# Patient Record
Sex: MALE | Race: WHITE | URBAN - METROPOLITAN AREA
[De-identification: names, ages, dates, MRNs, and addresses within clinical notes are randomized per-mention and may not be internally consistent; named-entity substitution may affect disease eponyms.]

---

## 2021-08-21 ENCOUNTER — INPATIENT (INPATIENT)
Facility: HOSPITAL | Age: 46
LOS: 32 days | Discharge: HOME | DRG: 917 | End: 2021-09-23
Attending: STUDENT IN AN ORGANIZED HEALTH CARE EDUCATION/TRAINING PROGRAM | Admitting: INTERNAL MEDICINE
Payer: COMMERCIAL

## 2021-08-21 VITALS
RESPIRATION RATE: 14 BRPM | TEMPERATURE: 101 F | DIASTOLIC BLOOD PRESSURE: 77 MMHG | SYSTOLIC BLOOD PRESSURE: 122 MMHG | HEART RATE: 159 BPM | OXYGEN SATURATION: 97 %

## 2021-08-21 DIAGNOSIS — Z98.89 OTHER SPECIFIED POSTPROCEDURAL STATES: Chronic | ICD-10-CM

## 2021-08-21 DIAGNOSIS — Z96.629 PRESENCE OF UNSPECIFIED ARTIFICIAL ELBOW JOINT: Chronic | ICD-10-CM

## 2021-08-21 DIAGNOSIS — A41.9 SEPSIS, UNSPECIFIED ORGANISM: ICD-10-CM

## 2021-08-21 LAB
ALBUMIN SERPL ELPH-MCNC: 3.6 G/DL — SIGNIFICANT CHANGE UP (ref 3.3–5.2)
ALP SERPL-CCNC: 60 U/L — SIGNIFICANT CHANGE UP (ref 40–120)
ALT FLD-CCNC: 183 U/L — HIGH
AMPHET UR-MCNC: NEGATIVE — SIGNIFICANT CHANGE UP
ANION GAP SERPL CALC-SCNC: 16 MMOL/L — SIGNIFICANT CHANGE UP (ref 5–17)
ANISOCYTOSIS BLD QL: SLIGHT — SIGNIFICANT CHANGE UP
APAP SERPL-MCNC: <3 UG/ML — LOW (ref 10–26)
APPEARANCE UR: CLEAR — SIGNIFICANT CHANGE UP
APTT BLD: 31.8 SEC — SIGNIFICANT CHANGE UP (ref 27.5–35.5)
AST SERPL-CCNC: 248 U/L — HIGH
BARBITURATES UR SCN-MCNC: NEGATIVE — SIGNIFICANT CHANGE UP
BENZODIAZ UR-MCNC: POSITIVE
BILIRUB SERPL-MCNC: 0.5 MG/DL — SIGNIFICANT CHANGE UP (ref 0.4–2)
BILIRUB UR-MCNC: NEGATIVE — SIGNIFICANT CHANGE UP
BUN SERPL-MCNC: 39.7 MG/DL — HIGH (ref 8–20)
CALCIUM SERPL-MCNC: 8.8 MG/DL — SIGNIFICANT CHANGE UP (ref 8.6–10.2)
CHLORIDE SERPL-SCNC: 104 MMOL/L — SIGNIFICANT CHANGE UP (ref 98–107)
CK MB CFR SERPL CALC: 126.3 NG/ML — HIGH (ref 0–6.7)
CK SERPL-CCNC: 5627 U/L — HIGH (ref 30–200)
CO2 SERPL-SCNC: 22 MMOL/L — SIGNIFICANT CHANGE UP (ref 22–29)
COCAINE METAB.OTHER UR-MCNC: POSITIVE
COLOR SPEC: YELLOW — SIGNIFICANT CHANGE UP
CREAT SERPL-MCNC: 3.78 MG/DL — HIGH (ref 0.5–1.3)
DIFF PNL FLD: ABNORMAL
EPI CELLS # UR: SIGNIFICANT CHANGE UP
ETHANOL SERPL-MCNC: <10 MG/DL — SIGNIFICANT CHANGE UP (ref 0–9)
GLUCOSE SERPL-MCNC: 84 MG/DL — SIGNIFICANT CHANGE UP (ref 70–99)
GLUCOSE UR QL: 50 MG/DL
HCT VFR BLD CALC: 55.2 % — HIGH (ref 39–50)
HGB BLD-MCNC: 18.3 G/DL — HIGH (ref 13–17)
INR BLD: 1.05 RATIO — SIGNIFICANT CHANGE UP (ref 0.88–1.16)
KETONES UR-MCNC: ABNORMAL
LACTATE BLDV-MCNC: 3.1 MMOL/L — HIGH (ref 0.5–2)
LEUKOCYTE ESTERASE UR-ACNC: NEGATIVE — SIGNIFICANT CHANGE UP
LIDOCAIN IGE QN: 58 U/L — HIGH (ref 22–51)
LYMPHOCYTES # BLD AUTO: 19 % — SIGNIFICANT CHANGE UP (ref 13–44)
MACROCYTES BLD QL: SLIGHT — SIGNIFICANT CHANGE UP
MAGNESIUM SERPL-MCNC: 1.7 MG/DL — LOW (ref 1.8–2.6)
MCHC RBC-ENTMCNC: 31.2 PG — SIGNIFICANT CHANGE UP (ref 27–34)
MCHC RBC-ENTMCNC: 33.2 GM/DL — SIGNIFICANT CHANGE UP (ref 32–36)
MCV RBC AUTO: 94.2 FL — SIGNIFICANT CHANGE UP (ref 80–100)
METAMYELOCYTES # FLD: 1 % — HIGH (ref 0–0)
METHADONE UR-MCNC: NEGATIVE — SIGNIFICANT CHANGE UP
MICROCYTES BLD QL: SLIGHT — SIGNIFICANT CHANGE UP
MONOCYTES NFR BLD AUTO: 15 % — HIGH (ref 2–14)
NEUTROPHILS NFR BLD AUTO: 61 % — SIGNIFICANT CHANGE UP (ref 43–77)
NEUTS BAND # BLD: 3 % — SIGNIFICANT CHANGE UP (ref 0–8)
NITRITE UR-MCNC: NEGATIVE — SIGNIFICANT CHANGE UP
NT-PROBNP SERPL-SCNC: 1967 PG/ML — HIGH (ref 0–300)
OPIATES UR-MCNC: NEGATIVE — SIGNIFICANT CHANGE UP
PCP SPEC-MCNC: SIGNIFICANT CHANGE UP
PCP UR-MCNC: NEGATIVE — SIGNIFICANT CHANGE UP
PH UR: 6 — SIGNIFICANT CHANGE UP (ref 5–8)
PLAT MORPH BLD: NORMAL — SIGNIFICANT CHANGE UP
PLATELET # BLD AUTO: 189 K/UL — SIGNIFICANT CHANGE UP (ref 150–400)
POIKILOCYTOSIS BLD QL AUTO: SLIGHT — SIGNIFICANT CHANGE UP
POLYCHROMASIA BLD QL SMEAR: SLIGHT — SIGNIFICANT CHANGE UP
POTASSIUM SERPL-MCNC: 5.2 MMOL/L — SIGNIFICANT CHANGE UP (ref 3.5–5.3)
POTASSIUM SERPL-SCNC: 5.2 MMOL/L — SIGNIFICANT CHANGE UP (ref 3.5–5.3)
PROT SERPL-MCNC: 6.5 G/DL — LOW (ref 6.6–8.7)
PROT UR-MCNC: 100 MG/DL
PROTHROM AB SERPL-ACNC: 12.2 SEC — SIGNIFICANT CHANGE UP (ref 10.6–13.6)
RAPID RVP RESULT: SIGNIFICANT CHANGE UP
RBC # BLD: 5.86 M/UL — HIGH (ref 4.2–5.8)
RBC # FLD: 11.8 % — SIGNIFICANT CHANGE UP (ref 10.3–14.5)
RBC BLD AUTO: ABNORMAL
RBC CASTS # UR COMP ASSIST: ABNORMAL /HPF (ref 0–4)
SALICYLATES SERPL-MCNC: <0.6 MG/DL — LOW (ref 10–20)
SARS-COV-2 RNA SPEC QL NAA+PROBE: SIGNIFICANT CHANGE UP
SODIUM SERPL-SCNC: 142 MMOL/L — SIGNIFICANT CHANGE UP (ref 135–145)
SP GR SPEC: 1.02 — SIGNIFICANT CHANGE UP (ref 1.01–1.02)
STOMATOCYTES BLD QL SMEAR: PRESENT — SIGNIFICANT CHANGE UP
T4 AB SER-ACNC: 4.8 UG/DL — SIGNIFICANT CHANGE UP (ref 4.5–12)
THC UR QL: NEGATIVE — SIGNIFICANT CHANGE UP
TROPONIN T SERPL-MCNC: 0.72 NG/ML — HIGH (ref 0–0.06)
TSH SERPL-MCNC: 0.76 UIU/ML — SIGNIFICANT CHANGE UP (ref 0.27–4.2)
UROBILINOGEN FLD QL: NEGATIVE MG/DL — SIGNIFICANT CHANGE UP
VARIANT LYMPHS # BLD: 1 % — SIGNIFICANT CHANGE UP (ref 0–6)
WBC # BLD: 4.16 K/UL — SIGNIFICANT CHANGE UP (ref 3.8–10.5)
WBC # FLD AUTO: 4.16 K/UL — SIGNIFICANT CHANGE UP (ref 3.8–10.5)
WBC UR QL: SIGNIFICANT CHANGE UP

## 2021-08-21 PROCEDURE — 93010 ELECTROCARDIOGRAM REPORT: CPT

## 2021-08-21 PROCEDURE — 31500 INSERT EMERGENCY AIRWAY: CPT

## 2021-08-21 PROCEDURE — 99291 CRITICAL CARE FIRST HOUR: CPT | Mod: 25

## 2021-08-21 PROCEDURE — 70450 CT HEAD/BRAIN W/O DYE: CPT | Mod: 26,MA

## 2021-08-21 PROCEDURE — 71045 X-RAY EXAM CHEST 1 VIEW: CPT | Mod: 26

## 2021-08-21 RX ORDER — GLUCAGON INJECTION, SOLUTION 0.5 MG/.1ML
1 INJECTION, SOLUTION SUBCUTANEOUS ONCE
Refills: 0 | Status: DISCONTINUED | OUTPATIENT
Start: 2021-08-21 | End: 2021-09-07

## 2021-08-21 RX ORDER — DEXTROSE 50 % IN WATER 50 %
12.5 SYRINGE (ML) INTRAVENOUS ONCE
Refills: 0 | Status: DISCONTINUED | OUTPATIENT
Start: 2021-08-21 | End: 2021-09-07

## 2021-08-21 RX ORDER — ALBUTEROL 90 UG/1
2 AEROSOL, METERED ORAL EVERY 6 HOURS
Refills: 0 | Status: DISCONTINUED | OUTPATIENT
Start: 2021-08-21 | End: 2021-08-23

## 2021-08-21 RX ORDER — HEPARIN SODIUM 5000 [USP'U]/ML
5000 INJECTION INTRAVENOUS; SUBCUTANEOUS EVERY 8 HOURS
Refills: 0 | Status: DISCONTINUED | OUTPATIENT
Start: 2021-08-21 | End: 2021-09-23

## 2021-08-21 RX ORDER — ACETAMINOPHEN 500 MG
975 TABLET ORAL ONCE
Refills: 0 | Status: COMPLETED | OUTPATIENT
Start: 2021-08-21 | End: 2021-08-21

## 2021-08-21 RX ORDER — DEXTROSE 50 % IN WATER 50 %
15 SYRINGE (ML) INTRAVENOUS ONCE
Refills: 0 | Status: DISCONTINUED | OUTPATIENT
Start: 2021-08-21 | End: 2021-09-07

## 2021-08-21 RX ORDER — NOREPINEPHRINE BITARTRATE/D5W 8 MG/250ML
0.05 PLASTIC BAG, INJECTION (ML) INTRAVENOUS
Qty: 8 | Refills: 0 | Status: DISCONTINUED | OUTPATIENT
Start: 2021-08-21 | End: 2021-08-23

## 2021-08-21 RX ORDER — CHLORHEXIDINE GLUCONATE 213 G/1000ML
1 SOLUTION TOPICAL
Refills: 0 | Status: DISCONTINUED | OUTPATIENT
Start: 2021-08-21 | End: 2021-09-07

## 2021-08-21 RX ORDER — PHENYLEPHRINE HYDROCHLORIDE 10 MG/ML
0.5 INJECTION INTRAVENOUS
Qty: 40 | Refills: 0 | Status: DISCONTINUED | OUTPATIENT
Start: 2021-08-21 | End: 2021-08-21

## 2021-08-21 RX ORDER — DEXTROSE 50 % IN WATER 50 %
25 SYRINGE (ML) INTRAVENOUS ONCE
Refills: 0 | Status: DISCONTINUED | OUTPATIENT
Start: 2021-08-21 | End: 2021-09-07

## 2021-08-21 RX ORDER — KETAMINE HYDROCHLORIDE 100 MG/ML
100 INJECTION INTRAMUSCULAR; INTRAVENOUS ONCE
Refills: 0 | Status: DISCONTINUED | OUTPATIENT
Start: 2021-08-21 | End: 2021-08-21

## 2021-08-21 RX ORDER — SODIUM CHLORIDE 9 MG/ML
2800 INJECTION, SOLUTION INTRAVENOUS ONCE
Refills: 0 | Status: COMPLETED | OUTPATIENT
Start: 2021-08-21 | End: 2021-08-21

## 2021-08-21 RX ORDER — SODIUM CHLORIDE 9 MG/ML
1000 INJECTION, SOLUTION INTRAVENOUS
Refills: 0 | Status: DISCONTINUED | OUTPATIENT
Start: 2021-08-21 | End: 2021-08-23

## 2021-08-21 RX ORDER — PIPERACILLIN AND TAZOBACTAM 4; .5 G/20ML; G/20ML
3.38 INJECTION, POWDER, LYOPHILIZED, FOR SOLUTION INTRAVENOUS ONCE
Refills: 0 | Status: COMPLETED | OUTPATIENT
Start: 2021-08-21 | End: 2021-08-21

## 2021-08-21 RX ORDER — INSULIN LISPRO 100/ML
VIAL (ML) SUBCUTANEOUS EVERY 6 HOURS
Refills: 0 | Status: DISCONTINUED | OUTPATIENT
Start: 2021-08-21 | End: 2021-09-07

## 2021-08-21 RX ORDER — CHLORHEXIDINE GLUCONATE 213 G/1000ML
15 SOLUTION TOPICAL EVERY 12 HOURS
Refills: 0 | Status: DISCONTINUED | OUTPATIENT
Start: 2021-08-21 | End: 2021-08-31

## 2021-08-21 RX ORDER — SODIUM CHLORIDE 9 MG/ML
1000 INJECTION, SOLUTION INTRAVENOUS
Refills: 0 | Status: DISCONTINUED | OUTPATIENT
Start: 2021-08-21 | End: 2021-09-07

## 2021-08-21 RX ORDER — PANTOPRAZOLE SODIUM 20 MG/1
40 TABLET, DELAYED RELEASE ORAL DAILY
Refills: 0 | Status: DISCONTINUED | OUTPATIENT
Start: 2021-08-21 | End: 2021-09-01

## 2021-08-21 RX ORDER — ROCURONIUM BROMIDE 10 MG/ML
70 VIAL (ML) INTRAVENOUS ONCE
Refills: 0 | Status: COMPLETED | OUTPATIENT
Start: 2021-08-21 | End: 2021-08-21

## 2021-08-21 RX ORDER — PROPOFOL 10 MG/ML
10 INJECTION, EMULSION INTRAVENOUS
Qty: 1000 | Refills: 0 | Status: DISCONTINUED | OUTPATIENT
Start: 2021-08-21 | End: 2021-08-24

## 2021-08-21 RX ORDER — MAGNESIUM SULFATE 500 MG/ML
2 VIAL (ML) INJECTION ONCE
Refills: 0 | Status: COMPLETED | OUTPATIENT
Start: 2021-08-21 | End: 2021-08-22

## 2021-08-21 RX ORDER — VANCOMYCIN HCL 1 G
1000 VIAL (EA) INTRAVENOUS ONCE
Refills: 0 | Status: COMPLETED | OUTPATIENT
Start: 2021-08-21 | End: 2021-08-21

## 2021-08-21 RX ORDER — PIPERACILLIN AND TAZOBACTAM 4; .5 G/20ML; G/20ML
3.38 INJECTION, POWDER, LYOPHILIZED, FOR SOLUTION INTRAVENOUS EVERY 12 HOURS
Refills: 0 | Status: DISCONTINUED | OUTPATIENT
Start: 2021-08-21 | End: 2021-08-27

## 2021-08-21 RX ADMIN — SODIUM CHLORIDE 2800 MILLILITER(S): 9 INJECTION, SOLUTION INTRAVENOUS at 20:44

## 2021-08-21 RX ADMIN — Medication 250 MILLIGRAM(S): at 20:54

## 2021-08-21 RX ADMIN — Medication 70 MILLIGRAM(S): at 19:45

## 2021-08-21 RX ADMIN — PIPERACILLIN AND TAZOBACTAM 200 GRAM(S): 4; .5 INJECTION, POWDER, LYOPHILIZED, FOR SOLUTION INTRAVENOUS at 20:44

## 2021-08-21 RX ADMIN — PROPOFOL 5.4 MICROGRAM(S)/KG/MIN: 10 INJECTION, EMULSION INTRAVENOUS at 22:55

## 2021-08-21 RX ADMIN — Medication 975 MILLIGRAM(S): at 20:00

## 2021-08-21 RX ADMIN — PHENYLEPHRINE HYDROCHLORIDE 16.9 MICROGRAM(S)/KG/MIN: 10 INJECTION INTRAVENOUS at 21:52

## 2021-08-21 RX ADMIN — KETAMINE HYDROCHLORIDE 100 MILLIGRAM(S): 100 INJECTION INTRAMUSCULAR; INTRAVENOUS at 19:45

## 2021-08-21 RX ADMIN — Medication 8.44 MICROGRAM(S)/KG/MIN: at 22:39

## 2021-08-21 NOTE — ED PROCEDURE NOTE - CPROC ED TIME OUT STATEMENT1
“Patient's name, , procedure and correct site were confirmed during the [facility] Timeout.”
“Patient's name, , procedure and correct site were confirmed during the [facility] Timeout.”

## 2021-08-21 NOTE — H&P ADULT - NSHPSOCIALHISTORY_GEN_ALL_CORE
His sister reports he drinks alcohol, abuses cocaine and pills (benzos and possibly others), owns [email or web address]

## 2021-08-21 NOTE — ED PROVIDER NOTE - CLINICAL SUMMARY MEDICAL DECISION MAKING FREE TEXT BOX
45y/o M with PMHx of DM, ? Asthma presents to the ED BIBA s/p being found unresponsive by family, unknown downtime. 45y/o M with PMHx of DM, ? Asthma presents to the ED BIBA s/p being found unresponsive by family, unknown downtime. Pt here agonal breathing, hypoxic, difficult to oxygenate, intubated on arrival, pt septic, concern for COVID vs pna vs aspiration pna (emesis at mouth), sepsis protocol initiated, MICU consulted, admit for further management

## 2021-08-21 NOTE — H&P ADULT - NSHPPOADEEPVENOUSTHROMB_GEN_A_CORE
I agree with the Resident's findings and plan, as documented in today's note.    Hazel Villarreal MD   no

## 2021-08-21 NOTE — ED ADULT NURSE NOTE - OBJECTIVE STATEMENT
Pt unresponsive at this time. .  per EMS pt hx DM asthma,  presents to the ED BIBA after being found unresponsive by his family, unknown downtime.  pt was found with agonal respirations, pinpoint pupils, received 3mg IV Narcan and 1mg IN Narcan. EMS believes pt may have aspirated, bagged on arrival.b MD BY at bedside, on monitor

## 2021-08-21 NOTE — ED PROVIDER NOTE - PHYSICAL EXAMINATION
Const:   HEENT: NC/AT. Moist mucous membranes.  Eyes: No scleral icterus. EOMI.  Neck:. Soft and supple. Full ROM without pain.  Cardiac: Regular rate and regular rhythm. +S1/S2. Peripheral pulses 2+ and symmetric. No LE edema.  Resp:   Abd: Soft, non-tender, non-distended. Normal bowel sounds in all 4 quadrants. No guarding or rebound.  Back: Spine midline and non-tender. No CVAT.  Skin: No rashes, abrasions or lacerations.  Lymph: No cervical lymphadenopathy.  Neuro: Const: Unresponsive, agonal breathing  HEENT: NC/AT. Moist mucous membranes. Dried emesis around mouth  Eyes: 4mm equal round reactive  Neck:. Soft and supple. Full ROM without pain.  Cardiac: Regular rate and regular rhythm. +S1/S2. Peripheral pulses 2+ and symmetric. No LE edema.  Resp: Decreased breath sounds b/l.   Abd: Soft, non-distended. Normal bowel sounds in all 4 quadrants. No guarding or rebound.  Back: Spine midline and non-tender. No CVAT.  Skin: No rashes, abrasions or lacerations. No signs of trauma   Lymph: No cervical lymphadenopathy.  Neuro: Unresponsive, agonal breathing Const: Unresponsive, agonal breathing  HEENT: NC/AT. Moist mucous membranes. Dried emesis around mouth  Eyes: 4mm equal round reactive  Neck:. Soft and supple.  Cardiac:Tachy and regular rhythm. +S1/S2. Peripheral pulses 2+ and symmetric. No LE edema.  Resp: Decreased breath sounds b/l.   Abd: Soft, non-distended. No guarding or rebound.  Back: Spine midline, no deformity, no step offs. No CVAT.  Skin: No rashes, abrasions or lacerations. No signs of trauma   Lymph: No cervical lymphadenopathy.  Neuro: Unresponsive, agonal breathing

## 2021-08-21 NOTE — ED ADULT TRIAGE NOTE - CHIEF COMPLAINT QUOTE
pt with known hx of heroin addiction, BIBA unresponsive, found by family friend. agonal breaths noted, being bagged by EMS. pt sent to critical care, MD called for eval.

## 2021-08-21 NOTE — H&P ADULT - ASSESSMENT
44 y/o M with a h/o HTN, DM, asthma, with acute hypoxemic respiratory failure, aspiration pneumonia, AMS, suspected benzodiazepine overdose, GARRETT.            CRITICAL CARE TIME SPENT: 55 mins  Time spent evaluating/treating patient with medical issues that pose a high risk for life threatening deterioration and/or end-organ damage, reviewing data/labs/imaging, discussing case with multidisciplinary team, discussing plan/goals of care with patient/family. Non-inclusive of procedure time.   42 y/o M with a h/o HTN, DM, asthma, with acute hypoxemic respiratory failure, aspiration pneumonia, AMS, suspected benzodiazepine overdose, septic shock, GARRETT.    At this time the leading theory is that the patient overdosed on benzodiazepines and had a massive aspiration event. No major acute intracranial pathology seen on CT head. Unclear if this was a suicide attempt. His sister reports he has never mentioned anything about hurting himself to her.    - actively titrating vent settings to maintain SpO2 > 92%, currently with significant FiO2 requirement, at increased risk for development of ARDS  - obtain ABG and adjust minute ventilation as necessary  - start sedation with propofol to promote vent synchrony and patient comfort, he is beginning to become restless  - start norepinephrine infusion to maintain a MAP > 65, suspect onset of septic shock compounded by vasodilation from propofol  - s/p 3L IVF at this point, will give additional 1L LR bolus now and start maintenance LR @ 100cc/hr given evidence of hemoconcentration  - start course of empiric Zosyn, blood cultures pending, will add sputum culture and check urine Legionella Ag  - GARRETT/ATN likely pre-renal in nature, may indicate him being down for an extended period of time, check CK level  - trend BUN/Cr, monitor lytes, place parikh catheter for precise UOP measurement, no indication for urgent HD at this time  - will consider MRI head pending clinical course to further investigate questionable findings on CT head  - will need psychiatry and social work evaluation prior to discharge    Case discussed in detail with eICU physician, Dr. Waters.      CRITICAL CARE TIME SPENT: 55 mins  Time spent evaluating/treating patient with medical issues that pose a high risk for life threatening deterioration and/or end-organ damage, reviewing data/labs/imaging, discussing case with multidisciplinary team, discussing plan/goals of care with patient/family. Non-inclusive of procedure time.   44 y/o M with a h/o HTN, DM, asthma, with acute hypoxemic respiratory failure, aspiration pneumonia, AMS, suspected benzodiazepine overdose, septic shock, GARRETT.    At this time the leading theory is that the patient overdosed on benzodiazepines and had a massive aspiration event. No major acute intracranial pathology seen on CT head. Unclear if this was a suicide attempt. His sister reports he has never mentioned anything about hurting himself to her.    - actively titrating vent settings to maintain SpO2 > 92%, currently with significant FiO2 requirement, at increased risk for development of ARDS  - obtain ABG and adjust minute ventilation as necessary  - empiric inhaled bronchodilators for now  - start sedation with propofol to promote vent synchrony and patient comfort, he is beginning to become restless  - start norepinephrine infusion to maintain a MAP > 65, suspect onset of septic shock compounded by vasodilation from propofol  - s/p 3L IVF at this point, will give additional 1L LR bolus now and start maintenance LR @ 100cc/hr given evidence of hemoconcentration  - start course of empiric Zosyn, blood cultures pending, will add sputum culture and check urine Legionella Ag  - GARRETT/ATN likely pre-renal in nature, may indicate him being down for an extended period of time, check CK level  - trend BUN/Cr, monitor lytes, place parikh catheter for precise UOP measurement, no indication for urgent HD at this time  - will consider MRI head pending clinical course to further investigate questionable findings on CT head  - start Q 6 hour sliding scale insulin regimen  - will need psychiatry and social work evaluation prior to discharge    Case discussed in detail at the bedside with the patient's sister, Adela Borges ([phone]). All questions answered and concerns addressed. She will bring in the medications that she found at his house.    Case discussed in detail with eICU physician, Dr. Waters.      CRITICAL CARE TIME SPENT: 55 mins  Time spent evaluating/treating patient with medical issues that pose a high risk for life threatening deterioration and/or end-organ damage, reviewing data/labs/imaging, discussing case with multidisciplinary team, discussing plan/goals of care with patient/family. Non-inclusive of procedure time.

## 2021-08-21 NOTE — ED PROVIDER NOTE - OBJECTIVE STATEMENT
43y/o M with PMHx of DM, ? Asthma presents to the ED BIBA s/p being found unresponsive by family, unknown downtime. Per EMS, pt was found with agonal respirations, received 3IV Narcan and 1 IN Narcan. EMS believes pt may have aspirated, bagged in field. Unable to obtain further information secondary to condition. 45y/o M with PMHx of DM, ? Asthma (as per EMS) presents to the ED BIBA s/p being found unresponsive by family, unknown downtime. Per EMS, pt was found with agonal respirations, pinpoint pupils, received 3mg IV Narcan and 1mg IN Narcan. EMS believes pt may have aspirated, bagged in field. Unable to obtain further information secondary to condition.

## 2021-08-22 LAB
A1C WITH ESTIMATED AVERAGE GLUCOSE RESULT: 5.3 % — SIGNIFICANT CHANGE UP (ref 4–5.6)
ALBUMIN SERPL ELPH-MCNC: 2.8 G/DL — LOW (ref 3.3–5.2)
ALBUMIN SERPL ELPH-MCNC: 2.9 G/DL — LOW (ref 3.3–5.2)
ALP SERPL-CCNC: 40 U/L — SIGNIFICANT CHANGE UP (ref 40–120)
ALP SERPL-CCNC: 41 U/L — SIGNIFICANT CHANGE UP (ref 40–120)
ALT FLD-CCNC: 149 U/L — HIGH
ALT FLD-CCNC: 158 U/L — HIGH
ANION GAP SERPL CALC-SCNC: 12 MMOL/L — SIGNIFICANT CHANGE UP (ref 5–17)
ANION GAP SERPL CALC-SCNC: 13 MMOL/L — SIGNIFICANT CHANGE UP (ref 5–17)
ANION GAP SERPL CALC-SCNC: 17 MMOL/L — SIGNIFICANT CHANGE UP (ref 5–17)
APPEARANCE UR: ABNORMAL
AST SERPL-CCNC: 244 U/L — HIGH
AST SERPL-CCNC: 274 U/L — HIGH
BACTERIA # UR AUTO: ABNORMAL
BASE EXCESS BLDV CALC-SCNC: -2.3 MMOL/L — LOW (ref -2–3)
BILIRUB SERPL-MCNC: 0.4 MG/DL — SIGNIFICANT CHANGE UP (ref 0.4–2)
BILIRUB SERPL-MCNC: 0.5 MG/DL — SIGNIFICANT CHANGE UP (ref 0.4–2)
BILIRUB UR-MCNC: NEGATIVE — SIGNIFICANT CHANGE UP
BUN SERPL-MCNC: 43.2 MG/DL — HIGH (ref 8–20)
BUN SERPL-MCNC: 43.8 MG/DL — HIGH (ref 8–20)
BUN SERPL-MCNC: 44.6 MG/DL — HIGH (ref 8–20)
CALCIUM SERPL-MCNC: 8.2 MG/DL — LOW (ref 8.6–10.2)
CALCIUM SERPL-MCNC: 8.5 MG/DL — LOW (ref 8.6–10.2)
CALCIUM SERPL-MCNC: 8.5 MG/DL — LOW (ref 8.6–10.2)
CHLORIDE SERPL-SCNC: 105 MMOL/L — SIGNIFICANT CHANGE UP (ref 98–107)
CHLORIDE SERPL-SCNC: 107 MMOL/L — SIGNIFICANT CHANGE UP (ref 98–107)
CHLORIDE SERPL-SCNC: 107 MMOL/L — SIGNIFICANT CHANGE UP (ref 98–107)
CK MB CFR SERPL CALC: 101.1 NG/ML — HIGH (ref 0–6.7)
CK MB CFR SERPL CALC: 125.5 NG/ML — HIGH (ref 0–6.7)
CK MB CFR SERPL CALC: 57.6 NG/ML — HIGH (ref 0–6.7)
CK SERPL-CCNC: 7124 U/L — HIGH (ref 30–200)
CK SERPL-CCNC: 7422 U/L — HIGH (ref 30–200)
CK SERPL-CCNC: 8851 U/L — HIGH (ref 30–200)
CO2 SERPL-SCNC: 19 MMOL/L — LOW (ref 22–29)
CO2 SERPL-SCNC: 20 MMOL/L — LOW (ref 22–29)
CO2 SERPL-SCNC: 20 MMOL/L — LOW (ref 22–29)
COLOR SPEC: YELLOW — SIGNIFICANT CHANGE UP
COMMENT - URINE: SIGNIFICANT CHANGE UP
COVID-19 SPIKE DOMAIN AB INTERP: NEGATIVE — SIGNIFICANT CHANGE UP
COVID-19 SPIKE DOMAIN ANTIBODY RESULT: 0.4 U/ML — SIGNIFICANT CHANGE UP
CREAT ?TM UR-MCNC: 282 MG/DL — SIGNIFICANT CHANGE UP
CREAT SERPL-MCNC: 2.22 MG/DL — HIGH (ref 0.5–1.3)
CREAT SERPL-MCNC: 3.08 MG/DL — HIGH (ref 0.5–1.3)
CREAT SERPL-MCNC: 3.68 MG/DL — HIGH (ref 0.5–1.3)
DIFF PNL FLD: ABNORMAL
EPI CELLS # UR: SIGNIFICANT CHANGE UP
ESTIMATED AVERAGE GLUCOSE: 105 MG/DL — SIGNIFICANT CHANGE UP (ref 68–114)
GAS PNL BLDA: SIGNIFICANT CHANGE UP
GAS PNL BLDA: SIGNIFICANT CHANGE UP
GLUCOSE BLDC GLUCOMTR-MCNC: 108 MG/DL — HIGH (ref 70–99)
GLUCOSE BLDC GLUCOMTR-MCNC: 124 MG/DL — HIGH (ref 70–99)
GLUCOSE BLDC GLUCOMTR-MCNC: 128 MG/DL — HIGH (ref 70–99)
GLUCOSE SERPL-MCNC: 106 MG/DL — HIGH (ref 70–99)
GLUCOSE SERPL-MCNC: 141 MG/DL — HIGH (ref 70–99)
GLUCOSE SERPL-MCNC: 154 MG/DL — HIGH (ref 70–99)
GLUCOSE UR QL: NEGATIVE MG/DL — SIGNIFICANT CHANGE UP
GRAM STN FLD: SIGNIFICANT CHANGE UP
GRAN CASTS # UR COMP ASSIST: ABNORMAL /LPF
HCO3 BLDV-SCNC: 24 MMOL/L — SIGNIFICANT CHANGE UP (ref 22–29)
HCT VFR BLD CALC: 47.1 % — SIGNIFICANT CHANGE UP (ref 39–50)
HCT VFR BLD CALC: 49.1 % — SIGNIFICANT CHANGE UP (ref 39–50)
HGB BLD-MCNC: 15.7 G/DL — SIGNIFICANT CHANGE UP (ref 13–17)
HGB BLD-MCNC: 16.7 G/DL — SIGNIFICANT CHANGE UP (ref 13–17)
HYALINE CASTS # UR AUTO: ABNORMAL /LPF
KETONES UR-MCNC: ABNORMAL
LEUKOCYTE ESTERASE UR-ACNC: ABNORMAL
MAGNESIUM SERPL-MCNC: 1.8 MG/DL — SIGNIFICANT CHANGE UP (ref 1.6–2.6)
MAGNESIUM SERPL-MCNC: 1.8 MG/DL — SIGNIFICANT CHANGE UP (ref 1.8–2.6)
MAGNESIUM SERPL-MCNC: 2.1 MG/DL — SIGNIFICANT CHANGE UP (ref 1.6–2.6)
MCHC RBC-ENTMCNC: 31 PG — SIGNIFICANT CHANGE UP (ref 27–34)
MCHC RBC-ENTMCNC: 31 PG — SIGNIFICANT CHANGE UP (ref 27–34)
MCHC RBC-ENTMCNC: 33.3 GM/DL — SIGNIFICANT CHANGE UP (ref 32–36)
MCHC RBC-ENTMCNC: 34 GM/DL — SIGNIFICANT CHANGE UP (ref 32–36)
MCV RBC AUTO: 91.3 FL — SIGNIFICANT CHANGE UP (ref 80–100)
MCV RBC AUTO: 93.1 FL — SIGNIFICANT CHANGE UP (ref 80–100)
NITRITE UR-MCNC: NEGATIVE — SIGNIFICANT CHANGE UP
NT-PROBNP SERPL-SCNC: 5142 PG/ML — HIGH (ref 0–300)
OSMOLALITY UR: 773 MOSM/KG — SIGNIFICANT CHANGE UP (ref 300–1000)
PCO2 BLDV: 49 MMHG — SIGNIFICANT CHANGE UP (ref 42–55)
PH BLDV: 7.3 — LOW (ref 7.32–7.43)
PH UR: 5 — SIGNIFICANT CHANGE UP (ref 5–8)
PHOSPHATE SERPL-MCNC: 3.5 MG/DL — SIGNIFICANT CHANGE UP (ref 2.4–4.7)
PHOSPHATE SERPL-MCNC: 4.2 MG/DL — SIGNIFICANT CHANGE UP (ref 2.4–4.7)
PLATELET # BLD AUTO: 149 K/UL — LOW (ref 150–400)
PLATELET # BLD AUTO: 170 K/UL — SIGNIFICANT CHANGE UP (ref 150–400)
PO2 BLDV: 42 MMHG — SIGNIFICANT CHANGE UP (ref 25–45)
POTASSIUM SERPL-MCNC: 5.1 MMOL/L — SIGNIFICANT CHANGE UP (ref 3.5–5.3)
POTASSIUM SERPL-MCNC: 5.4 MMOL/L — HIGH (ref 3.5–5.3)
POTASSIUM SERPL-MCNC: 5.6 MMOL/L — HIGH (ref 3.5–5.3)
POTASSIUM SERPL-SCNC: 5.1 MMOL/L — SIGNIFICANT CHANGE UP (ref 3.5–5.3)
POTASSIUM SERPL-SCNC: 5.4 MMOL/L — HIGH (ref 3.5–5.3)
POTASSIUM SERPL-SCNC: 5.6 MMOL/L — HIGH (ref 3.5–5.3)
PROT ?TM UR-MCNC: 66 MG/DL — HIGH (ref 0–12)
PROT SERPL-MCNC: 5.6 G/DL — LOW (ref 6.6–8.7)
PROT SERPL-MCNC: 5.6 G/DL — LOW (ref 6.6–8.7)
PROT UR-MCNC: 30 MG/DL
PROT/CREAT UR-RTO: 0.2 RATIO — SIGNIFICANT CHANGE UP
RBC # BLD: 5.06 M/UL — SIGNIFICANT CHANGE UP (ref 4.2–5.8)
RBC # BLD: 5.38 M/UL — SIGNIFICANT CHANGE UP (ref 4.2–5.8)
RBC # FLD: 12 % — SIGNIFICANT CHANGE UP (ref 10.3–14.5)
RBC # FLD: 12.1 % — SIGNIFICANT CHANGE UP (ref 10.3–14.5)
RBC CASTS # UR COMP ASSIST: ABNORMAL /HPF (ref 0–4)
SAO2 % BLDV: 70.4 % — SIGNIFICANT CHANGE UP
SARS-COV-2 IGG+IGM SERPL QL IA: 0.4 U/ML — SIGNIFICANT CHANGE UP
SARS-COV-2 IGG+IGM SERPL QL IA: NEGATIVE — SIGNIFICANT CHANGE UP
SODIUM SERPL-SCNC: 138 MMOL/L — SIGNIFICANT CHANGE UP (ref 135–145)
SODIUM SERPL-SCNC: 139 MMOL/L — SIGNIFICANT CHANGE UP (ref 135–145)
SODIUM SERPL-SCNC: 143 MMOL/L — SIGNIFICANT CHANGE UP (ref 135–145)
SODIUM UR-SCNC: <30 MMOL/L — SIGNIFICANT CHANGE UP
SP GR SPEC: 1.02 — SIGNIFICANT CHANGE UP (ref 1.01–1.02)
SPECIMEN SOURCE: SIGNIFICANT CHANGE UP
TROPONIN T SERPL-MCNC: 1.04 NG/ML — HIGH (ref 0–0.06)
TROPONIN T SERPL-MCNC: 1.25 NG/ML — HIGH (ref 0–0.06)
UROBILINOGEN FLD QL: NEGATIVE MG/DL — SIGNIFICANT CHANGE UP
WBC # BLD: 11.65 K/UL — HIGH (ref 3.8–10.5)
WBC # BLD: 17.12 K/UL — HIGH (ref 3.8–10.5)
WBC # FLD AUTO: 11.65 K/UL — HIGH (ref 3.8–10.5)
WBC # FLD AUTO: 17.12 K/UL — HIGH (ref 3.8–10.5)
WBC UR QL: SIGNIFICANT CHANGE UP

## 2021-08-22 PROCEDURE — 93306 TTE W/DOPPLER COMPLETE: CPT | Mod: 26

## 2021-08-22 PROCEDURE — 71045 X-RAY EXAM CHEST 1 VIEW: CPT | Mod: 26

## 2021-08-22 PROCEDURE — 93010 ELECTROCARDIOGRAM REPORT: CPT | Mod: 77

## 2021-08-22 PROCEDURE — 99291 CRITICAL CARE FIRST HOUR: CPT

## 2021-08-22 PROCEDURE — 93010 ELECTROCARDIOGRAM REPORT: CPT

## 2021-08-22 PROCEDURE — 99223 1ST HOSP IP/OBS HIGH 75: CPT

## 2021-08-22 PROCEDURE — 99221 1ST HOSP IP/OBS SF/LOW 40: CPT

## 2021-08-22 RX ORDER — MIDODRINE HYDROCHLORIDE 2.5 MG/1
10 TABLET ORAL EVERY 8 HOURS
Refills: 0 | Status: DISCONTINUED | OUTPATIENT
Start: 2021-08-22 | End: 2021-08-26

## 2021-08-22 RX ORDER — ASPIRIN/CALCIUM CARB/MAGNESIUM 324 MG
81 TABLET ORAL DAILY
Refills: 0 | Status: DISCONTINUED | OUTPATIENT
Start: 2021-08-22 | End: 2021-09-07

## 2021-08-22 RX ORDER — VASOPRESSIN 20 [USP'U]/ML
0.04 INJECTION INTRAVENOUS
Qty: 50 | Refills: 0 | Status: DISCONTINUED | OUTPATIENT
Start: 2021-08-22 | End: 2021-08-22

## 2021-08-22 RX ADMIN — VASOPRESSIN 2.4 UNIT(S)/MIN: 20 INJECTION INTRAVENOUS at 00:54

## 2021-08-22 RX ADMIN — PROPOFOL 5.4 MICROGRAM(S)/KG/MIN: 10 INJECTION, EMULSION INTRAVENOUS at 00:55

## 2021-08-22 RX ADMIN — PANTOPRAZOLE SODIUM 40 MILLIGRAM(S): 20 TABLET, DELAYED RELEASE ORAL at 09:48

## 2021-08-22 RX ADMIN — VASOPRESSIN 2.4 UNIT(S)/MIN: 20 INJECTION INTRAVENOUS at 10:10

## 2021-08-22 RX ADMIN — SODIUM CHLORIDE 100 MILLILITER(S): 9 INJECTION, SOLUTION INTRAVENOUS at 00:52

## 2021-08-22 RX ADMIN — HEPARIN SODIUM 5000 UNIT(S): 5000 INJECTION INTRAVENOUS; SUBCUTANEOUS at 14:12

## 2021-08-22 RX ADMIN — Medication 50 GRAM(S): at 00:52

## 2021-08-22 RX ADMIN — ALBUTEROL 2 PUFF(S): 90 AEROSOL, METERED ORAL at 14:55

## 2021-08-22 RX ADMIN — CHLORHEXIDINE GLUCONATE 15 MILLILITER(S): 213 SOLUTION TOPICAL at 17:06

## 2021-08-22 RX ADMIN — ALBUTEROL 2 PUFF(S): 90 AEROSOL, METERED ORAL at 08:34

## 2021-08-22 RX ADMIN — CHLORHEXIDINE GLUCONATE 15 MILLILITER(S): 213 SOLUTION TOPICAL at 05:19

## 2021-08-22 RX ADMIN — SODIUM CHLORIDE 100 MILLILITER(S): 9 INJECTION, SOLUTION INTRAVENOUS at 09:47

## 2021-08-22 RX ADMIN — ALBUTEROL 2 PUFF(S): 90 AEROSOL, METERED ORAL at 04:13

## 2021-08-22 RX ADMIN — ALBUTEROL 2 PUFF(S): 90 AEROSOL, METERED ORAL at 20:27

## 2021-08-22 RX ADMIN — PIPERACILLIN AND TAZOBACTAM 25 GRAM(S): 4; .5 INJECTION, POWDER, LYOPHILIZED, FOR SOLUTION INTRAVENOUS at 17:06

## 2021-08-22 RX ADMIN — HEPARIN SODIUM 5000 UNIT(S): 5000 INJECTION INTRAVENOUS; SUBCUTANEOUS at 22:37

## 2021-08-22 RX ADMIN — MIDODRINE HYDROCHLORIDE 10 MILLIGRAM(S): 2.5 TABLET ORAL at 22:37

## 2021-08-22 RX ADMIN — HEPARIN SODIUM 5000 UNIT(S): 5000 INJECTION INTRAVENOUS; SUBCUTANEOUS at 05:20

## 2021-08-22 RX ADMIN — PROPOFOL 5.4 MICROGRAM(S)/KG/MIN: 10 INJECTION, EMULSION INTRAVENOUS at 09:48

## 2021-08-22 RX ADMIN — PIPERACILLIN AND TAZOBACTAM 25 GRAM(S): 4; .5 INJECTION, POWDER, LYOPHILIZED, FOR SOLUTION INTRAVENOUS at 05:19

## 2021-08-22 RX ADMIN — CHLORHEXIDINE GLUCONATE 1 APPLICATION(S): 213 SOLUTION TOPICAL at 05:19

## 2021-08-22 NOTE — CONSULT NOTE ADULT - ASSESSMENT
43y old  Male with PMH of HTN, DM, Asthma who presents with a chief complaint of Acute hypoxemic respiratory failure, AMS  from suspected overdose of benzo and cocaine.  Patient found  by sister unresponsive   Arrived in the ER with agonal breath breathing and was intubated. Cxr c/w extensive right infiltrate likely aspiration pneumonia.  CT head reveals symmetric low attenuation lesions in the bilateral globus pallidus regions of the basal ganglia, which can be seen with hypoxic ischemic encephalopathy or related to carbon monoxide positioning, as well as indeterminate areas of low attenuation in the bilateral cerebellum may reflect infarcts.  Patient developed shock symptoms of hypotension requiring pressors to maintain b/p. Resuscitated with fluids and receiving maintenance fluids. We were asked to see patient for Elevated trops from 0.71 on admission to 1.25.  Ekg on admit without ischemic changes and repeated now and sinus tach with no ischemic changes.    ELEVATED TROP   continue to trend trop  Repeat ecg in the am  Stat echo for wall motion abnormalities and if so consider anticoagulation if ok with neuro  Recoment ASA 81mg via ngt  Avoid beta blockers due to hypotension    ASPIRATION PNEUMONIA  LIKELY SEPTIC SHOCK FROM ASPIRATION PNEUMONIA  C/w antibiotics with zosyn  Agree with ivf for septic shock  Pressors for support with vasopressin & norepinephrine         43y old  Male with PMH of HTN, DM, Asthma who presents with a chief complaint of Acute hypoxemic respiratory failure, AMS  from suspected overdose of benzo and cocaine.  Patient found  by sister unresponsive.  Arrived in the ER with agonal breath breathing and was intubated. Cxr c/w extensive right infiltrate likely aspiration pneumonia.  CT head reveals symmetric low attenuation lesions in the bilateral globus pallidus regions of the basal ganglia, which can be seen with hypoxic ischemic encephalopathy or related to carbon monoxide positioning, as well as indeterminate areas of low attenuation in the bilateral cerebellum may reflect infarcts.  Patient developed shock symptoms of hypotension requiring pressors to maintain b/p. Resuscitated with fluids and receiving maintenance fluids. We were asked to see patient for Elevated trops from 0.71 on admission to 1.25.  Ekg on admit without ischemic changes and repeated now and sinus tach with no ischemic changes.    ELEVATED TROP /CPK   continue to trend trop  Repeat ecg in the am  Stat echo   preliminary report with significant wall motion abnormalities full report pending  Recommend ASA 81mg via NGT if ok with neuro  Avoid beta blockers due to hypotension  No statin  due to transaminitis likely due to shock liver  Patient will need Left Heart cath is he has a meaningful neurological recovery    ASPIRATION PNEUMONIA  LIKELY SEPTIC SHOCK FROM ASPIRATION PNEUMONIA  c/w antibiotics with zosyn  Agree with ivf for septic shock  Pressors for support with vasopressin & norepinephrine    ARF maintain renal perfusion  agree with IVF  Pressors to maintain b/p and titrate asap    Abnormal ct of brain secondary to anoxic encephalopathy  continue to follow neuro status    Prognosis appears poor   Will follow with you.

## 2021-08-22 NOTE — PROGRESS NOTE ADULT - SUBJECTIVE AND OBJECTIVE BOX
Patient is a 46y old  Male who presents with a chief complaint of Acute hypoxemic respiratory failure, AMS (22 Aug 2021 13:23)      BRIEF HOSPITAL COURSE: 45 y/o M (real name: Ildefonso Pierre, : [birth year]) with a h/o HTN, DM, asthma, presents to the ED after being found unresponsive in bed by his sister this evening. Noted to have agonal respirations and emesis coming from mouth. He was found to be hypoxemic in the ED prompting emergent endotracheal intubation and appears to have an extensive right sided pneumonia on CXR. Urine toxicology (+) for benzos and cocaine. CT head reveals symmetric low attenuation lesions in the bilateral globus pallidus regions of the basal ganglia, which can be seen with hypoxic ischemic encephalopathy or related to carbon monoxide positioning, as well as indeterminate areas of low attenuation in the bilateral cerebellum may reflect infarcts. Hospital course complicated by severe septic shock state requiring dual IV vasopressor therapy and GARRETT/rhabdomyolysis.    Events last 24 hours: Patient remains in IV vasopressor dependent shock state. TTE reveals severe reduced LVEF (<20%) and severe AS. Making urine. Continues to require full mechanical vent support. Sedated on propofol (agitated when weaned).        PAST MEDICAL & SURGICAL HISTORY:  Asthma    HTN (hypertension)    DM (diabetes mellitus)        Review of Systems:  Unable to obtain secondary to sedation/intubation      Medications:  piperacillin/tazobactam IVPB.. 3.375 Gram(s) IV Intermittent every 12 hours  midodrine 10 milliGRAM(s) Oral every 8 hours  norepinephrine Infusion 0.05 MICROgram(s)/kG/Min IV Continuous <Continuous>  ALBUTerol    90 MICROgram(s) HFA Inhaler 2 Puff(s) Inhalation every 6 hours  propofol Infusion 10 MICROgram(s)/kG/Min IV Continuous <Continuous>  aspirin  chewable 81 milliGRAM(s) Oral daily  heparin   Injectable 5000 Unit(s) SubCutaneous every 8 hours  pantoprazole  Injectable 40 milliGRAM(s) IV Push daily  dextrose 40% Gel 15 Gram(s) Oral once  dextrose 50% Injectable 25 Gram(s) IV Push once  dextrose 50% Injectable 12.5 Gram(s) IV Push once  dextrose 50% Injectable 25 Gram(s) IV Push once  glucagon  Injectable 1 milliGRAM(s) IntraMuscular once  insulin lispro (ADMELOG) corrective regimen sliding scale   SubCutaneous every 6 hours  dextrose 5%. 1000 milliLiter(s) IV Continuous <Continuous>  dextrose 5%. 1000 milliLiter(s) IV Continuous <Continuous>  lactated ringers. 1000 milliLiter(s) IV Continuous <Continuous>  chlorhexidine 0.12% Liquid 15 milliLiter(s) Oral Mucosa every 12 hours  chlorhexidine 2% Cloths 1 Application(s) Topical <User Schedule>        Mode: AC/ CMV (Assist Control/ Continuous Mandatory Ventilation)  RR (machine): 16  TV (machine): 450  FiO2: 50  PEEP: 7  MAP: 10  PIP: 16      ICU Vital Signs Last 24 Hrs  T(C): 38.3 (22 Aug 2021 20:01), Max: 39.4 (22 Aug 2021 01:00)  T(F): 100.9 (22 Aug 2021 20:01), Max: 102.9 (22 Aug 2021 01:00)  HR: 106 (22 Aug 2021 21:00) (101 - 126)  BP: 99/81 (22 Aug 2021 21:00) (55/25 - 139/95)  BP(mean): 88 (22 Aug 2021 21:00) (32 - 123)  ABP: --  ABP(mean): --  RR: 24 (22 Aug 2021 21:00) (14 - 38)  SpO2: 100% (22 Aug 2021 21:00) (84% - 100%)      ABG - ( 22 Aug 2021 00:41 )  pH, Arterial: 7.240 pH, Blood: x     /  pCO2: 43    /  pO2: 138   / HCO3: 18    / Base Excess: -9.0  /  SaO2: 99.5                I&O's Detail    21 Aug 2021 07:01  -  22 Aug 2021 07:00  --------------------------------------------------------  IN:    IV PiggyBack: 50 mL    IV PiggyBack: 50 mL    Lactated Ringers: 800 mL    Norepinephrine: 222.3 mL    Propofol: 44.8 mL    Vasopressin: 19.2 mL  Total IN: 1186.3 mL    OUT:    Indwelling Catheter - Urethral (mL): 540 mL  Total OUT: 540 mL    Total NET: 646.3 mL      22 Aug 2021 07:01  -  22 Aug 2021 21:43  --------------------------------------------------------  IN:    IV PiggyBack: 100 mL    Lactated Ringers: 1400 mL    Norepinephrine: 145.3 mL    Propofol: 72.6 mL    Vasopressin: 16.8 mL  Total IN: 1734.7 mL    OUT:    Indwelling Catheter - Urethral (mL): 705 mL  Total OUT: 705 mL    Total NET: 1029.7 mL            LABS:                        15.7   17.12 )-----------( 149      ( 22 Aug 2021 12:22 )             47.1     08    138  |  105  |  43.8<H>  ----------------------------<  154<H>  5.4<H>   |  20.0<L>  |  2.22<H>    Ca    8.2<L>      22 Aug 2021 20:53  Phos  3.5       Mg     1.8         TPro  5.6<L>  /  Alb  2.8<L>  /  TBili  0.5  /  DBili  x   /  AST  244<H>  /  ALT  149<H>  /  AlkPhos  40  08-22      CARDIAC MARKERS ( 22 Aug 2021 12:22 )  x     / 1.04 ng/mL / 7422 U/L / x     / 101.1 ng/mL  CARDIAC MARKERS ( 22 Aug 2021 04:55 )  x     / 1.25 ng/mL / 8851 U/L / x     / 125.5 ng/mL  CARDIAC MARKERS ( 21 Aug 2021 20:02 )  x     / 0.72 ng/mL / 5627 U/L / x     / 126.3 ng/mL      CAPILLARY BLOOD GLUCOSE      POCT Blood Glucose.: 124 mg/dL (22 Aug 2021 17:03)    PT/INR - ( 21 Aug 2021 20:02 )   PT: 12.2 sec;   INR: 1.05 ratio         PTT - ( 21 Aug 2021 20:02 )  PTT:31.8 sec  Urinalysis Basic - ( 22 Aug 2021 12:26 )    Color: Yellow / Appearance: Slightly Turbid / S.025 / pH: x  Gluc: x / Ketone: Trace  / Bili: Negative / Urobili: Negative mg/dL   Blood: x / Protein: 30 mg/dL / Nitrite: Negative   Leuk Esterase: Trace / RBC: 3-5 /HPF / WBC 3-5   Sq Epi: x / Non Sq Epi: Few / Bacteria: Few      CULTURES:  Rapid RVP Result: NotDetec (21 @ 21:01)        Physical Examination:    General: No acute distress. sedated, intubated    HEENT: Pupils equal, reactive to light.  Symmetric.    PULM: Clear to auscultation bilaterally, no significant sputum production    CVS: tachycardic, reg rhythm, no murmurs, rubs, or gallops    ABD: Soft, nondistended, nontender, normoactive bowel sounds, no masses    EXT: No edema, nontender    SKIN: Warm and well perfused, no rashes noted.    NEURO: sedated, agitated when sedation is weaned, moves all extremities spontaneously        RADIOLOGY:     < from: Xray Chest 1 View- PORTABLE-Urgent (Xray Chest 1 View- PORTABLE-Urgent .) (21 @ 00:48) >  A right jugular line has been inserted in good position. Bilateral infiltrates right greater than left again noted although there is some improvement.    IMPRESSION: Bilateral infiltrates right greater thanleft somewhat improved on the latest film. Tubes in place as above.          CRITICAL CARE TIME SPENT: 38 mins  Time spent evaluating/treating patient with medical issues that pose a high risk for life threatening deterioration and/or end-organ damage, reviewing data/labs/imaging, discussing case with multidisciplinary team, discussing plan/goals of care with patient/family. Non-inclusive of procedure time.

## 2021-08-22 NOTE — CONSULT NOTE ADULT - ASSESSMENT
DX : Anoxic Encephalopathy, Shock Liver,     ATN - Pigment Nephropathy,    Trend CK, Myoglobin,    C/W Current Management,    RRT in AM,     D/W Dr. Chambers,

## 2021-08-22 NOTE — CONSULT NOTE ADULT - SUBJECTIVE AND OBJECTIVE BOX
Reason for Admission: Acute hypoxemic respiratory failure, AMS      History of Present Illness:       47 y/o M (real name: Ildefonso Pierre, : [birth year]) with a h/o HTN, DM, asthma, presents to the ED after being found unresponsive in bed by his sister this evening. Noted to have agonal respirations and emesis coming from mouth. He was found to be hypoxemic in the ED prompting emergent endotracheal intubation and appears to have an extensive right sided pneumonia on CXR. The patient's sister, Adela, reports that he had recently broken up with his girlfriend and has been having a hard time coping with it and has been noticeably depressed. Within the past 5 days he was taken to the ED at [facility] for some unclear intoxication. Adela also reports recently noticing a plethora of newly prescribed medications at his house and was able to identify clonazepam and alprazolam. Tonight he had told her that he was going out with friends and asked if she would come over and watch his dog, which she thought seemed unusual. Urine toxicology (+) for benzos and cocaine. CT head reveals symmetric low attenuation lesions in the bilateral globus pallidus regions of the basal ganglia, which can be seen with hypoxic ischemic encephalopathy or related to carbon monoxide positioning, as well as indeterminate areas of low attenuation in the bilateral cerebellum may reflect infarcts. He is sedated and now with progressive hypotension despite 3L IVF bolus. Started on IV vasopressor therapy.     Review of Systems:  · General	not applicable  · Skin/Breast	not applicable  · Ophthalmologic	not applicable  · ENMT	not applicable  · Respiratory and Thorax	not applicable  · Cardiovascular	not applicable  · Gastrointestinal	not applicable  · Genitourinary	not applicable  · Musculoskeletal	not applicable  · Neurological	not applicable  · Psychiatric	not applicable  · Hematology/Lymphatics	not applicable  · Endocrine	not applicable  · Allergic/Immunologic	not applicable  · Additional ROS	Unable to obtain secondary to sedation/intubation.             Allergies:  	Allergy Status Unknown:     PAST MEDICAL HISTORY:  Asthma     DM (diabetes mellitus)     HTN (hypertension).      Social History (marital status, living situation, occupation, tobacco use, alcohol and drug use, and sexual history):     His sister reports he drinks alcohol, abuses cocaine and pills (benzos and possibly others), owns [employer]     Tobacco Screening:  · Core Measure Site	No  · Has the patient used tobacco in the past 30 days?	Unable to assess due to patient's cognitive impairment    Risk Assessment:    Present on Admission:  Deep Venous Thrombosis	no  Pulmonary Embolus	no     Heart Failure:  Does this patient have a history of or has been diagnosed with heart failure? no.    HIV Screen (per [facility] Department of Health, HIV screening must be offered to every individual between ages 13 and 64)	Unable to offer due to clinical condition    Physical Exam:    Physical Exam:  · Constitutional	detailed exam  · Constitutional Details	no distress  · Constitutional Comments	sedated, intubated, dried emesis around mouth  · Eyes	EOMI; PERRL; no drainage or redness  · ENMT	No oral lesions; no gross abnormalities  · Neck	No bruits; no thyromegaly or nodules  · Back	No deformity or limitation of movement  · Respiratory	detailed exam  · Respiratory Details	airway patent; good air movement; respirations non-labored  · Respiratory Comments	diminished at right base  · Cardiovascular	detailed exam  · Cardiovascular Details	no murmur  · Cardiovascular Details	tachycardia; positive S1; positive S2  · Gastrointestinal	Soft, non-tender, no hepatosplenomegaly, normal bowel sounds  · Extremities	No cyanosis, clubbing or edema  · Vascular	Equal and normal pulses (carotid, femoral, dorsalis pedis)  · Neurological	detailed exam  · Mental Status	sedated, does not follow commands, moves all extremities spontaneously  · Skin	No lesions; no rash  · Lymph Nodes	No lymphadedenopathy  · Musculoskeletal	No joint pain, swelling or deformity; no limitation of movement    Xray Chest 1 View- PORTABLE-Urgent (Xray Chest 1 View- PORTABLE-Urgent .) (21 @ 00:48)    PROCEDURE DATE:  2021      INTERPRETATION:  AP supine chest on 2021 at 8:10 PM. Patient was unresponsive andwas intubated.    COMPARISON: None available.    Heart magnified by technique.    Fairly extensive infiltration the right lung and smaller infiltrate at left lower lung field noted.    Endotracheal tube and nasogastric tube are in place.    Follow-up AP chest on 2021 at 12:34 AM.    A right jugular line has been inserted in good position. Bilateral infiltrates right greater than left again noted although there is some improvement.    IMPRESSION: Bilateral infiltrates right greater than left somewhat improved on the latest film.     Tubes in place as above.    CT Head No Cont (21 @ 20:37)     EXAM:  CT BRAIN                          PROCEDURE DATE:  2021      INTERPRETATION:  Clinical Indication: Altered mental status.    Comparison: None    Technique: Noncontrast axial CT images of the head were acquired. Coronal and sagittal reformats were obtained.    Findings:  The ventricles and sulci are normal in size for the patient's stated age.  No acute intracranial hemorrhage is identified.  There is no extra-axial fluid collection. No mass effect or midline shift is seen.  Indeterminate low-attenuation is noted in the bilateral cerebellum. Symmetric low attenuation lesions are noted in the bilateral globus pallidus regions of the basal ganglia  There is patchy opacification of the bilateral ethmoid air cells and mild maxillary sinus mucosal thickening. The mastoid air cells are well aerated.  No acute osseous abnormality is seen.  Nasogastric tube is partially imaged. Secretions are noted in the nasopharynx    Impression:    Symmetric low attenuation lesions in the bilateral globus pallidus regions of the basal ganglia, which can be seen with hypoxic ischemic encephalopathy or related to carbon monoxide positioning.  Indeterminate areas of low attenuation in the bilateral cerebellum may reflect infarcts.  MRI is recommended for further evaluation.  Creatinine, Serum: 3.68 mg/dL (08.22.21 @ 04:55)  Historical Values  Creatinine, Serum: 3.68 mg/dL (08.22.21 @ 04:55)   Creatinine, Serum: 3.78 mg/dL (08..21 @ 20:02)   44 y/o M with a h/o HTN, DM, asthma, with acute hypoxemic respiratory failure, aspiration pneumonia, AMS, suspected benzodiazepine overdose, septic shock, GARRETT.    At this time the leading theory is that the patient overdosed on benzodiazepines and had a massive aspiration event. No major acute intracranial pathology seen on CT head. Unclear if this was a suicide attempt. His sister reports he has never mentioned anything about hurting himself to her.    - actively titrating vent settings to maintain SpO2 > 92%, currently with significant FiO2 requirement, at increased risk for development of ARDS  - obtain ABG and adjust minute ventilation as necessary  - empiric inhaled bronchodilators for now  - start sedation with propofol to promote vent synchrony and patient comfort, he is beginning to become restless  - start norepinephrine infusion to maintain a MAP > 65, suspect onset of septic shock compounded by vasodilation from propofol  - s/p 3L IVF at this point, will give additional 1L LR bolus now and start maintenance LR @ 100cc/hr given evidence of hemoconcentration  - start course of empiric Zosyn, blood cultures pending, will add sputum culture and check urine Legionella Ag  - ATN  - No indication for urgent HD at this time  - consider MRI head pending clinical course to further investigate questionable findings on CT head  - start Q 6 hour sliding scale insulin regimen      143    |  107    |  43.2<H>  ----------------------------<  106<H>  Ca:8.5<L> (22 Aug 2021 04:55)  5.1     |  19.0<L>  |  3.68<H>      eGFR if Non : 19 *L*  eGFR if : 22 *L*    TPro  5.6<L>  /  Alb  2.9<L>  /  TBili  0.4    /  DBili  x      /  AST  274<H>  /  ALT  158<H>  /  AlkPhos  41     22 Aug 2021 04:55                             16.7   11.65<H> )-----------( 170      ( 22 Aug 2021 04:55 )                  49.1     Phos:4.2 mg/dL M.1 mg/dL  ( @ 04:55)    Urinalysis Basic - ( 21 Aug 2021 20:57 )  Color: Yellow / Appearance: Clear / S.025 / pH: x  Gluc: x / Ketone: Trace<!>  / Bili: Negative / Urobili: Negative mg/dL   Blood: x / Protein: 100 mg/dL<!> / Nitrite: Negative   Leuk Esterase: Negative / RBC: 3-5 /HPF<!> / WBC 0-2   Sq Epi: x / Non Sq Epi: Occasional / Bacteria: x

## 2021-08-22 NOTE — PROGRESS NOTE ADULT - ASSESSMENT
45 y/o M (real name: Ildefonso Pierre, : [birth year]) with a h/o HTN, DM, asthma, presents to the ED after being found unresponsive in the setting of substance abuse    Acute hypoxic respiratory failure  Cardiogenic vs distributive shock   Aspiration PNA   GARRETT w/ rhabdomyolysis   Anoxic brain injury   NSTEMI  Polysubstance abuse ? intentional OD    Neuro: Maintain RAAS b/w 0 to -2. Urine tox +ve benzos and cocaine. CTH suggestive of hypoxic encephalopathy. Sedated w/ low dose propofol for vent synchrony. Psych eval and 1:1 observation when/ if he wakes up   Cardiovascular: Aim for MAP target 65. On Levophed and vasopressin. TTE (report pending) but EF down on bedside eval. Trend enzymes. Start ASA. No BB. LHC when stable as per cads  Resp/Chest: Maintain SpO2 > 92%. On Volume AC . Wean per protocol as tolerated and daily SBT. Vent bundle. EtCO2 monitoring  GI/Nutrition: Diet: Keep NPO for now. IV PPI  ID: Dense ight sided PNA on CXR. Started on Zosyn for aspiration. f/u BCx. Trend LA  Renal: Renal consulted. Continue IV hydration for now. No acute need for HD now but likely in next 24-48hrs. Avoid nephrotoxic agents. Strict I&O. Trend CK  Endocrinology: A1c and TSH WNL. Check lipid panel. Maintain Blood sugar < 180  Haem/Oncology:  DVT ppx w/ HSQ   Code status: Full  Line/tubes/ drains: RIJ TLC (shock) , ETT/OGT and parikh     Critical Care time: 35 minutes assessing presenting problems of acute illness that poses high probability of life threatening deterioration or end organ damage/dysfunction.  Medical decision making including Initiating plan of care, reviewing data, reviewing radiology, discussing with multidisciplinary team, non inclusive of procedures

## 2021-08-22 NOTE — CONSULT NOTE ADULT - SUBJECTIVE AND OBJECTIVE BOX
Patient is a 43y old  Male who presents with a chief complaint of Acute hypoxemic respiratory failure, AMS (22 Aug 2021 11:37)      HPI:  45 y/o M (real name: Ildefonso Pierre, : [birth year]) with a h/o HTN, DM, asthma, presents to the ED after being found unresponsive in bed by his sister this evening. Noted to have agonal respirations and emesis coming from mouth. He was found to be hypoxemic in the ED prompting emergent endotracheal intubation and appears to have an extensive right sided pneumonia on CXR. The patient's sister, Adela, reports that he had recently broken up with his girlfriend and has been having a hard time coping with it and has been noticeably depressed. Within the past 5 days he was taken to the ED at [facility] for some unclear intoxication. Adela also reports recently noticing a plethora of newly prescribed medications at his house and was able to identify clonazepam and alprazolam. Tonight he had told her that he was going out with friends and asked if she would come over and watch his dog, which she thought seemed unusual. Urine toxicology (+) for benzos and cocaine. CT head reveals symmetric low attenuation lesions in the bilateral globus pallidus regions of the basal ganglia, which can be seen with hypoxic ischemic encephalopathy or related to carbon monoxide positioning, as well as indeterminate areas of low attenuation in the bilateral cerebellum may reflect infarcts. He is sedated and now with progressive hypotension despite 3L IVF bolus. Started on IV vasopressor therapy. (21 Aug 2021 21:54)      PAST MEDICAL HISTORY  ASTHMA  HTN (hypertension)  DM (diabetes mellitus)    PAST SURGICAL HISTORY          ALLERGIES:  NKDA      MEDICATIONS  (STANDING):  ALBUTerol    90 MICROgram(s) HFA Inhaler 2 Puff(s) Inhalation every 6 hours  glucagon  Injectable 1 milliGRAM(s) IntraMuscular once  heparin   Injectable 5000 Unit(s) SubCutaneous every 8 hours  insulin lispro (ADMELOG) corrective regimen sliding scale   SubCutaneous every 6 hours  lactated ringers. 1000 milliLiter(s) (100 mL/Hr) IV Continuous <Continuous>  norepinephrine Infusion 0.05 MICROgram(s)/kG/Min (8.44 mL/Hr) IV Continuous <Continuous>  pantoprazole  Injectable 40 milliGRAM(s) IV Push daily  piperacillin/tazobactam IVPB.. 3.375 Gram(s) IV Intermittent every 12 hours  propofol Infusion 10 MICROgram(s)/kG/Min (5.4 mL/Hr) IV Continuous <Continuous>  vasopressin Infusion 0.04 Unit(s)/Min (2.4 mL/Hr) IV Continuous <Continuous>    REVIEW OF SYSTEMS:  Unable to obtain    Vital Signs Last 24 Hrs  T(C): 37.9 (22 Aug 2021 12:00), Max: 39.4 (22 Aug 2021 01:00)  T(F): 100.2 (22 Aug 2021 12:00), Max: 102.9 (22 Aug 2021 01:00)  HR: 103 (22 Aug 2021 12:00) (101 - 159)  BP: 100/80 (22 Aug 2021 12:00) (55/25 - 139/95)  BP(mean): 88 (22 Aug 2021 12:00) (32 - 123)  RR: 22 (22 Aug 2021 12:00) (14 - 38)  SpO2: 96% (22 Aug 2021 12:00) (84% - 100%)    Daily     Daily     I&O's Detail    21 Aug 2021 07:01  -  22 Aug 2021 07:00  --------------------------------------------------------  IN:    IV PiggyBack: 50 mL    IV PiggyBack: 50 mL    Lactated Ringers: 800 mL    Norepinephrine: 222.3 mL    Propofol: 44.8 mL    Vasopressin: 19.2 mL  Total IN: 1186.3 mL    OUT:    Indwelling Catheter - Urethral (mL): 540 mL  Total OUT: 540 mL    Total NET: 646.3 mL      22 Aug 2021 07:01  -  22 Aug 2021 12:16  --------------------------------------------------------  IN:    Lactated Ringers: 500 mL    Norepinephrine: 52.5 mL    Propofol: 11.2 mL    Vasopressin: 12 mL  Total IN: 575.7 mL    OUT:    Indwelling Catheter - Urethral (mL): 245 mL  Total OUT: 245 mL    Total NET: 330.7 mL      PHYSICAL EXAM:  Appearance: Normal, well nourished	  HEENT:   Normal oral mucosa, PERRL, EOMI, sclera non-icteric	  Lymphatic: No cervical lymphadenopathy  Cardiovascular: Normal S1 S2, No JVD, No cardiac murmurs, No carotid bruits, No peripheral edema  Respiratory: Lungs clear to auscultation	  Psychiatry: A & O x 3, Mood & affect appropriate  Gastrointestinal:  Soft, Non-tender, + BS, no bruits	  Skin: No rashes, No ecchymoses, No cyanosis  Neurologic: Grossly non-focal with full strength in all four extremities  Extremities: Normal range of motion, No clubbing, cyanosis or edema  Vascular: Peripheral pulses palpable 2+ bilaterally      INTERPRETATION OF TELEMETRY:    ECG:    LABS:                        16.7   11.65 )-----------( 170      ( 22 Aug 2021 04:55 )             49.1     08-22    143  |  107  |  43.2<H>  ----------------------------<  106<H>  5.1   |  19.0<L>  |  3.68<H>    Ca    8.5<L>      22 Aug 2021 04:55  Phos  4.2     08-  Mg     2.1     08-22    TPro  5.6<L>  /  Alb  2.9<L>  /  TBili  0.4  /  DBili  x   /  AST  274<H>  /  ALT  158<H>  /  AlkPhos  41  08-22    CARDIAC MARKERS ( 22 Aug 2021 04:55 )  x     / 1.25 ng/mL / 8851 U/L / x     / 125.5 ng/mL  CARDIAC MARKERS ( 21 Aug 2021 20:02 )  x     / 0.72 ng/mL / 5627 U/L / x     / 126.3 ng/mL      PT/INR - ( 21 Aug 2021 20:02 )   PT: 12.2 sec;   INR: 1.05 ratio         PTT - ( 21 Aug 2021 20:02 )  PTT:31.8 sec  Urinalysis Basic - ( 21 Aug 2021 20:57 )    Color: Yellow / Appearance: Clear / S.025 / pH: x  Gluc: x / Ketone: Trace  / Bili: Negative / Urobili: Negative mg/dL   Blood: x / Protein: 100 mg/dL / Nitrite: Negative   Leuk Esterase: Negative / RBC: 3-5 /HPF / WBC 0-2   Sq Epi: x / Non Sq Epi: Occasional / Bacteria: x      I&O's Summary    21 Aug 2021 07:01  -  22 Aug 2021 07:00  --------------------------------------------------------  IN: 1186.3 mL / OUT: 540 mL / NET: 646.3 mL    22 Aug 2021 07:01  -  22 Aug 2021 12:16  --------------------------------------------------------  IN: 575.7 mL / OUT: 245 mL / NET: 330.7 mL      BNPSerum Pro-Brain Natriuretic Peptide: 1967 pg/mL ( @ 20:02)    RADIOLOGY & ADDITIONAL STUDIES: This is   a 43y old  Male with PMH of HTN, DM, Asthma who presents with a chief complaint of Acute hypoxemic respiratory failure, AMS  from suspected overdose of benzo and cocaine.  Patient found  by sister unresponsive   Arrived in the ER with agonal breath breathing and was intubated. Cxr c/w extensive right infiltrate likely aspiration pneumonia.  CT head reveals symmetric low attenuation lesions in the bilateral globus pallidus regions of the basal ganglia, which can be seen with hypoxic ischemic encephalopathy or related to carbon monoxide positioning, as well as indeterminate areas of low attenuation in the bilateral cerebellum may reflect infarcts.  Patient developed shock symptoms of hypotension requiring pressors to maintain b/p. Resuscitated with fluids and receiving maintenance fluids. We were asked to see patient for Elevated trops from 0.71 on admission to 1.25.  Ekg on admit without ischemic changes and repeated now and sinus tach with no ischemic changes.  Patient is intubated and sedated and unable to give history  Sister shared that patient has been depressed over breakup from his girlfriend and he asked her to take care of his dog which was a unusual request. She questions whether this was an intentional suicide attempt        PAST MEDICAL HISTORY  ASTHMA  HTN (hypertension)  DM (diabetes mellitus)    PAST SURGICAL HISTORY  Unknown    ALLERGIES:  NKDA    SOCIAL HX  unmarried, recently broke up with girlfriend  Non smoker   + polysubstance abuse  + etoh, Cocaine, benzo      MEDICATIONS  (STANDING):  ALBUTerol    90 MICROgram(s) HFA Inhaler 2 Puff(s) Inhalation every 6 hours  glucagon  Injectable 1 milliGRAM(s) IntraMuscular once  heparin   Injectable 5000 Unit(s) SubCutaneous every 8 hours  insulin lispro (ADMELOG) corrective regimen sliding scale   SubCutaneous every 6 hours  lactated ringers. 1000 milliLiter(s) (100 mL/Hr) IV Continuous <Continuous>  norepinephrine Infusion 0.05 MICROgram(s)/kG/Min (8.44 mL/Hr) IV Continuous <Continuous>  pantoprazole  Injectable 40 milliGRAM(s) IV Push daily  piperacillin/tazobactam IVPB.. 3.375 Gram(s) IV Intermittent every 12 hours  propofol Infusion 10 MICROgram(s)/kG/Min (5.4 mL/Hr) IV Continuous <Continuous>  vasopressin Infusion 0.04 Unit(s)/Min (2.4 mL/Hr) IV Continuous <Continuous>    REVIEW OF SYSTEMS:  Unable to obtain    Vital Signs Last 24 Hrs  T(C): 37.9 (22 Aug 2021 12:00), Max: 39.4 (22 Aug 2021 01:00)  T(F): 100.2 (22 Aug 2021 12:00), Max: 102.9 (22 Aug 2021 01:00)  HR: 103 (22 Aug 2021 12:00) (101 - 159)  BP: 100/80 (22 Aug 2021 12:00) (55/25 - 139/95)  BP(mean): 88 (22 Aug 2021 12:) (32 - 123)  RR: 22 (22 Aug 2021 12:00) (14 - 38)  SpO2: 96% (22 Aug 2021 12:00) (84% - 100%)    Daily     Daily     I&O's Detail    21 Aug 2021 07:01  -  22 Aug 2021 07:00  --------------------------------------------------------  IN:    IV PiggyBack: 50 mL    IV PiggyBack: 50 mL    Lactated Ringers: 800 mL    Norepinephrine: 222.3 mL    Propofol: 44.8 mL    Vasopressin: 19.2 mL  Total IN: 1186.3 mL    OUT:    Indwelling Catheter - Urethral (mL): 540 mL  Total OUT: 540 mL    Total NET: 646.3 mL      22 Aug 2021 07:01  -  22 Aug 2021 12:16  --------------------------------------------------------  IN:    Lactated Ringers: 500 mL    Norepinephrine: 52.5 mL    Propofol: 11.2 mL    Vasopressin: 12 mL  Total IN: 575.7 mL    OUT:    Indwelling Catheter - Urethral (mL): 245 mL  Total OUT: 245 mL    Total NET: 330.7 mL    PHYSICAL EXAM:  Appearance: Well developed well nourished male	  HEENT:   Normal oral mucosa, PERRL, EOMI, sclera non-icteric	  Lymphatic: No cervical lymphadenopathy  Cardiovascular: Normal S1 S2, No JVD, No cardiac murmurs, No carotid bruits, No peripheral edema  Respiratory: Decreased breath sounds  Psychiatry:  Unable to assess  Gastrointestinal:  Soft, Non-tender, + BS, no bruits	  Skin: No rashes, No ecchymoses, No cyanosis  Neurologic:Unable to assess  Extremities: No edeama   Vascular: Peripheral pulses palpable 2+ bilaterally      INTERPRETATION OF TELEMETRY:  Sinus to sinus tachycardia    ECG:  Sinus tach wnl    LABS:                        16.7   11.65 )-----------( 170      ( 22 Aug 2021 04:55 )             49.1         143  |  107  |  43.2<H>  ----------------------------<  106<H>  5.1   |  19.0<L>  |  3.68<H>    Ca    8.5<L>      22 Aug 2021 04:55  Phos  4.2       Mg     2.1         TPro  5.6<L>  /  Alb  2.9<L>  /  TBili  0.4  /  DBili  x   /  AST  274<H>  /  ALT  158<H>  /  AlkPhos  41  0822    CARDIAC MARKERS ( 22 Aug 2021 04:55 )  x     / 1.25 ng/mL / 8851 U/L / x     / 125.5 ng/mL  CARDIAC MARKERS ( 21 Aug 2021 20:02 )  x     / 0.72 ng/mL / 5627 U/L / x     / 126.3 ng/mL      PT/INR - ( 21 Aug 2021 20:02 )   PT: 12.2 sec;   INR: 1.05 ratio         PTT - ( 21 Aug 2021 20:02 )  PTT:31.8 sec  Urinalysis Basic - ( 21 Aug 2021 20:57 )    Color: Yellow / Appearance: Clear / S.025 / pH: x  Gluc: x / Ketone: Trace  / Bili: Negative / Urobili: Negative mg/dL   Blood: x / Protein: 100 mg/dL / Nitrite: Negative   Leuk Esterase: Negative / RBC: 3-5 /HPF / WBC 0-2   Sq Epi: x / Non Sq Epi: Occasional / Bacteria: x      I&O's Summary    21 Aug 2021 07:01  -  22 Aug 2021 07:00  --------------------------------------------------------  IN: 1186.3 mL / OUT: 540 mL / NET: 646.3 mL    22 Aug 2021 07:01  -  22 Aug 2021 12:16  --------------------------------------------------------  IN: 575.7 mL / OUT: 245 mL / NET: 330.7 mL    BNPSerum Pro-Brain Natriuretic Peptide: 1967 pg/mL ( @ 20:02)    RADIOLOGY & ADDITIONAL STUDIES:  < from: Xray Chest 1 View- PORTABLE-Urgent (Xray Chest 1 View- PORTABLE-Urgent .) (21 @ 00:48) >  IMPRESSION: Bilateral infiltrates right greater thanleft somewhat improved on the latest film. Tubes in place as above.

## 2021-08-22 NOTE — PROGRESS NOTE ADULT - ASSESSMENT
44 y/o M with a h/o HTN, DM, asthma, with acute hypoxemic respiratory failure, aspiration pneumonia, AMS, suspected benzodiazepine overdose, septic shock, GARRETT, rhabdomyolysis.    Suspected benzodiazepine overdose with massive aspiration event. No major acute intracranial pathology seen on CT head. Unclear if this was a suicide attempt.     - actively titrating vent settings to maintain SpO2 > 92%, weaned FiO2 to 40% and PEEP to 5  - empiric inhaled bronchodilators  - sedate with propofol to promote vent synchrony and patient comfort, agitated but nonpurposeful when weaned  - actively titrating norepinephrine infusion to maintain a MAP > 65, d/c vasopressin, add midodrine  - unclear etiology of severe cardiomyopathy, severe AS? Will require RHC/LHC when stable  - trops elevated 1+ but difficult to interpret in the setting of GARRETT and rhabdo, no acute ischemia on EKG  - hydrating with LR @ 100cc/hr, CK downtrending, f/u VBG to assess acid-base balance  - continue empiric Zosyn, blood and sputum cultures pending  - trend BUN/Cr, monitor lytes, UOP adequate at this time, mildly hyperkalemic- will treat medically now, high risk for requiring HD, nephrology is following  - will consider MRI head pending clinical course to further investigate questionable findings on CT head  - will need psychiatry and social work evaluation prior to discharge

## 2021-08-22 NOTE — PROGRESS NOTE ADULT - SUBJECTIVE AND OBJECTIVE BOX
Patient is a 43y old  Male who presents with a chief complaint of Acute hypoxemic respiratory failure, AMS (22 Aug 2021 12:15)    BRIEF HOSPITAL COURSE:   45 y/o M (real name: Ildefonso Pierre, : [birth year]) with a h/o HTN, DM, asthma, presents to the ED after being found unresponsive in bed by his sister this evening. Noted to have agonal respirations and emesis coming from mouth. He was found to be hypoxemic in the ED prompting emergent endotracheal intubation and appears to have an extensive right sided pneumonia on CXR    Events last 24 hours:   Remains on full vent support. Does not follow commands. On levophed and vasopressin and low dose propofol for vent synchrony     PAST MEDICAL & SURGICAL HISTORY:  Asthma    HTN (hypertension)    DM (diabetes mellitus)    Review of Systems:  Unable to assess given mentation       Physical Examination:    ICU Vital Signs Last 24 Hrs  T(C): 37.9 (22 Aug 2021 13:07), Max: 39.4 (22 Aug 2021 01:00)  T(F): 100.2 (22 Aug 2021 13:07), Max: 102.9 (22 Aug 2021 01:00)  HR: 109 (22 Aug 2021 13:00) (101 - 159)  BP: 86/74 (22 Aug 2021 13:00) (55/25 - 139/95)  BP(mean): 79 (22 Aug 2021 13:00) (32 - 123)  ABP: --  ABP(mean): --  RR: 18 (22 Aug 2021 13:00) (14 - 38)  SpO2: 99% (22 Aug 2021 13:00) (84% - 100%)    Neuro: Sedated and could not assess. Cough/ gag +  HEENT: Pupils equal, sluggish but reactive to light  PULM: Clear to auscultation bilaterally  CVS: Regular rhythm and controlled rate  ABD: Soft, nondistended  EXT: No b/l LE edema      Medications:  piperacillin/tazobactam IVPB.. 3.375 Gram(s) IV Intermittent every 12 hours    norepinephrine Infusion 0.05 MICROgram(s)/kG/Min IV Continuous <Continuous>    ALBUTerol    90 MICROgram(s) HFA Inhaler 2 Puff(s) Inhalation every 6 hours    propofol Infusion 10 MICROgram(s)/kG/Min IV Continuous <Continuous>      heparin   Injectable 5000 Unit(s) SubCutaneous every 8 hours    pantoprazole  Injectable 40 milliGRAM(s) IV Push daily      dextrose 40% Gel 15 Gram(s) Oral once  dextrose 50% Injectable 25 Gram(s) IV Push once  dextrose 50% Injectable 12.5 Gram(s) IV Push once  dextrose 50% Injectable 25 Gram(s) IV Push once  glucagon  Injectable 1 milliGRAM(s) IntraMuscular once  insulin lispro (ADMELOG) corrective regimen sliding scale   SubCutaneous every 6 hours  vasopressin Infusion 0.04 Unit(s)/Min IV Continuous <Continuous>    dextrose 5%. 1000 milliLiter(s) IV Continuous <Continuous>  dextrose 5%. 1000 milliLiter(s) IV Continuous <Continuous>  lactated ringers. 1000 milliLiter(s) IV Continuous <Continuous>      chlorhexidine 0.12% Liquid 15 milliLiter(s) Oral Mucosa every 12 hours  chlorhexidine 2% Cloths 1 Application(s) Topical <User Schedule>        Mode: AC/ CMV (Assist Control/ Continuous Mandatory Ventilation)  RR (machine): 16  TV (machine): 450  FiO2: 50  PEEP: 7  MAP: 9  PIP: 14      I&O's Detail    21 Aug 2021 07:  -  22 Aug 2021 07:00  --------------------------------------------------------  IN:    IV PiggyBack: 50 mL    IV PiggyBack: 50 mL    Lactated Ringers: 800 mL    Norepinephrine: 222.3 mL    Propofol: 44.8 mL    Vasopressin: 19.2 mL  Total IN: 1186.3 mL    OUT:    Indwelling Catheter - Urethral (mL): 540 mL  Total OUT: 540 mL    Total NET: 646.3 mL      22 Aug 2021 07:  -  22 Aug 2021 13:23  --------------------------------------------------------  IN:    Lactated Ringers: 700 mL    Norepinephrine: 73.5 mL    Propofol: 16.8 mL    Vasopressin: 12 mL  Total IN: 802.3 mL    OUT:    Indwelling Catheter - Urethral (mL): 310 mL  Total OUT: 310 mL    Total NET: 492.3 mL            RADIOLOGY/ Microbiology/ Labs: reviewed

## 2021-08-22 NOTE — CHART NOTE - NSCHARTNOTEFT_GEN_A_CORE
ETT was initially secured at 25cm as per ER -  Pt has 7.5 oral endotracheal tube and was pulled back to 23cm at the lip as per RO Talbot.  No issues or complications noted. BS equal and bilateral.  Will continue to monitor patient

## 2021-08-23 LAB
ALBUMIN SERPL ELPH-MCNC: 2.4 G/DL — LOW (ref 3.3–5.2)
ALBUMIN SERPL ELPH-MCNC: 2.8 G/DL — LOW (ref 3.3–5.2)
ALP SERPL-CCNC: 47 U/L — SIGNIFICANT CHANGE UP (ref 40–120)
ALP SERPL-CCNC: 64 U/L — SIGNIFICANT CHANGE UP (ref 40–120)
ALT FLD-CCNC: 123 U/L — HIGH
ALT FLD-CCNC: 133 U/L — HIGH
AMMONIA BLD-MCNC: 29 UMOL/L — SIGNIFICANT CHANGE UP (ref 11–55)
ANION GAP SERPL CALC-SCNC: 10 MMOL/L — SIGNIFICANT CHANGE UP (ref 5–17)
ANION GAP SERPL CALC-SCNC: 11 MMOL/L — SIGNIFICANT CHANGE UP (ref 5–17)
APPEARANCE CSF: CLEAR — SIGNIFICANT CHANGE UP
AST SERPL-CCNC: 211 U/L — HIGH
AST SERPL-CCNC: 219 U/L — HIGH
BILIRUB SERPL-MCNC: 0.5 MG/DL — SIGNIFICANT CHANGE UP (ref 0.4–2)
BILIRUB SERPL-MCNC: 0.7 MG/DL — SIGNIFICANT CHANGE UP (ref 0.4–2)
BUN SERPL-MCNC: 35.5 MG/DL — HIGH (ref 8–20)
BUN SERPL-MCNC: 40.6 MG/DL — HIGH (ref 8–20)
CALCIUM SERPL-MCNC: 8.5 MG/DL — LOW (ref 8.6–10.2)
CALCIUM SERPL-MCNC: 9.3 MG/DL — SIGNIFICANT CHANGE UP (ref 8.6–10.2)
CHLORIDE SERPL-SCNC: 103 MMOL/L — SIGNIFICANT CHANGE UP (ref 98–107)
CHLORIDE SERPL-SCNC: 107 MMOL/L — SIGNIFICANT CHANGE UP (ref 98–107)
CHOLEST SERPL-MCNC: 87 MG/DL — SIGNIFICANT CHANGE UP
CK MB CFR SERPL CALC: 28.9 NG/ML — HIGH (ref 0–6.7)
CK MB CFR SERPL CALC: 36.6 NG/ML — HIGH (ref 0–6.7)
CK SERPL-CCNC: 6350 U/L — HIGH (ref 30–200)
CK SERPL-CCNC: 6504 U/L — HIGH (ref 30–200)
CO2 SERPL-SCNC: 21 MMOL/L — LOW (ref 22–29)
CO2 SERPL-SCNC: 27 MMOL/L — SIGNIFICANT CHANGE UP (ref 22–29)
COLOR CSF: SIGNIFICANT CHANGE UP
CREAT SERPL-MCNC: 1.74 MG/DL — HIGH (ref 0.5–1.3)
CREAT SERPL-MCNC: 1.99 MG/DL — HIGH (ref 0.5–1.3)
CULTURE RESULTS: NO GROWTH — SIGNIFICANT CHANGE UP
GLUCOSE BLDC GLUCOMTR-MCNC: 100 MG/DL — HIGH (ref 70–99)
GLUCOSE BLDC GLUCOMTR-MCNC: 156 MG/DL — HIGH (ref 70–99)
GLUCOSE BLDC GLUCOMTR-MCNC: 78 MG/DL — SIGNIFICANT CHANGE UP (ref 70–99)
GLUCOSE BLDC GLUCOMTR-MCNC: 99 MG/DL — SIGNIFICANT CHANGE UP (ref 70–99)
GLUCOSE CSF-MCNC: 83 MG/DLG/24H — HIGH (ref 40–70)
GLUCOSE SERPL-MCNC: 110 MG/DL — HIGH (ref 70–99)
GLUCOSE SERPL-MCNC: 125 MG/DL — HIGH (ref 70–99)
GRAM STN FLD: SIGNIFICANT CHANGE UP
GRAM STN FLD: SIGNIFICANT CHANGE UP
HCT VFR BLD CALC: 42 % — SIGNIFICANT CHANGE UP (ref 39–50)
HDLC SERPL-MCNC: 41 MG/DL — SIGNIFICANT CHANGE UP
HGB BLD-MCNC: 14 G/DL — SIGNIFICANT CHANGE UP (ref 13–17)
LACTATE SERPL-SCNC: 0.4 MMOL/L — LOW (ref 0.5–2)
LACTATE SERPL-SCNC: 3 MMOL/L — HIGH (ref 0.5–2)
LEGIONELLA AG UR QL: NEGATIVE — SIGNIFICANT CHANGE UP
LIPID PNL WITH DIRECT LDL SERPL: 11 MG/DL — SIGNIFICANT CHANGE UP
MAGNESIUM SERPL-MCNC: 2 MG/DL — SIGNIFICANT CHANGE UP (ref 1.8–2.6)
MAGNESIUM SERPL-MCNC: 2.2 MG/DL — SIGNIFICANT CHANGE UP (ref 1.6–2.6)
MCHC RBC-ENTMCNC: 31.3 PG — SIGNIFICANT CHANGE UP (ref 27–34)
MCHC RBC-ENTMCNC: 33.3 GM/DL — SIGNIFICANT CHANGE UP (ref 32–36)
MCV RBC AUTO: 94 FL — SIGNIFICANT CHANGE UP (ref 80–100)
MYOGLOBIN SERPL-MCNC: 1708 NG/ML — HIGH (ref 28–72)
NEUTROPHILS # CSF: SIGNIFICANT CHANGE UP % (ref 0–6)
NON HDL CHOLESTEROL: 46 MG/DL — SIGNIFICANT CHANGE UP
NRBC NFR CSF: 1 /UL — SIGNIFICANT CHANGE UP (ref 0–5)
PHOSPHATE SERPL-MCNC: 2.6 MG/DL — SIGNIFICANT CHANGE UP (ref 2.4–4.7)
PHOSPHATE SERPL-MCNC: 3.6 MG/DL — SIGNIFICANT CHANGE UP (ref 2.4–4.7)
PLATELET # BLD AUTO: 127 K/UL — LOW (ref 150–400)
POTASSIUM SERPL-MCNC: 4.4 MMOL/L — SIGNIFICANT CHANGE UP (ref 3.5–5.3)
POTASSIUM SERPL-MCNC: 6.6 MMOL/L — CRITICAL HIGH (ref 3.5–5.3)
POTASSIUM SERPL-SCNC: 4.4 MMOL/L — SIGNIFICANT CHANGE UP (ref 3.5–5.3)
POTASSIUM SERPL-SCNC: 6.6 MMOL/L — CRITICAL HIGH (ref 3.5–5.3)
PROT CSF-MCNC: 24 MG/DL — SIGNIFICANT CHANGE UP (ref 15–45)
PROT SERPL-MCNC: 5.6 G/DL — LOW (ref 6.6–8.7)
PROT SERPL-MCNC: 5.7 G/DL — LOW (ref 6.6–8.7)
RBC # BLD: 4.47 M/UL — SIGNIFICANT CHANGE UP (ref 4.2–5.8)
RBC # CSF: 3 /CMM — HIGH (ref 0–1)
RBC # FLD: 12.1 % — SIGNIFICANT CHANGE UP (ref 10.3–14.5)
SODIUM SERPL-SCNC: 135 MMOL/L — SIGNIFICANT CHANGE UP (ref 135–145)
SODIUM SERPL-SCNC: 143 MMOL/L — SIGNIFICANT CHANGE UP (ref 135–145)
SPECIMEN SOURCE: SIGNIFICANT CHANGE UP
SPECIMEN SOURCE: SIGNIFICANT CHANGE UP
TRIGL SERPL-MCNC: 177 MG/DL — HIGH
TUBE TYPE: SIGNIFICANT CHANGE UP
WBC # BLD: 18.88 K/UL — HIGH (ref 3.8–10.5)
WBC # FLD AUTO: 18.88 K/UL — HIGH (ref 3.8–10.5)

## 2021-08-23 PROCEDURE — 70450 CT HEAD/BRAIN W/O DYE: CPT | Mod: 26

## 2021-08-23 PROCEDURE — 99233 SBSQ HOSP IP/OBS HIGH 50: CPT

## 2021-08-23 PROCEDURE — 95720 EEG PHY/QHP EA INCR W/VEEG: CPT

## 2021-08-23 PROCEDURE — 99223 1ST HOSP IP/OBS HIGH 75: CPT

## 2021-08-23 RX ORDER — DEXTROSE 50 % IN WATER 50 %
50 SYRINGE (ML) INTRAVENOUS ONCE
Refills: 0 | Status: COMPLETED | OUTPATIENT
Start: 2021-08-23 | End: 2021-08-23

## 2021-08-23 RX ORDER — SODIUM CHLORIDE 9 MG/ML
1000 INJECTION, SOLUTION INTRAVENOUS
Refills: 0 | Status: DISCONTINUED | OUTPATIENT
Start: 2021-08-23 | End: 2021-08-24

## 2021-08-23 RX ORDER — MIDAZOLAM HYDROCHLORIDE 1 MG/ML
2 INJECTION, SOLUTION INTRAMUSCULAR; INTRAVENOUS ONCE
Refills: 0 | Status: DISCONTINUED | OUTPATIENT
Start: 2021-08-23 | End: 2021-08-23

## 2021-08-23 RX ORDER — CALCIUM GLUCONATE 100 MG/ML
2 VIAL (ML) INTRAVENOUS ONCE
Refills: 0 | Status: COMPLETED | OUTPATIENT
Start: 2021-08-23 | End: 2021-08-23

## 2021-08-23 RX ORDER — INSULIN HUMAN 100 [IU]/ML
10 INJECTION, SOLUTION SUBCUTANEOUS ONCE
Refills: 0 | Status: COMPLETED | OUTPATIENT
Start: 2021-08-23 | End: 2021-08-23

## 2021-08-23 RX ORDER — ACETAMINOPHEN 500 MG
650 TABLET ORAL EVERY 6 HOURS
Refills: 0 | Status: DISCONTINUED | OUTPATIENT
Start: 2021-08-23 | End: 2021-09-07

## 2021-08-23 RX ORDER — SODIUM BICARBONATE 1 MEQ/ML
50 SYRINGE (ML) INTRAVENOUS ONCE
Refills: 0 | Status: COMPLETED | OUTPATIENT
Start: 2021-08-23 | End: 2021-08-23

## 2021-08-23 RX ORDER — ALBUTEROL 90 UG/1
2 AEROSOL, METERED ORAL EVERY 6 HOURS
Refills: 0 | Status: DISCONTINUED | OUTPATIENT
Start: 2021-08-23 | End: 2021-09-23

## 2021-08-23 RX ORDER — VANCOMYCIN HCL 1 G
1000 VIAL (EA) INTRAVENOUS ONCE
Refills: 0 | Status: COMPLETED | OUTPATIENT
Start: 2021-08-23 | End: 2021-08-23

## 2021-08-23 RX ORDER — SODIUM ZIRCONIUM CYCLOSILICATE 10 G/10G
10 POWDER, FOR SUSPENSION ORAL EVERY 8 HOURS
Refills: 0 | Status: COMPLETED | OUTPATIENT
Start: 2021-08-23 | End: 2021-08-23

## 2021-08-23 RX ORDER — MIDAZOLAM HYDROCHLORIDE 1 MG/ML
1 INJECTION, SOLUTION INTRAMUSCULAR; INTRAVENOUS
Refills: 0 | Status: DISCONTINUED | OUTPATIENT
Start: 2021-08-23 | End: 2021-08-24

## 2021-08-23 RX ADMIN — MIDAZOLAM HYDROCHLORIDE 2 MILLIGRAM(S): 1 INJECTION, SOLUTION INTRAMUSCULAR; INTRAVENOUS at 09:42

## 2021-08-23 RX ADMIN — MIDAZOLAM HYDROCHLORIDE 1 MILLIGRAM(S): 1 INJECTION, SOLUTION INTRAMUSCULAR; INTRAVENOUS at 16:03

## 2021-08-23 RX ADMIN — CHLORHEXIDINE GLUCONATE 15 MILLILITER(S): 213 SOLUTION TOPICAL at 17:07

## 2021-08-23 RX ADMIN — Medication 81 MILLIGRAM(S): at 11:21

## 2021-08-23 RX ADMIN — Medication 2: at 06:25

## 2021-08-23 RX ADMIN — MIDAZOLAM HYDROCHLORIDE 2 MILLIGRAM(S): 1 INJECTION, SOLUTION INTRAMUSCULAR; INTRAVENOUS at 17:49

## 2021-08-23 RX ADMIN — PROPOFOL 5.4 MICROGRAM(S)/KG/MIN: 10 INJECTION, EMULSION INTRAVENOUS at 20:09

## 2021-08-23 RX ADMIN — PROPOFOL 5.4 MICROGRAM(S)/KG/MIN: 10 INJECTION, EMULSION INTRAVENOUS at 12:48

## 2021-08-23 RX ADMIN — SODIUM ZIRCONIUM CYCLOSILICATE 10 GRAM(S): 10 POWDER, FOR SUSPENSION ORAL at 14:34

## 2021-08-23 RX ADMIN — SODIUM ZIRCONIUM CYCLOSILICATE 10 GRAM(S): 10 POWDER, FOR SUSPENSION ORAL at 22:09

## 2021-08-23 RX ADMIN — MIDAZOLAM HYDROCHLORIDE 1 MILLIGRAM(S): 1 INJECTION, SOLUTION INTRAMUSCULAR; INTRAVENOUS at 13:12

## 2021-08-23 RX ADMIN — PIPERACILLIN AND TAZOBACTAM 25 GRAM(S): 4; .5 INJECTION, POWDER, LYOPHILIZED, FOR SOLUTION INTRAVENOUS at 05:39

## 2021-08-23 RX ADMIN — HEPARIN SODIUM 5000 UNIT(S): 5000 INJECTION INTRAVENOUS; SUBCUTANEOUS at 05:39

## 2021-08-23 RX ADMIN — PIPERACILLIN AND TAZOBACTAM 25 GRAM(S): 4; .5 INJECTION, POWDER, LYOPHILIZED, FOR SOLUTION INTRAVENOUS at 17:07

## 2021-08-23 RX ADMIN — CHLORHEXIDINE GLUCONATE 15 MILLILITER(S): 213 SOLUTION TOPICAL at 05:38

## 2021-08-23 RX ADMIN — MIDODRINE HYDROCHLORIDE 10 MILLIGRAM(S): 2.5 TABLET ORAL at 13:01

## 2021-08-23 RX ADMIN — INSULIN HUMAN 10 UNIT(S): 100 INJECTION, SOLUTION SUBCUTANEOUS at 07:02

## 2021-08-23 RX ADMIN — MIDODRINE HYDROCHLORIDE 10 MILLIGRAM(S): 2.5 TABLET ORAL at 05:39

## 2021-08-23 RX ADMIN — Medication 50 MILLILITER(S): at 05:40

## 2021-08-23 RX ADMIN — Medication 50 MILLIEQUIVALENT(S): at 05:39

## 2021-08-23 RX ADMIN — MIDODRINE HYDROCHLORIDE 10 MILLIGRAM(S): 2.5 TABLET ORAL at 22:09

## 2021-08-23 RX ADMIN — ALBUTEROL 2 PUFF(S): 90 AEROSOL, METERED ORAL at 04:02

## 2021-08-23 RX ADMIN — PANTOPRAZOLE SODIUM 40 MILLIGRAM(S): 20 TABLET, DELAYED RELEASE ORAL at 11:21

## 2021-08-23 RX ADMIN — MIDAZOLAM HYDROCHLORIDE 2 MILLIGRAM(S): 1 INJECTION, SOLUTION INTRAMUSCULAR; INTRAVENOUS at 10:22

## 2021-08-23 RX ADMIN — Medication 650 MILLIGRAM(S): at 22:10

## 2021-08-23 RX ADMIN — HEPARIN SODIUM 5000 UNIT(S): 5000 INJECTION INTRAVENOUS; SUBCUTANEOUS at 13:01

## 2021-08-23 RX ADMIN — CHLORHEXIDINE GLUCONATE 1 APPLICATION(S): 213 SOLUTION TOPICAL at 05:38

## 2021-08-23 RX ADMIN — Medication 650 MILLIGRAM(S): at 23:26

## 2021-08-23 RX ADMIN — ALBUTEROL 2 PUFF(S): 90 AEROSOL, METERED ORAL at 08:57

## 2021-08-23 RX ADMIN — Medication 250 MILLIGRAM(S): at 23:47

## 2021-08-23 RX ADMIN — SODIUM ZIRCONIUM CYCLOSILICATE 10 GRAM(S): 10 POWDER, FOR SUSPENSION ORAL at 06:09

## 2021-08-23 RX ADMIN — Medication 200 GRAM(S): at 05:56

## 2021-08-23 RX ADMIN — HEPARIN SODIUM 5000 UNIT(S): 5000 INJECTION INTRAVENOUS; SUBCUTANEOUS at 22:09

## 2021-08-23 RX ADMIN — MIDAZOLAM HYDROCHLORIDE 2 MILLIGRAM(S): 1 INJECTION, SOLUTION INTRAMUSCULAR; INTRAVENOUS at 11:12

## 2021-08-23 NOTE — PROGRESS NOTE ADULT - SUBJECTIVE AND OBJECTIVE BOX
[facility]               Office: [address], [place] [address]                Telephone: [phone]/Fax: [phone]                                                       PROGRESS NOTE    Reason for follow up: HFrEF, elevated cardiac enzymes  COVID Status: Negative 8/21  Update: LINDA. Pt. remains intubated/sedated with questionable neurological status. CT demonstrated hypoxic-ischemic injury. EEG pending.     Review of symptoms:   All review of systems negative unless indicated otherwise above.     Vitals:  T(C): 36.6 (08-23-21 @ 11:40), Max: 38.3 (08-22-21 @ 20:00)  HR: 78 (08-23-21 @ 12:12) (78 - 116)  BP: 93/72 (08-23-21 @ 11:00) (80/69 - 176/100)  RR: 16 (08-23-21 @ 11:00) (16 - 30)  SpO2: 98% (08-23-21 @ 12:12) (86% - 100%)    Weight (kg): 93.1 (08-22 @ 00:45)    I&O's Summary    22 Aug 2021 07:01  -  23 Aug 2021 07:00  --------------------------------------------------------  IN: 2906.6 mL / OUT: 1435 mL / NET: 1471.6 mL    23 Aug 2021 07:01  -  23 Aug 2021 14:45  --------------------------------------------------------  IN: 689.5 mL / OUT: 980 mL / NET: -290.5 mL    TELEMETRY: Sinus rhythm    PHYSICAL EXAM:  Appearance: in no distress, intubated/sedated  Neurologic: sedated  Cardiovascular: RRR, Normal S1 S2, No JVD, No murmur  Respiratory: Lungs clear to auscultation	  Gastrointestinal:  Soft, Non-distended, + BS,	  Extremities: No clubbing, cyanosis, edema. Warm peripherally.  Vascular: Peripheral pulses palpable 2+ in all four extremities  Skin: warm, dry, intact    CURRENT MEDICATIONS:  midodrine 10 milliGRAM(s) Oral every 8 hours    piperacillin/tazobactam IVPB..  propofol Infusion  pantoprazole  Injectable  dextrose 40% Gel  dextrose 50% Injectable  dextrose 50% Injectable  dextrose 50% Injectable  glucagon  Injectable  insulin lispro (ADMELOG) corrective regimen sliding scale  aspirin  chewable  chlorhexidine 0.12% Liquid  chlorhexidine 2% Cloths  dextrose 5%.  dextrose 5%.  heparin   Injectable  lactated ringers. 	    LABS:	 	  CARDIAC MARKERS ( 23 Aug 2021 07:52 )                      6504 U/L / x     / 28.9 ng/mL  CARDIAC MARKERS ( 23 Aug 2021 04:15 )  / x              / 6350 U/L / x     / 36.6 ng/mL   CARDIAC MARKERS ( 22 Aug 2021 20:53 ) / x             / 7124 U/L / x     / 57.6 ng/mL  CARDIAC MARKERS ( 22 Aug 2021 12:22 ) 1.04 ng/mL / 7422 U/L / x     / 101.1 ng/mL  CARDIAC MARKERS ( 22 Aug 2021 04:55 ) 1.25 ng/mL / 8851 U/L / x     / 125.5 ng/mL  CARDIAC MARKERS ( 21 Aug 2021 20:02 ) 0.72 ng/mL / 5627 U/L / x     / 126.3 ng/mL                            14.0   18.88 )-----------( 127      ( 23 Aug 2021 04:14 )             42.0     08-23    143  |  107  |  35.5<H>  ----------------------------<  125<H>  4.4   |  27.0  |  1.74<H>    Ca    9.3      23 Aug 2021 07:52  Phos  2.6     08-23  Mg     2.2     08-23    TPro  5.6<L>  /  Alb  2.8<L>  /  TBili  0.7  /  DBili  x   /  AST  211<H>  /  ALT  123<H>  /  AlkPhos  47  08-23    proBNP: Serum Pro-Brain Natriuretic Peptide: 5142 pg/mL (08-22 @ 12:22)  Serum Pro-Brain Natriuretic Peptide: 1967 pg/mL (08-21 @ 20:02)    Lipid Profile: Date: 08-23 @ 04:14  Total cholesterol 87; Direct LDL: --; HDL: 41; Triglycerides:177    TSH: Thyroid Stimulating Hormone, Serum: 0.76 uIU/mL    TTE Echo Complete w/ Contrast w/ Doppler (08.22.21 @ 13:19)   Summary:   1. Left ventricular ejection fraction, by visual estimation, is <20%.   2. Severely decreased global left ventricular systolic function.   3. Spectral Doppler shows impaired relaxation pattern of left ventricular myocardial filling (Grade I diastolic dysfunction).   4. Moderately reduced RV systolic function.   5. Normal left atrial size.   6. Normal right atrial size.   7. There is no evidence of pericardial effusion.   8. Mild to moderate mitral valve regurgitation.   9. Mild aortic regurgitation.  10. Aortiv Valve is thickend and calcified with diminished opening, Calcuated JUAN DAVID is 0.88 sq cm, Mean gradient is 14 mm Hg. This probably represents Low Low gradient Severe AS.  11. Endocardial visualization was enhanced with intravenous echo contrast.    MD Jb Electronically signed on 8/22/2021 at 3:18:18 PM    < end of copied text >

## 2021-08-23 NOTE — CONSULT NOTE ADULT - ASSESSMENT
The patient is a 46y Male with a history of substance abuse now with anoxia.    Anoxia.   CT findings are consistent with anoxic brain injury.   Management of mechanical ventilator per ICU.    R/o seizure.   Agree with plan for c-EEG.   Would hold off on antiseizure drug for now.   Once EEG on, can lower/hold sedation and observe for seizure activity.    Epilepsy service to follow.    Case discussed with ICU team (Dr DERECK Hillman attending).

## 2021-08-23 NOTE — PROVIDER CONTACT NOTE (CRITICAL VALUE NOTIFICATION) - NS PROVIDER READ BACK TO LAB
Patient:   CHALINO ROMERO            MRN: [id number]            FIN: [id number]              Age:   69 years     Sex:  MALE     :  54   Associated Diagnoses:   None   Author:   GA ESPINOZA     Basic Information       Face to Face Encounter:  Diagnosis: CVA- Left [place].   .    Homebound Status:          This patient is homebound because an illness or injury renders him/her normally unable to leave home except with the assistance of another individual and leaving the home requires a considerable and taxing effort. Pt presents: High Fall Risk, Cognitive Deficits.         This patient is homebound because an illness or injury renders him/her normally unable to leave home except with the assistance or the aid of a supporting device and leaving the home requires a considerable and taxing effort: Walker, Instability, weakness, pain with ambulation dt medical condition.   Home Care Interventions/Plan:          Skilled Assessment and Teaching Oral Medication: Non-adherence to Medication Regimen.         Physicial Therapy: Gait Training, Strengthening, Balance Excerises.         Occupational Therapy: Compensatory strategies for cognitive deficits, Environmental modifications.         Speech Therapy: Maintenance therapy to prevent or slow decline in condition.   Certification:  The patient is being treated for the primary reason of Home Health Services during this encounter. The patient requires skilled home health because of residual sequela from an CVA. The patient is homebound due to these two reasons:  1)    and   2) Deficits with gait, transfers, ADLs and self Care.   The patient needs the following skilled services: PT,OT,SLP and RN services. The patient requires these services due to  continued functional deficits_.  I am the certifying physician.  I certify/recertify that this patient is confined to his/her home (and meets homebound criteria) and needs intermittent skilled nursing care,  physical therapy and/or speech therapy or continues to need occupational therapy. The patient is under my care, and a plan of care has been initiated and will periodically be reviewed by a physician. I had a Face-to-Face encounter with the patient today, during which the primary reason for home health  services was addressed. Please see the above clinical documentation to support certification and eligibility for home care. It will be made available to [facility] upon request.  Dr. Guerrero Mckinney  will follow the patient in the community.   .    SUBJECTIVE     Results Review   Functional Status   Reviewed patient's functional status   With the patient     Progress towards goals   Making gains towards functional goals     Participation in therapy   Participates in intensive therapy       Objective   VS/Measurements     Vitals between:   14-JUL-2019 21:15:30   TO   15-JUL-2019 21:15:30                   LAST RESULT MINIMUM MAXIMUM  Temperature 36.5 36.0 36.5  Heart Rate 79 79 101  Respiratory Rate 16 16 16  NISBP           113 113 145  NIDBP           72 70 73  NIMBP           86 86 97  SpO2                    95 92 96    General:  Alert and oriented, No acute distress.    Eye:  Pupils are equal, round and reactive to light, Extraocular movements are intact, Normal conjunctiva.   HENT:  Normocephalic.    Neck:  Supple.    Respiratory:  Lungs are clear to auscultation, Respirations are non-labored.   Cardiovascular:  Normal rate.    Breast:  No mass.    Gastrointestinal:  Soft.    Genitourinary:  No costovertebral angle tenderness.    Lymphatics:  No lymphadenopathy neck, axilla, groin.    Integumentary:  Warm.    Mental status/ Cognition   Alert.   Speech and language intact.   Insight and judgement improving.     Cranial Nerves:  Cranial nerves II - XII grossly intact.    Movement/ Coordination   Requires the use of a walker.     Musculoskeletal:  Improved strength and coordination of RUE and RLE.   yes

## 2021-08-23 NOTE — DIETITIAN INITIAL EVALUATION ADULT. - ETIOLOGY
Related to septic shock state, GARRETT/rhabdomyolysis, PNA requiring full vent support at this time.

## 2021-08-23 NOTE — PROGRESS NOTE ADULT - ASSESSMENT
DX : Anoxic Encephalopathy, Shock Liver,     ATN - Pigment Nephropathy,    Trend CK, Myoglobin,    C/W Current Management,    Continue with IVF;  SCr downtrending;  Trend CPK  Holding off on HD at current    D/W Dr. Hillman,

## 2021-08-23 NOTE — DIETITIAN INITIAL EVALUATION ADULT. - PERTINENT LABORATORY DATA
08-23 Na143 mmol/L Glu 125 mg/dL<H> K+ 4.4 mmol/L Cr  1.74 mg/dL<H> BUN 35.5 mg/dL<H> Phos 2.6 mg/dL Alb 2.8 g/dL<L> PAB n/a

## 2021-08-23 NOTE — CONSULT NOTE ADULT - SUBJECTIVE AND OBJECTIVE BOX
[facility]                                        [facility]                                  [facility]                                      [address]                                           [place], [place], [address]                                                [phone]        CC: R/o seizure.     HISTORY:  The patient is a 46y Male who presented to the ER after being found unresponsive in bed by family. He was brought to the ER. He was noted to be hypoxic with agonal respirations and was intubated and admitted to ICU on a mechanical ventilator.   He was noted to have some twitching type movement of the extremities for which the Epilepsy service was consulted.   He has a history of substance abuse.     PAST MEDICAL & SURGICAL HISTORY:  Asthma  HTN (hypertension)  DM (diabetes mellitus)    MEDICATIONS  (STANDING):  aspirin  chewable 81 milliGRAM(s) Oral daily  chlorhexidine 0.12% Liquid 15 milliLiter(s) Oral Mucosa every 12 hours  chlorhexidine 2% Cloths 1 Application(s) Topical <User Schedule>  dextrose 40% Gel 15 Gram(s) Oral once  dextrose 5%. 1000 milliLiter(s) (50 mL/Hr) IV Continuous <Continuous>  glucagon  Injectable 1 milliGRAM(s) IntraMuscular once  heparin   Injectable 5000 Unit(s) SubCutaneous every 8 hours  insulin lispro (ADMELOG) corrective regimen sliding scale   SubCutaneous every 6 hours  lactated ringers. 1000 milliLiter(s) (100 mL/Hr) IV Continuous <Continuous>  midodrine 10 milliGRAM(s) Oral every 8 hours  pantoprazole  Injectable 40 milliGRAM(s) IV Push daily  piperacillin/tazobactam IVPB.. 3.375 Gram(s) IV Intermittent every 12 hours  propofol Infusion 10 MICROgram(s)/kG/Min (5.4 mL/Hr) IV Continuous <Continuous>  sodium zirconium cyclosilicate 10 Gram(s) Oral every 8 hours    MEDICATIONS  (PRN):  ALBUTerol    90 MICROgram(s) HFA Inhaler 2 Puff(s) Inhalation every 6 hours PRN Shortness of Breath  midazolam Injectable 1 milliGRAM(s) IV Push every 2 hours PRN involuntary movements      Allergies  Allergy Status Unknown    SOCIAL HISTORY:  Unable to provide.   Known substance abuse.     FAMILY HISTORY:  Unable to provide.     ROS:  Constitutional: Unobtainable due to patient's condition.   Neuro: Unobtainable due to patient's condition.   Eyes: Unobtainable due to patient's condition.   Ears/nose/throat: Unobtainable due to patient's condition.   Cardiac: Unobtainable due to patient's condition.   Respiratory: Unobtainable due to patient's condition.   GI: Unobtainable due to patient's condition.   : Unobtainable due to patient's condition..  Integumentary: Unobtainable due to patient's condition.  Psych: Unobtainable due to patient's condition.  Heme: Unobtainable due to patient's condition.     Exam:  Vital Signs Last 24 Hrs  T(C): 36.6 (23 Aug 2021 11:40), Max: 38.3 (22 Aug 2021 20:00)  T(F): 97.9 (23 Aug 2021 11:40), Max: 100.9 (22 Aug 2021 20:00)  HR: 78 (23 Aug 2021 12:12) (78 - 116)  BP: 93/72 (23 Aug 2021 11:00) (80/69 - 176/100)  BP(mean): 80 (23 Aug 2021 11:00) (65 - 116)  RR: 16 (23 Aug 2021 11:00) (16 - 30)  SpO2: 98% (23 Aug 2021 12:12) (86% - 100%)  General: NAD.   Carotid bruits absent.     Mental status: The patient is sedated on a mechanical ventilator. He is not following any instructions.     Cranial nerves: There is no papilledema. Pupils react symmetrically to light. There is no blink to threat. He does not track with gaze. Corneal reflexes are present. Palate and tongue cannot be assessed.     Motor/Sensory: There is normal bulk and tone.  Minimal movement to noxious stimuli.     Reflexes: 1+ throughout and plantar responses are flexor.    Cerebellar: Cannot be assessed.     LABS:                         14.0   18.88 )-----------( 127      ( 23 Aug 2021 04:14 )             42.0       08-23    143  |  107  |  35.5<H>  ----------------------------<  125<H>  4.4   |  27.0  |  1.74<H>    Ca    9.3      23 Aug 2021 07:52  Phos  2.6     08-23  Mg     2.2     08-23    TPro  5.6<L>  /  Alb  2.8<L>  /  TBili  0.7  /  DBili  x   /  AST  211<H>  /  ALT  123<H>  /  AlkPhos  47  08-23      PT/INR - ( 21 Aug 2021 20:02 )   PT: 12.2 sec;   INR: 1.05 ratio    PTT - ( 21 Aug 2021 20:02 )  PTT:31.8 sec    CK: 6504    Toxicology positive for benzodiazepine and cocaine.       RADIOLOGY   CT head 8/21 and 8/23 images reviewed. There is hypodensity of the globus pallidus bilaterally.   There are multiple small infarcts in the cerebral and cerebellar hemispheres.

## 2021-08-23 NOTE — PROGRESS NOTE ADULT - SUBJECTIVE AND OBJECTIVE BOX
[facility] DIVISION OF KIDNEY DISEASES AND HYPERTENSION -- FOLLOW UP NOTE  --------------------------------------------------------------------------------  Chief Complaint:ATN    24 hour events/subjective:  Seen/examined  SCr improving;      PAST HISTORY  --------------------------------------------------------------------------------  No significant changes to PMH, PSH, FHx, SHx, unless otherwise noted    ALLERGIES & MEDICATIONS  --------------------------------------------------------------------------------  Allergies    Allergy Status Unknown    Intolerances      Standing Inpatient Medications  ALBUTerol    90 MICROgram(s) HFA Inhaler 2 Puff(s) Inhalation every 6 hours  aspirin  chewable 81 milliGRAM(s) Oral daily  chlorhexidine 0.12% Liquid 15 milliLiter(s) Oral Mucosa every 12 hours  chlorhexidine 2% Cloths 1 Application(s) Topical <User Schedule>  dextrose 40% Gel 15 Gram(s) Oral once  dextrose 5%. 1000 milliLiter(s) IV Continuous <Continuous>  dextrose 5%. 1000 milliLiter(s) IV Continuous <Continuous>  dextrose 50% Injectable 25 Gram(s) IV Push once  dextrose 50% Injectable 12.5 Gram(s) IV Push once  dextrose 50% Injectable 25 Gram(s) IV Push once  glucagon  Injectable 1 milliGRAM(s) IntraMuscular once  heparin   Injectable 5000 Unit(s) SubCutaneous every 8 hours  insulin lispro (ADMELOG) corrective regimen sliding scale   SubCutaneous every 6 hours  lactated ringers. 1000 milliLiter(s) IV Continuous <Continuous>  midodrine 10 milliGRAM(s) Oral every 8 hours  norepinephrine Infusion 0.05 MICROgram(s)/kG/Min IV Continuous <Continuous>  pantoprazole  Injectable 40 milliGRAM(s) IV Push daily  piperacillin/tazobactam IVPB.. 3.375 Gram(s) IV Intermittent every 12 hours  propofol Infusion 10 MICROgram(s)/kG/Min IV Continuous <Continuous>  sodium zirconium cyclosilicate 10 Gram(s) Oral every 8 hours    PRN Inpatient Medications      REVIEW OF SYSTEMS  --------------------------------------------------------------------------------  Gen: No weight changes, fatigue, fevers/chills, weakness  Skin: No rashes  Head/Eyes/Ears/Mouth: No headache; Normal hearing; Normal vision w/o blurriness; No sinus pain/discomfort, sore throat  Respiratory: No dyspnea, cough, wheezing, hemoptysis  CV: No chest pain, PND, orthopnea  GI: No abdominal pain, diarrhea, constipation, nausea, vomiting, melena, hematochezia  : No increased frequency, dysuria, hematuria, nocturia  MSK: No joint pain/swelling; no back pain; no edema  Neuro: No dizziness/lightheadedness, weakness, seizures, numbness, tingling  Heme: No easy bruising or bleeding  Endo: No heat/cold intolerance  Psych: No significant nervousness, anxiety, stress, depression    All other systems were reviewed and are negative, except as noted.    VITALS/PHYSICAL EXAM  --------------------------------------------------------------------------------  T(C): 37.9 (08-23-21 @ 07:36), Max: 38.3 (08-22-21 @ 20:00)  HR: 86 (08-23-21 @ 11:00) (79 - 116)  BP: 93/72 (08-23-21 @ 11:00) (80/69 - 176/100)  RR: 16 (08-23-21 @ 11:00) (16 - 30)  SpO2: 97% (08-23-21 @ 11:00) (86% - 100%)  Wt(kg): --  Height (cm): 180.3 (08-21-21 @ 20:06)  Weight (kg): 93.1 (08-22-21 @ 00:45)  BMI (kg/m2): 28.6 (08-22-21 @ 00:45)  BSA (m2): 2.13 (08-22-21 @ 00:45)      08-22-21 @ 07:01  -  08-23-21 @ 07:00  --------------------------------------------------------  IN: 2906.6 mL / OUT: 1435 mL / NET: 1471.6 mL    08-23-21 @ 07:01  -  08-23-21 @ 11:31  --------------------------------------------------------  IN: 305.8 mL / OUT: 525 mL / NET: -219.2 mL      Physical Exam:  	Gen: NAD   	HEENT: PERRL, supple neck, clear oropharynx  	Pulm: CTA B/L  	CV: RRR, S1S2; no rub  	Back: No spinal or CVA tenderness; no sacral edema  	Abd: +BS, soft, nontender/nondistended  	: No suprapubic tenderness  	UE: Warm, FROM, no clubbing, intact strength; no edema; no asterixis  	LE: Warm, FROM, no clubbing, intact strength; no edema  	Neuro: No focal deficits, intact gait  	Psych: Normal affect and mood  	Skin: Warm, without rashes    LABS/STUDIES  --------------------------------------------------------------------------------              14.0   18.88 >-----------<  127      [08-23-21 @ 04:14]              42.0     143  |  107  |  35.5  ----------------------------<  125      [08-23-21 @ 07:52]  4.4   |  27.0  |  1.74        Ca     9.3     [08-23-21 @ 07:52]      Mg     2.2     [08-23-21 @ 07:52]      Phos  2.6     [08-23-21 @ 07:52]    TPro  5.6  /  Alb  2.8  /  TBili  0.7  /  DBili  x   /  AST  211  /  ALT  123  /  AlkPhos  47  [08-23-21 @ 07:52]    PT/INR: PT 12.2 , INR 1.05       [08-21-21 @ 20:02]  PTT: 31.8       [08-21-21 @ 20:02]    Troponin 1.04      [08-22-21 @ 12:22]  CK 6504      [08-23-21 @ 07:52]    Creatinine Trend:  SCr 1.74 [08-23 @ 07:52]  SCr 1.99 [08-23 @ 04:14]  SCr 2.22 [08-22 @ 20:53]  SCr 3.08 [08-22 @ 12:22]  SCr 3.68 [08-22 @ 04:55]    Urinalysis - [08-22-21 @ 12:26]      Color Yellow / Appearance Slightly Turbid / SG 1.025 / pH 5.0      Gluc Negative / Ketone Trace  / Bili Negative / Urobili Negative       Blood Large / Protein 30 / Leuk Est Trace / Nitrite Negative      RBC 3-5 / WBC 3-5 / Hyaline 0-2 / Gran 0-2 / Sq Epi  / Non Sq Epi Few / Bacteria Few    Urine Creatinine 282      [08-22-21 @ 12:22]  Urine Protein 66.0      [08-22-21 @ 12:22]  Urine Sodium <30      [08-22-21 @ 12:22]  Urine Osmolality 773      [08-22-21 @ 12:26]    TSH 0.76      [08-21-21 @ 20:02]  Lipid: chol 87, , HDL 41, LDL --      [08-23-21 @ 04:14]

## 2021-08-23 NOTE — PROGRESS NOTE ADULT - SUBJECTIVE AND OBJECTIVE BOX
Patient is a 46y old  Male who presents with a chief complaint of Acute hypoxemic respiratory failure, AMS (22 Aug 2021 21:42)     45 y/o M (real name: Ildefonso Pierre, : [birth year]) with a h/o HTN, DM, asthma, presents to the ED after being found unresponsive in bed by his sister this evening. Noted to have agonal respirations and emesis coming from mouth. He was found to be hypoxemic in the ED prompting emergent endotracheal intubation and appears to have an extensive right sided pneumonia on CXR. Urine toxicology (+) for benzos and cocaine. CT head reveals symmetric low attenuation lesions in the bilateral globus pallidus regions of the basal ganglia, which can be seen with hypoxic ischemic encephalopathy or related to carbon monoxide positioning, as well as indeterminate areas of low attenuation in the bilateral cerebellum may reflect infarcts. Hospital course complicated by severe septic shock state requiring dual IV vasopressor therapy and GARRETT/rhabdomyolysis.    Events last 24 hours: Patient remains in IV vasopressor dependent shock state. TTE reveals severe reduced LVEF (<20%) and severe AS. Making urine. Continues to require full mechanical vent support. Sedated on propofol (agitated when weaned).          BRIEF HOSPITAL COURSE: ***    Events last 24 hours: ***    PAST MEDICAL & SURGICAL HISTORY:  Asthma    HTN (hypertension)    DM (diabetes mellitus)      Allergies    Allergy Status Unknown      Review of Systems:  CONSTITUTIONAL: No fever, chills, or fatigue  EYES: No eye pain, visual disturbances, or discharge  ENMT:  No difficulty hearing, tinnitus, vertigo; No sinus or throat pain  NECK: No pain or stiffness  RESPIRATORY: No cough, wheezing, chills or hemoptysis; No shortness of breath  CARDIOVASCULAR: No chest pain, palpitations, dizziness, or leg swelling  GASTROINTESTINAL: No abdominal or epigastric pain. No nausea, vomiting, or hematemesis; No diarrhea or constipation. No melena or hematochezia.  GENITOURINARY: No dysuria, frequency, hematuria, or incontinence  NEUROLOGICAL: No headaches, memory loss, loss of strength, numbness, or tremors  SKIN: No itching, burning, rashes, or lesions   MUSCULOSKELETAL: No joint pain or swelling; No muscle, back, or extremity pain  PSYCHIATRIC: No depression, anxiety, mood swings, or difficulty sleeping      Medications:  piperacillin/tazobactam IVPB.. 3.375 Gram(s) IV Intermittent every 12 hours    midodrine 10 milliGRAM(s) Oral every 8 hours  norepinephrine Infusion 0.05 MICROgram(s)/kG/Min IV Continuous <Continuous>    ALBUTerol    90 MICROgram(s) HFA Inhaler 2 Puff(s) Inhalation every 6 hours    propofol Infusion 10 MICROgram(s)/kG/Min IV Continuous <Continuous>      aspirin  chewable 81 milliGRAM(s) Oral daily  heparin   Injectable 5000 Unit(s) SubCutaneous every 8 hours    pantoprazole  Injectable 40 milliGRAM(s) IV Push daily      dextrose 40% Gel 15 Gram(s) Oral once  dextrose 50% Injectable 25 Gram(s) IV Push once  dextrose 50% Injectable 12.5 Gram(s) IV Push once  dextrose 50% Injectable 25 Gram(s) IV Push once  glucagon  Injectable 1 milliGRAM(s) IntraMuscular once  insulin lispro (ADMELOG) corrective regimen sliding scale   SubCutaneous every 6 hours    dextrose 5%. 1000 milliLiter(s) IV Continuous <Continuous>  dextrose 5%. 1000 milliLiter(s) IV Continuous <Continuous>  lactated ringers. 1000 milliLiter(s) IV Continuous <Continuous>      chlorhexidine 0.12% Liquid 15 milliLiter(s) Oral Mucosa every 12 hours  chlorhexidine 2% Cloths 1 Application(s) Topical <User Schedule>    sodium zirconium cyclosilicate 10 Gram(s) Oral every 8 hours      Mode: AC/ CMV (Assist Control/ Continuous Mandatory Ventilation)  RR (machine): 16  TV (machine): 450  FiO2: 40  PEEP: 5  MAP: 10  PIP: 17      ICU Vital Signs Last 24 Hrs  T(C): 37.9 (23 Aug 2021 07:36), Max: 38.3 (22 Aug 2021 20:00)  T(F): 100.2 (23 Aug 2021 07:36), Max: 100.9 (22 Aug 2021 20:00)  HR: 84 (23 Aug 2021 09:01) (79 - 116)  BP: 80/69 (23 Aug 2021 09:00) (55/25 - 176/100)  BP(mean): 74 (23 Aug 2021 09:00) (36 - 116)  ABP: --  ABP(mean): --  RR: 18 (23 Aug 2021 09:00) (16 - 30)  SpO2: 99% (23 Aug 2021 06:14) (86% - 100%)    Vital Signs Last 24 Hrs  T(C): 37.9 (23 Aug 2021 07:36), Max: 38.3 (22 Aug 2021 20:00)  T(F): 100.2 (23 Aug 2021 07:36), Max: 100.9 (22 Aug 2021 20:00)  HR: 84 (23 Aug 2021 09:01) (79 - 116)  BP: 80/69 (23 Aug 2021 09:00) (55/25 - 176/100)  BP(mean): 74 (23 Aug 2021 09:00) (36 - 116)  RR: 18 (23 Aug 2021 09:00) (16 - 30)  SpO2: 99% (23 Aug 2021 06:14) (86% - 100%)    ABG - ( 22 Aug 2021 00:41 )  pH, Arterial: 7.240 pH, Blood: x     /  pCO2: 43    /  pO2: 138   / HCO3: 18    / Base Excess: -9.0  /  SaO2: 99.5                I&O's Detail    22 Aug 2021 07:01  -  23 Aug 2021 07:00  --------------------------------------------------------  IN:    IV PiggyBack: 150 mL    Lactated Ringers: 2400 mL    Norepinephrine: 172.9 mL    Propofol: 166.9 mL    Vasopressin: 16.8 mL  Total IN: 2906.6 mL    OUT:    Indwelling Catheter - Urethral (mL): 1435 mL  Total OUT: 1435 mL    Total NET: 1471.6 mL      23 Aug 2021 07:01  -  23 Aug 2021 09:08  --------------------------------------------------------  IN:    IV PiggyBack: 50 mL    Lactated Ringers: 200 mL    Propofol: 55.8 mL  Total IN: 305.8 mL    OUT:    Indwelling Catheter - Urethral (mL): 525 mL    Norepinephrine: 0 mL  Total OUT: 525 mL    Total NET: -219.2 mL            LABS:                        14.0   18.88 )-----------( 127      ( 23 Aug 2021 04:14 )             42.0     08-23    143  |  107  |  35.5<H>  ----------------------------<  125<H>  4.4   |  27.0  |  1.74<H>    Ca    9.3      23 Aug 2021 07:52  Phos  2.6     08  Mg     2.2     08-23    TPro  5.6<L>  /  Alb  2.8<L>  /  TBili  0.7  /  DBili  x   /  AST  211<H>  /  ALT  123<H>  /  AlkPhos  47  08-23      CARDIAC MARKERS ( 23 Aug 2021 07:52 )  x     / x     / 6504 U/L / x     / 28.9 ng/mL  CARDIAC MARKERS ( 23 Aug 2021 04:15 )  x     / x     / 6350 U/L / x     / 36.6 ng/mL  CARDIAC MARKERS ( 22 Aug 2021 20:53 )  x     / x     / 7124 U/L / x     / 57.6 ng/mL  CARDIAC MARKERS ( 22 Aug 2021 12:22 )  x     / 1.04 ng/mL / 7422 U/L / x     / 101.1 ng/mL  CARDIAC MARKERS ( 22 Aug 2021 04:55 )  x     / 1.25 ng/mL / 8851 U/L / x     / 125.5 ng/mL  CARDIAC MARKERS ( 21 Aug 2021 20:02 )  x     / 0.72 ng/mL / 5627 U/L / x     / 126.3 ng/mL      CAPILLARY BLOOD GLUCOSE      POCT Blood Glucose.: 156 mg/dL (23 Aug 2021 06:23)    PT/INR - ( 21 Aug 2021 20:02 )   PT: 12.2 sec;   INR: 1.05 ratio         PTT - ( 21 Aug 2021 20:02 )  PTT:31.8 sec  Urinalysis Basic - ( 22 Aug 2021 12:26 )    Color: Yellow / Appearance: Slightly Turbid / S.025 / pH: x  Gluc: x / Ketone: Trace  / Bili: Negative / Urobili: Negative mg/dL   Blood: x / Protein: 30 mg/dL / Nitrite: Negative   Leuk Esterase: Trace / RBC: 3-5 /HPF / WBC 3-5   Sq Epi: x / Non Sq Epi: Few / Bacteria: Few      CULTURES:  Culture Results:   Normal Respiratory Le present ( @ 12:18)  Culture Results:   No growth ( @ 02:40)      Physical Examination:    General: No acute distress.  Alert, oriented, interactive, nonfocal    HEENT: Pupils equal, reactive to light.  Symmetric.    PULM: Clear to auscultation bilaterally, no significant sputum production    CVS: Regular rate and rhythm, no murmurs, rubs, or gallops    ABD: Soft, nondistended, nontender, normoactive bowel sounds, no masses    EXT: No edema, nontender    SKIN: Warm and well perfused, no rashes noted.    RADIOLOGY: ***    CRITICAL CARE TIME SPENT: ***     Patient is a 46y old  Male who presents with a chief complaint of Acute hypoxemic respiratory failure, AMS (22 Aug 2021 21:42)     45 y/o M (real name: Ildefonso Pierre, : [birth year]) with a h/o HTN, DM, asthma, presents to the ED after being found unresponsive in bed by his sister this evening. Noted to have agonal respirations and emesis coming from mouth. He was found to be hypoxemic in the ED prompting emergent endotracheal intubation and appears to have an extensive right sided pneumonia on CXR. Urine toxicology (+) for benzos and cocaine. CT head reveals symmetric low attenuation lesions in the bilateral globus pallidus regions of the basal ganglia, which can be seen with hypoxic ischemic encephalopathy or related to carbon monoxide positioning, as well as indeterminate areas of low attenuation in the bilateral cerebellum may reflect infarcts. Hospital course complicated by severe septic shock state requiring dual IV vasopressor therapy and GARRETT/rhabdomyolysis.    Events last 24 hours: Patient remains in IV vasopressor dependent shock state. TTE reveals severe reduced LVEF (<20%) and severe AS. Making urine. Continues to require full mechanical vent support. Sedated on propofol (agitated when weaned).      PAST MEDICAL & SURGICAL HISTORY:  Asthma    HTN (hypertension)    DM (diabetes mellitus)      Allergies    Allergy Status Unknown      Review of Systems:  CONSTITUTIONAL: No fever, chills, or fatigue  EYES: No eye pain, visual disturbances, or discharge  ENMT:  No difficulty hearing, tinnitus, vertigo; No sinus or throat pain  NECK: No pain or stiffness  RESPIRATORY: No cough, wheezing, chills or hemoptysis; No shortness of breath  CARDIOVASCULAR: No chest pain, palpitations, dizziness, or leg swelling  GASTROINTESTINAL: No abdominal or epigastric pain. No nausea, vomiting, or hematemesis; No diarrhea or constipation. No melena or hematochezia.  GENITOURINARY: No dysuria, frequency, hematuria, or incontinence  NEUROLOGICAL: No headaches, memory loss, loss of strength, numbness, or tremors  SKIN: No itching, burning, rashes, or lesions   MUSCULOSKELETAL: No joint pain or swelling; No muscle, back, or extremity pain  PSYCHIATRIC: No depression, anxiety, mood swings, or difficulty sleeping      Medications:  piperacillin/tazobactam IVPB.. 3.375 Gram(s) IV Intermittent every 12 hours    midodrine 10 milliGRAM(s) Oral every 8 hours  norepinephrine Infusion 0.05 MICROgram(s)/kG/Min IV Continuous <Continuous>    ALBUTerol    90 MICROgram(s) HFA Inhaler 2 Puff(s) Inhalation every 6 hours    propofol Infusion 10 MICROgram(s)/kG/Min IV Continuous <Continuous>      aspirin  chewable 81 milliGRAM(s) Oral daily  heparin   Injectable 5000 Unit(s) SubCutaneous every 8 hours    pantoprazole  Injectable 40 milliGRAM(s) IV Push daily      dextrose 40% Gel 15 Gram(s) Oral once  dextrose 50% Injectable 25 Gram(s) IV Push once  dextrose 50% Injectable 12.5 Gram(s) IV Push once  dextrose 50% Injectable 25 Gram(s) IV Push once  glucagon  Injectable 1 milliGRAM(s) IntraMuscular once  insulin lispro (ADMELOG) corrective regimen sliding scale   SubCutaneous every 6 hours    dextrose 5%. 1000 milliLiter(s) IV Continuous <Continuous>  dextrose 5%. 1000 milliLiter(s) IV Continuous <Continuous>  lactated ringers. 1000 milliLiter(s) IV Continuous <Continuous>      chlorhexidine 0.12% Liquid 15 milliLiter(s) Oral Mucosa every 12 hours  chlorhexidine 2% Cloths 1 Application(s) Topical <User Schedule>    sodium zirconium cyclosilicate 10 Gram(s) Oral every 8 hours      Mode: AC/ CMV (Assist Control/ Continuous Mandatory Ventilation)  RR (machine): 16  TV (machine): 450  FiO2: 40  PEEP: 5  MAP: 10  PIP: 17      ICU Vital Signs Last 24 Hrs  T(C): 37.9 (23 Aug 2021 07:36), Max: 38.3 (22 Aug 2021 20:00)  T(F): 100.2 (23 Aug 2021 07:36), Max: 100.9 (22 Aug 2021 20:00)  HR: 84 (23 Aug 2021 09:01) (79 - 116)  BP: 80/69 (23 Aug 2021 09:00) (55/25 - 176/100)  BP(mean): 74 (23 Aug 2021 09:00) (36 - 116)  ABP: --  ABP(mean): --  RR: 18 (23 Aug 2021 09:00) (16 - 30)  SpO2: 99% (23 Aug 2021 06:14) (86% - 100%)    Vital Signs Last 24 Hrs  T(C): 37.9 (23 Aug 2021 07:36), Max: 38.3 (22 Aug 2021 20:00)  T(F): 100.2 (23 Aug 2021 07:36), Max: 100.9 (22 Aug 2021 20:00)  HR: 84 (23 Aug 2021 09:01) (79 - 116)  BP: 80/69 (23 Aug 2021 09:00) (55/25 - 176/100)  BP(mean): 74 (23 Aug 2021 09:00) (36 - 116)  RR: 18 (23 Aug 2021 09:00) (16 - 30)  SpO2: 99% (23 Aug 2021 06:14) (86% - 100%)    ABG - ( 22 Aug 2021 00:41 )  pH, Arterial: 7.240 pH, Blood: x     /  pCO2: 43    /  pO2: 138   / HCO3: 18    / Base Excess: -9.0  /  SaO2: 99.5                I&O's Detail    22 Aug 2021 07:01  -  23 Aug 2021 07:00  --------------------------------------------------------  IN:    IV PiggyBack: 150 mL    Lactated Ringers: 2400 mL    Norepinephrine: 172.9 mL    Propofol: 166.9 mL    Vasopressin: 16.8 mL  Total IN: 2906.6 mL    OUT:    Indwelling Catheter - Urethral (mL): 1435 mL  Total OUT: 1435 mL    Total NET: 1471.6 mL      23 Aug 2021 07:01  -  23 Aug 2021 09:08  --------------------------------------------------------  IN:    IV PiggyBack: 50 mL    Lactated Ringers: 200 mL    Propofol: 55.8 mL  Total IN: 305.8 mL    OUT:    Indwelling Catheter - Urethral (mL): 525 mL    Norepinephrine: 0 mL  Total OUT: 525 mL    Total NET: -219.2 mL            LABS:                        14.0   18.88 )-----------( 127      ( 23 Aug 2021 04:14 )             42.0     08-23    143  |  107  |  35.5<H>  ----------------------------<  125<H>  4.4   |  27.0  |  1.74<H>    Ca    9.3      23 Aug 2021 07:52  Phos  2.6     08-23  Mg     2.2     08-23    TPro  5.6<L>  /  Alb  2.8<L>  /  TBili  0.7  /  DBili  x   /  AST  211<H>  /  ALT  123<H>  /  AlkPhos  47  08-23      CARDIAC MARKERS ( 23 Aug 2021 07:52 )  x     / x     / 6504 U/L / x     / 28.9 ng/mL  CARDIAC MARKERS ( 23 Aug 2021 04:15 )  x     / x     / 6350 U/L / x     / 36.6 ng/mL  CARDIAC MARKERS ( 22 Aug 2021 20:53 )  x     / x     / 7124 U/L / x     / 57.6 ng/mL  CARDIAC MARKERS ( 22 Aug 2021 12:22 )  x     / 1.04 ng/mL / 7422 U/L / x     / 101.1 ng/mL  CARDIAC MARKERS ( 22 Aug 2021 04:55 )  x     / 1.25 ng/mL / 8851 U/L / x     / 125.5 ng/mL  CARDIAC MARKERS ( 21 Aug 2021 20:02 )  x     / 0.72 ng/mL / 5627 U/L / x     / 126.3 ng/mL      CAPILLARY BLOOD GLUCOSE      POCT Blood Glucose.: 156 mg/dL (23 Aug 2021 06:23)    PT/INR - ( 21 Aug 2021 20:02 )   PT: 12.2 sec;   INR: 1.05 ratio         PTT - ( 21 Aug 2021 20:02 )  PTT:31.8 sec  Urinalysis Basic - ( 22 Aug 2021 12:26 )    Color: Yellow / Appearance: Slightly Turbid / S.025 / pH: x  Gluc: x / Ketone: Trace  / Bili: Negative / Urobili: Negative mg/dL   Blood: x / Protein: 30 mg/dL / Nitrite: Negative   Leuk Esterase: Trace / RBC: 3-5 /HPF / WBC 3-5   Sq Epi: x / Non Sq Epi: Few / Bacteria: Few      CULTURES:  Culture Results:   Normal Respiratory Le present ( @ 12:18)  Culture Results:   No growth ( @ 02:40)      Physical Examination:    General: No acute distress.  sedated, intubated    HEENT: Pupils equal, reactive to light.  Symmetric.    PULM: diminished on right, no significant sputum production    CVS: Regular rate and rhythm, no murmurs, rubs, or gallops    ABD: Soft, nondistended, nontender, normoactive bowel sounds, no masses    EXT: No edema, nontender    SKIN: Warm and well perfused, no rashes noted.    RADIOLOGY: reviewed    CRITICAL CARE TIME SPENT: 35 min     Patient is a 46y old  Male who presents with a chief complaint of Acute hypoxemic respiratory failure, AMS (22 Aug 2021 21:42)     45 y/o M (real name: Ildefonso Pierre, : 79) with a h/o HTN, DM, asthma, presents to the ED after being found unresponsive in bed by his sister this evening. Noted to have agonal respirations and emesis coming from mouth. He was found to be hypoxemic in the ED prompting emergent endotracheal intubation and appears to have an extensive right sided pneumonia on CXR. Urine toxicology (+) for benzos and cocaine. CT head reveals symmetric low attenuation lesions in the bilateral globus pallidus regions of the basal ganglia, which can be seen with hypoxic ischemic encephalopathy or related to carbon monoxide positioning, as well as indeterminate areas of low attenuation in the bilateral cerebellum may reflect infarcts. Hospital course complicated by severe septic shock state requiring dual IV vasopressor therapy and GARRETT/rhabdomyolysis.    Events last 24 hours: Patient remains in IV vasopressor dependent shock state. TTE reveals severe reduced LVEF (<20%) and severe AS. Making urine. Continues to require full mechanical vent support. Sedated on propofol (agitated when weaned).      PAST MEDICAL & SURGICAL HISTORY:  Asthma    HTN (hypertension)    DM (diabetes mellitus)      Allergies    Allergy Status Unknown      Could not obtain ROS due to underlying mentation    Medications:  piperacillin/tazobactam IVPB.. 3.375 Gram(s) IV Intermittent every 12 hours    midodrine 10 milliGRAM(s) Oral every 8 hours  norepinephrine Infusion 0.05 MICROgram(s)/kG/Min IV Continuous <Continuous>    ALBUTerol    90 MICROgram(s) HFA Inhaler 2 Puff(s) Inhalation every 6 hours    propofol Infusion 10 MICROgram(s)/kG/Min IV Continuous <Continuous>      aspirin  chewable 81 milliGRAM(s) Oral daily  heparin   Injectable 5000 Unit(s) SubCutaneous every 8 hours    pantoprazole  Injectable 40 milliGRAM(s) IV Push daily      dextrose 40% Gel 15 Gram(s) Oral once  dextrose 50% Injectable 25 Gram(s) IV Push once  dextrose 50% Injectable 12.5 Gram(s) IV Push once  dextrose 50% Injectable 25 Gram(s) IV Push once  glucagon  Injectable 1 milliGRAM(s) IntraMuscular once  insulin lispro (ADMELOG) corrective regimen sliding scale   SubCutaneous every 6 hours    dextrose 5%. 1000 milliLiter(s) IV Continuous <Continuous>  dextrose 5%. 1000 milliLiter(s) IV Continuous <Continuous>  lactated ringers. 1000 milliLiter(s) IV Continuous <Continuous>      chlorhexidine 0.12% Liquid 15 milliLiter(s) Oral Mucosa every 12 hours  chlorhexidine 2% Cloths 1 Application(s) Topical <User Schedule>    sodium zirconium cyclosilicate 10 Gram(s) Oral every 8 hours      Mode: AC/ CMV (Assist Control/ Continuous Mandatory Ventilation)  RR (machine): 16  TV (machine): 450  FiO2: 40  PEEP: 5  MAP: 10  PIP: 17      ICU Vital Signs Last 24 Hrs  T(C): 37.9 (23 Aug 2021 07:36), Max: 38.3 (22 Aug 2021 20:00)  T(F): 100.2 (23 Aug 2021 07:36), Max: 100.9 (22 Aug 2021 20:00)  HR: 84 (23 Aug 2021 09:01) (79 - 116)  BP: 80/69 (23 Aug 2021 09:00) (55/25 - 176/100)  BP(mean): 74 (23 Aug 2021 09:00) (36 - 116)  ABP: --  ABP(mean): --  RR: 18 (23 Aug 2021 09:00) (16 - 30)  SpO2: 99% (23 Aug 2021 06:14) (86% - 100%)    Vital Signs Last 24 Hrs  T(C): 37.9 (23 Aug 2021 07:36), Max: 38.3 (22 Aug 2021 20:00)  T(F): 100.2 (23 Aug 2021 07:36), Max: 100.9 (22 Aug 2021 20:00)  HR: 84 (23 Aug 2021 09:01) (79 - 116)  BP: 80/69 (23 Aug 2021 09:00) (55/25 - 176/100)  BP(mean): 74 (23 Aug 2021 09:00) (36 - 116)  RR: 18 (23 Aug 2021 09:00) (16 - 30)  SpO2: 99% (23 Aug 2021 06:14) (86% - 100%)    ABG - ( 22 Aug 2021 00:41 )  pH, Arterial: 7.240 pH, Blood: x     /  pCO2: 43    /  pO2: 138   / HCO3: 18    / Base Excess: -9.0  /  SaO2: 99.5                I&O's Detail    22 Aug 2021 07:01  -  23 Aug 2021 07:00  --------------------------------------------------------  IN:    IV PiggyBack: 150 mL    Lactated Ringers: 2400 mL    Norepinephrine: 172.9 mL    Propofol: 166.9 mL    Vasopressin: 16.8 mL  Total IN: 2906.6 mL    OUT:    Indwelling Catheter - Urethral (mL): 1435 mL  Total OUT: 1435 mL    Total NET: 1471.6 mL      23 Aug 2021 07:01  -  23 Aug 2021 09:08  --------------------------------------------------------  IN:    IV PiggyBack: 50 mL    Lactated Ringers: 200 mL    Propofol: 55.8 mL  Total IN: 305.8 mL    OUT:    Indwelling Catheter - Urethral (mL): 525 mL    Norepinephrine: 0 mL  Total OUT: 525 mL    Total NET: -219.2 mL            LABS:                        14.0   18.88 )-----------( 127      ( 23 Aug 2021 04:14 )             42.0         143  |  107  |  35.5<H>  ----------------------------<  125<H>  4.4   |  27.0  |  1.74<H>    Ca    9.3      23 Aug 2021 07:52  Phos  2.6       Mg     2.2         TPro  5.6<L>  /  Alb  2.8<L>  /  TBili  0.7  /  DBili  x   /  AST  211<H>  /  ALT  123<H>  /  AlkPhos  47  08      CARDIAC MARKERS ( 23 Aug 2021 07:52 )  x     / x     / 6504 U/L / x     / 28.9 ng/mL  CARDIAC MARKERS ( 23 Aug 2021 04:15 )  x     / x     / 6350 U/L / x     / 36.6 ng/mL  CARDIAC MARKERS ( 22 Aug 2021 20:53 )  x     / x     / 7124 U/L / x     / 57.6 ng/mL  CARDIAC MARKERS ( 22 Aug 2021 12:22 )  x     / 1.04 ng/mL / 7422 U/L / x     / 101.1 ng/mL  CARDIAC MARKERS ( 22 Aug 2021 04:55 )  x     / 1.25 ng/mL / 8851 U/L / x     / 125.5 ng/mL  CARDIAC MARKERS ( 21 Aug 2021 20:02 )  x     / 0.72 ng/mL / 5627 U/L / x     / 126.3 ng/mL      CAPILLARY BLOOD GLUCOSE      POCT Blood Glucose.: 156 mg/dL (23 Aug 2021 06:23)    PT/INR - ( 21 Aug 2021 20:02 )   PT: 12.2 sec;   INR: 1.05 ratio         PTT - ( 21 Aug 2021 20:02 )  PTT:31.8 sec  Urinalysis Basic - ( 22 Aug 2021 12:26 )    Color: Yellow / Appearance: Slightly Turbid / S.025 / pH: x  Gluc: x / Ketone: Trace  / Bili: Negative / Urobili: Negative mg/dL   Blood: x / Protein: 30 mg/dL / Nitrite: Negative   Leuk Esterase: Trace / RBC: 3-5 /HPF / WBC 3-5   Sq Epi: x / Non Sq Epi: Few / Bacteria: Few      CULTURES:  Culture Results:   Normal Respiratory Le present ( @ 12:18)  Culture Results:   No growth ( @ 02:40)      Physical Examination:    General: No acute distress.  sedated, intubated    HEENT: Pupils equal, reactive to light.  Symmetric.    PULM: diminished on right, no significant sputum production    CVS: Regular rate and rhythm, no murmurs, rubs, or gallops    ABD: Soft, nondistended, nontender, normoactive bowel sounds, no masses    EXT: No edema, nontender    SKIN: Warm and well perfused, no rashes noted.    RADIOLOGY: reviewed    CRITICAL CARE TIME SPENT: 35 min

## 2021-08-23 NOTE — PROGRESS NOTE ADULT - ASSESSMENT
44 y/o M with a h/o HTN, DM, asthma, with acute hypoxemic respiratory failure, aspiration pneumonia, AMS, suspected benzodiazepine overdose, septic shock, GARRETT, rhabdomyolysis.    Suspected benzodiazepine overdose with massive aspiration event. No major acute intracranial pathology seen on CT head. Unclear if this was a suicide attempt.     - actively titrating vent settings to maintain SpO2 > 92%, weaned FiO2 to 40% and PEEP to 5  - empiric inhaled bronchodilators  - sedate with propofol to promote vent synchrony and patient comfort, agitated but nonpurposeful when weaned  - actively titrating norepinephrine infusion to maintain a MAP > 65, d/c vasopressin, add midodrine  - unclear etiology of severe cardiomyopathy, severe AS? Will require RHC/LHC when stable  - trops elevated 1+ but difficult to interpret in the setting of GARRETT and rhabdo, no acute ischemia on EKG  - hydrating with LR @ 100cc/hr, CK downtrending, f/u VBG to assess acid-base balance  - continue empiric Zosyn, blood and sputum cultures pending  - trend BUN/Cr, monitor lytes, UOP adequate at this time, mildly hyperkalemic- will treat medically now, high risk for requiring HD, nephrology is following  - will consider MRI head pending clinical course to further investigate questionable findings on CT head  - will need psychiatry and social work evaluation prior to discharge     Pt is a 42 y/o M w/ PMHx HTN, DM, asthma, with acute hypoxemic respiratory failure, aspiration pneumonia, AMS, suspected benzodiazepine overdose, septic shock, GARRETT, rhabdomyolysis.    Suspected benzodiazepine overdose with massive aspiration event. No major acute intracranial pathology seen on CT head. Unclear if this was a suicide attempt.     - sedate with propofol to promote vent synchrony and patient comfort, agitated but nonpurposeful when weaned  - will repeat CT Head  - LP to be performed pending CT Head  - actively titrating vent settings to maintain SpO2 > 92%, weaned FiO2 to 40% and PEEP to 5  - empiric inhaled bronchodilators  - actively titrating norepinephrine infusion to maintain a MAP > 65, d/c vasopressin, add midodrine  - unclear etiology of severe cardiomyopathy, severe AS? Will require RHC/LHC when stable  - trops elevated 1+ but difficult to interpret in the setting of GARRETT and rhabdo, no acute ischemia on EKG  - hydrating with LR @ 100cc/hr, CK downtrending, f/u VBG to assess acid-base balance  - continue empiric Zosyn, blood and sputum cultures pending  - trend BUN/Cr, monitor lytes, UOP adequate at this time, mildly hyperkalemic- will treat medically now, high risk for requiring HD, nephrology is following  - will consider MRI head pending clinical course to further investigate questionable findings on CT head  - will need psychiatry and social work evaluation prior to discharge

## 2021-08-23 NOTE — DIETITIAN INITIAL EVALUATION ADULT. - OTHER INFO
Pt is a 45 y/o M  with a h/o HTN, DM, asthma, presents to the ED after being found unresponsive in bed by his sister this evening. Noted to have agonal respirations and emesis coming from mouth. He was found to be hypoxemic in the ED prompting emergent endotracheal intubation and appears to have an extensive right sided pneumonia on CXR. Urine toxicology (+) for benzos and cocaine. CT head reveals symmetric low attenuation lesions in the bilateral globus pallidus regions of the basal ganglia, which can be seen with hypoxic ischemic encephalopathy or related to carbon monoxide positioning, as well as indeterminate areas of low attenuation in the bilateral cerebellum may reflect infarcts. Hospital course complicated by severe septic shock state requiring dual IV vasopressor therapy and GARRETT/rhabdomyolysis. Pt remains intubated/sedated; NPO status maintained at this time. Suspected DTI to R heel noted.

## 2021-08-24 LAB
ALBUMIN SERPL ELPH-MCNC: 2.5 G/DL — LOW (ref 3.3–5.2)
ALP SERPL-CCNC: 64 U/L — SIGNIFICANT CHANGE UP (ref 40–120)
ALT FLD-CCNC: 103 U/L — HIGH
ANION GAP SERPL CALC-SCNC: 12 MMOL/L — SIGNIFICANT CHANGE UP (ref 5–17)
AST SERPL-CCNC: 164 U/L — HIGH
BASOPHILS # BLD AUTO: 0 K/UL — SIGNIFICANT CHANGE UP (ref 0–0.2)
BASOPHILS NFR BLD AUTO: 0 % — SIGNIFICANT CHANGE UP (ref 0–2)
BILIRUB SERPL-MCNC: 0.7 MG/DL — SIGNIFICANT CHANGE UP (ref 0.4–2)
BUN SERPL-MCNC: 22.4 MG/DL — HIGH (ref 8–20)
CALCIUM SERPL-MCNC: 8.7 MG/DL — SIGNIFICANT CHANGE UP (ref 8.6–10.2)
CHLORIDE SERPL-SCNC: 107 MMOL/L — SIGNIFICANT CHANGE UP (ref 98–107)
CK MB CFR SERPL CALC: 8.1 NG/ML — HIGH (ref 0–6.7)
CK SERPL-CCNC: 3449 U/L — HIGH (ref 30–200)
CO2 SERPL-SCNC: 25 MMOL/L — SIGNIFICANT CHANGE UP (ref 22–29)
CREAT SERPL-MCNC: 1.24 MG/DL — SIGNIFICANT CHANGE UP (ref 0.5–1.3)
CSF PCR RESULT: SIGNIFICANT CHANGE UP
CULTURE RESULTS: SIGNIFICANT CHANGE UP
DACRYOCYTES BLD QL SMEAR: SLIGHT — SIGNIFICANT CHANGE UP
EOSINOPHIL # BLD AUTO: 0.17 K/UL — SIGNIFICANT CHANGE UP (ref 0–0.5)
EOSINOPHIL NFR BLD AUTO: 0.9 % — SIGNIFICANT CHANGE UP (ref 0–6)
GAS PNL BLDA: SIGNIFICANT CHANGE UP
GAS PNL BLDA: SIGNIFICANT CHANGE UP
GLUCOSE BLDC GLUCOMTR-MCNC: 102 MG/DL — HIGH (ref 70–99)
GLUCOSE BLDC GLUCOMTR-MCNC: 146 MG/DL — HIGH (ref 70–99)
GLUCOSE BLDC GLUCOMTR-MCNC: 99 MG/DL — SIGNIFICANT CHANGE UP (ref 70–99)
GLUCOSE SERPL-MCNC: 119 MG/DL — HIGH (ref 70–99)
HCT VFR BLD CALC: 36.3 % — LOW (ref 39–50)
HGB BLD-MCNC: 12.4 G/DL — LOW (ref 13–17)
LACTATE SERPL-SCNC: 2.1 MMOL/L — HIGH (ref 0.5–2)
LYMPHOCYTES # BLD AUTO: 0.68 K/UL — LOW (ref 1–3.3)
LYMPHOCYTES # BLD AUTO: 3.6 % — LOW (ref 13–44)
MAGNESIUM SERPL-MCNC: 2 MG/DL — SIGNIFICANT CHANGE UP (ref 1.6–2.6)
MANUAL SMEAR VERIFICATION: SIGNIFICANT CHANGE UP
MCHC RBC-ENTMCNC: 31.2 PG — SIGNIFICANT CHANGE UP (ref 27–34)
MCHC RBC-ENTMCNC: 34.2 GM/DL — SIGNIFICANT CHANGE UP (ref 32–36)
MCV RBC AUTO: 91.2 FL — SIGNIFICANT CHANGE UP (ref 80–100)
METAMYELOCYTES # FLD: 1.8 % — HIGH (ref 0–0)
MONOCYTES # BLD AUTO: 0.68 K/UL — SIGNIFICANT CHANGE UP (ref 0–0.9)
MONOCYTES NFR BLD AUTO: 3.6 % — SIGNIFICANT CHANGE UP (ref 2–14)
MRSA PCR RESULT.: SIGNIFICANT CHANGE UP
NEUTROPHILS # BLD AUTO: 16.97 K/UL — HIGH (ref 1.8–7.4)
NEUTROPHILS NFR BLD AUTO: 83.9 % — HIGH (ref 43–77)
NEUTS BAND # BLD: 6.2 % — SIGNIFICANT CHANGE UP (ref 0–8)
OVALOCYTES BLD QL SMEAR: SLIGHT — SIGNIFICANT CHANGE UP
PHOSPHATE SERPL-MCNC: 2.5 MG/DL — SIGNIFICANT CHANGE UP (ref 2.4–4.7)
PLAT MORPH BLD: NORMAL — SIGNIFICANT CHANGE UP
PLATELET # BLD AUTO: 134 K/UL — LOW (ref 150–400)
POIKILOCYTOSIS BLD QL AUTO: SLIGHT — SIGNIFICANT CHANGE UP
POLYCHROMASIA BLD QL SMEAR: SLIGHT — SIGNIFICANT CHANGE UP
POTASSIUM SERPL-MCNC: 4.1 MMOL/L — SIGNIFICANT CHANGE UP (ref 3.5–5.3)
POTASSIUM SERPL-SCNC: 4.1 MMOL/L — SIGNIFICANT CHANGE UP (ref 3.5–5.3)
PROT SERPL-MCNC: 5.6 G/DL — LOW (ref 6.6–8.7)
RBC # BLD: 3.98 M/UL — LOW (ref 4.2–5.8)
RBC # FLD: 12.1 % — SIGNIFICANT CHANGE UP (ref 10.3–14.5)
RBC BLD AUTO: ABNORMAL
S AUREUS DNA NOSE QL NAA+PROBE: DETECTED
SMUDGE CELLS # BLD: PRESENT — SIGNIFICANT CHANGE UP
SODIUM SERPL-SCNC: 143 MMOL/L — SIGNIFICANT CHANGE UP (ref 135–145)
SPECIMEN SOURCE: SIGNIFICANT CHANGE UP
TRIGL SERPL-MCNC: 339 MG/DL — HIGH
WBC # BLD: 18.83 K/UL — HIGH (ref 3.8–10.5)
WBC # FLD AUTO: 18.83 K/UL — HIGH (ref 3.8–10.5)

## 2021-08-24 PROCEDURE — 93971 EXTREMITY STUDY: CPT | Mod: 26,RT

## 2021-08-24 PROCEDURE — 99233 SBSQ HOSP IP/OBS HIGH 50: CPT

## 2021-08-24 PROCEDURE — 99222 1ST HOSP IP/OBS MODERATE 55: CPT

## 2021-08-24 PROCEDURE — 71045 X-RAY EXAM CHEST 1 VIEW: CPT | Mod: 26

## 2021-08-24 PROCEDURE — 99291 CRITICAL CARE FIRST HOUR: CPT

## 2021-08-24 RX ORDER — PROPOFOL 10 MG/ML
50 INJECTION, EMULSION INTRAVENOUS
Qty: 1000 | Refills: 0 | Status: DISCONTINUED | OUTPATIENT
Start: 2021-08-24 | End: 2021-08-24

## 2021-08-24 RX ORDER — FENTANYL CITRATE 50 UG/ML
0.5 INJECTION INTRAVENOUS
Qty: 2500 | Refills: 0 | Status: DISCONTINUED | OUTPATIENT
Start: 2021-08-24 | End: 2021-08-24

## 2021-08-24 RX ORDER — PROPOFOL 10 MG/ML
9.67 INJECTION, EMULSION INTRAVENOUS
Qty: 1000 | Refills: 0 | Status: DISCONTINUED | OUTPATIENT
Start: 2021-08-24 | End: 2021-08-24

## 2021-08-24 RX ORDER — MIDAZOLAM HYDROCHLORIDE 1 MG/ML
0.02 INJECTION, SOLUTION INTRAMUSCULAR; INTRAVENOUS
Qty: 100 | Refills: 0 | Status: DISCONTINUED | OUTPATIENT
Start: 2021-08-24 | End: 2021-08-30

## 2021-08-24 RX ORDER — FENTANYL CITRATE 50 UG/ML
3 INJECTION INTRAVENOUS
Qty: 2500 | Refills: 0 | Status: DISCONTINUED | OUTPATIENT
Start: 2021-08-24 | End: 2021-08-31

## 2021-08-24 RX ORDER — PROPOFOL 10 MG/ML
40 INJECTION, EMULSION INTRAVENOUS
Qty: 1000 | Refills: 0 | Status: DISCONTINUED | OUTPATIENT
Start: 2021-08-24 | End: 2021-08-24

## 2021-08-24 RX ORDER — FUROSEMIDE 40 MG
80 TABLET ORAL ONCE
Refills: 0 | Status: COMPLETED | OUTPATIENT
Start: 2021-08-24 | End: 2021-08-24

## 2021-08-24 RX ORDER — FENTANYL CITRATE 50 UG/ML
50 INJECTION INTRAVENOUS
Refills: 0 | Status: DISCONTINUED | OUTPATIENT
Start: 2021-08-24 | End: 2021-08-24

## 2021-08-24 RX ORDER — MIDAZOLAM HYDROCHLORIDE 1 MG/ML
2 INJECTION, SOLUTION INTRAMUSCULAR; INTRAVENOUS ONCE
Refills: 0 | Status: DISCONTINUED | OUTPATIENT
Start: 2021-08-24 | End: 2021-08-24

## 2021-08-24 RX ORDER — FENTANYL CITRATE 50 UG/ML
100 INJECTION INTRAVENOUS ONCE
Refills: 0 | Status: DISCONTINUED | OUTPATIENT
Start: 2021-08-24 | End: 2021-08-24

## 2021-08-24 RX ORDER — FENTANYL CITRATE 50 UG/ML
2 INJECTION INTRAVENOUS
Qty: 2500 | Refills: 0 | Status: DISCONTINUED | OUTPATIENT
Start: 2021-08-24 | End: 2021-08-24

## 2021-08-24 RX ADMIN — Medication 650 MILLIGRAM(S): at 16:30

## 2021-08-24 RX ADMIN — PROPOFOL 5.4 MICROGRAM(S)/KG/MIN: 10 INJECTION, EMULSION INTRAVENOUS at 04:16

## 2021-08-24 RX ADMIN — CHLORHEXIDINE GLUCONATE 1 APPLICATION(S): 213 SOLUTION TOPICAL at 05:24

## 2021-08-24 RX ADMIN — FENTANYL CITRATE 50 MICROGRAM(S): 50 INJECTION INTRAVENOUS at 16:59

## 2021-08-24 RX ADMIN — Medication 81 MILLIGRAM(S): at 11:26

## 2021-08-24 RX ADMIN — SODIUM CHLORIDE 100 MILLILITER(S): 9 INJECTION, SOLUTION INTRAVENOUS at 11:47

## 2021-08-24 RX ADMIN — FENTANYL CITRATE 100 MICROGRAM(S): 50 INJECTION INTRAVENOUS at 03:09

## 2021-08-24 RX ADMIN — PANTOPRAZOLE SODIUM 40 MILLIGRAM(S): 20 TABLET, DELAYED RELEASE ORAL at 11:25

## 2021-08-24 RX ADMIN — MIDODRINE HYDROCHLORIDE 10 MILLIGRAM(S): 2.5 TABLET ORAL at 21:50

## 2021-08-24 RX ADMIN — Medication 2 MILLIGRAM(S): at 17:32

## 2021-08-24 RX ADMIN — Medication 650 MILLIGRAM(S): at 16:59

## 2021-08-24 RX ADMIN — MIDAZOLAM HYDROCHLORIDE 1 MILLIGRAM(S): 1 INJECTION, SOLUTION INTRAMUSCULAR; INTRAVENOUS at 01:52

## 2021-08-24 RX ADMIN — CHLORHEXIDINE GLUCONATE 15 MILLILITER(S): 213 SOLUTION TOPICAL at 17:21

## 2021-08-24 RX ADMIN — Medication 80 MILLIGRAM(S): at 23:41

## 2021-08-24 RX ADMIN — FENTANYL CITRATE 4.66 MICROGRAM(S)/KG/HR: 50 INJECTION INTRAVENOUS at 04:16

## 2021-08-24 RX ADMIN — HEPARIN SODIUM 5000 UNIT(S): 5000 INJECTION INTRAVENOUS; SUBCUTANEOUS at 21:50

## 2021-08-24 RX ADMIN — FENTANYL CITRATE 100 MICROGRAM(S): 50 INJECTION INTRAVENOUS at 03:18

## 2021-08-24 RX ADMIN — PROPOFOL 5.4 MICROGRAM(S)/KG/MIN: 10 INJECTION, EMULSION INTRAVENOUS at 11:25

## 2021-08-24 RX ADMIN — FENTANYL CITRATE 27.9 MICROGRAM(S)/KG/HR: 50 INJECTION INTRAVENOUS at 17:40

## 2021-08-24 RX ADMIN — FENTANYL CITRATE 50 MICROGRAM(S): 50 INJECTION INTRAVENOUS at 03:29

## 2021-08-24 RX ADMIN — MIDODRINE HYDROCHLORIDE 10 MILLIGRAM(S): 2.5 TABLET ORAL at 13:36

## 2021-08-24 RX ADMIN — HEPARIN SODIUM 5000 UNIT(S): 5000 INJECTION INTRAVENOUS; SUBCUTANEOUS at 13:37

## 2021-08-24 RX ADMIN — PIPERACILLIN AND TAZOBACTAM 25 GRAM(S): 4; .5 INJECTION, POWDER, LYOPHILIZED, FOR SOLUTION INTRAVENOUS at 05:14

## 2021-08-24 RX ADMIN — PIPERACILLIN AND TAZOBACTAM 25 GRAM(S): 4; .5 INJECTION, POWDER, LYOPHILIZED, FOR SOLUTION INTRAVENOUS at 17:21

## 2021-08-24 RX ADMIN — MIDAZOLAM HYDROCHLORIDE 1.86 MG/KG/HR: 1 INJECTION, SOLUTION INTRAMUSCULAR; INTRAVENOUS at 22:30

## 2021-08-24 RX ADMIN — MIDAZOLAM HYDROCHLORIDE 2 MILLIGRAM(S): 1 INJECTION, SOLUTION INTRAMUSCULAR; INTRAVENOUS at 02:14

## 2021-08-24 RX ADMIN — HEPARIN SODIUM 5000 UNIT(S): 5000 INJECTION INTRAVENOUS; SUBCUTANEOUS at 05:13

## 2021-08-24 RX ADMIN — CHLORHEXIDINE GLUCONATE 15 MILLILITER(S): 213 SOLUTION TOPICAL at 05:14

## 2021-08-24 RX ADMIN — FENTANYL CITRATE 50 MICROGRAM(S): 50 INJECTION INTRAVENOUS at 02:19

## 2021-08-24 RX ADMIN — MIDODRINE HYDROCHLORIDE 10 MILLIGRAM(S): 2.5 TABLET ORAL at 05:13

## 2021-08-24 RX ADMIN — PROPOFOL 27.9 MICROGRAM(S)/KG/MIN: 10 INJECTION, EMULSION INTRAVENOUS at 17:31

## 2021-08-24 RX ADMIN — PROPOFOL 22.3 MICROGRAM(S)/KG/MIN: 10 INJECTION, EMULSION INTRAVENOUS at 16:31

## 2021-08-24 NOTE — CHART NOTE - NSCHARTNOTEFT_GEN_A_CORE
Spoke with Iggy, she is requesting family meeting this Friday, time to be determined because patients sister July will be flying in from [place] on Thursday. There is another sister who is coming from [place] also.

## 2021-08-24 NOTE — CONSULT NOTE ADULT - SUBJECTIVE AND OBJECTIVE BOX
HPI:  47 y/o M (real name: Ildefonso Pierre, : [birth year]) with a h/o HTN, DM, asthma, presents to the ED after being found unresponsive in bed by his sister this evening. Noted to have agonal respirations and emesis coming from mouth. He was found to be hypoxemic in the ED prompting emergent endotracheal intubation and appears to have an extensive right sided pneumonia on CXR. The patient's sister, Adela, reports that he had recently broken up with his girlfriend and has been having a hard time coping with it and has been noticeably depressed. Within the past 5 days he was taken to the ED at [facility] for some unclear intoxication. Adela also reports recently noticing a plethora of newly prescribed medications at his house and was able to identify clonazepam and alprazolam. Tonight he had told her that he was going out with friends and asked if she would come over and watch his dog, which she thought seemed unusual. Urine toxicology (+) for benzos and cocaine. CT head reveals symmetric low attenuation lesions in the bilateral globus pallidus regions of the basal ganglia, which can be seen with hypoxic ischemic encephalopathy or related to carbon monoxide positioning, as well as indeterminate areas of low attenuation in the bilateral cerebellum may reflect infarcts. He is sedated and now with progressive hypotension despite 3L IVF bolus. Started on IV vasopressor therapy. (21 Aug 2021 21:54)      PERTINENT PMH REVIEWED: Yes No    PAST MEDICAL & SURGICAL HISTORY:  No pertinent past medical history    Asthma    HTN (hypertension)    DM (diabetes mellitus)    S/P elbow joint replacement  right elbow surgery.    S/P knee surgery  right thigh surgery.        SOCIAL HISTORY:                      Substance history:                    Admitted from:  home  [facility]                     Taoism/spirituality:                    Cultural concerns:                      Surrogate/HCP/Guardian: Phone#:    FAMILY HISTORY:      Allergies    Allergy Status Unknown  No Known Allergies    Intolerances        ADVANCE DIRECTIVES:   DNR YES NO  Completed on:                     MOLST  YES NO   Completed on:  Living Will  YES NO   Completed on:    Baseline ADLs (prior to admission):  Independent/ Dependent      Karnofsky/Palliative Performance Status Version 2:  %  http://npcrc.org/files/news/palliative_performance_scale_ppsv2.pdf    Present Symptoms:     Dyspnea: Mild Moderate Severe  Nausea/Vomiting: Yes No  Anxiety:  Yes No  Depression: Yes No  Fatigue: Yes No  Loss of appetite: Yes No  Constipation:     Pain:             Character-            Duration-            Effect-            Factors-            Frequency-            Location-            Severity-    Pain AD Score:  [email or web address] (press ctrl + left click to view)    Review of Systems: Reviewed                     Negative:                     Positive:  Unable to obtain due to poor mentation   All others negative    MEDICATIONS  (STANDING):  aspirin  chewable 81 milliGRAM(s) Oral daily  chlorhexidine 0.12% Liquid 15 milliLiter(s) Oral Mucosa every 12 hours  chlorhexidine 2% Cloths 1 Application(s) Topical <User Schedule>  dextrose 40% Gel 15 Gram(s) Oral once  dextrose 5% + lactated ringers. 1000 milliLiter(s) (100 mL/Hr) IV Continuous <Continuous>  dextrose 5%. 1000 milliLiter(s) (50 mL/Hr) IV Continuous <Continuous>  dextrose 5%. 1000 milliLiter(s) (100 mL/Hr) IV Continuous <Continuous>  dextrose 50% Injectable 25 Gram(s) IV Push once  dextrose 50% Injectable 12.5 Gram(s) IV Push once  dextrose 50% Injectable 25 Gram(s) IV Push once  fentaNYL   Infusion. 0.5 MICROgram(s)/kG/Hr (4.66 mL/Hr) IV Continuous <Continuous>  glucagon  Injectable 1 milliGRAM(s) IntraMuscular once  heparin   Injectable 5000 Unit(s) SubCutaneous every 8 hours  insulin lispro (ADMELOG) corrective regimen sliding scale   SubCutaneous every 6 hours  midodrine 10 milliGRAM(s) Oral every 8 hours  pantoprazole  Injectable 40 milliGRAM(s) IV Push daily  piperacillin/tazobactam IVPB.. 3.375 Gram(s) IV Intermittent every 12 hours  propofol Infusion 10 MICROgram(s)/kG/Min (5.4 mL/Hr) IV Continuous <Continuous>    MEDICATIONS  (PRN):  acetaminophen    Suspension .. 650 milliGRAM(s) Oral every 6 hours PRN Temp greater or equal to 38C (100.4F)  ALBUTerol    90 MICROgram(s) HFA Inhaler 2 Puff(s) Inhalation every 6 hours PRN Shortness of Breath  fentaNYL    Injectable 50 MICROGram(s) IV Push every 2 hours PRN Mild Pain (1 - 3)  midazolam Injectable 1 milliGRAM(s) IV Push every 2 hours PRN involuntary movements      PHYSICAL EXAM:    Vital Signs Last 24 Hrs  T(C): 37 (24 Aug 2021 07:40), Max: 39 (23 Aug 2021 23:00)  T(F): 98.6 (24 Aug 2021 07:40), Max: 102.2 (23 Aug 2021 23:00)  HR: 74 (24 Aug 2021 09:14) (74 - 97)  BP: 102/83 (24 Aug 2021 08:00) (85/67 - 119/90)  BP(mean): 90 (24 Aug 2021 08:00) (71 - 100)  RR: 16 (24 Aug 2021 08:00) (15 - 35)  SpO2: 100% (24 Aug 2021 09:14) (93% - 100%)    General: alert  oriented x ____ lethargic agitated delirious                  cachexia  nonverbal  coma    HEENT: normal  dry mouth  ET tube/trach    Lungs: comfortable tachypnea/labored breathing  excessive secretions    CV: normal  tachycardia    GI: normal  distended  tender  no BS               PEG/NG/OG tube  constipation  last BM:     : normal  incontinent  oliguria/anuria  parikh    MSK: normal  weakness  edema             ambulatory  bedbound/wheelchair bound    Neuro: no focal deficits cognitive impairment dysphagia dysarthria hemiplegia     Skin: normal  pressure ulcers- Stage_____  no rash    LABS:                        12.4   18.83 )-----------( 134      ( 24 Aug 2021 03:51 )             36.3     08-24    143  |  107  |  22.4<H>  ----------------------------<  119<H>  4.1   |  25.0  |  1.24    Ca    8.7      24 Aug 2021 03:51  Phos  2.5     08-24  Mg     2.0     08-24    TPro  5.6<L>  /  Alb  2.5<L>  /  TBili  0.7  /  DBili  x   /  AST  164<H>  /  ALT  103<H>  /  AlkPhos  64  08-24      Urinalysis Basic - ( 22 Aug 2021 12:26 )    Color: Yellow / Appearance: Slightly Turbid / S.025 / pH: x  Gluc: x / Ketone: Trace  / Bili: Negative / Urobili: Negative mg/dL   Blood: x / Protein: 30 mg/dL / Nitrite: Negative   Leuk Esterase: Trace / RBC: 3-5 /HPF / WBC 3-5   Sq Epi: x / Non Sq Epi: Few / Bacteria: Few      I&O's Summary    23 Aug 2021 07:01  -  24 Aug 2021 07:00  --------------------------------------------------------  IN: 3513.7 mL / OUT: 2745 mL / NET: 768.7 mL        RADIOLOGY & ADDITIONAL STUDIES:       HPI: 46M with PMH as listed admitted  with unresponsiveness, found by his sister. Patient was intubated and admitted to MICU. CT head revealed symmetric low attenuation lesions in the bilateral globus pallidus regions of the basal ganglia, which can be seen with hypoxic ischemic encephalopathy or related to carbon monoxide positioning. Indeterminate areas of low attenuation in the bilateral cerebellum may reflect infarcts. There are concerns for benzo overdose. Patient is current vent dependent and with poor mental status. s/p shock, off pressors. Epilepsy consulted for twitching, concerns for anoxic injury. EEG monitor on. Urine toxicology + for benzodiazepines and cocaine.      PERTINENT PMH REVIEWED: Yes     PAST MEDICAL & SURGICAL HISTORY:  No pertinent past medical history  Asthma  HTN (hypertension)  DM (diabetes mellitus)  S/P elbow joint replacement s/p right elbow surgery.  S/P knee surgery   Right thigh surgery.    SOCIAL HISTORY:                      Substance history: known substance abuse                     Admitted from:  home                     Pentecostalism/spirituality:                    Cultural concerns: none                       Surrogat - sister     FAMILY HISTORY: unable to obtain as patient is unresponsive     Allergies    Allergy Status Unknown  No Known Allergies    ADVANCE DIRECTIVES: Full code     Baseline ADLs (prior to admission):  Independent     Karnofsky/Palliative Performance Status Version 2:  %  http://npcrc.org/files/news/palliative_performance_scale_[email or web address].pdf    Present Symptoms:     Dyspnea: vented   Nausea/Vomiting: No  Anxiety:  unable   Depression: unable   Fatigue: unable   Loss of appetite: unable  Constipation: no     Pain: none currently             Character-            Duration-            Effect-            Factors-            Frequency-            Location-            Severity-    Pain AD Score:  [email or web address] (press ctrl + left click to view)    Review of Systems: Reviewed                 Unable to obtain due to poor mentation   All others negative    MEDICATIONS  (STANDING):  aspirin  chewable 81 milliGRAM(s) Oral daily  chlorhexidine 0.12% Liquid 15 milliLiter(s) Oral Mucosa every 12 hours  chlorhexidine 2% Cloths 1 Application(s) Topical <User Schedule>  dextrose 40% Gel 15 Gram(s) Oral once  dextrose 5% + lactated ringers. 1000 milliLiter(s) (100 mL/Hr) IV Continuous <Continuous>  dextrose 5%. 1000 milliLiter(s) (50 mL/Hr) IV Continuous <Continuous>  dextrose 5%. 1000 milliLiter(s) (100 mL/Hr) IV Continuous <Continuous>  dextrose 50% Injectable 25 Gram(s) IV Push once  dextrose 50% Injectable 12.5 Gram(s) IV Push once  dextrose 50% Injectable 25 Gram(s) IV Push once  fentaNYL   Infusion. 0.5 MICROgram(s)/kG/Hr (4.66 mL/Hr) IV Continuous <Continuous>  glucagon  Injectable 1 milliGRAM(s) IntraMuscular once  heparin   Injectable 5000 Unit(s) SubCutaneous every 8 hours  insulin lispro (ADMELOG) corrective regimen sliding scale   SubCutaneous every 6 hours  midodrine 10 milliGRAM(s) Oral every 8 hours  pantoprazole  Injectable 40 milliGRAM(s) IV Push daily  piperacillin/tazobactam IVPB.. 3.375 Gram(s) IV Intermittent every 12 hours  propofol Infusion 10 MICROgram(s)/kG/Min (5.4 mL/Hr) IV Continuous <Continuous>    MEDICATIONS  (PRN):  acetaminophen    Suspension .. 650 milliGRAM(s) Oral every 6 hours PRN Temp greater or equal to 38C (100.4F)  ALBUTerol    90 MICROgram(s) HFA Inhaler 2 Puff(s) Inhalation every 6 hours PRN Shortness of Breath  fentaNYL    Injectable 50 MICROGram(s) IV Push every 2 hours PRN Mild Pain (1 - 3)  midazolam Injectable 1 milliGRAM(s) IV Push every 2 hours PRN involuntary movements    PHYSICAL EXAM:    Vital Signs Last 24 Hrs  T(C): 37 (24 Aug 2021 07:40), Max: 39 (23 Aug 2021 23:00)  T(F): 98.6 (24 Aug 2021 07:40), Max: 102.2 (23 Aug 2021 23:00)  HR: 74 (24 Aug 2021 09:14) (74 - 97)  BP: 102/83 (24 Aug 2021 08:00) (85/67 - 119/90)  BP(mean): 90 (24 Aug 2021 08:00) (71 - 100)  RR: 16 (24 Aug 2021 08:00) (15 - 35)  SpO2: 100% (24 Aug 2021 09:14) (93% - 100%)    General: intubated     HEENT: ET tube    Lungs: comfortable     CV: normal      GI: normal. NG tube     : parikh    MSK: bedbound/wheelchair bound    Neuro: no focal deficits     Skin: no rash    LABS:                      12.4   18.83 )-----------( 134      ( 24 Aug 2021 03:51 )             36.3     08-    143  |  107  |  22.4<H>  ----------------------------<  119<H>  4.1   |  25.0  |  1.24    Ca    8.7      24 Aug 2021 03:51  Phos  2.5       Mg     2.0         TPro  5.6<L>  /  Alb  2.5<L>  /  TBili  0.7  /  DBili  x   /  AST  164<H>  /  ALT  103<H>  /  AlkPhos  64      Urinalysis Basic - ( 22 Aug 2021 12:26 )    Color: Yellow / Appearance: Slightly Turbid / S.025 / pH: x  Gluc: x / Ketone: Trace  / Bili: Negative / Urobili: Negative mg/dL   Blood: x / Protein: 30 mg/dL / Nitrite: Negative   Leuk Esterase: Trace / RBC: 3-5 /HPF / WBC 3-5   Sq Epi: x / Non Sq Epi: Few / Bacteria: Few    I&O's Summary    23 Aug 2021 07:01  -  24 Aug 2021 07:00  --------------------------------------------------------  IN: 3513.7 mL / OUT: 2745 mL / NET: 768.7 mL    RADIOLOGY & ADDITIONAL STUDIES:    < from: CT Head No Cont (21 @ 10:53) >  INTERPRETATION:  CLINICAL INDICATION: Seizure. Altered mental status.    TECHNIQUE: CT of the head was performed without the administration of intravenous contrast.    COMPARISON: CT head 2021.    FINDINGS:  Slightly increased prominence of hypoattenuating foci involving the bilateral globi pallidi. Additional likely infarcts are increased in conspicuity involving the right sensorimotor cortex, parietal centrum semiovale, left occipital lobe, and bilateral cerebellum.    No acute hemorrhage. No hydrocephalus. No extra-axial fluid collections. No midline shift.    The visualized intraorbital contents are unremarkable. Scattered mucosal thickening throughout the ethmoid air cells. The mastoid air cells are clear. The visualized soft tissues and osseous structures appear normal.    IMPRESSION:    -Slightly increased prominence of bilateral globus pallidus hypodense foci, likely representing hypoxic-ischemicinjury.    -Again noted multiple likely small acute infarcts involving the bilateral cerebral and cerebellar hemispheres.    -No acute intracranial hemorrhage.    --- End of Report ---    < end of copied text >    < from: CT Head No Cont (21 @ 20:37) >  Impression:  Symmetric low attenuation lesions in the bilateral globus pallidus regions of the basal ganglia, which can be seen with hypoxic ischemic encephalopathy or related to carbon monoxide positioning.  Indeterminate areas of low attenuation in the bilateral cerebellum may reflect infarcts.  MRI is recommended for further evaluation.    --- End of Report ---    < end of copied text >     HPI: 46M with PMH as listed admitted  with unresponsiveness, found by his sister. Patient was intubated and admitted to MICU. CT head revealed symmetric low attenuation lesions in the bilateral globus pallidus regions of the basal ganglia, which can be seen with hypoxic ischemic encephalopathy or related to carbon monoxide positioning. Indeterminate areas of low attenuation in the bilateral cerebellum may reflect infarcts. There are concerns for benzo overdose. Patient is current vent dependent and with poor mental status. s/p shock, off pressors. Epilepsy consulted for twitching, concerns for anoxic injury. EEG monitor on. Urine toxicology + for benzodiazepines and cocaine.      PERTINENT PMH REVIEWED: Yes     PAST MEDICAL & SURGICAL HISTORY:  No pertinent past medical history  Asthma  HTN (hypertension)  DM (diabetes mellitus)  S/P elbow joint replacement s/p right elbow surgery.  S/P knee surgery   Right thigh surgery.    SOCIAL HISTORY:                      Substance history: known substance abuse                     Admitted from:  home                     Presybeterian/spirituality:                    Cultural concerns: none                       Surrogate -  Xiao (aunt) [phone] making decisions  Ex wife Araceli: [phone] (contact if Xiao is unreachable)   Pinky Posadas [phone] (sister lives in [place])    FAMILY HISTORY: unable to obtain as patient is unresponsive     Allergies    Allergy Status Unknown  No Known Allergies    ADVANCE DIRECTIVES: Full code     Baseline ADLs (prior to admission):  Independent     Karnofsky/Palliative Performance Status Version 2:  %  http://npcrc.org/files/news/palliative_performance_scale_[email or web address].pdf    Present Symptoms:     Dyspnea: vented   Nausea/Vomiting: No  Anxiety:  unable   Depression: unable   Fatigue: unable   Loss of appetite: unable  Constipation: no     Pain: none currently             Character-            Duration-            Effect-            Factors-            Frequency-            Location-            Severity-    Pain AD Score:  [email or web address] (press ctrl + left click to view)    Review of Systems: Reviewed                 Unable to obtain due to poor mentation   All others negative    MEDICATIONS  (STANDING):  aspirin  chewable 81 milliGRAM(s) Oral daily  chlorhexidine 0.12% Liquid 15 milliLiter(s) Oral Mucosa every 12 hours  chlorhexidine 2% Cloths 1 Application(s) Topical <User Schedule>  dextrose 40% Gel 15 Gram(s) Oral once  dextrose 5% + lactated ringers. 1000 milliLiter(s) (100 mL/Hr) IV Continuous <Continuous>  dextrose 5%. 1000 milliLiter(s) (50 mL/Hr) IV Continuous <Continuous>  dextrose 5%. 1000 milliLiter(s) (100 mL/Hr) IV Continuous <Continuous>  dextrose 50% Injectable 25 Gram(s) IV Push once  dextrose 50% Injectable 12.5 Gram(s) IV Push once  dextrose 50% Injectable 25 Gram(s) IV Push once  fentaNYL   Infusion. 0.5 MICROgram(s)/kG/Hr (4.66 mL/Hr) IV Continuous <Continuous>  glucagon  Injectable 1 milliGRAM(s) IntraMuscular once  heparin   Injectable 5000 Unit(s) SubCutaneous every 8 hours  insulin lispro (ADMELOG) corrective regimen sliding scale   SubCutaneous every 6 hours  midodrine 10 milliGRAM(s) Oral every 8 hours  pantoprazole  Injectable 40 milliGRAM(s) IV Push daily  piperacillin/tazobactam IVPB.. 3.375 Gram(s) IV Intermittent every 12 hours  propofol Infusion 10 MICROgram(s)/kG/Min (5.4 mL/Hr) IV Continuous <Continuous>    MEDICATIONS  (PRN):  acetaminophen    Suspension .. 650 milliGRAM(s) Oral every 6 hours PRN Temp greater or equal to 38C (100.4F)  ALBUTerol    90 MICROgram(s) HFA Inhaler 2 Puff(s) Inhalation every 6 hours PRN Shortness of Breath  fentaNYL    Injectable 50 MICROGram(s) IV Push every 2 hours PRN Mild Pain (1 - 3)  midazolam Injectable 1 milliGRAM(s) IV Push every 2 hours PRN involuntary movements    PHYSICAL EXAM:    Vital Signs Last 24 Hrs  T(C): 37 (24 Aug 2021 07:40), Max: 39 (23 Aug 2021 23:00)  T(F): 98.6 (24 Aug 2021 07:40), Max: 102.2 (23 Aug 2021 23:00)  HR: 74 (24 Aug 2021 09:14) (74 - 97)  BP: 102/83 (24 Aug 2021 08:00) (85/67 - 119/90)  BP(mean): 90 (24 Aug 2021 08:00) (71 - 100)  RR: 16 (24 Aug 2021 08:00) (15 - 35)  SpO2: 100% (24 Aug 2021 09:14) (93% - 100%)    General: intubated     HEENT: ET tube    Lungs: comfortable     CV: normal      GI: normal. NG tube     : parikh    MSK: bedbound/wheelchair bound    Neuro: no focal deficits     Skin: no rash    LABS:                      12.4   18.83 )-----------( 134      ( 24 Aug 2021 03:51 )             36.3     08-24    143  |  107  |  22.4<H>  ----------------------------<  119<H>  4.1   |  25.0  |  1.24    Ca    8.7      24 Aug 2021 03:51  Phos  2.5     -  Mg     2.0     -24    TPro  5.6<L>  /  Alb  2.5<L>  /  TBili  0.7  /  DBili  x   /  AST  164<H>  /  ALT  103<H>  /  AlkPhos  64  08-24    Urinalysis Basic - ( 22 Aug 2021 12:26 )    Color: Yellow / Appearance: Slightly Turbid / S.025 / pH: x  Gluc: x / Ketone: Trace  / Bili: Negative / Urobili: Negative mg/dL   Blood: x / Protein: 30 mg/dL / Nitrite: Negative   Leuk Esterase: Trace / RBC: 3-5 /HPF / WBC 3-5   Sq Epi: x / Non Sq Epi: Few / Bacteria: Few    I&O's Summary    23 Aug 2021 07:01  -  24 Aug 2021 07:00  --------------------------------------------------------  IN: 3513.7 mL / OUT: 2745 mL / NET: 768.7 mL    RADIOLOGY & ADDITIONAL STUDIES:    < from: CT Head No Cont (21 @ 10:53) >  INTERPRETATION:  CLINICAL INDICATION: Seizure. Altered mental status.    TECHNIQUE: CT of the head was performed without the administration of intravenous contrast.    COMPARISON: CT head 2021.    FINDINGS:  Slightly increased prominence of hypoattenuating foci involving the bilateral globi pallidi. Additional likely infarcts are increased in conspicuity involving the right sensorimotor cortex, parietal centrum semiovale, left occipital lobe, and bilateral cerebellum.    No acute hemorrhage. No hydrocephalus. No extra-axial fluid collections. No midline shift.    The visualized intraorbital contents are unremarkable. Scattered mucosal thickening throughout the ethmoid air cells. The mastoid air cells are clear. The visualized soft tissues and osseous structures appear normal.    IMPRESSION:    -Slightly increased prominence of bilateral globus pallidus hypodense foci, likely representing hypoxic-ischemicinjury.    -Again noted multiple likely small acute infarcts involving the bilateral cerebral and cerebellar hemispheres.    -No acute intracranial hemorrhage.    --- End of Report ---    < end of copied text >    < from: CT Head No Cont (21 @ 20:37) >  Impression:  Symmetric low attenuation lesions in the bilateral globus pallidus regions of the basal ganglia, which can be seen with hypoxic ischemic encephalopathy or related to carbon monoxide positioning.  Indeterminate areas of low attenuation in the bilateral cerebellum may reflect infarcts.  MRI is recommended for further evaluation.    --- End of Report ---    < end of copied text >

## 2021-08-24 NOTE — CONSULT NOTE ADULT - CONVERSATION DETAILS
Extensive conversation with patient's Aunt Xiao via telephone. Patient with a 17 year old daughter, ex wife, and 2 sisters. He has been estranged from sisters essentially per Xiao. He has been in touch with ex wife frequently due to sharing child. Child is underage so surrogate decision making would go to Aunt at this point as family has all deferred to her. Aunt Xiao has kept all family in the loop of the updates given by providers.     I reviewed overall condition and poor prognosis given poor mental state, concerns for anoxic injury, acute systolic heart failure, ventilator dependence. I addressed with Aunt regarding next steps if he continues to be unresponsive and ventilator dependent, then we will need to make further decisions regarding next steps. I explained to her the options would be trach/PEG versus withdrawal and comfort measures. She explained they have been through this before as patient's mother (almost 20 years ago now) had respiratory failure and was ventilator dependent and the patient actually had to make the decision at that time to remove her from the ventilator. I expressed that we will continue what we are doing, await results of EEG and give him some more time but in the next 24-48 hours I recommended family meeting to discuss next steps. I then addressed with her code status and explained that if he were to decompensate to the point of having a cardiac arrest despite our maximal efforts, it is unlikely that CPR will be medically therapeutic or result in a better outcome. She agrees with DNR but continue all other measures and see if he has any improvement and if not we will plan on family discussion.

## 2021-08-24 NOTE — EEG REPORT - NS EEG TEXT BOX
[facility]   COMPREHENSIVE EPILEPSY CENTER   REPORT OF LONG-TERM VIDEO EEG     [facility]: [address], [address], [place] [address], Ph#: [phone]  [facility]: [id number], [place] [address], Ph#: [phone]  [facility]: [address] [place] [address], Ph#: [phone]    Patient Name: YESI MIRZA  Age and : 46y ([birth year])  MRN #: [id number]  Location: [address] [id number]  Referring Physician: Ridge Chambers    Start Time/Date: 15:51 on 21  End Time/Date: 08:00 on 21  Duration: 16hr 06m    _____________________________________________________________  STUDY INFORMATION    EEG Recording Technique:  The patient underwent continuous Video-EEG monitoring, using Telemetry System hardware on the XLTek Digital System. EEG and video data were stored on a computer hard drive with important events saved in digital archive files. The material was reviewed by a physician (electroencephalographer / epileptologist) on a daily basis. Seb and seizure detection algorithms were utilized and reviewed. An EEG Technician attended to the patient, and was available throughout daytime work hours.  The epilepsy center neurologist was available in person or on call 24-hours per day.    EEG Placement and Labeling of Electrodes:  The EEG was performed utilizing 20 channel referential EEG connections (coronal over temporal over parasagittal montage) using all standard 10-20 electrode placements with EKG, with additional electrodes placed in the inferior temporal region using the modified 10-10 montage electrode placements for elective admissions, or if deemed necessary. Recording was at a sampling rate of 256 samples per second per channel. Time synchronized digital video recording was done simultaneously with EEG recording. A low light infrared camera was used for low light recording.     _____________________________________________________________  HISTORY    Patient is a 46y old  Male who presents with a chief complaint of Acute hypoxemic respiratory failure, AMS (24 Aug 2021 00:03)      PERTINENT MEDICATION:  propofol Infusion 10 MICROgram(s)/kG/Min (5.4 mL/Hr) IV Continuous <Continuous>    _____________________________________________________________  STUDY INTERPRETATION    Findings: The background was continuous and reactive. During wakefulness, no posterior dominant rhythm seen.    Background Slowing:  Diffuse theta and polymorphic delta slowing.    Focal Slowing:   None were present.    Sleep Background:  Drowsiness was characterized by fragmentation, attenuation, and slowing of the background activity.    Stage II sleep transients were not recorded.    Other Non-Epileptiform Findings:  None were present.    Interictal Epileptiform Activity:   None were present.    Events:  Clinical events: Multiple push-button events for intermittent contraction/elevation of the right shoulder or upper body were without ictal correlate on EEG.  Seizures: None recorded.    Activation Procedures:   Hyperventilation was not performed.    Photic stimulation was not performed.     Artifacts:  Intermittent myogenic and movement artifacts were noted.    ECG:  The heart rate on single channel ECG was predominantly between 80-90 BPM.    _____________________________________________________________  EEG SUMMARY/CLASSIFICATION    Abnormal EEG in a sedated patient.  - Multiple push-button events for intermittent contraction/elevation of the right shoulder or upper body were without ictal correlate on EEG.  - Moderate to severe generalized slowing.    _____________________________________________________________  EEG IMPRESSION/CLINICAL CORRELATE    Abnormal EEG study.  1. Intermittent contraction/elevation of the right shoulder and upper body are nonepileptic.   2. Moderate to severe nonspecific diffuse or multifocal cerebral dysfunction.   3. No epileptiform pattern or seizure seen.    _____________________________________________________________    Brandee Roach MD  Director, Epilepsy/EMU - [facility]

## 2021-08-24 NOTE — PROGRESS NOTE ADULT - SUBJECTIVE AND OBJECTIVE BOX
Patient is a 43y old  Male who presents with a chief complaint of Acute hypoxemic respiratory failure, AMS (22 Aug 2021 12:15)    BRIEF HOSPITAL COURSE:   46M with h/o HTN, DM, asthma, presents to the ED after being found unresponsive in bed by his sister this evening. Noted to have agonal respirations and emesis coming from mouth. He was found to be hypoxemic in the ED prompting emergent endotracheal intubation and appears to have an extensive right sided pneumonia on CXR    Events last 24 hours:   Remains on full vent support. S/p LP today. Sedated on Propofol. On EEG. Involuntary movements earlier this morning, now on high dose propofol    PAST MEDICAL & SURGICAL HISTORY:  Asthma    HTN (hypertension)    DM (diabetes mellitus)    Review of Systems:  Unable to assess given mentation     Physical Examination:  ICU Vital Signs Last 24 Hrs  T(C): 39 (24 Aug 2021 00:00), Max: 39 (23 Aug 2021 23:00)  T(F): 102.2 (24 Aug 2021 00:00), Max: 102.2 (23 Aug 2021 23:00)  HR: 89 (24 Aug 2021 00:00) (78 - 109)  BP: 96/70 (24 Aug 2021 00:00) (80/69 - 176/100)  BP(mean): 79 (24 Aug 2021 00:00) (69 - 116)  RR: 26 (24 Aug 2021 00:00) (15 - 30)  SpO2: 97% (24 Aug 2021 00:00) (86% - 100%)    Neuro: Sedated and could not assess. Cough +  HEENT: Pupils equal, sluggish but reactive to light  PULM: Clear to auscultation bilaterally  CVS: Regular rhythm and controlled rate  ABD: Soft, nondistended  EXT: No b/l LE edema      MEDICATIONS  (STANDING):  aspirin  chewable 81 milliGRAM(s) Oral daily  chlorhexidine 0.12% Liquid 15 milliLiter(s) Oral Mucosa every 12 hours  chlorhexidine 2% Cloths 1 Application(s) Topical <User Schedule>  dextrose 40% Gel 15 Gram(s) Oral once  dextrose 5% + lactated ringers. 1000 milliLiter(s) (100 mL/Hr) IV Continuous <Continuous>  dextrose 5%. 1000 milliLiter(s) (50 mL/Hr) IV Continuous <Continuous>  dextrose 5%. 1000 milliLiter(s) (100 mL/Hr) IV Continuous <Continuous>  dextrose 50% Injectable 25 Gram(s) IV Push once  dextrose 50% Injectable 12.5 Gram(s) IV Push once  dextrose 50% Injectable 25 Gram(s) IV Push once  glucagon  Injectable 1 milliGRAM(s) IntraMuscular once  heparin   Injectable 5000 Unit(s) SubCutaneous every 8 hours  insulin lispro (ADMELOG) corrective regimen sliding scale   SubCutaneous every 6 hours  midodrine 10 milliGRAM(s) Oral every 8 hours  pantoprazole  Injectable 40 milliGRAM(s) IV Push daily  piperacillin/tazobactam IVPB.. 3.375 Gram(s) IV Intermittent every 12 hours  propofol Infusion 10 MICROgram(s)/kG/Min (5.4 mL/Hr) IV Continuous <Continuous>    MEDICATIONS  (PRN):  acetaminophen    Suspension .. 650 milliGRAM(s) Oral every 6 hours PRN Temp greater or equal to 38C (100.4F)  ALBUTerol    90 MICROgram(s) HFA Inhaler 2 Puff(s) Inhalation every 6 hours PRN Shortness of Breath  midazolam Injectable 1 milliGRAM(s) IV Push every 2 hours PRN involuntary movements    RADIOLOGY/ Microbiology/ Labs: reviewed      46M w/ PMHx HTN, DM, asthma, presents to the ED after being found unresponsive in the setting of substance abuse    Acute hypoxic respiratory failure  Cardiogenic vs distributive shock, resolved   Aspiration PNA   GARRETT w/ rhabdomyolysis   Anoxic brain injury   NSTEMI  Polysubstance abuse ? intentional OD    Neuro: Urine tox +ve benzos and cocaine. Initial CTH suggestive of hypoxic encephalopathy. S/p LP today and results WNL. Suppressed EEG tonight, plan to titrated down propofol. PRN versed. Repeat CTH c/w anoxic injury   Cardiovascular: Aim for MAP target 65. Off pressors. On midodrine. TTE w/ biventricular heart failure, EF < 20%. Started ASA. No BB. LHC when stable as per cads  Resp/Chest: Maintain SpO2 > 92%. On Volume AC. Unable to wean at this time. Vent bundle. EtCO2 monitoring. ABG AM   GI/Nutrition: Diet: Started on tube feeds. IV PPI  ID: Right sided PNA on CXR. Started on Zosyn for aspiration. Added Vanco x 1 for persistent fevers, ? central in origin. f/u BCx. Trend LA  Renal: Continue IV hydration. SCr continues to trend down and UOP acceptable. Strict I&O. f/u CK  Endocrinology: A1c and TSH WNL. Maintain Blood sugar < 180  Haem/Oncology:  DVT ppx w/ HSQ   Code status: Full  Line/tubes/ drains: RIJ TLC (shock) 08/21, ETT/OGT and parikh 08/21    )

## 2021-08-24 NOTE — CONSULT NOTE ADULT - ASSESSMENT
46M with HTN, DM, asthma, here with vent dependent respiratory failure, unresponsiveness, concerns for anoxic injury,  GARRETT, NSTEMI, urine toxicology + for benzodiazepines and cocaine.     #1 Ventilator dependence - poor overall prognosis for meaningful weaning given poor mental status. concerns for anoxic injury   #2 Anoxic brain injury - post possible overdose. CT head findings as above. Epilepsy following, on continuous EEG.   #3  46M with HTN, DM, asthma, here with vent dependent respiratory failure, unresponsiveness, concerns for anoxic injury,  GARRETT, NSTEMI, urine toxicology + for benzodiazepines and cocaine.     #1 Ventilator dependence - poor overall prognosis for meaningful weaning given poor mental status. concerns for anoxic injury   #2 Anoxic brain injury - post possible overdose. CT head findings as above. Epilepsy following, on continuous EEG.   #3 GARRETT - improving slowly.   #4 Encounter for palliative care - will reach out to sister. Overall poor prognosis.  46M with HTN, DM, asthma, here with vent dependent respiratory failure, unresponsiveness, concerns for anoxic injury,  GARRETT, NSTEMI, acute systolic CHF, s/p shock (off pressors), possible aspiration related event on zosyn,  urine toxicology + for benzodiazepines and cocaine.     #1 Ventilator dependence - poor overall prognosis for meaningful weaning given poor mental status. concerns for anoxic injury   #2 Anoxic brain injury - post possible overdose. CT head findings as above. Epilepsy following, on continuous EEG.   #3 Shock - possible aspiration event, resolved on antibiotics.   #4 Acute systolic CHF - not a candidate for intervention at this time.   #5 GARRETT - improving slowly.   #6 Encounter for palliative care - will reach out to sister. Overall poor prognosis.  46M with HTN, DM, asthma, here with vent dependent respiratory failure, unresponsiveness, concerns for anoxic injury,  GARRETT, NSTEMI, acute systolic CHF, s/p shock (off pressors), possible aspiration related event on zosyn,  urine toxicology + for benzodiazepines and cocaine.     #1 Ventilator dependence - poor overall prognosis for meaningful weaning given poor mental status. concerns for anoxic injury   #2 Anoxic brain injury - post possible overdose. CT head findings as above. Epilepsy following, on continuous EEG.   #3 Shock - possible aspiration event, resolved on antibiotics.   #4 Acute systolic CHF - not a candidate for intervention at this time.   #5 GARRETT - improving slowly.   #6 Encounter for palliative care - see goals of care above. Overall poor prognosis.

## 2021-08-24 NOTE — PROVIDER CONTACT NOTE (EICU) - ASSESSMENT
Problem List  1. ? suicide attempt  2. Acute hypoxic resp failure with pneumonia, likely from aspiration  3. GARRETT with hyperkalemia, unclear baseline creatinine  4. Watch for BZD and cocaine withdrawal  5. Concern of bone marrow high percent of monocyte and metamyelocytes  6. Dehydration with HB 18
GCS 3

## 2021-08-24 NOTE — PROGRESS NOTE ADULT - ASSESSMENT
43y old  Male with PMH of HTN, DM, Asthma who presents with a chief complaint of Acute hypoxemic respiratory failure, AMS  from suspected overdose of benzo and cocaine.  Patient found  by sister unresponsive.  Arrived in the ER with agonal breath breathing and was intubated. Cxr c/w extensive right infiltrate likely aspiration pneumonia.  CT head reveals symmetric low attenuation lesions in the bilateral globus pallidus regions of the basal ganglia, which can be seen with hypoxic ischemic encephalopathy or related to carbon monoxide positioning, as well as indeterminate areas of low attenuation in the bilateral cerebellum may reflect infarcts.  Patient developed shock symptoms of hypotension requiring pressors to maintain b/p. Resuscitated with fluids and receiving maintenance fluids. We were asked to see patient for Elevated trops from 0.71 on admission to 1.25.  Ekg on admit without ischemic changes and repeated now and sinus tach with no ischemic changes.    ELEVATED TROP/CPK   CK/CKMB trending down  EKG without acute changes  Recommend ASA 81mg via NGT if ok with neuro    Systolic heart failure (LVEF <20%)  TTE: LVEF <20%, grade I DD, moderately reduced RV systolic function, mild-moderate MVR, mild AI, LF/LG severe AS  Once BP can tolerate would recommend introducing hydralazine for afterload reduction and low dose BB  Avoid ACE/ARB/ARNi until renal function normalizes  If pt should have meaningful neurologic recovery he will eventually need ischemic eval ([facility])    SEPTIC SHOCK FROM ASPIRATION PNEUMONIA  On Zosyn  BP improving, no longer requiring vasopressors  On Midodrine     Anoxic Encephalopathy/Shock Liver  Avoid statin in view of transaminitis/rhabdo?  EEG pending

## 2021-08-24 NOTE — PROGRESS NOTE ADULT - SUBJECTIVE AND OBJECTIVE BOX
[facility]                                                                     MD Carole Thompson DO                                              Epilepsy Consult #: [phone])                                               Office: [address] [place] [address], [address]                                                 Phone: [phone]; Fax: [phone]                            ==============================================    EPILEPSY FOLLOW-UP NOTE      INTERVAL HISTORY:  Intermittent contraction of the right upper body and shoulder captured on cvEEG without ictal correlate on EEG.     MEDICATIONS:   acetaminophen    Suspension .. 650 milliGRAM(s) Oral every 6 hours PRN Temp greater or equal to 38C (100.4F)  ALBUTerol    90 MICROgram(s) HFA Inhaler 2 Puff(s) Inhalation every 6 hours PRN Shortness of Breath  aspirin  chewable 81 milliGRAM(s) Oral daily  chlorhexidine 0.12% Liquid 15 milliLiter(s) Oral Mucosa every 12 hours  chlorhexidine 2% Cloths 1 Application(s) Topical <User Schedule>  dextrose 40% Gel 15 Gram(s) Oral once  dextrose 5% + lactated ringers. 1000 milliLiter(s) (100 mL/Hr) IV Continuous <Continuous>  dextrose 5%. 1000 milliLiter(s) (50 mL/Hr) IV Continuous <Continuous>  dextrose 5%. 1000 milliLiter(s) (100 mL/Hr) IV Continuous <Continuous>  dextrose 50% Injectable 25 Gram(s) IV Push once  dextrose 50% Injectable 12.5 Gram(s) IV Push once  dextrose 50% Injectable 25 Gram(s) IV Push once  fentaNYL    Injectable 50 MICROGram(s) IV Push every 2 hours PRN Mild Pain (1 - 3)  fentaNYL   Infusion. 0.5 MICROgram(s)/kG/Hr (4.66 mL/Hr) IV Continuous <Continuous>  glucagon  Injectable 1 milliGRAM(s) IntraMuscular once  heparin   Injectable 5000 Unit(s) SubCutaneous every 8 hours  insulin lispro (ADMELOG) corrective regimen sliding scale   SubCutaneous every 6 hours  midazolam Injectable 1 milliGRAM(s) IV Push every 2 hours PRN involuntary movements  midodrine 10 milliGRAM(s) Oral every 8 hours  pantoprazole  Injectable 40 milliGRAM(s) IV Push daily  piperacillin/tazobactam IVPB.. 3.375 Gram(s) IV Intermittent every 12 hours  propofol Infusion 10 MICROgram(s)/kG/Min (5.4 mL/Hr) IV Continuous <Continuous>    LAST 24-HR VITALS:  T(C): 37 (21 @ 07:40), Max: 39 (21 @ 23:00)  HR: 78 (21 @ 11:00) (74 - 97)  BP: 94/70 (21 @ 11:00) (85/67 - 119/90)  ABP: --  RR: 15 (21 @ 11:00) (11 - 35)  SpO2: 98% (21 @ 11:00) (93% - 100%)  CVP(cm H2O): --    GENERAL PHYSICAL EXAM:  GEN: lying in bed, sedated, mechanically ventilated   HEENT:  NCAT, OP clear  EYES: no nystagmus  NECK: supple  CV: RRR, no murmur  PULM: no wheezing  ABD: +BS, NT, ND  EXT: no cyanosis  SKIN: warm, dry    NEUROLOGICAL EXAM: (on propofol)   sedated, unresponsive to tactile/verbal stimuli  Bilateral pupils sluggish in response, symmetric face  No spontaneous movement  No grimace or w/d to painful stimuli in all EXTs  0 reflex in all EXTs  Unable to assess plantar response; bilateral heel protectors on  Coordination deferred  Gait deferred      LABS:                          12.4   18.83 )-----------( 134      ( 24 Aug 2021 03:51 )             36.3     -    143  |  107  |  22.4<H>  ----------------------------<  119<H>  4.1   |  25.0  |  1.24    Ca    8.7      24 Aug 2021 03:51  Phos  2.5     08-  Mg     2.0     -24    TPro  5.6<L>  /  Alb  2.5<L>  /  TBili  0.7  /  DBili  x   /  AST  164<H>  /  ALT  103<H>  /  AlkPhos  64  -24    CAPILLARY BLOOD GLUCOSE      POCT Blood Glucose.: 99 mg/dL (24 Aug 2021 11:32)  POCT Blood Glucose.: 100 mg/dL (23 Aug 2021 23:22)  POCT Blood Glucose.: 78 mg/dL (23 Aug 2021 17:00)    LIVER FUNCTIONS - ( 24 Aug 2021 03:51 )  Alb: 2.5 g/dL / Pro: 5.6 g/dL / ALK PHOS: 64 U/L / ALT: 103 U/L / AST: 164 U/L / GGT: x             Urinalysis Basic - ( 22 Aug 2021 12:26 )    Color: Yellow / Appearance: Slightly Turbid / S.025 / pH: x  Gluc: x / Ketone: Trace  / Bili: Negative / Urobili: Negative mg/dL   Blood: x / Protein: 30 mg/dL / Nitrite: Negative   Leuk Esterase: Trace / RBC: 3-5 /HPF / WBC 3-5   Sq Epi: x / Non Sq Epi: Few / Bacteria: Few        OTHER IMAGING AND STUDIES:    [facility] [place]  - 21:  EEG SUMMARY/CLASSIFICATION    Abnormal EEG in a sedated patient.  - Multiple push-button events for intermittent contraction/elevation of the right shoulder or upper body were without ictal correlate on EEG.  - Moderate to severe generalized slowing.    _____________________________________________________________  EEG IMPRESSION/CLINICAL CORRELATE    Abnormal EEG study.  1. Intermittent contraction/elevation of the right shoulder and upper body are nonepileptic.   2. Moderate to severe nonspecific diffuse or multifocal cerebral dysfunction.   3. No epileptiform pattern or seizure seen.

## 2021-08-24 NOTE — CHART NOTE - NSCHARTNOTEFT_GEN_A_CORE
Received phone call back form Aunt Xiao. She discussed everything with patient's ex wife Araceli and she feels caught in the middle because the rest of the family wants to hold off on " DNR" status at this time. She agrees with it but is trying to give the rest of the family time to process so she asked me to remove that order for now. I ensured that family understood this status did not mean we would stop treating. She understands but stated to hold off on that for now and if he does decompensate will readdress and she will reinstate it. She just doesn't want the family to feel like she is making decisions without their involvement. Full code for now.

## 2021-08-24 NOTE — PROGRESS NOTE ADULT - ASSESSMENT
46M w/ PMHx HTN, DM, asthma, presents to the ED after being found unresponsive in the setting of substance abuse    Acute hypoxic respiratory failure  Cardiogenic vs distributive shock, resolved   Aspiration PNA   GARRETT w/ rhabdomyolysis   Anoxic brain injury   NSTEMI  Polysubstance abuse ? intentional OD    Neuro: Urine tox +ve benzos and cocaine. Initial CTH suggestive of hypoxic encephalopathy. S/p LP today and results WNL. Suppressed EEG tonight, plan to titrated down propofol. PRN versed. Repeat CTH c/w anoxic injury   Cardiovascular: Aim for MAP target 65. Off pressors. On midodrine. TTE w/ biventricular heart failure, EF < 20%. Started ASA. No BB. LHC when stable as per cads  Resp/Chest: Maintain SpO2 > 92%. On Volume AC. Unable to wean at this time. Vent bundle. EtCO2 monitoring. ABG AM   GI/Nutrition: Diet: Started on tube feeds. IV PPI  ID: Right sided PNA on CXR. Started on Zosyn for aspiration. Added Vanco x 1 for persistent fevers, ? central in origin. f/u BCx. Trend LA  Renal: Continue IV hydration. SCr continues to trend down and UOP acceptable. Strict I&O. f/u CK  Endocrinology: A1c and TSH WNL. Maintain Blood sugar < 180  Haem/Oncology:  DVT ppx w/ HSQ   Code status: Full  Line/tubes/ drains: RIJ TLC (shock) 08/21, ETT/OGT and parikh 08/21    Critical Care time: 35 minutes assessing presenting problems of acute illness that poses high probability of life threatening deterioration or end organ damage/dysfunction.  Medical decision making including Initiating plan of care, reviewing data, reviewing radiology, discussing with multidisciplinary team, non inclusive of procedures

## 2021-08-24 NOTE — PROGRESS NOTE ADULT - SUBJECTIVE AND OBJECTIVE BOX
Patient is a 43y old  Male who presents with a chief complaint of Acute hypoxemic respiratory failure, AMS (22 Aug 2021 12:15)    BRIEF HOSPITAL COURSE:   46M with h/o HTN, DM, asthma, presents to the ED after being found unresponsive in bed by his sister this evening. Noted to have agonal respirations and emesis coming from mouth. He was found to be hypoxemic in the ED prompting emergent endotracheal intubation and appears to have an extensive right sided pneumonia on CXR    Events last 24 hours:   Remains on full vent support. S/p LP today. Sedated on Propofol. On EEG. Involuntary movements earlier this morning, now on high dose propofol    PAST MEDICAL & SURGICAL HISTORY:  Asthma    HTN (hypertension)    DM (diabetes mellitus)    Review of Systems:  Unable to assess given mentation       Physical Examination:  ICU Vital Signs Last 24 Hrs  T(C): 39 (24 Aug 2021 00:00), Max: 39 (23 Aug 2021 23:00)  T(F): 102.2 (24 Aug 2021 00:00), Max: 102.2 (23 Aug 2021 23:00)  HR: 89 (24 Aug 2021 00:00) (78 - 109)  BP: 96/70 (24 Aug 2021 00:00) (80/69 - 176/100)  BP(mean): 79 (24 Aug 2021 00:00) (69 - 116)  ABP: --  ABP(mean): --  RR: 26 (24 Aug 2021 00:00) (15 - 30)  SpO2: 97% (24 Aug 2021 00:00) (86% - 100%)      Neuro: Sedated and could not assess. Cough +  HEENT: Pupils equal, sluggish but reactive to light  PULM: Clear to auscultation bilaterally  CVS: Regular rhythm and controlled rate  ABD: Soft, nondistended  EXT: No b/l LE edema      MEDICATIONS  (STANDING):  aspirin  chewable 81 milliGRAM(s) Oral daily  chlorhexidine 0.12% Liquid 15 milliLiter(s) Oral Mucosa every 12 hours  chlorhexidine 2% Cloths 1 Application(s) Topical <User Schedule>  dextrose 40% Gel 15 Gram(s) Oral once  dextrose 5% + lactated ringers. 1000 milliLiter(s) (100 mL/Hr) IV Continuous <Continuous>  dextrose 5%. 1000 milliLiter(s) (50 mL/Hr) IV Continuous <Continuous>  dextrose 5%. 1000 milliLiter(s) (100 mL/Hr) IV Continuous <Continuous>  dextrose 50% Injectable 25 Gram(s) IV Push once  dextrose 50% Injectable 12.5 Gram(s) IV Push once  dextrose 50% Injectable 25 Gram(s) IV Push once  glucagon  Injectable 1 milliGRAM(s) IntraMuscular once  heparin   Injectable 5000 Unit(s) SubCutaneous every 8 hours  insulin lispro (ADMELOG) corrective regimen sliding scale   SubCutaneous every 6 hours  midodrine 10 milliGRAM(s) Oral every 8 hours  pantoprazole  Injectable 40 milliGRAM(s) IV Push daily  piperacillin/tazobactam IVPB.. 3.375 Gram(s) IV Intermittent every 12 hours  propofol Infusion 10 MICROgram(s)/kG/Min (5.4 mL/Hr) IV Continuous <Continuous>    MEDICATIONS  (PRN):  acetaminophen    Suspension .. 650 milliGRAM(s) Oral every 6 hours PRN Temp greater or equal to 38C (100.4F)  ALBUTerol    90 MICROgram(s) HFA Inhaler 2 Puff(s) Inhalation every 6 hours PRN Shortness of Breath  midazolam Injectable 1 milliGRAM(s) IV Push every 2 hours PRN involuntary movements        RADIOLOGY/ Microbiology/ Labs: reviewed

## 2021-08-24 NOTE — PROGRESS NOTE ADULT - ASSESSMENT
46M w/ PMHx HTN, DM, asthma, presents to the ED after being found unresponsive in the setting of substance abuse. Personally reviewed all imagines, labs, EEG and other studies.    Impression:  1. Seizure-like activity: Hyperkinetic movements captured on cvEEG without ictal correlate on EEG. No epileptiform discharges or seizure seen.  2. Anoxic brain injury from ?intentional polysubstance abuse  3. Acute respiratory failure  4. Rhabdomyolysis  5. GARRETT  6. NSTEMI      Recommendation:  - discontinue cvEEG  - no indication for antiepileptic medication at this time  - on abx  - cardiology following  - management of vent per primary team   - ongoing GOC discussions with family      No acute intervention from Epilepsy at this time.  Please reconsult if needed.  ______________________  Brandee Roach MD   Director, Epilepsy/EMU - [facility]   Epilepsy Consult #: [phone] ([phone])

## 2021-08-25 PROBLEM — Z00.00 ENCOUNTER FOR PREVENTIVE HEALTH EXAMINATION: Status: ACTIVE | Noted: 2021-08-25

## 2021-08-25 LAB
ANION GAP SERPL CALC-SCNC: 9 MMOL/L — SIGNIFICANT CHANGE UP (ref 5–17)
BUN SERPL-MCNC: 17.1 MG/DL — SIGNIFICANT CHANGE UP (ref 8–20)
CALCIUM SERPL-MCNC: 8.6 MG/DL — SIGNIFICANT CHANGE UP (ref 8.6–10.2)
CHLORIDE SERPL-SCNC: 106 MMOL/L — SIGNIFICANT CHANGE UP (ref 98–107)
CO2 SERPL-SCNC: 32 MMOL/L — HIGH (ref 22–29)
CREAT SERPL-MCNC: 1.07 MG/DL — SIGNIFICANT CHANGE UP (ref 0.5–1.3)
GLUCOSE BLDC GLUCOMTR-MCNC: 132 MG/DL — HIGH (ref 70–99)
GLUCOSE BLDC GLUCOMTR-MCNC: 94 MG/DL — SIGNIFICANT CHANGE UP (ref 70–99)
GLUCOSE SERPL-MCNC: 115 MG/DL — HIGH (ref 70–99)
HCT VFR BLD CALC: 37.8 % — LOW (ref 39–50)
HGB BLD-MCNC: 12.6 G/DL — LOW (ref 13–17)
MAGNESIUM SERPL-MCNC: 1.6 MG/DL — SIGNIFICANT CHANGE UP (ref 1.6–2.6)
MCHC RBC-ENTMCNC: 30.7 PG — SIGNIFICANT CHANGE UP (ref 27–34)
MCHC RBC-ENTMCNC: 33.3 GM/DL — SIGNIFICANT CHANGE UP (ref 32–36)
MCV RBC AUTO: 92 FL — SIGNIFICANT CHANGE UP (ref 80–100)
MYOGLOBIN UR-MCNC: 1373 NG/ML — HIGH (ref 0–13)
PHOSPHATE SERPL-MCNC: 1.9 MG/DL — LOW (ref 2.4–4.7)
PLATELET # BLD AUTO: 138 K/UL — LOW (ref 150–400)
POTASSIUM SERPL-MCNC: 3.5 MMOL/L — SIGNIFICANT CHANGE UP (ref 3.5–5.3)
POTASSIUM SERPL-SCNC: 3.5 MMOL/L — SIGNIFICANT CHANGE UP (ref 3.5–5.3)
RBC # BLD: 4.11 M/UL — LOW (ref 4.2–5.8)
RBC # FLD: 12.3 % — SIGNIFICANT CHANGE UP (ref 10.3–14.5)
SODIUM SERPL-SCNC: 147 MMOL/L — HIGH (ref 135–145)
WBC # BLD: 13.41 K/UL — HIGH (ref 3.8–10.5)
WBC # FLD AUTO: 13.41 K/UL — HIGH (ref 3.8–10.5)

## 2021-08-25 PROCEDURE — 99233 SBSQ HOSP IP/OBS HIGH 50: CPT

## 2021-08-25 PROCEDURE — 93931 UPPER EXTREMITY STUDY: CPT | Mod: 26,LT

## 2021-08-25 RX ORDER — POTASSIUM PHOSPHATE, MONOBASIC POTASSIUM PHOSPHATE, DIBASIC 236; 224 MG/ML; MG/ML
30 INJECTION, SOLUTION INTRAVENOUS ONCE
Refills: 0 | Status: COMPLETED | OUTPATIENT
Start: 2021-08-25 | End: 2021-08-25

## 2021-08-25 RX ORDER — POTASSIUM CHLORIDE 20 MEQ
20 PACKET (EA) ORAL ONCE
Refills: 0 | Status: COMPLETED | OUTPATIENT
Start: 2021-08-25 | End: 2021-08-25

## 2021-08-25 RX ORDER — MAGNESIUM SULFATE 500 MG/ML
4 VIAL (ML) INJECTION ONCE
Refills: 0 | Status: COMPLETED | OUTPATIENT
Start: 2021-08-25 | End: 2021-08-25

## 2021-08-25 RX ADMIN — Medication 650 MILLIGRAM(S): at 03:48

## 2021-08-25 RX ADMIN — Medication 100 MILLIEQUIVALENT(S): at 11:04

## 2021-08-25 RX ADMIN — MIDODRINE HYDROCHLORIDE 10 MILLIGRAM(S): 2.5 TABLET ORAL at 05:14

## 2021-08-25 RX ADMIN — MIDODRINE HYDROCHLORIDE 10 MILLIGRAM(S): 2.5 TABLET ORAL at 21:03

## 2021-08-25 RX ADMIN — MIDODRINE HYDROCHLORIDE 10 MILLIGRAM(S): 2.5 TABLET ORAL at 13:04

## 2021-08-25 RX ADMIN — Medication 650 MILLIGRAM(S): at 10:00

## 2021-08-25 RX ADMIN — FENTANYL CITRATE 27.9 MICROGRAM(S)/KG/HR: 50 INJECTION INTRAVENOUS at 19:05

## 2021-08-25 RX ADMIN — PIPERACILLIN AND TAZOBACTAM 25 GRAM(S): 4; .5 INJECTION, POWDER, LYOPHILIZED, FOR SOLUTION INTRAVENOUS at 17:36

## 2021-08-25 RX ADMIN — Medication 650 MILLIGRAM(S): at 21:04

## 2021-08-25 RX ADMIN — HEPARIN SODIUM 5000 UNIT(S): 5000 INJECTION INTRAVENOUS; SUBCUTANEOUS at 13:03

## 2021-08-25 RX ADMIN — CHLORHEXIDINE GLUCONATE 1 APPLICATION(S): 213 SOLUTION TOPICAL at 05:15

## 2021-08-25 RX ADMIN — Medication 650 MILLIGRAM(S): at 05:16

## 2021-08-25 RX ADMIN — Medication 81 MILLIGRAM(S): at 11:05

## 2021-08-25 RX ADMIN — PANTOPRAZOLE SODIUM 40 MILLIGRAM(S): 20 TABLET, DELAYED RELEASE ORAL at 11:04

## 2021-08-25 RX ADMIN — MIDAZOLAM HYDROCHLORIDE 1.86 MG/KG/HR: 1 INJECTION, SOLUTION INTRAMUSCULAR; INTRAVENOUS at 13:03

## 2021-08-25 RX ADMIN — CHLORHEXIDINE GLUCONATE 15 MILLILITER(S): 213 SOLUTION TOPICAL at 17:37

## 2021-08-25 RX ADMIN — Medication 650 MILLIGRAM(S): at 11:00

## 2021-08-25 RX ADMIN — HEPARIN SODIUM 5000 UNIT(S): 5000 INJECTION INTRAVENOUS; SUBCUTANEOUS at 05:14

## 2021-08-25 RX ADMIN — Medication 100 GRAM(S): at 11:14

## 2021-08-25 RX ADMIN — Medication 650 MILLIGRAM(S): at 23:01

## 2021-08-25 RX ADMIN — POTASSIUM PHOSPHATE, MONOBASIC POTASSIUM PHOSPHATE, DIBASIC 83.33 MILLIMOLE(S): 236; 224 INJECTION, SOLUTION INTRAVENOUS at 10:44

## 2021-08-25 RX ADMIN — HEPARIN SODIUM 5000 UNIT(S): 5000 INJECTION INTRAVENOUS; SUBCUTANEOUS at 21:04

## 2021-08-25 RX ADMIN — PIPERACILLIN AND TAZOBACTAM 25 GRAM(S): 4; .5 INJECTION, POWDER, LYOPHILIZED, FOR SOLUTION INTRAVENOUS at 05:15

## 2021-08-25 RX ADMIN — FENTANYL CITRATE 27.9 MICROGRAM(S)/KG/HR: 50 INJECTION INTRAVENOUS at 03:03

## 2021-08-25 RX ADMIN — CHLORHEXIDINE GLUCONATE 15 MILLILITER(S): 213 SOLUTION TOPICAL at 05:14

## 2021-08-25 NOTE — PROGRESS NOTE ADULT - ASSESSMENT
Asssesment & Plan:   46M w/ PMHx HTN, DM, asthma, presents to the ED after being found unresponsive in the setting of substance abuse with anoxic brain injury related to prolonged hypoxia.     Neuro:  - Continue sedation on versed and fentanyl gtts   - PRN Ativan pushes needed for agitation  - Lumbar puncture negative 8/23  - Urine tox on admission + for benzos/cocaine.   - CTH suggestive of hypoxic encephalopathy.  - EEG d/c'd no seizure activity, nonepileptic movements present  - Consider further imaging with MRI of brain   - GOC with palliative care and family planned meeting for Friday    Cardiovascular:   - Off vasopressors, aim for MAP target >65.   - Continue Midodrine 10 mg Q8hr   - TTE w/ biventricular heart failure, EF < 20%. Started ASA. Appreciate cards recs  - Right lower extremity duplex negative. Awaiting Left Upper Arm arterial duplex     Resp/Chest:   - Failed SBT this AM with tachypnea and agitation. Sedation resestarted  - Remains on Volume AC. Weaned 02 from 60% to 40% peep 8, tolerating well.   - Continue to maintain SpO2 > 92%.   - Continue Ventilator bundle.   - Continue EtCO2 monitoring.    - Albuterol PRN.     GI/Nutrition:   - Continue tube feedings  - IV PPI while intubated     ID:   - Continues to remain febrile on cooling blanket. Trend fever curve   - + Right sided PNA on CXR and remains on Zosyn for aspiration cause   - Blood culture/UC negative   - Consider additional source as neurogenic fevers    Renal:  - Improving kidney function; SCr continues to trend down and UOP acceptable.  - Strict I&O.   -CK trending down, continue to monitor.     Endocrinology:   - A1c and TSH WNL.   - Continue to maintain tight glycemic control with goal blood sugar < 180  - Sliding scale FSq6hr    Haem/Oncology:    -DVT ppx w/ HSQ   -SCDs    Code status:   -Full, GOC meeting Friday. Palliative care involved.     Line/tubes/ drains:   -RIJ TLC, ETT/OGT, PIV, and parikh catheter     Plan of care discussed with attending Dr. Garcia.              Asssesment & Plan:   46M w/ PMHx HTN, DM, asthma, presents to the ED after being found unresponsive in the setting of substance abuse with anoxic brain injury related to prolonged hypoxia.     Neuro:  - No purposeful movements  - Continue sedation on versed and fentanyl gtts   - PRN Ativan pushes needed for agitation  - Lumbar puncture negative 8/23  - Urine tox on admission + for benzos/cocaine.   - CTH suggestive of hypoxic encephalopathy.  - EEG d/c'd no seizure activity, nonepileptic myoclonic jerks movements present  - Consider further imaging with MRI of brain   - GOC with palliative care and family planned meeting for Friday    Cardiovascular:   - Off vasopressors, aim for MAP target >65.   - Continue Midodrine 10 mg Q8hr   - TTE w/ biventricular heart failure, EF < 20%. Started ASA. Appreciate cards recs  - Right lower extremity duplex negative. Awaiting Left Upper Arm arterial duplex     Resp/Chest:   - Failed SBT this AM with tachypnea and agitation. Sedation resestarted  - Remains on Volume AC. Weaned 02 from 60% to 40% peep 8, tolerating well.   - Continue to maintain SpO2 > 92%.   - Continue Ventilator bundle.   - Continue EtCO2 monitoring.    - Albuterol PRN.     GI/Nutrition:   - Continue tube feedings  - IV PPI while intubated     ID:   - Continues to remain febrile on cooling blanket. Trend fever curve   - + Right sided PNA on CXR and remains on Zosyn for aspiration cause   - Blood culture/UC negative   - Consider additional source as neurogenic fevers    Renal:  - Improving kidney function; SCr continues to trend down and UOP acceptable.  - Strict I&O.   -CK trending down, continue to monitor.     Endocrinology:   - A1c and TSH WNL.   - Continue to maintain tight glycemic control with goal blood sugar < 180  - Sliding scale FSq6hr    Haem/Oncology:    -DVT ppx w/ HSQ   -SCDs    Code status:   -Full, GOC meeting Friday. Palliative care involved.     Line/tubes/ drains:   -RIJ TLC, ETT/OGT, PIV, and parikh catheter     Plan of care discussed with attending Dr. Garcia.

## 2021-08-25 NOTE — PROGRESS NOTE ADULT - ASSESSMENT
46M with HTN, DM, asthma, here with vent dependent respiratory failure, unresponsiveness, concerns for anoxic injury,  GARRETT, NSTEMI, acute systolic CHF, s/p shock (off pressors), possible aspiration related event on zosyn,  urine toxicology + for benzodiazepines and cocaine.     #1 Ventilator dependence - poor overall prognosis for meaningful weaning given poor mental status. concerns for anoxic injury   #2 Anoxic brain injury - post possible overdose. CT head findings as above. Epilepsy following, on continuous EEG.   #3 Shock - possible aspiration event, resolved on antibiotics.   #4 Acute systolic CHF - not a candidate for intervention at this time.   #5 GARRETT - improving slowly.   #6 Encounter for palliative care - goals of care held 8/24, family meeting Friday once sister comes from [place]. If patient decompensates, would warrant further discussion.  46M with HTN, DM, asthma, here with vent dependent respiratory failure, unresponsiveness, concerns for anoxic injury,  GARRETT, NSTEMI, acute systolic CHF, s/p shock (off pressors), possible aspiration related event on zosyn,  urine toxicology + for benzodiazepines and cocaine.     #1 Ventilator dependence - poor overall prognosis for meaningful weaning given poor mental status. concerns for anoxic injury   #2 Anoxic brain injury - post possible overdose. CT head findings as above. Epilepsy following, on continuous EEG.   #3 Shock - possible aspiration event, resolved on antibiotics.   #4 Acute systolic CHF - not a candidate for intervention at this time.   #5 GARRETT - improving slowly.   #6 Encounter for palliative care - goals of care held 8/24, family meeting Friday once sister comes from [place]. If patient decompensates, would warrant further discussion prior to family meeting on friday.  46M with HTN, DM, asthma, here with vent dependent respiratory failure, unresponsiveness, concerns for anoxic injury,  GARRETT, NSTEMI, acute systolic CHF, s/p shock (off pressors), possible aspiration related event on zosyn,  urine toxicology + for benzodiazepines and cocaine.     #1 Ventilator dependence - poor overall prognosis for meaningful weaning given poor mental status. concerns for anoxic injury   #2 Anoxic brain injury - post possible overdose. CT head findings as above. Epilepsy following, on continuous EEG.   #3 Shock - possible aspiration event, resolved on antibiotics.   #4 Acute systolic CHF - not a candidate for intervention at this time.   #5 GARRETT - improving slowly.   #6 Encounter for palliative care - goals of care held 8/24, family meeting Friday once sister comes from [place], (other sister Beverly in [place] - will be on the phone). If patient decompensates, would warrant further discussion prior to family meeting on Friday.

## 2021-08-25 NOTE — PROGRESS NOTE ADULT - SUBJECTIVE AND OBJECTIVE BOX
OVERNIGHT EVENTS: remains vent dependent with poor mental state     Present Symptoms:           Dyspnea: vented   Nausea/Vomiting: No  Anxiety:  unable   Depression: unable   Fatigue: unable   Loss of appetite: unable  Constipation: no     Pain: none currently             Character-            Duration-            Effect-            Factors-            Frequency-            Location-            Severity-    Pain AD Score:  [email or web address] (press ctrl + left click to view)    Review of Systems: Reviewed                 Unable to obtain due to poor mentation   All others negative    MEDICATIONS  (STANDING):  aspirin  chewable 81 milliGRAM(s) Oral daily  chlorhexidine 0.12% Liquid 15 milliLiter(s) Oral Mucosa every 12 hours  chlorhexidine 2% Cloths 1 Application(s) Topical <User Schedule>  dextrose 40% Gel 15 Gram(s) Oral once  dextrose 5%. 1000 milliLiter(s) (50 mL/Hr) IV Continuous <Continuous>  dextrose 5%. 1000 milliLiter(s) (100 mL/Hr) IV Continuous <Continuous>  dextrose 50% Injectable 25 Gram(s) IV Push once  dextrose 50% Injectable 12.5 Gram(s) IV Push once  dextrose 50% Injectable 25 Gram(s) IV Push once  fentaNYL   Infusion. 3 MICROgram(s)/kG/Hr (27.9 mL/Hr) IV Continuous <Continuous>  glucagon  Injectable 1 milliGRAM(s) IntraMuscular once  heparin   Injectable 5000 Unit(s) SubCutaneous every 8 hours  insulin lispro (ADMELOG) corrective regimen sliding scale   SubCutaneous every 6 hours  magnesium sulfate  IVPB 4 Gram(s) IV Intermittent once  midazolam Infusion 0.02 mG/kG/Hr (1.86 mL/Hr) IV Continuous <Continuous>  midodrine 10 milliGRAM(s) Oral every 8 hours  pantoprazole  Injectable 40 milliGRAM(s) IV Push daily  piperacillin/tazobactam IVPB.. 3.375 Gram(s) IV Intermittent every 12 hours  potassium chloride  20 mEq/100 mL IVPB 20 milliEquivalent(s) IV Intermittent once    MEDICATIONS  (PRN):  acetaminophen    Suspension .. 650 milliGRAM(s) Oral every 6 hours PRN Temp greater or equal to 38C (100.4F)  ALBUTerol    90 MICROgram(s) HFA Inhaler 2 Puff(s) Inhalation every 6 hours PRN Shortness of Breath    PHYSICAL EXAM:    Vital Signs Last 24 Hrs  T(C): 38.4 (25 Aug 2021 10:00), Max: 39.3 (25 Aug 2021 06:00)  T(F): 101.1 (25 Aug 2021 10:00), Max: 102.7 (25 Aug 2021 06:00)  HR: 91 (25 Aug 2021 10:00) (64 - 108)  BP: 109/80 (25 Aug 2021 10:00) (88/70 - 124/75)  BP(mean): 87 (25 Aug 2021 10:00) (76 - 103)  RR: 14 (25 Aug 2021 10:00) (13 - 40)  SpO2: 98% (25 Aug 2021 10:00) (88% - 100%)    General: intubated     HEENT: ET tube    Lungs: comfortable     CV: normal      GI: normal. NG tube     : kati    MSK: bedbound/wheelchair bound    Neuro: no focal deficits     Skin: no rash    LABS:                      12.6   13.41 )-----------( 138      ( 25 Aug 2021 04:17 )             37.8     08-25    147<H>  |  106  |  17.1  ----------------------------<  115<H>  3.5   |  32.0<H>  |  1.07    Ca    8.6      25 Aug 2021 04:17  Phos  1.9     08-25  Mg     1.6     08-25    TPro  5.6<L>  /  Alb  2.5<L>  /  TBili  0.7  /  DBili  x   /  AST  164<H>  /  ALT  103<H>  /  AlkPhos  64  08-24    I&O's Summary    24 Aug 2021 07:01  -  25 Aug 2021 07:00  --------------------------------------------------------  IN: 3829 mL / OUT: 4175 mL / NET: -346 mL    25 Aug 2021 07:01  -  25 Aug 2021 10:55  --------------------------------------------------------  IN: 192.7 mL / OUT: 130 mL / NET: 62.7 mL    RADIOLOGY & ADDITIONAL STUDIES:    ADVANCE DIRECTIVES: Full code

## 2021-08-25 NOTE — PROGRESS NOTE ADULT - SUBJECTIVE AND OBJECTIVE BOX
Patient is a 43y old M who presents with a chief complaint of acute hypoxemic respiratory failure/AMS (22 Aug 2021 12:15).    BRIEF HOSPITAL COURSE:   47 y/o M with a h/o HTN, DM, asthma, presents to the ED after being found unresponsive in bed. Noted to have agonal respirations and emesis coming from mouth. He was found to be hypoxemic in the ED prompting emergent endotracheal intubation and appears to have an extensive right sided pneumonia on CXR. Urine toxicology (+) for benzos and cocaine. CT head reveals symmetric low attenuation lesions in the bilateral globus pallidus regions of the basal ganglia, which can be seen with hypoxic ischemic encephalopathy, as well as indeterminate areas of low attenuation in the bilateral cerebellum may reflect infarcts. Hospital course complicated by severe septic shock state and GARRETT/rhabdomyolysis.    Events last 24 hours:   Remains on full vent support. Increased ventilator asynchrony and involuntary movements now sedated on fentanyl and versed gtts. Hyperkinetic movements with no seizure activity; EEG d/c'd. Right lower extremity duplex negative. Awaiting left upper arm arterial duplex. Continues to remain off vasopressors and on Midodrine. Palliative involved, GOC discussion planned for Friday.     PAST MEDICAL & SURGICAL HISTORY:  Asthma  HTN (hypertension)  DM (diabetes mellitus)    Review of Systems:  Unable to assess given mentation and sedation.       ICU Vital Signs Last 24 Hrs  T(C): 38.2 (25 Aug 2021 11:00), Max: 39.3 (25 Aug 2021 06:00)  T(F): 100.8 (25 Aug 2021 11:00), Max: 102.7 (25 Aug 2021 06:00)  HR: 85 (25 Aug 2021 11:00) (64 - 108)  BP: 99/73 (25 Aug 2021 11:00) (88/70 - 124/75)  BP(mean): 82 (25 Aug 2021 11:00) (76 - 103)  RR: 13 (25 Aug 2021 11:00) (13 - 40)  SpO2: 97% (25 Aug 2021 11:00) (88% - 100%)    I&O's Detail    24 Aug 2021 07:01  -  25 Aug 2021 07:00  --------------------------------------------------------  IN:    dextrose 5% + lactated ringers: 1600 mL    Enteral Tube Flush: 110 mL    FentaNYL: 93 mL    FentaNYL: 74.4 mL    FentaNYL: 349.3 mL    IV PiggyBack: 75 mL    Jevity 1.5: 1080 mL    Midazolam: 50.1 mL    Propofol: 140 mL    Propofol (Status Epilepticus): 89.2 mL    Propofol (Status Epilepticus): 168 mL  Total IN: 3829 mL    OUT:    Indwelling Catheter - Urethral (mL): 4175 mL  Total OUT: 4175 mL    Total NET: -346 mL      25 Aug 2021 07:01  -  25 Aug 2021 11:36  --------------------------------------------------------  IN:    FentaNYL: 14 mL    IV PiggyBack: 25 mL    Jevity 1.5: 150 mL    Midazolam: 3.7 mL  Total IN: 192.7 mL    OUT:    Indwelling Catheter - Urethral (mL): 130 mL  Total OUT: 130 mL    Total NET: 62.7 mL          Lab Results:  CBC  CBC Full  -  ( 25 Aug 2021 04:17 )  WBC Count : 13.41 K/uL  RBC Count : 4.11 M/uL  Hemoglobin : 12.6 g/dL  Hematocrit : 37.8 %  Platelet Count - Automated : 138 K/uL  Mean Cell Volume : 92.0 fl  Mean Cell Hemoglobin : 30.7 pg  Mean Cell Hemoglobin Concentration : 33.3 gm/dL  Auto Neutrophil # : x  Auto Lymphocyte # : x  Auto Monocyte # : x  Auto Eosinophil # : x  Auto Basophil # : x  Auto Neutrophil % : x  Auto Lymphocyte % : x  Auto Monocyte % : x  Auto Eosinophil % : x  Auto Basophil % : x                          12.6   13.41 )-----------( 138      ( 25 Aug 2021 04:17 )             37.8     08-25    147<H>  |  106  |  17.1  ----------------------------<  115<H>  3.5   |  32.0<H>  |  1.07    Ca    8.6      25 Aug 2021 04:17  Phos  1.9     08-25  Mg     1.6     08-25    TPro  5.6<L>  /  Alb  2.5<L>  /  TBili  0.7  /  DBili  x   /  AST  164<H>  /  ALT  103<H>  /  AlkPhos  64  08-24    LIVER FUNCTIONS - ( 24 Aug 2021 03:51 )  Alb: 2.5 g/dL / Pro: 5.6 g/dL / ALK PHOS: 64 U/L / ALT: 103 U/L / AST: 164 U/L / GGT: x           ABG - ( 24 Aug 2021 18:08 )  pH, Arterial: 7.390 pH, Blood: x     /  pCO2: 50    /  pO2: 116   / HCO3: 30    / Base Excess: 5.3   /  SaO2: 99.2        MICROBIOLOGY/CULTURES:  Culture Results:   No growth (08-23 @ 21:43)  Culture Results:   Testing in progress (08-23 @ 21:43)  Culture Results:   Normal Respiratory Le present (08-22 @ 12:18)  Culture Results:   No growth (08-22 @ 02:40)  Culture Results:   No growth at 48 hours (08-21 @ 20:04)  Culture Results:   No growth at 48 hours (08-21 @ 20:03)      RADIOLOGY RESULTS:  < from: CT Head No Cont (08.23.21 @ 10:53) >  INTERPRETATION:  CLINICAL INDICATION: Seizure. Altered mental status.    TECHNIQUE: CT of the head was performed without the administration of intravenous contrast.    COMPARISON: CT head 8/21/2021.    FINDINGS:  Slightly increased prominence of hypoattenuating foci involving the bilateral globi pallidi. Additional likely infarcts are increased in conspicuity involving the right sensorimotor cortex, parietal centrum semiovale, left occipital lobe, and bilateral cerebellum.    No acute hemorrhage. No hydrocephalus. No extra-axial fluid collections. No midline shift.    The visualized intraorbital contents are unremarkable. Scattered mucosal thickening throughout the ethmoid air cells. The mastoid air cells are clear. The visualized soft tissues and osseous structures appear normal.    IMPRESSION:    -Slightly increased prominence of bilateral globus pallidus hypodense foci, likely representing hypoxic-ischemicinjury.    -Again noted multiple likely small acute infarcts involving the bilateral cerebral and cerebellar hemispheres.    -No acute intracranial hemorrhage.    Impression:  Symmetric low attenuation lesions in the bilateral globus pallidus regions of the basal ganglia, which can be seen with hypoxic ischemic encephalopathy or related to carbon monoxide positioning.  Indeterminate areas of low attenuation in the bilateral cerebellum may reflect infarcts.  MRI is recommended for further evaluation.      MEDICATIONS  (STANDING):  aspirin  chewable 81 milliGRAM(s) Oral daily  chlorhexidine 0.12% Liquid 15 milliLiter(s) Oral Mucosa every 12 hours  chlorhexidine 2% Cloths 1 Application(s) Topical <User Schedule>  dextrose 40% Gel 15 Gram(s) Oral once  dextrose 5%. 1000 milliLiter(s) (50 mL/Hr) IV Continuous <Continuous>  dextrose 5%. 1000 milliLiter(s) (100 mL/Hr) IV Continuous <Continuous>  dextrose 50% Injectable 25 Gram(s) IV Push once  dextrose 50% Injectable 12.5 Gram(s) IV Push once  dextrose 50% Injectable 25 Gram(s) IV Push once  fentaNYL   Infusion. 3 MICROgram(s)/kG/Hr (27.9 mL/Hr) IV Continuous <Continuous>  glucagon  Injectable 1 milliGRAM(s) IntraMuscular once  heparin   Injectable 5000 Unit(s) SubCutaneous every 8 hours  insulin lispro (ADMELOG) corrective regimen sliding scale   SubCutaneous every 6 hours  midazolam Infusion 0.02 mG/kG/Hr (1.86 mL/Hr) IV Continuous <Continuous>  midodrine 10 milliGRAM(s) Oral every 8 hours  pantoprazole  Injectable 40 milliGRAM(s) IV Push daily  piperacillin/tazobactam IVPB.. 3.375 Gram(s) IV Intermittent every 12 hours    MEDICATIONS  (PRN):  acetaminophen    Suspension .. 650 milliGRAM(s) Oral every 6 hours PRN Temp greater or equal to 38C (100.4F)  ALBUTerol    90 MICROgram(s) HFA Inhaler 2 Puff(s) Inhalation every 6 hours PRN Shortness of Breath      Physical Exam  Neuro: Intubated & Sedated, will over breath ventilator. Cough/Gag +, PERRLA sluggish 1mm  PULM: Coarse rhonchi heard to auscultation bilaterally, +sputum   CVS: Regular rhythm and controlled rate  ABD: Soft, nondistended  EXT: +2 edema lower extremity, left upper extremity cool with mottled fingers            Patient is a 43y old M who presents with a chief complaint of acute hypoxemic respiratory failure/AMS (22 Aug 2021 12:15).    BRIEF HOSPITAL COURSE:   45 y/o M with a h/o HTN, DM, asthma, presents to the ED after being found unresponsive in bed. Noted to have agonal respirations and emesis coming from mouth. He was found to be hypoxemic in the ED prompting emergent endotracheal intubation and appears to have an extensive right sided pneumonia on CXR. Urine toxicology (+) for benzos and cocaine. CT head reveals symmetric low attenuation lesions in the bilateral globus pallidus regions of the basal ganglia, which can be seen with hypoxic ischemic encephalopathy, as well as indeterminate areas of low attenuation in the bilateral cerebellum may reflect infarcts. Hospital course complicated by severe septic shock state and GARRETT/rhabdomyolysis.    Events last 24 hours:   Remains on full vent support, failing SBTs. Increased ventilator asynchrony and involuntary movements now sedated on fentanyl and versed gtts. Hyperkinetic movements with no seizure activity; EEG d/c'd. No purposeful movements. Continues to remain off vasopressors and on Midodrine. Palliative involved, GOC discussion planned for Friday.     PAST MEDICAL & SURGICAL HISTORY:  Asthma  HTN (hypertension)  DM (diabetes mellitus)    Review of Systems:  Unable to assess given mentation and sedation.       ICU Vital Signs Last 24 Hrs  T(C): 38.2 (25 Aug 2021 11:00), Max: 39.3 (25 Aug 2021 06:00)  T(F): 100.8 (25 Aug 2021 11:00), Max: 102.7 (25 Aug 2021 06:00)  HR: 85 (25 Aug 2021 11:00) (64 - 108)  BP: 99/73 (25 Aug 2021 11:00) (88/70 - 124/75)  BP(mean): 82 (25 Aug 2021 11:00) (76 - 103)  RR: 13 (25 Aug 2021 11:00) (13 - 40)  SpO2: 97% (25 Aug 2021 11:00) (88% - 100%)    I&O's Detail    24 Aug 2021 07:01  -  25 Aug 2021 07:00  --------------------------------------------------------  IN:    dextrose 5% + lactated ringers: 1600 mL    Enteral Tube Flush: 110 mL    FentaNYL: 93 mL    FentaNYL: 74.4 mL    FentaNYL: 349.3 mL    IV PiggyBack: 75 mL    Jevity 1.5: 1080 mL    Midazolam: 50.1 mL    Propofol: 140 mL    Propofol (Status Epilepticus): 89.2 mL    Propofol (Status Epilepticus): 168 mL  Total IN: 3829 mL    OUT:    Indwelling Catheter - Urethral (mL): 4175 mL  Total OUT: 4175 mL    Total NET: -346 mL      25 Aug 2021 07:01  -  25 Aug 2021 11:36  --------------------------------------------------------  IN:    FentaNYL: 14 mL    IV PiggyBack: 25 mL    Jevity 1.5: 150 mL    Midazolam: 3.7 mL  Total IN: 192.7 mL    OUT:    Indwelling Catheter - Urethral (mL): 130 mL  Total OUT: 130 mL    Total NET: 62.7 mL          Lab Results:  CBC  CBC Full  -  ( 25 Aug 2021 04:17 )  WBC Count : 13.41 K/uL  RBC Count : 4.11 M/uL  Hemoglobin : 12.6 g/dL  Hematocrit : 37.8 %  Platelet Count - Automated : 138 K/uL  Mean Cell Volume : 92.0 fl  Mean Cell Hemoglobin : 30.7 pg  Mean Cell Hemoglobin Concentration : 33.3 gm/dL  Auto Neutrophil # : x  Auto Lymphocyte # : x  Auto Monocyte # : x  Auto Eosinophil # : x  Auto Basophil # : x  Auto Neutrophil % : x  Auto Lymphocyte % : x  Auto Monocyte % : x  Auto Eosinophil % : x  Auto Basophil % : x                          12.6   13.41 )-----------( 138      ( 25 Aug 2021 04:17 )             37.8     08-25    147<H>  |  106  |  17.1  ----------------------------<  115<H>  3.5   |  32.0<H>  |  1.07    Ca    8.6      25 Aug 2021 04:17  Phos  1.9     08-25  Mg     1.6     08-25    TPro  5.6<L>  /  Alb  2.5<L>  /  TBili  0.7  /  DBili  x   /  AST  164<H>  /  ALT  103<H>  /  AlkPhos  64  08-24    LIVER FUNCTIONS - ( 24 Aug 2021 03:51 )  Alb: 2.5 g/dL / Pro: 5.6 g/dL / ALK PHOS: 64 U/L / ALT: 103 U/L / AST: 164 U/L / GGT: x           ABG - ( 24 Aug 2021 18:08 )  pH, Arterial: 7.390 pH, Blood: x     /  pCO2: 50    /  pO2: 116   / HCO3: 30    / Base Excess: 5.3   /  SaO2: 99.2        MICROBIOLOGY/CULTURES:  Culture Results:   No growth (08-23 @ 21:43)  Culture Results:   Testing in progress (08-23 @ 21:43)  Culture Results:   Normal Respiratory Le present (08-22 @ 12:18)  Culture Results:   No growth (08-22 @ 02:40)  Culture Results:   No growth at 48 hours (08-21 @ 20:04)  Culture Results:   No growth at 48 hours (08-21 @ 20:03)      RADIOLOGY RESULTS:  < from: CT Head No Cont (08.23.21 @ 10:53) >  INTERPRETATION:  CLINICAL INDICATION: Seizure. Altered mental status.    TECHNIQUE: CT of the head was performed without the administration of intravenous contrast.    COMPARISON: CT head 8/21/2021.    FINDINGS:  Slightly increased prominence of hypoattenuating foci involving the bilateral globi pallidi. Additional likely infarcts are increased in conspicuity involving the right sensorimotor cortex, parietal centrum semiovale, left occipital lobe, and bilateral cerebellum.    No acute hemorrhage. No hydrocephalus. No extra-axial fluid collections. No midline shift.    The visualized intraorbital contents are unremarkable. Scattered mucosal thickening throughout the ethmoid air cells. The mastoid air cells are clear. The visualized soft tissues and osseous structures appear normal.    IMPRESSION:    -Slightly increased prominence of bilateral globus pallidus hypodense foci, likely representing hypoxic-ischemicinjury.    -Again noted multiple likely small acute infarcts involving the bilateral cerebral and cerebellar hemispheres.    -No acute intracranial hemorrhage.    Impression:  Symmetric low attenuation lesions in the bilateral globus pallidus regions of the basal ganglia, which can be seen with hypoxic ischemic encephalopathy or related to carbon monoxide positioning.  Indeterminate areas of low attenuation in the bilateral cerebellum may reflect infarcts.  MRI is recommended for further evaluation.      MEDICATIONS  (STANDING):  aspirin  chewable 81 milliGRAM(s) Oral daily  chlorhexidine 0.12% Liquid 15 milliLiter(s) Oral Mucosa every 12 hours  chlorhexidine 2% Cloths 1 Application(s) Topical <User Schedule>  dextrose 40% Gel 15 Gram(s) Oral once  dextrose 5%. 1000 milliLiter(s) (50 mL/Hr) IV Continuous <Continuous>  dextrose 5%. 1000 milliLiter(s) (100 mL/Hr) IV Continuous <Continuous>  dextrose 50% Injectable 25 Gram(s) IV Push once  dextrose 50% Injectable 12.5 Gram(s) IV Push once  dextrose 50% Injectable 25 Gram(s) IV Push once  fentaNYL   Infusion. 3 MICROgram(s)/kG/Hr (27.9 mL/Hr) IV Continuous <Continuous>  glucagon  Injectable 1 milliGRAM(s) IntraMuscular once  heparin   Injectable 5000 Unit(s) SubCutaneous every 8 hours  insulin lispro (ADMELOG) corrective regimen sliding scale   SubCutaneous every 6 hours  midazolam Infusion 0.02 mG/kG/Hr (1.86 mL/Hr) IV Continuous <Continuous>  midodrine 10 milliGRAM(s) Oral every 8 hours  pantoprazole  Injectable 40 milliGRAM(s) IV Push daily  piperacillin/tazobactam IVPB.. 3.375 Gram(s) IV Intermittent every 12 hours    MEDICATIONS  (PRN):  acetaminophen    Suspension .. 650 milliGRAM(s) Oral every 6 hours PRN Temp greater or equal to 38C (100.4F)  ALBUTerol    90 MICROgram(s) HFA Inhaler 2 Puff(s) Inhalation every 6 hours PRN Shortness of Breath      Physical Exam  Neuro: Intubated & Sedated, will over breath ventilator. Cough/Gag +, PERRLA sluggish 1mm  PULM: Coarse rhonchi heard to auscultation bilaterally, +sputum   CVS: Regular rhythm and controlled rate  ABD: Soft, nondistended  EXT: +2 edema lower extremity, left upper extremity cool with mottled fingers

## 2021-08-25 NOTE — PROGRESS NOTE ADULT - SUBJECTIVE AND OBJECTIVE BOX
Patient is a 43y old M who presents with a chief complaint of acute hypoxemic respiratory failure/AMS (22 Aug 2021 12:15).    BRIEF HOSPITAL COURSE:   45 y/o M with a h/o HTN, DM, asthma, presents to the ED after being found unresponsive in bed. Noted to have agonal respirations and emesis coming from mouth. He was found to be hypoxemic in the ED prompting emergent endotracheal intubation and appears to have an extensive right sided pneumonia on CXR. Urine toxicology (+) for benzos and cocaine. CT head reveals symmetric low attenuation lesions in the bilateral globus pallidus regions of the basal ganglia, which can be seen with hypoxic ischemic encephalopathy, as well as indeterminate areas of low attenuation in the bilateral cerebellum may reflect infarcts. Hospital course complicated by severe septic shock state and GARRETT/rhabdomyolysis.    Events last 24 hours:   Remains on full vent support, failing SBTs. Increased ventilator asynchrony and involuntary movements now sedated on fentanyl and versed gtts. Hyperkinetic movements with no seizure activity; EEG d/c'd. No purposeful movements. Continues to remain off vasopressors and on Midodrine. Palliative involved, GOC discussion planned for Friday.     PAST MEDICAL & SURGICAL HISTORY:  Asthma  HTN (hypertension)  DM (diabetes mellitus)    Review of Systems:  Unable to assess given mentation and sedation.       ICU Vital Signs Last 24 Hrs  T(C): 38.2 (25 Aug 2021 11:00), Max: 39.3 (25 Aug 2021 06:00)  T(F): 100.8 (25 Aug 2021 11:00), Max: 102.7 (25 Aug 2021 06:00)  HR: 85 (25 Aug 2021 11:00) (64 - 108)  BP: 99/73 (25 Aug 2021 11:00) (88/70 - 124/75)  BP(mean): 82 (25 Aug 2021 11:00) (76 - 103)  RR: 13 (25 Aug 2021 11:00) (13 - 40)  SpO2: 97% (25 Aug 2021 11:00) (88% - 100%)    I&O's Detail    24 Aug 2021 07:01  -  25 Aug 2021 07:00  --------------------------------------------------------  IN:    dextrose 5% + lactated ringers: 1600 mL    Enteral Tube Flush: 110 mL    FentaNYL: 93 mL    FentaNYL: 74.4 mL    FentaNYL: 349.3 mL    IV PiggyBack: 75 mL    Jevity 1.5: 1080 mL    Midazolam: 50.1 mL    Propofol: 140 mL    Propofol (Status Epilepticus): 89.2 mL    Propofol (Status Epilepticus): 168 mL  Total IN: 3829 mL    OUT:    Indwelling Catheter - Urethral (mL): 4175 mL  Total OUT: 4175 mL    Total NET: -346 mL      25 Aug 2021 07:01  -  25 Aug 2021 11:36  --------------------------------------------------------  IN:    FentaNYL: 14 mL    IV PiggyBack: 25 mL    Jevity 1.5: 150 mL    Midazolam: 3.7 mL  Total IN: 192.7 mL    OUT:    Indwelling Catheter - Urethral (mL): 130 mL  Total OUT: 130 mL    Total NET: 62.7 mL          Lab Results:  CBC  CBC Full  -  ( 25 Aug 2021 04:17 )  WBC Count : 13.41 K/uL  RBC Count : 4.11 M/uL  Hemoglobin : 12.6 g/dL  Hematocrit : 37.8 %  Platelet Count - Automated : 138 K/uL  Mean Cell Volume : 92.0 fl  Mean Cell Hemoglobin : 30.7 pg  Mean Cell Hemoglobin Concentration : 33.3 gm/dL  Auto Neutrophil # : x  Auto Lymphocyte # : x  Auto Monocyte # : x  Auto Eosinophil # : x  Auto Basophil # : x  Auto Neutrophil % : x  Auto Lymphocyte % : x  Auto Monocyte % : x  Auto Eosinophil % : x  Auto Basophil % : x                          12.6   13.41 )-----------( 138      ( 25 Aug 2021 04:17 )             37.8     08-25    147<H>  |  106  |  17.1  ----------------------------<  115<H>  3.5   |  32.0<H>  |  1.07    Ca    8.6      25 Aug 2021 04:17  Phos  1.9     08-25  Mg     1.6     08-25    TPro  5.6<L>  /  Alb  2.5<L>  /  TBili  0.7  /  DBili  x   /  AST  164<H>  /  ALT  103<H>  /  AlkPhos  64  08-24    LIVER FUNCTIONS - ( 24 Aug 2021 03:51 )  Alb: 2.5 g/dL / Pro: 5.6 g/dL / ALK PHOS: 64 U/L / ALT: 103 U/L / AST: 164 U/L / GGT: x           ABG - ( 24 Aug 2021 18:08 )  pH, Arterial: 7.390 pH, Blood: x     /  pCO2: 50    /  pO2: 116   / HCO3: 30    / Base Excess: 5.3   /  SaO2: 99.2        MICROBIOLOGY/CULTURES:  Culture Results:   No growth (08-23 @ 21:43)  Culture Results:   Testing in progress (08-23 @ 21:43)  Culture Results:   Normal Respiratory Le present (08-22 @ 12:18)  Culture Results:   No growth (08-22 @ 02:40)  Culture Results:   No growth at 48 hours (08-21 @ 20:04)  Culture Results:   No growth at 48 hours (08-21 @ 20:03)      RADIOLOGY RESULTS:  < from: CT Head No Cont (08.23.21 @ 10:53) >  INTERPRETATION:  CLINICAL INDICATION: Seizure. Altered mental status.    TECHNIQUE: CT of the head was performed without the administration of intravenous contrast.    COMPARISON: CT head 8/21/2021.    FINDINGS:  Slightly increased prominence of hypoattenuating foci involving the bilateral globi pallidi. Additional likely infarcts are increased in conspicuity involving the right sensorimotor cortex, parietal centrum semiovale, left occipital lobe, and bilateral cerebellum.    No acute hemorrhage. No hydrocephalus. No extra-axial fluid collections. No midline shift.    The visualized intraorbital contents are unremarkable. Scattered mucosal thickening throughout the ethmoid air cells. The mastoid air cells are clear. The visualized soft tissues and osseous structures appear normal.    IMPRESSION:    -Slightly increased prominence of bilateral globus pallidus hypodense foci, likely representing hypoxic-ischemicinjury.    -Again noted multiple likely small acute infarcts involving the bilateral cerebral and cerebellar hemispheres.    -No acute intracranial hemorrhage.    Impression:  Symmetric low attenuation lesions in the bilateral globus pallidus regions of the basal ganglia, which can be seen with hypoxic ischemic encephalopathy or related to carbon monoxide positioning.  Indeterminate areas of low attenuation in the bilateral cerebellum may reflect infarcts.  MRI is recommended for further evaluation.      MEDICATIONS  (STANDING):  aspirin  chewable 81 milliGRAM(s) Oral daily  chlorhexidine 0.12% Liquid 15 milliLiter(s) Oral Mucosa every 12 hours  chlorhexidine 2% Cloths 1 Application(s) Topical <User Schedule>  dextrose 40% Gel 15 Gram(s) Oral once  dextrose 5%. 1000 milliLiter(s) (50 mL/Hr) IV Continuous <Continuous>  dextrose 5%. 1000 milliLiter(s) (100 mL/Hr) IV Continuous <Continuous>  dextrose 50% Injectable 25 Gram(s) IV Push once  dextrose 50% Injectable 12.5 Gram(s) IV Push once  dextrose 50% Injectable 25 Gram(s) IV Push once  fentaNYL   Infusion. 3 MICROgram(s)/kG/Hr (27.9 mL/Hr) IV Continuous <Continuous>  glucagon  Injectable 1 milliGRAM(s) IntraMuscular once  heparin   Injectable 5000 Unit(s) SubCutaneous every 8 hours  insulin lispro (ADMELOG) corrective regimen sliding scale   SubCutaneous every 6 hours  midazolam Infusion 0.02 mG/kG/Hr (1.86 mL/Hr) IV Continuous <Continuous>  midodrine 10 milliGRAM(s) Oral every 8 hours  pantoprazole  Injectable 40 milliGRAM(s) IV Push daily  piperacillin/tazobactam IVPB.. 3.375 Gram(s) IV Intermittent every 12 hours    MEDICATIONS  (PRN):  acetaminophen    Suspension .. 650 milliGRAM(s) Oral every 6 hours PRN Temp greater or equal to 38C (100.4F)  ALBUTerol    90 MICROgram(s) HFA Inhaler 2 Puff(s) Inhalation every 6 hours PRN Shortness of Breath      Physical Exam  Neuro: Intubated & Sedated, will over breath ventilator. Cough/Gag +, PERRLA sluggish 1mm  PULM: Coarse rhonchi heard to auscultation bilaterally, +sputum   CVS: Regular rhythm and controlled rate  ABD: Soft, nondistended  EXT: +2 edema lower extremity, left upper extremity cool with mottled fingers               Assessment and Plan:   · Assessment	  Asssesment & Plan:   46M w/ PMHx HTN, DM, asthma, presents to the ED after being found unresponsive in the setting of substance abuse with anoxic brain injury related to prolonged hypoxia.     Neuro:  - No purposeful movements  - Continue sedation on versed and fentanyl gtts   - PRN Ativan pushes needed for agitation  - Lumbar puncture negative 8/23  - Urine tox on admission + for benzos/cocaine.   - CTH suggestive of hypoxic encephalopathy.  - EEG d/c'd no seizure activity, nonepileptic myoclonic jerks movements present  - Consider further imaging with MRI of brain   - [facility] with palliative care and family planned meeting for Friday    Cardiovascular:   - Off vasopressors, aim for MAP target >65.   - Continue Midodrine 10 mg Q8hr   - TTE w/ biventricular heart failure, EF < 20%. Started ASA. Appreciate cards recs  - Right lower extremity duplex negative. Awaiting Left Upper Arm arterial duplex     Resp/Chest:   - Failed SBT this AM with tachypnea and agitation. Sedation resestarted  - Remains on Volume AC. Weaned 02 from 60% to 40% peep 8, tolerating well.   - Continue to maintain SpO2 > 92%.   - Continue Ventilator bundle.   - Continue EtCO2 monitoring.    - Albuterol PRN.     GI/Nutrition:   - Continue tube feedings  - IV PPI while intubated     ID:   - Continues to remain febrile on cooling blanket. Trend fever curve   - + Right sided PNA on CXR and remains on Zosyn for aspiration cause   - Blood culture/UC negative   - Consider additional source as neurogenic fevers    Renal:  - Improving kidney function; SCr continues to trend down and UOP acceptable.  - Strict I&O.   -CK trending down, continue to monitor.     Endocrinology:   - A1c and TSH WNL.   - Continue to maintain tight glycemic control with goal blood sugar < 180  - Sliding scale FSq6hr    Haem/Oncology:    -DVT ppx w/ HSQ   -SCDs    Code status:   -Full, GOC meeting Friday. Palliative care involved.     Line/tubes/ drains:   -RIJ TLC, ETT/OGT, PIV, and parikh catheter     Plan of care discussed with attending Dr. Garcia.

## 2021-08-25 NOTE — PROGRESS NOTE ADULT - SUBJECTIVE AND OBJECTIVE BOX
Brief Hospital Course:   47 yo male with hx of DM, HTN, asthma, presented to the ED on  with AMS/encephalopathy due to benzo and cocaine overdose, found to have aspiration pneumonia, acute respiratory failure, anoxic encephalopathy.      Subjective/ Interval events:  Sedation turned off this AM, patient reportedly desaturated and became dyssynchronous with vent, no purposeful movements     Exam:   pupils reactive   +cough and gag, triggering the vent  no response to painful stimuli, no withdrawal  reg rhythm  bilat air entry  abd soft  trace edema     Impression/Plan:    Anoxic encephalopathy  - was having myoclonic jerks but no seizures seen on EEG  - will obtain MRI to better assess for anoxic injury - if structural injury apparent then will  need to shift goals of care     Acute respiratory failure/ aspiration pneumonia  - zosyn 7 day course   - still with fevers but may be neurogenic     Sedation/Analgesia: versed/fent  Vasoactive medications: none  CAM assessment: unable to perform  DVT prophylaxis: heparin sc  GI prophylaxis: protonix  Nutrition: tube feeds  Central line: left IJ TLC  Arguello:  present  Mobility: bedrest  Family updated daily     _________________________________________________________________________________________________  On Admission  21 (4d)  HPI:  45 y/o M (real name: Ildefonso Pierre, : [birth year]) with a h/o HTN, DM, asthma, presents to the ED after being found unresponsive in bed by his sister this evening. Noted to have agonal respirations and emesis coming from mouth. He was found to be hypoxemic in the ED prompting emergent endotracheal intubation and appears to have an extensive right sided pneumonia on CXR. The patient's sister, Adela, reports that he had recently broken up with his girlfriend and has been having a hard time coping with it and has been noticeably depressed. Within the past 5 days he was taken to the ED at [facility] for some unclear intoxication. Adela also reports recently noticing a plethora of newly prescribed medications at his house and was able to identify clonazepam and alprazolam. Tonight he had told her that he was going out with friends and asked if she would come over and watch his dog, which she thought seemed unusual. Urine toxicology (+) for benzos and cocaine. CT head reveals symmetric low attenuation lesions in the bilateral globus pallidus regions of the basal ganglia, which can be seen with hypoxic ischemic encephalopathy or related to carbon monoxide positioning, as well as indeterminate areas of low attenuation in the bilateral cerebellum may reflect infarcts. He is sedated and now with progressive hypotension despite 3L IVF bolus. Started on IV vasopressor therapy. (21 Aug 2021 21:54)    PAST MEDICAL & SURGICAL HISTORY:  No pertinent past medical history    Asthma    HTN (hypertension)    DM (diabetes mellitus)    S/P elbow joint replacement  right elbow surgery.    S/P knee surgery  right thigh surgery.        Antimicrobial:  piperacillin/tazobactam IVPB.. 3.375 Gram(s) IV Intermittent every 12 hours    Cardiovascular:  midodrine 10 milliGRAM(s) Oral every 8 hours    Pulmonary:  ALBUTerol    90 MICROgram(s) HFA Inhaler 2 Puff(s) Inhalation every 6 hours PRN    Hematalogic:  aspirin  chewable 81 milliGRAM(s) Oral daily  heparin   Injectable 5000 Unit(s) SubCutaneous every 8 hours    Other:  acetaminophen    Suspension .. 650 milliGRAM(s) Oral every 6 hours PRN  chlorhexidine 0.12% Liquid 15 milliLiter(s) Oral Mucosa every 12 hours  chlorhexidine 2% Cloths 1 Application(s) Topical <User Schedule>  dextrose 40% Gel 15 Gram(s) Oral once  dextrose 5%. 1000 milliLiter(s) IV Continuous <Continuous>  dextrose 5%. 1000 milliLiter(s) IV Continuous <Continuous>  dextrose 50% Injectable 25 Gram(s) IV Push once  dextrose 50% Injectable 12.5 Gram(s) IV Push once  dextrose 50% Injectable 25 Gram(s) IV Push once  fentaNYL   Infusion. 3 MICROgram(s)/kG/Hr IV Continuous <Continuous>  glucagon  Injectable 1 milliGRAM(s) IntraMuscular once  insulin lispro (ADMELOG) corrective regimen sliding scale   SubCutaneous every 6 hours  midazolam Infusion 0.02 mG/kG/Hr IV Continuous <Continuous>  pantoprazole  Injectable 40 milliGRAM(s) IV Push daily      Drug Dosing Weight  Height (cm): 180.3 (21 Aug 2021 20:06)  Weight (kg): 93.1 (22 Aug 2021 00:45)  BMI (kg/m2): 28.6 (22 Aug 2021 00:45)  BSA (m2): 2.13 (22 Aug 2021 00:45)    T(C): 38.2 (21 @ 11:00), Max: 39.3 (21 @ 06:00)  HR: 85 (21 @ 11:00)  BP: 99/73 (21 @ 11:00)  BP(mean): 82 (21 @ 11:00)  ABP: --  ABP(mean): --  RR: 13 (21 @ 11:00)  SpO2: 97% (21 @ 11:00)    ABG - ( 24 Aug 2021 18:08 )  pH, Arterial: 7.390 pH, Blood: x     /  pCO2: 50    /  pO2: 116   / HCO3: 30    / Base Excess: 5.3   /  SaO2: 99.2                   @ 07:01  -   @ 07:00  --------------------------------------------------------  IN: 3829 mL / OUT: 4175 mL / NET: -346 mL        Mode: AC/ CMV (Assist Control/ Continuous Mandatory Ventilation)  RR (machine): 16  TV (machine): 400  FiO2: 40  PEEP: 8  MAP: 13  PIP: 24        LABS:  CBC Full  -  ( 25 Aug 2021 04:17 )  WBC Count : 13.41 K/uL  RBC Count : 4.11 M/uL  Hemoglobin : 12.6 g/dL  Hematocrit : 37.8 %  Platelet Count - Automated : 138 K/uL  Mean Cell Volume : 92.0 fl  Mean Cell Hemoglobin : 30.7 pg  Mean Cell Hemoglobin Concentration : 33.3 gm/dL  Auto Neutrophil # : x  Auto Lymphocyte # : x  Auto Monocyte # : x  Auto Eosinophil # : x  Auto Basophil # : x  Auto Neutrophil % : x  Auto Lymphocyte % : x  Auto Monocyte % : x  Auto Eosinophil % : x  Auto Basophil % : x        147<H>  |  106  |  17.1  ----------------------------<  115<H>  3.5   |  32.0<H>  |  1.07    Ca    8.6      25 Aug 2021 04:17  Phos  1.9       Mg     1.6         TPro  5.6<L>  /  Alb  2.5<L>  /  TBili  0.7  /  DBili  x   /  AST  164<H>  /  ALT  103<H>  /  AlkPhos  64          Culture Results:   No growth ( @ 21:43)  Culture Results:   Testing in progress ( @ 21:43)  Culture Results:   Normal Respiratory Le present ( @ 12:18)

## 2021-08-25 NOTE — CHART NOTE - NSCHARTNOTEFT_GEN_A_CORE
Palliative care attending:    Spoke with Aunt Iggy via telephone. COnfirmed meeting for Friday for 12pm with her, ezx wife Araceli, daughter Eric, and patient's sister July, other sister Beverly via telephone. A lot of emotional support provided. Overall seems daughter Eric is struggling and family may opt to continue aggressive treatments to give her more time to process even though they know the outcome will be very poor.

## 2021-08-26 LAB
ANION GAP SERPL CALC-SCNC: 12 MMOL/L — SIGNIFICANT CHANGE UP (ref 5–17)
BUN SERPL-MCNC: 17.9 MG/DL — SIGNIFICANT CHANGE UP (ref 8–20)
CALCIUM SERPL-MCNC: 8.6 MG/DL — SIGNIFICANT CHANGE UP (ref 8.6–10.2)
CHLORIDE SERPL-SCNC: 109 MMOL/L — HIGH (ref 98–107)
CO2 SERPL-SCNC: 28 MMOL/L — SIGNIFICANT CHANGE UP (ref 22–29)
CREAT SERPL-MCNC: 1.09 MG/DL — SIGNIFICANT CHANGE UP (ref 0.5–1.3)
CULTURE RESULTS: SIGNIFICANT CHANGE UP
GLUCOSE BLDC GLUCOMTR-MCNC: 104 MG/DL — HIGH (ref 70–99)
GLUCOSE BLDC GLUCOMTR-MCNC: 107 MG/DL — HIGH (ref 70–99)
GLUCOSE BLDC GLUCOMTR-MCNC: 130 MG/DL — HIGH (ref 70–99)
GLUCOSE SERPL-MCNC: 131 MG/DL — HIGH (ref 70–99)
HCT VFR BLD CALC: 38.2 % — LOW (ref 39–50)
HGB BLD-MCNC: 12.5 G/DL — LOW (ref 13–17)
MAGNESIUM SERPL-MCNC: 1.9 MG/DL — SIGNIFICANT CHANGE UP (ref 1.6–2.6)
MCHC RBC-ENTMCNC: 30.7 PG — SIGNIFICANT CHANGE UP (ref 27–34)
MCHC RBC-ENTMCNC: 32.7 GM/DL — SIGNIFICANT CHANGE UP (ref 32–36)
MCV RBC AUTO: 93.9 FL — SIGNIFICANT CHANGE UP (ref 80–100)
PHOSPHATE SERPL-MCNC: 2.4 MG/DL — SIGNIFICANT CHANGE UP (ref 2.4–4.7)
PLATELET # BLD AUTO: 156 K/UL — SIGNIFICANT CHANGE UP (ref 150–400)
POTASSIUM SERPL-MCNC: 3.8 MMOL/L — SIGNIFICANT CHANGE UP (ref 3.5–5.3)
POTASSIUM SERPL-SCNC: 3.8 MMOL/L — SIGNIFICANT CHANGE UP (ref 3.5–5.3)
RBC # BLD: 4.07 M/UL — LOW (ref 4.2–5.8)
RBC # FLD: 12.4 % — SIGNIFICANT CHANGE UP (ref 10.3–14.5)
SODIUM SERPL-SCNC: 149 MMOL/L — HIGH (ref 135–145)
SPECIMEN SOURCE: SIGNIFICANT CHANGE UP
VDRL CSF-TITR: SIGNIFICANT CHANGE UP
WBC # BLD: 8.83 K/UL — SIGNIFICANT CHANGE UP (ref 3.8–10.5)
WBC # FLD AUTO: 8.83 K/UL — SIGNIFICANT CHANGE UP (ref 3.8–10.5)

## 2021-08-26 PROCEDURE — 99233 SBSQ HOSP IP/OBS HIGH 50: CPT

## 2021-08-26 PROCEDURE — 70551 MRI BRAIN STEM W/O DYE: CPT | Mod: 26

## 2021-08-26 RX ORDER — THYROID 120 MG
30 TABLET ORAL DAILY
Refills: 0 | Status: DISCONTINUED | OUTPATIENT
Start: 2021-08-26 | End: 2021-09-23

## 2021-08-26 RX ORDER — MIDODRINE HYDROCHLORIDE 2.5 MG/1
5 TABLET ORAL EVERY 8 HOURS
Refills: 0 | Status: DISCONTINUED | OUTPATIENT
Start: 2021-08-26 | End: 2021-08-28

## 2021-08-26 RX ADMIN — FENTANYL CITRATE 27.9 MICROGRAM(S)/KG/HR: 50 INJECTION INTRAVENOUS at 09:20

## 2021-08-26 RX ADMIN — CHLORHEXIDINE GLUCONATE 15 MILLILITER(S): 213 SOLUTION TOPICAL at 17:54

## 2021-08-26 RX ADMIN — MIDODRINE HYDROCHLORIDE 5 MILLIGRAM(S): 2.5 TABLET ORAL at 17:54

## 2021-08-26 RX ADMIN — MIDODRINE HYDROCHLORIDE 5 MILLIGRAM(S): 2.5 TABLET ORAL at 05:17

## 2021-08-26 RX ADMIN — Medication 650 MILLIGRAM(S): at 03:21

## 2021-08-26 RX ADMIN — Medication 650 MILLIGRAM(S): at 11:06

## 2021-08-26 RX ADMIN — Medication 650 MILLIGRAM(S): at 21:50

## 2021-08-26 RX ADMIN — Medication 650 MILLIGRAM(S): at 12:06

## 2021-08-26 RX ADMIN — Medication 650 MILLIGRAM(S): at 05:31

## 2021-08-26 RX ADMIN — FENTANYL CITRATE 27.9 MICROGRAM(S)/KG/HR: 50 INJECTION INTRAVENOUS at 18:46

## 2021-08-26 RX ADMIN — HEPARIN SODIUM 5000 UNIT(S): 5000 INJECTION INTRAVENOUS; SUBCUTANEOUS at 22:56

## 2021-08-26 RX ADMIN — HEPARIN SODIUM 5000 UNIT(S): 5000 INJECTION INTRAVENOUS; SUBCUTANEOUS at 05:17

## 2021-08-26 RX ADMIN — Medication 81 MILLIGRAM(S): at 11:06

## 2021-08-26 RX ADMIN — Medication 650 MILLIGRAM(S): at 22:50

## 2021-08-26 RX ADMIN — MIDAZOLAM HYDROCHLORIDE 1.86 MG/KG/HR: 1 INJECTION, SOLUTION INTRAMUSCULAR; INTRAVENOUS at 18:46

## 2021-08-26 RX ADMIN — PIPERACILLIN AND TAZOBACTAM 25 GRAM(S): 4; .5 INJECTION, POWDER, LYOPHILIZED, FOR SOLUTION INTRAVENOUS at 05:17

## 2021-08-26 RX ADMIN — CHLORHEXIDINE GLUCONATE 15 MILLILITER(S): 213 SOLUTION TOPICAL at 05:18

## 2021-08-26 RX ADMIN — PIPERACILLIN AND TAZOBACTAM 25 GRAM(S): 4; .5 INJECTION, POWDER, LYOPHILIZED, FOR SOLUTION INTRAVENOUS at 22:56

## 2021-08-26 RX ADMIN — PIPERACILLIN AND TAZOBACTAM 25 GRAM(S): 4; .5 INJECTION, POWDER, LYOPHILIZED, FOR SOLUTION INTRAVENOUS at 13:06

## 2021-08-26 RX ADMIN — MIDAZOLAM HYDROCHLORIDE 1.86 MG/KG/HR: 1 INJECTION, SOLUTION INTRAMUSCULAR; INTRAVENOUS at 08:23

## 2021-08-26 RX ADMIN — MIDODRINE HYDROCHLORIDE 5 MILLIGRAM(S): 2.5 TABLET ORAL at 22:56

## 2021-08-26 RX ADMIN — MIDAZOLAM HYDROCHLORIDE 1.86 MG/KG/HR: 1 INJECTION, SOLUTION INTRAMUSCULAR; INTRAVENOUS at 20:05

## 2021-08-26 RX ADMIN — PANTOPRAZOLE SODIUM 40 MILLIGRAM(S): 20 TABLET, DELAYED RELEASE ORAL at 11:06

## 2021-08-26 RX ADMIN — CHLORHEXIDINE GLUCONATE 1 APPLICATION(S): 213 SOLUTION TOPICAL at 05:17

## 2021-08-26 RX ADMIN — HEPARIN SODIUM 5000 UNIT(S): 5000 INJECTION INTRAVENOUS; SUBCUTANEOUS at 13:06

## 2021-08-26 NOTE — PROGRESS NOTE ADULT - ASSESSMENT
45 y/o M with a h/o HTN, DM, asthma, with acute hypoxemic respiratory failure, aspiration pneumonia, AMS, suspected benzodiazepine overdose, septic shock, GARRETT, rhabdomyolysis.    Suspected benzodiazepine overdose with massive aspiration event. No major acute intracranial pathology seen on CT head. Unclear if this was a suicide attempt.     - actively titrating vent settings to maintain SpO2 > 92%, failed SBT yesterday, although switched to PSV overnight which he seems more comfortable on, mental status precludes extubation, strong suspicion of anoxic brain injury   - MRI head ordered   - sedate with midazolam and fentanyl infusions to promote vent synchrony and patient comfort, agitated but nonpurposeful when weaned  - off norepinephrine infusion, wean midodrine to 5mg TID  - unclear etiology of severe cardiomyopathy, severe AS? Will require RHC/LHC pending neurologic recovery  - continue empiric Zosyn  - blood cultures neg for growth thus far, CSF noninfected, sputum culture growing normal resp jasper, suspect fevers are neurogenic in nature  - renal function improved, trend BUN/Cr, monitor lytes, UOP adequate at this time   45 y/o M with a h/o HTN, DM, asthma, with acute hypoxemic respiratory failure, aspiration pneumonia, AMS, suspected benzodiazepine overdose, septic shock, GARRETT, rhabdomyolysis.    Suspected benzodiazepine overdose with massive aspiration event. No major acute intracranial pathology seen on CT head. Unclear if this was a suicide attempt.     - actively titrating vent settings to maintain SpO2 > 92%, switched to PSV overnight which he seems more comfortable on, failed spontaneous awakening trial due to tachypnea/hypoxemia, mental status precludes extubation, strong suspicion of anoxic brain injury   - MRI head ordered   - sedate with midazolam and fentanyl infusions to promote vent synchrony and patient comfort, agitated but nonpurposeful when weaned  - off norepinephrine infusion, wean midodrine to 5mg TID  - unclear etiology of severe cardiomyopathy, severe AS? Will require RHC/LHC pending neurologic recovery  - continue empiric Zosyn  - blood cultures neg for growth thus far, CSF noninfected, sputum culture growing normal resp jasper, suspect fevers are neurogenic in nature  - renal function improved, trend BUN/Cr, monitor lytes, UOP adequate at this time

## 2021-08-26 NOTE — PROGRESS NOTE ADULT - SUBJECTIVE AND OBJECTIVE BOX
OVERNIGHT EVENTS: remains vent dependent. getting MRI today     Present Symptoms:         Dyspnea: vented   Nausea/Vomiting: No  Anxiety:  unable   Depression: unable   Fatigue: unable   Loss of appetite: unable  Constipation: no     Pain: none currently             Character-            Duration-            Effect-            Factors-            Frequency-            Location-            Severity-    Pain AD Score:  [email or web address] (press ctrl + left click to view)    Review of Systems: Reviewed                 Unable to obtain due to poor mentation   All others negative    MEDICATIONS  (STANDING):  aspirin  chewable 81 milliGRAM(s) Oral daily  chlorhexidine 0.12% Liquid 15 milliLiter(s) Oral Mucosa every 12 hours  chlorhexidine 2% Cloths 1 Application(s) Topical <User Schedule>  dextrose 40% Gel 15 Gram(s) Oral once  dextrose 5%. 1000 milliLiter(s) (50 mL/Hr) IV Continuous <Continuous>  dextrose 5%. 1000 milliLiter(s) (100 mL/Hr) IV Continuous <Continuous>  dextrose 50% Injectable 25 Gram(s) IV Push once  dextrose 50% Injectable 12.5 Gram(s) IV Push once  dextrose 50% Injectable 25 Gram(s) IV Push once  fentaNYL   Infusion. 3 MICROgram(s)/kG/Hr (27.9 mL/Hr) IV Continuous <Continuous>  glucagon  Injectable 1 milliGRAM(s) IntraMuscular once  heparin   Injectable 5000 Unit(s) SubCutaneous every 8 hours  insulin lispro (ADMELOG) corrective regimen sliding scale   SubCutaneous every 6 hours  midazolam Infusion 0.02 mG/kG/Hr (1.86 mL/Hr) IV Continuous <Continuous>  midodrine 5 milliGRAM(s) Oral every 8 hours  pantoprazole  Injectable 40 milliGRAM(s) IV Push daily  piperacillin/tazobactam IVPB.. 3.375 Gram(s) IV Intermittent every 8 hours    MEDICATIONS  (PRN):  acetaminophen    Suspension .. 650 milliGRAM(s) Oral every 6 hours PRN Temp greater or equal to 38C (100.4F)  ALBUTerol    90 MICROgram(s) HFA Inhaler 2 Puff(s) Inhalation every 6 hours PRN Shortness of Breath    PHYSICAL EXAM:    Vital Signs Last 24 Hrs  T(C): 38.8 (26 Aug 2021 08:00), Max: 39.3 (26 Aug 2021 04:00)  T(F): 101.8 (26 Aug 2021 08:00), Max: 102.7 (26 Aug 2021 04:00)  HR: 101 (26 Aug 2021 08:22) (78 - 112)  BP: 199/118 (26 Aug 2021 08:00) (89/69 - 199/118)  BP(mean): 138 (26 Aug 2021 08:00) (74 - 138)  RR: 44 (26 Aug 2021 08:00) (12 - 44)  SpO2: 94% (26 Aug 2021 08:22) (88% - 99%)  General: intubated     HEENT: ET tube    Lungs: comfortable     CV: normal      GI: normal. NG tube     : kati    MSK: bedbound/wheelchair bound    Neuro: unresponsive     Skin: no rash    LABS:                      12.5   8.83  )-----------( 156      ( 26 Aug 2021 03:52 )             38.2     08-26    149<H>  |  109<H>  |  17.9  ----------------------------<  131<H>  3.8   |  28.0  |  1.09    Ca    8.6      26 Aug 2021 03:52  Phos  2.4     08-26  Mg     1.9     08-26    I&O's Summary    25 Aug 2021 07:01  -  26 Aug 2021 07:00  --------------------------------------------------------  IN: 2731.2 mL / OUT: 1315 mL / NET: 1416.2 mL    26 Aug 2021 07:01  -  26 Aug 2021 11:03  --------------------------------------------------------  IN: 159.5 mL / OUT: 125 mL / NET: 34.5 mL    RADIOLOGY & ADDITIONAL STUDIES:    ADVANCE DIRECTIVES: Full code

## 2021-08-26 NOTE — PROGRESS NOTE ADULT - SUBJECTIVE AND OBJECTIVE BOX
Patient is a 46y old  Male who presents with a chief complaint of Acute hypoxemic respiratory failure, AMS (25 Aug 2021 11:41)    BRIEF HOSPITAL COURSE: 45 y/o M (real name: Ildefonso Pierre, : [birth year]) with a h/o HTN, DM, asthma, admitted on  after being found unresponsive in bed with agonal respirations and emesis coming from mouth. He was found to be hypoxemic in the ED prompting emergent endotracheal intubation and noted to have extensive right sided pneumonia on CXR. Urine toxicology (+) for benzos and cocaine. CT head reveals symmetric low attenuation lesions in the bilateral globus pallidus regions of the basal ganglia, which can be seen with hypoxic ischemic encephalopathy or related to carbon monoxide positioning, as well as indeterminate areas of low attenuation in the bilateral cerebellum may reflect infarcts. Hospital course complicated by severe septic shock state requiring dual IV vasopressor therapy and GARRETT/rhabdomyolysis.    Events last 24 hours: Comfortable on PSV, although heavily sedated on midazolam and fentanyl infusions. Sedation weaned this morning for awakening trial and he developed severe tachypnea and hypoxemia, remained encephalopathic and nonpurposeful. Off IV vasopressor. Renal function improved. Strong concern for anoxic encephalopathy. Febrile (102.7'F).        PAST MEDICAL & SURGICAL HISTORY:  No pertinent past medical history    Asthma    HTN (hypertension)    DM (diabetes mellitus)    S/P elbow joint replacement  right elbow surgery.    S/P knee surgery  right thigh surgery.        Review of Systems:  Unable to obtain secondary to sedation/intubation.      Medications:  piperacillin/tazobactam IVPB.. 3.375 Gram(s) IV Intermittent every 12 hours  midodrine 10 milliGRAM(s) Oral every 8 hours  ALBUTerol    90 MICROgram(s) HFA Inhaler 2 Puff(s) Inhalation every 6 hours PRN  acetaminophen    Suspension .. 650 milliGRAM(s) Oral every 6 hours PRN  fentaNYL   Infusion. 3 MICROgram(s)/kG/Hr IV Continuous <Continuous>  midazolam Infusion 0.02 mG/kG/Hr IV Continuous <Continuous>  aspirin  chewable 81 milliGRAM(s) Oral daily  heparin   Injectable 5000 Unit(s) SubCutaneous every 8 hours  pantoprazole  Injectable 40 milliGRAM(s) IV Push daily  dextrose 40% Gel 15 Gram(s) Oral once  dextrose 50% Injectable 25 Gram(s) IV Push once  dextrose 50% Injectable 12.5 Gram(s) IV Push once  dextrose 50% Injectable 25 Gram(s) IV Push once  glucagon  Injectable 1 milliGRAM(s) IntraMuscular once  insulin lispro (ADMELOG) corrective regimen sliding scale   SubCutaneous every 6 hours  dextrose 5%. 1000 milliLiter(s) IV Continuous <Continuous>  dextrose 5%. 1000 milliLiter(s) IV Continuous <Continuous>  chlorhexidine 0.12% Liquid 15 milliLiter(s) Oral Mucosa every 12 hours  chlorhexidine 2% Cloths 1 Application(s) Topical <User Schedule>        Mode: CPAP with PS  FiO2: 40  PEEP: 6  PS: 6  MAP: 8  PIP: 20      ICU Vital Signs Last 24 Hrs  T(C): 39.3 (26 Aug 2021 04:00), Max: 39.3 (25 Aug 2021 06:00)  T(F): 102.7 (26 Aug 2021 04:00), Max: 102.7 (25 Aug 2021 06:00)  HR: 103 (26 Aug 2021 04:00) (78 - 108)  BP: 122/94 (26 Aug 2021 04:00) (89/69 - 124/75)  BP(mean): 102 (26 Aug 2021 04:00) (74 - 104)  ABP: --  ABP(mean): --  RR: 22 (26 Aug 2021 04:00) (12 - 40)  SpO2: 89% (26 Aug 2021 04:00) (89% - 99%)      ABG - ( 24 Aug 2021 18:08 )  pH, Arterial: 7.390 pH, Blood: x     /  pCO2: 50    /  pO2: 116   / HCO3: 30    / Base Excess: 5.3   /  SaO2: 99.2                I&O's Detail    24 Aug 2021 07:01  -  25 Aug 2021 07:00  --------------------------------------------------------  IN:    dextrose 5% + lactated ringers: 1600 mL    Enteral Tube Flush: 110 mL    FentaNYL: 93 mL    FentaNYL: 74.4 mL    FentaNYL: 349.3 mL    IV PiggyBack: 75 mL    Jevity 1.5: 1080 mL    Midazolam: 50.1 mL    Propofol: 140 mL    Propofol (Status Epilepticus): 89.2 mL    Propofol (Status Epilepticus): 168 mL  Total IN: 3829 mL    OUT:    Indwelling Catheter - Urethral (mL): 4175 mL  Total OUT: 4175 mL    Total NET: -346 mL      25 Aug 2021 07:01  -  26 Aug 2021 04:18  --------------------------------------------------------  IN:    Enteral Tube Flush: 150 mL    FentaNYL: 406.2 mL    IV PiggyBack: 100 mL    IV PiggyBack: 100 mL    IV PiggyBack: 499.8 mL    IV PiggyBack: 125 mL    Jevity 1.5: 1050 mL    Midazolam: 115.7 mL  Total IN: 2546.7 mL    OUT:    Indwelling Catheter - Urethral (mL): 1115 mL  Total OUT: 1115 mL    Total NET: 1431.7 mL            LABS:                        12.5   8.83  )-----------( 156      ( 26 Aug 2021 03:52 )             38.2     08-25    147<H>  |  106  |  17.1  ----------------------------<  115<H>  3.5   |  32.0<H>  |  1.07    Ca    8.6      25 Aug 2021 04:17  Phos  1.9     08-25  Mg     1.6     08-25            CAPILLARY BLOOD GLUCOSE      POCT Blood Glucose.: 130 mg/dL (26 Aug 2021 00:07)        CULTURES:  Culture Results:   No growth (21 @ 21:43)  Culture Results:   Testing in progress (21 @ 21:43)  Culture Results:   Normal Respiratory Jasper present (21 @ 12:18)  Culture Results:   No growth (21 @ 02:40)  Rapid RVP Result: NotDetec (21 @ 21:01)  Culture Results:   No growth at 48 hours (21 @ 20:04)  Culture Results:   No growth at 48 hours (21 @ 20:03)        Physical Examination:    General: No acute distress.  sedated, intubated    HEENT: Pupils equal, reactive to light.  Symmetric.    PULM: Clear to auscultation bilaterally, no significant sputum production    CVS: tachycardic, reg rhythm, no murmurs, rubs, or gallops    ABD: Soft, nondistended, nontender, normoactive bowel sounds, no masses    EXT: No edema, nontender    SKIN: Warm and well perfused, no rashes noted.    NEURO: sedated, agitated and nonpurposeful when sedation is weaned, overbreathes the vent        RADIOLOGY:     < from: Xray Chest 1 View- PORTABLE-Urgent (Xray Chest 1 View- PORTABLE-Urgent .) (21 @ 03:00) >  FINDINGS: ET tube tip above tracheal bifurcation.  NG tube tip beyond GE junction.  RIGHT IJ catheter tip in SVC.      The lungs show unchanged RIGHT perihilar/basilar diffuse airspace disease and/or effusion. LEFT lung parenchyma clear.. No pneumothorax.    The  heart is mildly enlarged in transverse diameter. No hilar mass.       Visualized osseous structures are intact.    IMPRESSION:   No interval change..      45 y/o M with a h/o HTN, DM, asthma, with acute hypoxemic respiratory failure, aspiration pneumonia, AMS, suspected benzodiazepine overdose, septic shock, GARRETT, rhabdomyolysis.    Suspected benzodiazepine overdose with massive aspiration event. No major acute intracranial pathology seen on CT head. Unclear if this was a suicide attempt.     - actively titrating vent settings to maintain SpO2 > 92%, switched to PSV overnight which he seems more comfortable on, failed spontaneous awakening trial due to tachypnea/hypoxemia, mental status precludes extubation, strong suspicion of anoxic brain injury   - MRI head ordered   - sedate with midazolam and fentanyl infusions to promote vent synchrony and patient comfort, agitated but nonpurposeful when weaned  - off norepinephrine infusion, wean midodrine to 5mg TID  - unclear etiology of severe cardiomyopathy, severe AS? Will require RHC/LHC pending neurologic recovery  - continue empiric Zosyn  - blood cultures neg for growth thus far, CSF noninfected, sputum culture growing normal resp jasper, suspect fevers are neurogenic in nature  - renal function improved, trend BUN/Cr, monitor lytes, UOP adequate at this time

## 2021-08-26 NOTE — CHART NOTE - NSCHARTNOTEFT_GEN_A_CORE
Spoke w/Xiao, Aunt, and updated her on patients condition. She would like MRI results prior to family meeting tomorrow, will try my best to get it done today.

## 2021-08-26 NOTE — PROGRESS NOTE ADULT - SUBJECTIVE AND OBJECTIVE BOX
Patient is a 46y old  Male who presents with a chief complaint of Acute hypoxemic respiratory failure, AMS (25 Aug 2021 11:41)      BRIEF HOSPITAL COURSE: 45 y/o M (real name: Ildefonso Pierre, : 79) with a h/o HTN, DM, asthma, admitted on  after being found unresponsive in bed with agonal respirations and emesis coming from mouth. He was found to be hypoxemic in the ED prompting emergent endotracheal intubation and noted to have extensive right sided pneumonia on CXR. Urine toxicology (+) for benzos and cocaine. CT head reveals symmetric low attenuation lesions in the bilateral globus pallidus regions of the basal ganglia, which can be seen with hypoxic ischemic encephalopathy or related to carbon monoxide positioning, as well as indeterminate areas of low attenuation in the bilateral cerebellum may reflect infarcts. Hospital course complicated by severe septic shock state requiring dual IV vasopressor therapy and GARRETT/rhabdomyolysis.    Events last 24 hours: Patient remains dependent on full mechanical vent support. Failed SBT earlier for agitation, vent desynchrony, and hypoxemia. Off IV vasopressor. Renal function improved. Mental status remains poor with strong concern for anoxic encephalopathy. Febrile (102.7'F).        PAST MEDICAL & SURGICAL HISTORY:  No pertinent past medical history    Asthma    HTN (hypertension)    DM (diabetes mellitus)    S/P elbow joint replacement  right elbow surgery.    S/P knee surgery  right thigh surgery.        Review of Systems:  Unable to obtain secondary to sedation/intubation.      Medications:  piperacillin/tazobactam IVPB.. 3.375 Gram(s) IV Intermittent every 12 hours  midodrine 10 milliGRAM(s) Oral every 8 hours  ALBUTerol    90 MICROgram(s) HFA Inhaler 2 Puff(s) Inhalation every 6 hours PRN  acetaminophen    Suspension .. 650 milliGRAM(s) Oral every 6 hours PRN  fentaNYL   Infusion. 3 MICROgram(s)/kG/Hr IV Continuous <Continuous>  midazolam Infusion 0.02 mG/kG/Hr IV Continuous <Continuous>  aspirin  chewable 81 milliGRAM(s) Oral daily  heparin   Injectable 5000 Unit(s) SubCutaneous every 8 hours  pantoprazole  Injectable 40 milliGRAM(s) IV Push daily  dextrose 40% Gel 15 Gram(s) Oral once  dextrose 50% Injectable 25 Gram(s) IV Push once  dextrose 50% Injectable 12.5 Gram(s) IV Push once  dextrose 50% Injectable 25 Gram(s) IV Push once  glucagon  Injectable 1 milliGRAM(s) IntraMuscular once  insulin lispro (ADMELOG) corrective regimen sliding scale   SubCutaneous every 6 hours  dextrose 5%. 1000 milliLiter(s) IV Continuous <Continuous>  dextrose 5%. 1000 milliLiter(s) IV Continuous <Continuous>  chlorhexidine 0.12% Liquid 15 milliLiter(s) Oral Mucosa every 12 hours  chlorhexidine 2% Cloths 1 Application(s) Topical <User Schedule>        Mode: CPAP with PS  FiO2: 40  PEEP: 6  PS: 6  MAP: 8  PIP: 20      ICU Vital Signs Last 24 Hrs  T(C): 39.3 (26 Aug 2021 04:00), Max: 39.3 (25 Aug 2021 06:00)  T(F): 102.7 (26 Aug 2021 04:00), Max: 102.7 (25 Aug 2021 06:00)  HR: 103 (26 Aug 2021 04:00) (78 - 108)  BP: 122/94 (26 Aug 2021 04:00) (89/69 - 124/75)  BP(mean): 102 (26 Aug 2021 04:00) (74 - 104)  ABP: --  ABP(mean): --  RR: 22 (26 Aug 2021 04:00) (12 - 40)  SpO2: 89% (26 Aug 2021 04:00) (89% - 99%)      ABG - ( 24 Aug 2021 18:08 )  pH, Arterial: 7.390 pH, Blood: x     /  pCO2: 50    /  pO2: 116   / HCO3: 30    / Base Excess: 5.3   /  SaO2: 99.2                I&O's Detail    24 Aug 2021 07:01  -  25 Aug 2021 07:00  --------------------------------------------------------  IN:    dextrose 5% + lactated ringers: 1600 mL    Enteral Tube Flush: 110 mL    FentaNYL: 93 mL    FentaNYL: 74.4 mL    FentaNYL: 349.3 mL    IV PiggyBack: 75 mL    Jevity 1.5: 1080 mL    Midazolam: 50.1 mL    Propofol: 140 mL    Propofol (Status Epilepticus): 89.2 mL    Propofol (Status Epilepticus): 168 mL  Total IN: 3829 mL    OUT:    Indwelling Catheter - Urethral (mL): 4175 mL  Total OUT: 4175 mL    Total NET: -346 mL      25 Aug 2021 07:01  -  26 Aug 2021 04:18  --------------------------------------------------------  IN:    Enteral Tube Flush: 150 mL    FentaNYL: 406.2 mL    IV PiggyBack: 100 mL    IV PiggyBack: 100 mL    IV PiggyBack: 499.8 mL    IV PiggyBack: 125 mL    Jevity 1.5: 1050 mL    Midazolam: 115.7 mL  Total IN: 2546.7 mL    OUT:    Indwelling Catheter - Urethral (mL): 1115 mL  Total OUT: 1115 mL    Total NET: 1431.7 mL            LABS:                        12.5   8.83  )-----------( 156      ( 26 Aug 2021 03:52 )             38.2     08-25    147<H>  |  106  |  17.1  ----------------------------<  115<H>  3.5   |  32.0<H>  |  1.07    Ca    8.6      25 Aug 2021 04:17  Phos  1.9     08-25  Mg     1.6     08-25            CAPILLARY BLOOD GLUCOSE      POCT Blood Glucose.: 130 mg/dL (26 Aug 2021 00:07)        CULTURES:  Culture Results:   No growth (21 @ 21:43)  Culture Results:   Testing in progress (21 @ 21:43)  Culture Results:   Normal Respiratory Le present (21 @ 12:18)  Culture Results:   No growth (21 @ 02:40)  Rapid RVP Result: NotDetec (21 @ 21:01)  Culture Results:   No growth at 48 hours (21 @ 20:04)  Culture Results:   No growth at 48 hours (21 @ 20:03)        Physical Examination:    General: No acute distress.  sedated, intubated    HEENT: Pupils equal, reactive to light.  Symmetric.    PULM: Clear to auscultation bilaterally, no significant sputum production    CVS: tachycardic, reg rhythm, no murmurs, rubs, or gallops    ABD: Soft, nondistended, nontender, normoactive bowel sounds, no masses    EXT: No edema, nontender    SKIN: Warm and well perfused, no rashes noted.    NEURO: sedated, agitated and nonpurposeful when sedation is weaned, overbreathes the vent        RADIOLOGY:     < from: Xray Chest 1 View- PORTABLE-Urgent (Xray Chest 1 View- PORTABLE-Urgent .) (21 @ 03:00) >  FINDINGS: ET tube tip above tracheal bifurcation.  NG tube tip beyond GE junction.  RIGHT IJ catheter tip in SVC.      The lungs show unchanged RIGHT perihilar/basilar diffuse airspace disease and/or effusion. LEFT lung parenchyma clear.. No pneumothorax.    The  heart is mildly enlarged in transverse diameter. No hilar mass.       Visualized osseous structures are intact.    IMPRESSION:   No interval change..            CRITICAL CARE TIME SPENT: 34 mins  Time spent evaluating/treating patient with medical issues that pose a high risk for life threatening deterioration and/or end-organ damage, reviewing data/labs/imaging, discussing case with multidisciplinary team, discussing plan/goals of care with patient/family. Non-inclusive of procedure time.   Patient is a 46y old  Male who presents with a chief complaint of Acute hypoxemic respiratory failure, AMS (25 Aug 2021 11:41)      BRIEF HOSPITAL COURSE: 45 y/o M (real name: Ildefonso Pierre, : [birth year]) with a h/o HTN, DM, asthma, admitted on  after being found unresponsive in bed with agonal respirations and emesis coming from mouth. He was found to be hypoxemic in the ED prompting emergent endotracheal intubation and noted to have extensive right sided pneumonia on CXR. Urine toxicology (+) for benzos and cocaine. CT head reveals symmetric low attenuation lesions in the bilateral globus pallidus regions of the basal ganglia, which can be seen with hypoxic ischemic encephalopathy or related to carbon monoxide positioning, as well as indeterminate areas of low attenuation in the bilateral cerebellum may reflect infarcts. Hospital course complicated by severe septic shock state requiring dual IV vasopressor therapy and GARRETT/rhabdomyolysis.    Events last 24 hours: Comfortable on PSV, although heavily sedated on midazolam and fentanyl infusions. Sedation weaned this morning for awakening trial and he developed severe tachypnea and hypoxemia, remained encephalopathic and nonpurposeful. Off IV vasopressor. Renal function improved. Strong concern for anoxic encephalopathy. Febrile (102.7'F).        PAST MEDICAL & SURGICAL HISTORY:  No pertinent past medical history    Asthma    HTN (hypertension)    DM (diabetes mellitus)    S/P elbow joint replacement  right elbow surgery.    S/P knee surgery  right thigh surgery.        Review of Systems:  Unable to obtain secondary to sedation/intubation.      Medications:  piperacillin/tazobactam IVPB.. 3.375 Gram(s) IV Intermittent every 12 hours  midodrine 10 milliGRAM(s) Oral every 8 hours  ALBUTerol    90 MICROgram(s) HFA Inhaler 2 Puff(s) Inhalation every 6 hours PRN  acetaminophen    Suspension .. 650 milliGRAM(s) Oral every 6 hours PRN  fentaNYL   Infusion. 3 MICROgram(s)/kG/Hr IV Continuous <Continuous>  midazolam Infusion 0.02 mG/kG/Hr IV Continuous <Continuous>  aspirin  chewable 81 milliGRAM(s) Oral daily  heparin   Injectable 5000 Unit(s) SubCutaneous every 8 hours  pantoprazole  Injectable 40 milliGRAM(s) IV Push daily  dextrose 40% Gel 15 Gram(s) Oral once  dextrose 50% Injectable 25 Gram(s) IV Push once  dextrose 50% Injectable 12.5 Gram(s) IV Push once  dextrose 50% Injectable 25 Gram(s) IV Push once  glucagon  Injectable 1 milliGRAM(s) IntraMuscular once  insulin lispro (ADMELOG) corrective regimen sliding scale   SubCutaneous every 6 hours  dextrose 5%. 1000 milliLiter(s) IV Continuous <Continuous>  dextrose 5%. 1000 milliLiter(s) IV Continuous <Continuous>  chlorhexidine 0.12% Liquid 15 milliLiter(s) Oral Mucosa every 12 hours  chlorhexidine 2% Cloths 1 Application(s) Topical <User Schedule>        Mode: CPAP with PS  FiO2: 40  PEEP: 6  PS: 6  MAP: 8  PIP: 20      ICU Vital Signs Last 24 Hrs  T(C): 39.3 (26 Aug 2021 04:00), Max: 39.3 (25 Aug 2021 06:00)  T(F): 102.7 (26 Aug 2021 04:00), Max: 102.7 (25 Aug 2021 06:00)  HR: 103 (26 Aug 2021 04:00) (78 - 108)  BP: 122/94 (26 Aug 2021 04:00) (89/69 - 124/75)  BP(mean): 102 (26 Aug 2021 04:00) (74 - 104)  ABP: --  ABP(mean): --  RR: 22 (26 Aug 2021 04:00) (12 - 40)  SpO2: 89% (26 Aug 2021 04:00) (89% - 99%)      ABG - ( 24 Aug 2021 18:08 )  pH, Arterial: 7.390 pH, Blood: x     /  pCO2: 50    /  pO2: 116   / HCO3: 30    / Base Excess: 5.3   /  SaO2: 99.2                I&O's Detail    24 Aug 2021 07:01  -  25 Aug 2021 07:00  --------------------------------------------------------  IN:    dextrose 5% + lactated ringers: 1600 mL    Enteral Tube Flush: 110 mL    FentaNYL: 93 mL    FentaNYL: 74.4 mL    FentaNYL: 349.3 mL    IV PiggyBack: 75 mL    Jevity 1.5: 1080 mL    Midazolam: 50.1 mL    Propofol: 140 mL    Propofol (Status Epilepticus): 89.2 mL    Propofol (Status Epilepticus): 168 mL  Total IN: 3829 mL    OUT:    Indwelling Catheter - Urethral (mL): 4175 mL  Total OUT: 4175 mL    Total NET: -346 mL      25 Aug 2021 07:01  -  26 Aug 2021 04:18  --------------------------------------------------------  IN:    Enteral Tube Flush: 150 mL    FentaNYL: 406.2 mL    IV PiggyBack: 100 mL    IV PiggyBack: 100 mL    IV PiggyBack: 499.8 mL    IV PiggyBack: 125 mL    Jevity 1.5: 1050 mL    Midazolam: 115.7 mL  Total IN: 2546.7 mL    OUT:    Indwelling Catheter - Urethral (mL): 1115 mL  Total OUT: 1115 mL    Total NET: 1431.7 mL            LABS:                        12.5   8.83  )-----------( 156      ( 26 Aug 2021 03:52 )             38.2     08-25    147<H>  |  106  |  17.1  ----------------------------<  115<H>  3.5   |  32.0<H>  |  1.07    Ca    8.6      25 Aug 2021 04:17  Phos  1.9     08-25  Mg     1.6     08-25            CAPILLARY BLOOD GLUCOSE      POCT Blood Glucose.: 130 mg/dL (26 Aug 2021 00:07)        CULTURES:  Culture Results:   No growth (21 @ 21:43)  Culture Results:   Testing in progress (21 @ 21:43)  Culture Results:   Normal Respiratory Le present (21 @ 12:18)  Culture Results:   No growth (21 @ 02:40)  Rapid RVP Result: NotDetec (21 @ 21:01)  Culture Results:   No growth at 48 hours (21 @ 20:04)  Culture Results:   No growth at 48 hours (21 @ 20:03)        Physical Examination:    General: No acute distress.  sedated, intubated    HEENT: Pupils equal, reactive to light.  Symmetric.    PULM: Clear to auscultation bilaterally, no significant sputum production    CVS: tachycardic, reg rhythm, no murmurs, rubs, or gallops    ABD: Soft, nondistended, nontender, normoactive bowel sounds, no masses    EXT: No edema, nontender    SKIN: Warm and well perfused, no rashes noted.    NEURO: sedated, agitated and nonpurposeful when sedation is weaned, overbreathes the vent        RADIOLOGY:     < from: Xray Chest 1 View- PORTABLE-Urgent (Xray Chest 1 View- PORTABLE-Urgent .) (21 @ 03:00) >  FINDINGS: ET tube tip above tracheal bifurcation.  NG tube tip beyond GE junction.  RIGHT IJ catheter tip in SVC.      The lungs show unchanged RIGHT perihilar/basilar diffuse airspace disease and/or effusion. LEFT lung parenchyma clear.. No pneumothorax.    The  heart is mildly enlarged in transverse diameter. No hilar mass.       Visualized osseous structures are intact.    IMPRESSION:   No interval change..            CRITICAL CARE TIME SPENT: 34 mins  Time spent evaluating/treating patient with medical issues that pose a high risk for life threatening deterioration and/or end-organ damage, reviewing data/labs/imaging, discussing case with multidisciplinary team, discussing plan/goals of care with patient/family. Non-inclusive of procedure time.

## 2021-08-26 NOTE — PROGRESS NOTE ADULT - ASSESSMENT
46M with HTN, DM, asthma, here with vent dependent respiratory failure, unresponsiveness, concerns for anoxic injury,  GARRETT, NSTEMI, acute systolic CHF, s/p shock (off pressors), possible aspiration related event on zosyn,  urine toxicology + for benzodiazepines and cocaine.     #1 Ventilator dependence - poor overall prognosis for meaningful weaning given poor mental status. concerns for anoxic injury   #2 Anoxic brain injury - post possible overdose. CT head findings as above. MRI today   #3 Shock - possible aspiration event, resolved on antibiotics.   #4 Acute systolic CHF - not a candidate for intervention at this time.   #5 GARRETT - improving slowly.   #6 Encounter for palliative care - goals of care held 8/24, family meeting Friday once sister comes from [place], (other sister Beverly in [place] - will be on the phone). If patient decompensates, would warrant further discussion prior to family meeting on Friday.

## 2021-08-26 NOTE — PROGRESS NOTE ADULT - ASSESSMENT
Avoidance of nephrotoxins/nephrotoxin medication adjustment  Medication dosage adjustment for kidney function  Continuous IVF administration (guided by CVP and testing of fluid responsiveness for 6 hours )  Discontinuation of ACE/ARBs ,  Close monitoring of serum Creatinine and UO  Acid-base, electrolyte and albumin status management  Avoidance of hyperglycemia ,  Consider alternatives to radiocontrast administration  Optimizing hemodynamics:

## 2021-08-27 LAB
ANION GAP SERPL CALC-SCNC: 9 MMOL/L — SIGNIFICANT CHANGE UP (ref 5–17)
APPEARANCE UR: CLEAR — SIGNIFICANT CHANGE UP
BILIRUB UR-MCNC: NEGATIVE — SIGNIFICANT CHANGE UP
BUN SERPL-MCNC: 21.8 MG/DL — HIGH (ref 8–20)
CALCIUM SERPL-MCNC: 9 MG/DL — SIGNIFICANT CHANGE UP (ref 8.6–10.2)
CHLORIDE SERPL-SCNC: 108 MMOL/L — HIGH (ref 98–107)
CO2 SERPL-SCNC: 28 MMOL/L — SIGNIFICANT CHANGE UP (ref 22–29)
COLOR SPEC: YELLOW — SIGNIFICANT CHANGE UP
CREAT SERPL-MCNC: 1.07 MG/DL — SIGNIFICANT CHANGE UP (ref 0.5–1.3)
DIFF PNL FLD: ABNORMAL
GAS PNL BLDA: SIGNIFICANT CHANGE UP
GLUCOSE BLDC GLUCOMTR-MCNC: 102 MG/DL — HIGH (ref 70–99)
GLUCOSE BLDC GLUCOMTR-MCNC: 105 MG/DL — HIGH (ref 70–99)
GLUCOSE BLDC GLUCOMTR-MCNC: 114 MG/DL — HIGH (ref 70–99)
GLUCOSE BLDC GLUCOMTR-MCNC: 122 MG/DL — HIGH (ref 70–99)
GLUCOSE BLDC GLUCOMTR-MCNC: 70 MG/DL — SIGNIFICANT CHANGE UP (ref 70–99)
GLUCOSE BLDC GLUCOMTR-MCNC: >530 MG/DL — CRITICAL HIGH (ref 70–99)
GLUCOSE SERPL-MCNC: 132 MG/DL — HIGH (ref 70–99)
GLUCOSE UR QL: NEGATIVE MG/DL — SIGNIFICANT CHANGE UP
GRAM STN FLD: SIGNIFICANT CHANGE UP
HCT VFR BLD CALC: 38.3 % — LOW (ref 39–50)
HGB BLD-MCNC: 12.7 G/DL — LOW (ref 13–17)
KETONES UR-MCNC: NEGATIVE — SIGNIFICANT CHANGE UP
LEUKOCYTE ESTERASE UR-ACNC: ABNORMAL
MAGNESIUM SERPL-MCNC: 1.9 MG/DL — SIGNIFICANT CHANGE UP (ref 1.6–2.6)
MCHC RBC-ENTMCNC: 30.4 PG — SIGNIFICANT CHANGE UP (ref 27–34)
MCHC RBC-ENTMCNC: 33.2 GM/DL — SIGNIFICANT CHANGE UP (ref 32–36)
MCV RBC AUTO: 91.6 FL — SIGNIFICANT CHANGE UP (ref 80–100)
NITRITE UR-MCNC: NEGATIVE — SIGNIFICANT CHANGE UP
PH UR: 7 — SIGNIFICANT CHANGE UP (ref 5–8)
PHOSPHATE SERPL-MCNC: 2.2 MG/DL — LOW (ref 2.4–4.7)
PLATELET # BLD AUTO: 205 K/UL — SIGNIFICANT CHANGE UP (ref 150–400)
POTASSIUM SERPL-MCNC: 3.9 MMOL/L — SIGNIFICANT CHANGE UP (ref 3.5–5.3)
POTASSIUM SERPL-SCNC: 3.9 MMOL/L — SIGNIFICANT CHANGE UP (ref 3.5–5.3)
PROT UR-MCNC: 30 MG/DL
RBC # BLD: 4.18 M/UL — LOW (ref 4.2–5.8)
RBC # FLD: 12.7 % — SIGNIFICANT CHANGE UP (ref 10.3–14.5)
RBC CASTS # UR COMP ASSIST: ABNORMAL /HPF (ref 0–4)
SODIUM SERPL-SCNC: 145 MMOL/L — SIGNIFICANT CHANGE UP (ref 135–145)
SP GR SPEC: 1.01 — SIGNIFICANT CHANGE UP (ref 1.01–1.02)
SPECIMEN SOURCE: SIGNIFICANT CHANGE UP
URATE CRY FLD QL MICRO: ABNORMAL
UROBILINOGEN FLD QL: 4 MG/DL
WBC # BLD: 8.08 K/UL — SIGNIFICANT CHANGE UP (ref 3.8–10.5)
WBC # FLD AUTO: 8.08 K/UL — SIGNIFICANT CHANGE UP (ref 3.8–10.5)
WBC UR QL: SIGNIFICANT CHANGE UP
WNV RNA SPEC QL NAA+PROBE: SIGNIFICANT CHANGE UP
WNV RNA SPEC QL NAA+PROBE: SIGNIFICANT CHANGE UP

## 2021-08-27 PROCEDURE — 99497 ADVNCD CARE PLAN 30 MIN: CPT | Mod: 25

## 2021-08-27 PROCEDURE — 99233 SBSQ HOSP IP/OBS HIGH 50: CPT

## 2021-08-27 PROCEDURE — 99291 CRITICAL CARE FIRST HOUR: CPT

## 2021-08-27 PROCEDURE — 99498 ADVNCD CARE PLAN ADDL 30 MIN: CPT | Mod: 25

## 2021-08-27 RX ORDER — FENTANYL CITRATE 50 UG/ML
100 INJECTION INTRAVENOUS ONCE
Refills: 0 | Status: DISCONTINUED | OUTPATIENT
Start: 2021-08-27 | End: 2021-08-27

## 2021-08-27 RX ORDER — MEROPENEM 1 G/30ML
1000 INJECTION INTRAVENOUS EVERY 8 HOURS
Refills: 0 | Status: COMPLETED | OUTPATIENT
Start: 2021-08-27 | End: 2021-09-03

## 2021-08-27 RX ORDER — IBUPROFEN 200 MG
800 TABLET ORAL ONCE
Refills: 0 | Status: COMPLETED | OUTPATIENT
Start: 2021-08-27 | End: 2021-08-27

## 2021-08-27 RX ORDER — VANCOMYCIN HCL 1 G
1000 VIAL (EA) INTRAVENOUS
Refills: 0 | Status: DISCONTINUED | OUTPATIENT
Start: 2021-08-27 | End: 2021-08-29

## 2021-08-27 RX ADMIN — MIDODRINE HYDROCHLORIDE 5 MILLIGRAM(S): 2.5 TABLET ORAL at 21:21

## 2021-08-27 RX ADMIN — HEPARIN SODIUM 5000 UNIT(S): 5000 INJECTION INTRAVENOUS; SUBCUTANEOUS at 21:21

## 2021-08-27 RX ADMIN — Medication 650 MILLIGRAM(S): at 06:45

## 2021-08-27 RX ADMIN — FENTANYL CITRATE 100 MICROGRAM(S): 50 INJECTION INTRAVENOUS at 13:12

## 2021-08-27 RX ADMIN — CHLORHEXIDINE GLUCONATE 1 APPLICATION(S): 213 SOLUTION TOPICAL at 05:44

## 2021-08-27 RX ADMIN — HEPARIN SODIUM 5000 UNIT(S): 5000 INJECTION INTRAVENOUS; SUBCUTANEOUS at 13:02

## 2021-08-27 RX ADMIN — FENTANYL CITRATE 100 MICROGRAM(S): 50 INJECTION INTRAVENOUS at 18:52

## 2021-08-27 RX ADMIN — Medication 650 MILLIGRAM(S): at 23:27

## 2021-08-27 RX ADMIN — Medication 30 MILLIGRAM(S): at 05:44

## 2021-08-27 RX ADMIN — MIDAZOLAM HYDROCHLORIDE 1.86 MG/KG/HR: 1 INJECTION, SOLUTION INTRAMUSCULAR; INTRAVENOUS at 03:01

## 2021-08-27 RX ADMIN — Medication 650 MILLIGRAM(S): at 05:45

## 2021-08-27 RX ADMIN — MIDODRINE HYDROCHLORIDE 5 MILLIGRAM(S): 2.5 TABLET ORAL at 13:02

## 2021-08-27 RX ADMIN — HEPARIN SODIUM 5000 UNIT(S): 5000 INJECTION INTRAVENOUS; SUBCUTANEOUS at 05:44

## 2021-08-27 RX ADMIN — MEROPENEM 100 MILLIGRAM(S): 1 INJECTION INTRAVENOUS at 19:31

## 2021-08-27 RX ADMIN — FENTANYL CITRATE 27.9 MICROGRAM(S)/KG/HR: 50 INJECTION INTRAVENOUS at 03:23

## 2021-08-27 RX ADMIN — CHLORHEXIDINE GLUCONATE 15 MILLILITER(S): 213 SOLUTION TOPICAL at 17:31

## 2021-08-27 RX ADMIN — CHLORHEXIDINE GLUCONATE 15 MILLILITER(S): 213 SOLUTION TOPICAL at 05:44

## 2021-08-27 RX ADMIN — PIPERACILLIN AND TAZOBACTAM 25 GRAM(S): 4; .5 INJECTION, POWDER, LYOPHILIZED, FOR SOLUTION INTRAVENOUS at 13:02

## 2021-08-27 RX ADMIN — FENTANYL CITRATE 27.9 MICROGRAM(S)/KG/HR: 50 INJECTION INTRAVENOUS at 11:09

## 2021-08-27 RX ADMIN — Medication 81 MILLIGRAM(S): at 11:09

## 2021-08-27 RX ADMIN — MIDODRINE HYDROCHLORIDE 5 MILLIGRAM(S): 2.5 TABLET ORAL at 05:45

## 2021-08-27 RX ADMIN — Medication 800 MILLIGRAM(S): at 14:19

## 2021-08-27 RX ADMIN — Medication 800 MILLIGRAM(S): at 15:20

## 2021-08-27 RX ADMIN — FENTANYL CITRATE 100 MICROGRAM(S): 50 INJECTION INTRAVENOUS at 12:58

## 2021-08-27 RX ADMIN — Medication 250 MILLIGRAM(S): at 19:31

## 2021-08-27 RX ADMIN — MIDAZOLAM HYDROCHLORIDE 1.86 MG/KG/HR: 1 INJECTION, SOLUTION INTRAMUSCULAR; INTRAVENOUS at 18:45

## 2021-08-27 RX ADMIN — FENTANYL CITRATE 27.9 MICROGRAM(S)/KG/HR: 50 INJECTION INTRAVENOUS at 18:50

## 2021-08-27 RX ADMIN — FENTANYL CITRATE 100 MICROGRAM(S): 50 INJECTION INTRAVENOUS at 18:42

## 2021-08-27 RX ADMIN — MIDAZOLAM HYDROCHLORIDE 1.86 MG/KG/HR: 1 INJECTION, SOLUTION INTRAMUSCULAR; INTRAVENOUS at 11:09

## 2021-08-27 RX ADMIN — PIPERACILLIN AND TAZOBACTAM 25 GRAM(S): 4; .5 INJECTION, POWDER, LYOPHILIZED, FOR SOLUTION INTRAVENOUS at 05:45

## 2021-08-27 RX ADMIN — PANTOPRAZOLE SODIUM 40 MILLIGRAM(S): 20 TABLET, DELAYED RELEASE ORAL at 11:08

## 2021-08-27 NOTE — CONSULT NOTE ADULT - ASSESSMENT
The patient is a 46y Male who is followed by neurology because of anoxic/hypoxic brain injury    Hypoxic-ischemic encephalopathy  s/p likely drug overdose with benzodiazepine and cocaine  His MRI brain appearance shows evidence of significant hypoxic/anoxic injury and strokes  Unable to perform adequate exam given sedation with versed and fentanyl  However, given reported history, MRI findings and lack of improvement on exam since admission his prognosis for meaningful neurological recovery is dismal    discussed with Dr Harris    will follow with you    Toby Sanchez MD PhD   [id number]

## 2021-08-27 NOTE — PROGRESS NOTE ADULT - SUBJECTIVE AND OBJECTIVE BOX
Patient is a 46y old  Male who presents with a chief complaint of Acute hypoxemic respiratory failure, AMS (27 Aug 2021 12:15)      BRIEF HOSPITAL COURSE:   46M PMH HTN, DM, asthma who presented with AMS, possible BZD overdose, acute hypoxic respiratory failure with aspiration PNA, septic shock, GARRETT, rhabdomyolysis. Unknown amount of time had passed prior to family finding pt unresponsive. Found on MRI with anoxic brain injury. No seizures found on vEEG. Also with continued fevers possibly neurogenic.     PAST MEDICAL & SURGICAL HISTORY:  No pertinent past medical history    Asthma    HTN (hypertension)    DM (diabetes mellitus)    S/P elbow joint replacement  right elbow surgery.    S/P knee surgery  right thigh surgery.          Medications:  piperacillin/tazobactam IVPB.. 3.375 Gram(s) IV Intermittent every 8 hours    midodrine 5 milliGRAM(s) Oral every 8 hours    ALBUTerol    90 MICROgram(s) HFA Inhaler 2 Puff(s) Inhalation every 6 hours PRN    acetaminophen    Suspension .. 650 milliGRAM(s) Oral every 6 hours PRN  fentaNYL   Infusion. 3 MICROgram(s)/kG/Hr IV Continuous <Continuous>  midazolam Infusion 0.02 mG/kG/Hr IV Continuous <Continuous>      aspirin  chewable 81 milliGRAM(s) Oral daily  heparin   Injectable 5000 Unit(s) SubCutaneous every 8 hours    pantoprazole  Injectable 40 milliGRAM(s) IV Push daily      dextrose 40% Gel 15 Gram(s) Oral once  dextrose 50% Injectable 25 Gram(s) IV Push once  dextrose 50% Injectable 12.5 Gram(s) IV Push once  dextrose 50% Injectable 25 Gram(s) IV Push once  glucagon  Injectable 1 milliGRAM(s) IntraMuscular once  insulin lispro (ADMELOG) corrective regimen sliding scale   SubCutaneous every 6 hours  thyroid 30 milliGRAM(s) Oral daily    dextrose 5%. 1000 milliLiter(s) IV Continuous <Continuous>  dextrose 5%. 1000 milliLiter(s) IV Continuous <Continuous>      chlorhexidine 0.12% Liquid 15 milliLiter(s) Oral Mucosa every 12 hours  chlorhexidine 2% Cloths 1 Application(s) Topical <User Schedule>        Mode: AC/ CMV (Assist Control/ Continuous Mandatory Ventilation)  RR (machine): 16  TV (machine): 400  FiO2: 40  PEEP: 5  MAP: 9  PIP: 20      ICU Vital Signs Last 24 Hrs  T(C): 39.3 (27 Aug 2021 13:00), Max: 39.3 (27 Aug 2021 13:00)  T(F): 102.7 (27 Aug 2021 13:00), Max: 102.7 (27 Aug 2021 13:00)  HR: 120 (27 Aug 2021 13:00) (85 - 120)  BP: 109/54 (27 Aug 2021 13:00) (92/66 - 124/88)  BP(mean): 72 (27 Aug 2021 13:00) (72 - 99)  ABP: --  ABP(mean): --  RR: 21 (27 Aug 2021 13:00) (12 - 24)  SpO2: 91% (27 Aug 2021 13:00) (91% - 98%)      ABG - ( 27 Aug 2021 04:58 )  pH, Arterial: 7.460 pH, Blood: x     /  pCO2: 42    /  pO2: 77    / HCO3: 30    / Base Excess: 6.1   /  SaO2: 96.4                I&O's Detail    26 Aug 2021 07:01  -  27 Aug 2021 07:00  --------------------------------------------------------  IN:    Enteral Tube Flush: 130 mL    FentaNYL: 674.4 mL    IV PiggyBack: 100 mL    Jevity 1.5: 1150 mL    Midazolam: 226.5 mL  Total IN: 2280.9 mL    OUT:    Indwelling Catheter - Urethral (mL): 1480 mL  Total OUT: 1480 mL    Total NET: 800.9 mL      27 Aug 2021 07:01  -  27 Aug 2021 14:11  --------------------------------------------------------  IN:    Enteral Tube Flush: 60 mL    FentaNYL: 186 mL    IV PiggyBack: 50 mL    Jevity 1.5: 250 mL    Midazolam: 51 mL  Total IN: 597 mL    OUT:    Indwelling Catheter - Urethral (mL): 170 mL  Total OUT: 170 mL    Total NET: 427 mL            LABS:                        12.7   8.08  )-----------( 205      ( 27 Aug 2021 04:48 )             38.3     08-27    145  |  108<H>  |  21.8<H>  ----------------------------<  132<H>  3.9   |  28.0  |  1.07    Ca    9.0      27 Aug 2021 04:48  Phos  2.2     08-27  Mg     1.9     08-27            CAPILLARY BLOOD GLUCOSE      POCT Blood Glucose.: 105 mg/dL (27 Aug 2021 12:11)        CULTURES:  Culture Results:   No growth at 3 days. (08-23 @ 21:43)  Culture Results:   Testing in progress (08-23 @ 21:43)  Culture Results:   Normal Respiratory Le present (08-22 @ 12:18)  Culture Results:   No growth (08-22 @ 02:40)  Rapid RVP Result: NotDetec (08-21 @ 21:01)  Culture Results:   No growth at 5 days. (08-21 @ 20:04)  Culture Results:   No growth at 5 days. (08-21 @ 20:03)      Physical Examination:  GENERAL: Intubated, sedated  HEENT: Normocephalic  NECK: Supple  PULM: Coarse mechanical breath sounds B/L  CVS: +S1, S2  ABD: Soft, non-tender  EXT: No pedal edema  SKIN: Warm and perfused  NEURO: +Gag reflex, +pupils reactive to light    DEVICES:     RADIOLOGY:   < from: MR Head No Cont (08.26.21 @ 22:37) >  EXAM:  MR BRAIN                          PROCEDURE DATE:  08/26/2021          INTERPRETATION:  CLINICAL INFORMATION: altered mental status. found unresponsive and hypoxic, overdosed on cocaine and xanax, now poor exam. Admitting Dxs: A41.9 UNRESPONSIVE.    TECHNIQUE: Multiplanar, multisequence brain MRI was performed without intravenous contrast    COMPARISON: CT head 8/21/2021.    FINDINGS:    Numerous areas of restricted diffusion in the bilateral cerebral hemispheres, bilateral globus pallidi, left hippocampus and bilateral cerebellar hemispheres suggesting acute infarcts and hypoxic ischemic encephalopathy. A focus of susceptibility artifact in the right frontal lobe suggesting chronic microhemorrhage or calcification. No midline shift, hydrocephalus, or effacement of basal cisterns.    Flow voids of the major intracranial vessels at the skull base follow expected course and contour.    Paranasal sinus mucosal thickening. Soft tissue in the bilateral mastoids. Bilateral orbits are within normal limits.      IMPRESSION: Numerous areas of restricted diffusion in the bilateral cerebral hemispheres, bilateral globus pallidi, left hippocampus and bilateral cerebellar hemispheres suggesting acute infarcts and hypoxic ischemic encephalopathy.    These findings were discussed with RO Ruiz at 8/27/2021 8:43 AM by Dr. Amy Padilla with read back confirmation.    --- End of Report ---            AYM PADILLA MD; Attending Radiologist  This document has been electronically signed. Aug 27 2021  8:44AM    < end of copied text >    Antibiotics Course:  piperacillin/tazobactam IVPB..   25 mL/Hr (08-27-21 @ 13:02)   25 mL/Hr (08-27-21 @ 05:45)   25 mL/Hr (08-26-21 @ 22:56)   25 mL/Hr (08-26-21 @ 13:06)   25 mL/Hr (08-26-21 @ 05:17)   25 mL/Hr (08-25-21 @ 17:36)   25 mL/Hr (08-25-21 @ 05:15)   25 mL/Hr (08-24-21 @ 17:21)   25 mL/Hr (08-24-21 @ 05:14)   25 mL/Hr (08-23-21 @ 17:07)   25 mL/Hr (08-23-21 @ 05:39)   25 mL/Hr (08-22-21 @ 17:06)   25 mL/Hr (08-22-21 @ 05:19)    piperacillin/tazobactam IVPB...   200 mL/Hr (08-21-21 @ 20:44)    vancomycin  IVPB   250 mL/Hr (08-23-21 @ 23:47)    vancomycin  IVPB.   250 mL/Hr (08-21-21 @ 20:54)

## 2021-08-27 NOTE — PROGRESS NOTE ADULT - SUBJECTIVE AND OBJECTIVE BOX
[facility] DIVISION OF KIDNEY DISEASES AND HYPERTENSION -- FOLLOW UP NOTE  --------------------------------------------------------------------------------  Chief Complaint:  GARRETT    24 hour events/subjective:  Seen/examined  Renal function normalized;  Pt vented; hypoxic;      PAST HISTORY  --------------------------------------------------------------------------------  No significant changes to PMH, PSH, FHx, SHx, unless otherwise noted    ALLERGIES & MEDICATIONS  --------------------------------------------------------------------------------  Allergies    Allergy Status Unknown  No Known Allergies    Intolerances      Standing Inpatient Medications  aspirin  chewable 81 milliGRAM(s) Oral daily  chlorhexidine 0.12% Liquid 15 milliLiter(s) Oral Mucosa every 12 hours  chlorhexidine 2% Cloths 1 Application(s) Topical <User Schedule>  dextrose 40% Gel 15 Gram(s) Oral once  dextrose 5%. 1000 milliLiter(s) IV Continuous <Continuous>  dextrose 5%. 1000 milliLiter(s) IV Continuous <Continuous>  dextrose 50% Injectable 25 Gram(s) IV Push once  dextrose 50% Injectable 12.5 Gram(s) IV Push once  dextrose 50% Injectable 25 Gram(s) IV Push once  fentaNYL   Infusion. 3 MICROgram(s)/kG/Hr IV Continuous <Continuous>  glucagon  Injectable 1 milliGRAM(s) IntraMuscular once  heparin   Injectable 5000 Unit(s) SubCutaneous every 8 hours  insulin lispro (ADMELOG) corrective regimen sliding scale   SubCutaneous every 6 hours  midazolam Infusion 0.02 mG/kG/Hr IV Continuous <Continuous>  midodrine 5 milliGRAM(s) Oral every 8 hours  pantoprazole  Injectable 40 milliGRAM(s) IV Push daily  piperacillin/tazobactam IVPB.. 3.375 Gram(s) IV Intermittent every 8 hours  thyroid 30 milliGRAM(s) Oral daily    PRN Inpatient Medications  acetaminophen    Suspension .. 650 milliGRAM(s) Oral every 6 hours PRN  ALBUTerol    90 MICROgram(s) HFA Inhaler 2 Puff(s) Inhalation every 6 hours PRN      REVIEW OF SYSTEMS  --------------------------------------------------------------------------------  unable to obtain    VITALS/PHYSICAL EXAM  --------------------------------------------------------------------------------  T(C): 38.7 (08-27-21 @ 11:00), Max: 39 (08-27-21 @ 05:00)  HR: 91 (08-27-21 @ 11:47) (85 - 107)  BP: 95/66 (08-27-21 @ 11:00) (92/66 - 131/82)  RR: 20 (08-27-21 @ 11:00) (12 - 27)  SpO2: 94% (08-27-21 @ 11:47) (91% - 98%)  Wt(kg): --        08-26-21 @ 07:01  -  08-27-21 @ 07:00  --------------------------------------------------------  IN: 2280.9 mL / OUT: 1480 mL / NET: 800.9 mL    08-27-21 @ 07:01  -  08-27-21 @ 12:16  --------------------------------------------------------  IN: 597 mL / OUT: 170 mL / NET: 427 mL      Physical Exam:  HEENT: ET tube    Lungs: comfortable     CV: normal      GI: normal. NG tube     : kati    MSK: bedbound/wheelchair bound    Neuro: unresponsive     Skin: no rash    LABS/STUDIES  --------------------------------------------------------------------------------              12.7   8.08  >-----------<  205      [08-27-21 @ 04:48]              38.3     145  |  108  |  21.8  ----------------------------<  132      [08-27-21 @ 04:48]  3.9   |  28.0  |  1.07        Ca     9.0     [08-27-21 @ 04:48]      Mg     1.9     [08-27-21 @ 04:48]      Phos  2.2     [08-27-21 @ 04:48]            Creatinine Trend:  SCr 1.07 [08-27 @ 04:48]  SCr 1.09 [08-26 @ 03:52]  SCr 1.07 [08-25 @ 04:17]  SCr 1.24 [08-24 @ 03:51]  SCr 1.74 [08-23 @ 07:52]    Urinalysis - [08-22-21 @ 12:26]      Color Yellow / Appearance Slightly Turbid / SG 1.025 / pH 5.0      Gluc Negative / Ketone Trace  / Bili Negative / Urobili Negative       Blood Large / Protein 30 / Leuk Est Trace / Nitrite Negative      RBC 3-5 / WBC 3-5 / Hyaline 0-2 / Gran 0-2 / Sq Epi  / Non Sq Epi Few / Bacteria Few    Urine Creatinine 282      [08-22-21 @ 12:22]  Urine Protein 66.0      [08-22-21 @ 12:22]  Urine Sodium <30      [08-22-21 @ 12:22]  Urine Osmolality 773      [08-22-21 @ 12:26]    TSH 0.76      [08-21-21 @ 20:02]  Lipid: chol --, , HDL --, LDL --      [08-24-21 @ 22:50]

## 2021-08-27 NOTE — GOALS OF CARE CONVERSATION - ADVANCED CARE PLANNING - CONVERSATION DETAILS
Extensive discussion with patient's family:    Those in Attendance:  Two sisters: July and Beverly (Beverly via telephone)   Aunt Xiao  Ex Wife - Araceli  Daughter Eric    Dr. Harris  ICU PA Christina Ruiz    We addressed poor prognosis given diffuse anoxic injury, poor neurological bedside exams, respiratory failure, ventilator dependence, systolic CHF with EF < 20%. Family very emotional understandably, ex wife Araceli stating " we need 4-6 weeks" because this is what she read online about diagnosing persistent vegetative state. We explained that given our bedside exams, and now confirmatory imaging studies of MRI, that he will not have meaningful neurological recovery.     Sister July recounts that they went through this with their mother and she just does not want to be selfish and cause her brother and his daughter Eric more suffering by waiting longer knowing the prognosis is grim.     There seems to be some tension between family and sister Peyton and this made the conversation a little more complex, but we reiterated that Ronald should be the center and focus of our discussions and all family issues should be put aside in order to make the best decision for Ronald and his best interest.     I shared with the family that at this time we need to know who the point person will be in terms of decisions moving forward. I expressed to them that legally speaking surrogate decision makers would be the two sisters Peyton and Beverly. They will discuss and determine who will be our point person, I expressed it would be July and Beverly unless they defer to the other family members.     We did discuss tracheostomy and PEG tube, and rehab/nursing home from there.     Time spent in face to face discussion 60 minutes. Extensive discussion with patient's family:    Those in Attendance:  Two sisters: July and Beverly (Beverly via telephone)   Aunt Xiao  Ex Wife - Araceli  Daughter Eric    Dr. Harris  ICU PA Christina Ruiz    We addressed poor prognosis given diffuse anoxic injury, poor neurological bedside exams, respiratory failure, ventilator dependence, systolic CHF with EF < 20%. Family very emotional understandably, ex wife Araceli stating " we need 4-6 weeks" because this is what she read online about diagnosing persistent vegetative state. We explained that given our bedside exams, and now confirmatory imaging studies of MRI, that he will not have meaningful neurological recovery.     Sister July recounts that they went through this with their mother and she just does not want to be selfish and cause her brother and his daughter Eric more suffering by waiting longer knowing the prognosis is grim.     There seems to be some tension between family and sister Peyton and this made the conversation a little more complex, but we reiterated that Ronald should be the center and focus of our discussions and all family issues should be put aside in order to make the best decision for Ronald and his best interest.     I shared with the family that at this time we need to know who the point person will be in terms of decisions moving forward. I expressed to them that legally speaking surrogate decision makers would be the two sisters Peyton and Beverly. They will discuss and determine who will be our point person, I expressed it would be July and Beverly unless they defer to the other family members.     We did discuss tracheostomy and PEG tube, and rehab/nursing home from there if they did decide to want to wait longer and want to continue aggressive care.      Time spent in face to face discussion 60 minutes.

## 2021-08-27 NOTE — CONSULT NOTE ADULT - SUBJECTIVE AND OBJECTIVE BOX
[facility]                                     Neurology at [place]                                 Laura Lawrence, & Jalen                                  [address]                                        [place], [place], [address]                                             [phone]    CC: anoxic brain injury  HPI:  The patient is a 46y Male who presented with agonal respirations and emesis on 8/21/21 and was emergently intubated and admitted to MICU.  He had urine toxicology positive for benzodiazepines and cocaine.  Per chart he had been in [facility] ER for intoxication a few days prior to admission.  He was hypoxic for an unknown amount of time, however he had signs of anoxic brain injury on head CT on arrival. Neurology is asked to evaluate.    PAST MEDICAL & SURGICAL HISTORY:  No pertinent past medical history    Asthma    HTN (hypertension)    DM (diabetes mellitus)    S/P elbow joint replacement  right elbow surgery.    S/P knee surgery  right thigh surgery.    MEDICATIONS  (STANDING):  aspirin  chewable 81 milliGRAM(s) Oral daily  chlorhexidine 0.12% Liquid 15 milliLiter(s) Oral Mucosa every 12 hours  chlorhexidine 2% Cloths 1 Application(s) Topical <User Schedule>  dextrose 40% Gel 15 Gram(s) Oral once  dextrose 5%. 1000 milliLiter(s) (50 mL/Hr) IV Continuous <Continuous>  dextrose 5%. 1000 milliLiter(s) (100 mL/Hr) IV Continuous <Continuous>  dextrose 50% Injectable 25 Gram(s) IV Push once  dextrose 50% Injectable 12.5 Gram(s) IV Push once  dextrose 50% Injectable 25 Gram(s) IV Push once  fentaNYL   Infusion. 3 MICROgram(s)/kG/Hr (27.9 mL/Hr) IV Continuous <Continuous>  glucagon  Injectable 1 milliGRAM(s) IntraMuscular once  heparin   Injectable 5000 Unit(s) SubCutaneous every 8 hours  insulin lispro (ADMELOG) corrective regimen sliding scale   SubCutaneous every 6 hours  midazolam Infusion 0.02 mG/kG/Hr (1.86 mL/Hr) IV Continuous <Continuous>  midodrine 5 milliGRAM(s) Oral every 8 hours  pantoprazole  Injectable 40 milliGRAM(s) IV Push daily  piperacillin/tazobactam IVPB.. 3.375 Gram(s) IV Intermittent every 8 hours  thyroid 30 milliGRAM(s) Oral daily    MEDICATIONS  (PRN):  acetaminophen    Suspension .. 650 milliGRAM(s) Oral every 6 hours PRN Temp greater or equal to 38C (100.4F)  ALBUTerol    90 MICROgram(s) HFA Inhaler 2 Puff(s) Inhalation every 6 hours PRN Shortness of Breath      Allergies    Allergy Status Unknown  No Known Allergies    Intolerances    SOCIAL HISTORY:  unable to obtain    FAMILY HISTORY:  unable to obtain      ROS: 14 point ROS negative other than what is present in HPI or below  sedated    Vital Signs Last 24 Hrs  T(C): 39.3 (27 Aug 2021 13:00), Max: 39.3 (27 Aug 2021 13:00)  T(F): 102.7 (27 Aug 2021 13:00), Max: 102.7 (27 Aug 2021 13:00)  HR: 120 (27 Aug 2021 13:00) (85 - 120)  BP: 109/54 (27 Aug 2021 13:00) (92/66 - 124/88)  BP(mean): 72 (27 Aug 2021 13:00) (72 - 99)  RR: 21 (27 Aug 2021 13:00) (12 - 24)  SpO2: 91% (27 Aug 2021 13:00) (91% - 98%)    Detailed Neurologic Exam:    Mental status: The patient is sedated, intubated on ventilator. Unable to assess orientation or language.    Cranial nerves: Pupils 2mm NR There is no blink  to threat. Extraocular motion is diminished with dolls eyes maneuvers. No corneal reflex There is no ptosis. Facial sensation is not able to be assessed. Facial musculature is grossly symmetric.  (+) gag to ETT suction    Motor: There is normal bulk and tone.  There is no tremor.  no movement to nailbed pressure x 4 ext    Sensation: No grimace to pinch in 4 extremities    Reflexes: absent  throughout and plantar responses are silent.    Cerebellar: unable to assess  dysmetria on finger to nose testing.    Gait: unable to assess due to condition    LABS:                         12.7   8.08  )-----------( 205      ( 27 Aug 2021 04:48 )             38.3       08-27    145  |  108<H>  |  21.8<H>  ----------------------------<  132<H>  3.9   |  28.0  |  1.07    Ca    9.0      27 Aug 2021 04:48  Phos  2.2     08-27  Mg     1.9     08-27      RADIOLOGY & ADDITIONAL STUDIES (independently reviewed unless otherwise noted):  MRI brain- FLAIR and DWI hyperintensities in b/l BG/GP/ left hippocampus and scattered throughout both cerebral and cerebellar hemispheres, no blood or mass

## 2021-08-27 NOTE — PROVIDER CONTACT NOTE (EICU) - BACKGROUND
Pt unresponsive found at home
Pt dyssynchr with vent and desat'ing
43M with a history of HTN, DM, asthma, recently broke up a relationship and also recently admitted to [facility] for intoxication who was found unresponsive by family, unknown down time with agonal breathing, vomitus.  s/p intubation in ED.  Of note, pt asked his family to take care of his dog, concern of drug overdosed as suicide attempt given recent issues with relationship  WBC 4.16 with no shift  Na 18.3, Cr 3.78, K 5.2, lactate 3.1  UTox pos for BZD and cocaine  CXR showed ETT almost at [place], extensive right sided infiltrate

## 2021-08-27 NOTE — PROGRESS NOTE ADULT - ASSESSMENT
1) ATN  2) Respiratory Failure  3) Anoxia  4) Shock    Renal function normalized  Signing off  Please recall if needed    dw MICU

## 2021-08-27 NOTE — PROGRESS NOTE ADULT - ASSESSMENT
46M with HTN, DM, asthma, here with vent dependent respiratory failure, unresponsiveness, concerns for anoxic injury,  GARRETT, NSTEMI, acute systolic CHF, s/p shock (off pressors), possible aspiration related event on zosyn,  urine toxicology + for benzodiazepines and cocaine.     #1 Ventilator dependence - poor overall prognosis for meaningful weaning given poor mental status. MRI revealing anoxic injury.   #2 Anoxic brain injury - post possible overdose. MRI showing anoxic injury.   #3 Shock - possible aspiration event, resolved on antibiotics.   #4 Acute systolic CHF - not a candidate for intervention at this time.   #5 GARRETT - improved.  #6 Encounter for palliative care - family meeting today at 12pm.

## 2021-08-27 NOTE — PROGRESS NOTE ADULT - SUBJECTIVE AND OBJECTIVE BOX
OVERNIGHT EVENTS: s/p MRI with anoxic injury.     Present Symptoms:        Dyspnea: vented   Nausea/Vomiting: No  Anxiety:  unable   Depression: unable   Fatigue: unable   Loss of appetite: unable  Constipation: no     Pain: none currently             Character-            Duration-            Effect-            Factors-            Frequency-            Location-            Severity-    Pain AD Score:  http://geriatrictoolkit.[facility]/cog/[email or web address] (press ctrl + left click to view)    Review of Systems: Reviewed                 Unable to obtain due to poor mentation   All others negative    MEDICATIONS  (STANDING):  aspirin  chewable 81 milliGRAM(s) Oral daily  chlorhexidine 0.12% Liquid 15 milliLiter(s) Oral Mucosa every 12 hours  chlorhexidine 2% Cloths 1 Application(s) Topical <User Schedule>  dextrose 40% Gel 15 Gram(s) Oral once  dextrose 5%. 1000 milliLiter(s) (50 mL/Hr) IV Continuous <Continuous>  dextrose 5%. 1000 milliLiter(s) (100 mL/Hr) IV Continuous <Continuous>  dextrose 50% Injectable 25 Gram(s) IV Push once  dextrose 50% Injectable 12.5 Gram(s) IV Push once  dextrose 50% Injectable 25 Gram(s) IV Push once  fentaNYL   Infusion. 3 MICROgram(s)/kG/Hr (27.9 mL/Hr) IV Continuous <Continuous>  glucagon  Injectable 1 milliGRAM(s) IntraMuscular once  heparin   Injectable 5000 Unit(s) SubCutaneous every 8 hours  insulin lispro (ADMELOG) corrective regimen sliding scale   SubCutaneous every 6 hours  midazolam Infusion 0.02 mG/kG/Hr (1.86 mL/Hr) IV Continuous <Continuous>  midodrine 5 milliGRAM(s) Oral every 8 hours  pantoprazole  Injectable 40 milliGRAM(s) IV Push daily  piperacillin/tazobactam IVPB.. 3.375 Gram(s) IV Intermittent every 8 hours  thyroid 30 milliGRAM(s) Oral daily    MEDICATIONS  (PRN):  acetaminophen    Suspension .. 650 milliGRAM(s) Oral every 6 hours PRN Temp greater or equal to 38C (100.4F)  ALBUTerol    90 MICROgram(s) HFA Inhaler 2 Puff(s) Inhalation every 6 hours PRN Shortness of Breath    PHYSICAL EXAM:    Vital Signs Last 24 Hrs  T(C): 39 (27 Aug 2021 10:00), Max: 39 (27 Aug 2021 05:00)  T(F): 102.2 (27 Aug 2021 10:00), Max: 102.2 (27 Aug 2021 05:00)  HR: 93 (27 Aug 2021 10:00) (85 - 113)  BP: 94/62 (27 Aug 2021 10:00) (92/66 - 136/89)  BP(mean): 73 (27 Aug 2021 10:00) (72 - 104)  RR: 24 (27 Aug 2021 10:00) (12 - 27)  SpO2: 92% (27 Aug 2021 10:00) (91% - 100%)    HEENT: ET tube    Lungs: comfortable     CV: normal      GI: normal. NG tube     : parikh    MSK: bedbound/wheelchair bound    Neuro: unresponsive     Skin: no rash    LABS:                      12.7   8.08  )-----------( 205      ( 27 Aug 2021 04:48 )             38.3     08-27    145  |  108<H>  |  21.8<H>  ----------------------------<  132<H>  3.9   |  28.0  |  1.07    Ca    9.0      27 Aug 2021 04:48  Phos  2.2     08-27  Mg     1.9     08-27    I&O's Summary    26 Aug 2021 07:01  -  27 Aug 2021 07:00  --------------------------------------------------------  IN: 2280.9 mL / OUT: 1480 mL / NET: 800.9 mL    RADIOLOGY & ADDITIONAL STUDIES:    < from: MR Head No Cont (08.26.21 @ 22:37) >  INTERPRETATION:  CLINICAL INFORMATION: altered mental status. found unresponsive and hypoxic, overdosed on cocaine and xanax, now poor exam. Admitting Dxs: A41.9 UNRESPONSIVE.    TECHNIQUE: Multiplanar, multisequence brain MRI was performed without intravenous contrast    COMPARISON: CT head 8/21/2021.    FINDINGS:    Numerous areas of restricted diffusion in the bilateral cerebral hemispheres, bilateral globus pallidi, left hippocampus and bilateral cerebellar hemispheres suggesting acute infarcts and hypoxic ischemic encephalopathy. A focus of susceptibility artifact in the right frontal lobe suggesting chronic microhemorrhage or calcification. No midline shift, hydrocephalus, or effacement of basal cisterns.    Flow voids of the major intracranial vessels at the skull base follow expected course and contour.    Paranasal sinus mucosal thickening. Soft tissue in the bilateral mastoids. Bilateral orbits are within normal limits.    IMPRESSION: Numerous areas of restricted diffusion in the bilateral cerebral hemispheres, bilateral globus pallidi, left hippocampus and bilateral cerebellar hemispheres suggesting acute infarcts and hypoxic ischemic encephalopathy.    These findings were discussed with RO Ruiz at 8/27/2021 8:43 AM by Dr. Dre Romero with read back confirmation.    --- End of Report ---    < end of copied text >    ADVANCE DIRECTIVES: full code

## 2021-08-27 NOTE — PROVIDER CONTACT NOTE (EICU) - RECOMMENDATIONS
Plan  1. 3L of LR boluses, followed by 150cc/hr  2. PLease send ABG for lactate and pH  3. Please adjust ETT, pulled up 1.5cm, spoke with ICU PA  4. Monitor K  5. EKG  6. Please contact toxicology/poison control re possible drug overdosed  7. Repeat CXR  8. He is not in ARDS, infiltrate only on right side, so no need to follow ARDS protocol at this moment.
May give Fentanyl 100mcg IVP
Refer to Casey

## 2021-08-27 NOTE — PROGRESS NOTE ADULT - ASSESSMENT
46M PMH HTN, DM, asthma who presented with AMS, possible BZD overdose, acute hypoxic respiratory failure with aspiration PNA, septic shock, GARRETT, rhabdomyolysis. Unknown amount of time had passed prior to family finding pt unresponsive. Found on MRI with anoxic brain injury    Impression/Plan:    Unresponsiveness/AMS  Possible BZD/cocaine overdose  Anoxic brain injury  - Neurology input appreciated re: poor prognosis  - C/w full ventilator support  - Fentanyl/Versed for sedation    Fevers  - Possibly neurogenic  - Switching Zosyn (8/21-) to Meropenem  - Restart Vancomycin  - Taking out CVC  - Repeat BCx x2, sputum cultures, UA  - PRN APAP, cooling blanket  - Trend leukocytosis    GARRETT  - Resolved    Rhabdomyolysis    Shock  - Off pressors  - C/w ABx as above    GOC/Dispo  - Pt remains full code  - S/p family meeting held today at noon  - Continue care in ICU  - Appreciate palliative input    Fili Woods M.D.  Pulmonary & Critical Care Medicine  [facility]  Pulmonary and Sleep Medicine at [place]  [address]  [place] [address]  T: [phone]  F: [phone]  - Improved, will repeat CPK in AM

## 2021-08-28 DIAGNOSIS — J96.01 ACUTE RESPIRATORY FAILURE WITH HYPOXIA: ICD-10-CM

## 2021-08-28 LAB
ANION GAP SERPL CALC-SCNC: 8 MMOL/L — SIGNIFICANT CHANGE UP (ref 5–17)
BUN SERPL-MCNC: 25 MG/DL — HIGH (ref 8–20)
CALCIUM SERPL-MCNC: 8.8 MG/DL — SIGNIFICANT CHANGE UP (ref 8.6–10.2)
CHLORIDE SERPL-SCNC: 106 MMOL/L — SIGNIFICANT CHANGE UP (ref 98–107)
CO2 SERPL-SCNC: 27 MMOL/L — SIGNIFICANT CHANGE UP (ref 22–29)
CREAT SERPL-MCNC: 0.89 MG/DL — SIGNIFICANT CHANGE UP (ref 0.5–1.3)
GLUCOSE BLDC GLUCOMTR-MCNC: 103 MG/DL — HIGH (ref 70–99)
GLUCOSE BLDC GLUCOMTR-MCNC: 108 MG/DL — HIGH (ref 70–99)
GLUCOSE BLDC GLUCOMTR-MCNC: 87 MG/DL — SIGNIFICANT CHANGE UP (ref 70–99)
GLUCOSE BLDC GLUCOMTR-MCNC: 97 MG/DL — SIGNIFICANT CHANGE UP (ref 70–99)
GLUCOSE SERPL-MCNC: 133 MG/DL — HIGH (ref 70–99)
HCT VFR BLD CALC: 38.7 % — LOW (ref 39–50)
HGB BLD-MCNC: 12.6 G/DL — LOW (ref 13–17)
MAGNESIUM SERPL-MCNC: 2 MG/DL — SIGNIFICANT CHANGE UP (ref 1.6–2.6)
MCHC RBC-ENTMCNC: 30.5 PG — SIGNIFICANT CHANGE UP (ref 27–34)
MCHC RBC-ENTMCNC: 32.6 GM/DL — SIGNIFICANT CHANGE UP (ref 32–36)
MCV RBC AUTO: 93.7 FL — SIGNIFICANT CHANGE UP (ref 80–100)
PHOSPHATE SERPL-MCNC: 2.7 MG/DL — SIGNIFICANT CHANGE UP (ref 2.4–4.7)
PLATELET # BLD AUTO: 230 K/UL — SIGNIFICANT CHANGE UP (ref 150–400)
POTASSIUM SERPL-MCNC: 4 MMOL/L — SIGNIFICANT CHANGE UP (ref 3.5–5.3)
POTASSIUM SERPL-SCNC: 4 MMOL/L — SIGNIFICANT CHANGE UP (ref 3.5–5.3)
RBC # BLD: 4.13 M/UL — LOW (ref 4.2–5.8)
RBC # FLD: 13 % — SIGNIFICANT CHANGE UP (ref 10.3–14.5)
SARS-COV-2 RNA SPEC QL NAA+PROBE: SIGNIFICANT CHANGE UP
SODIUM SERPL-SCNC: 141 MMOL/L — SIGNIFICANT CHANGE UP (ref 135–145)
WBC # BLD: 9.14 K/UL — SIGNIFICANT CHANGE UP (ref 3.8–10.5)
WBC # FLD AUTO: 9.14 K/UL — SIGNIFICANT CHANGE UP (ref 3.8–10.5)

## 2021-08-28 PROCEDURE — 99221 1ST HOSP IP/OBS SF/LOW 40: CPT

## 2021-08-28 PROCEDURE — 99232 SBSQ HOSP IP/OBS MODERATE 35: CPT

## 2021-08-28 PROCEDURE — 99291 CRITICAL CARE FIRST HOUR: CPT

## 2021-08-28 RX ORDER — FENTANYL CITRATE 50 UG/ML
100 INJECTION INTRAVENOUS ONCE
Refills: 0 | Status: DISCONTINUED | OUTPATIENT
Start: 2021-08-28 | End: 2021-08-28

## 2021-08-28 RX ORDER — SODIUM CHLORIDE 9 MG/ML
1000 INJECTION, SOLUTION INTRAVENOUS
Refills: 0 | Status: DISCONTINUED | OUTPATIENT
Start: 2021-08-28 | End: 2021-08-31

## 2021-08-28 RX ORDER — FENTANYL CITRATE 50 UG/ML
50 INJECTION INTRAVENOUS EVERY 6 HOURS
Refills: 0 | Status: DISCONTINUED | OUTPATIENT
Start: 2021-08-28 | End: 2021-08-31

## 2021-08-28 RX ORDER — IBUPROFEN 200 MG
400 TABLET ORAL EVERY 8 HOURS
Refills: 0 | Status: DISCONTINUED | OUTPATIENT
Start: 2021-08-28 | End: 2021-09-03

## 2021-08-28 RX ADMIN — Medication 650 MILLIGRAM(S): at 21:13

## 2021-08-28 RX ADMIN — SODIUM CHLORIDE 75 MILLILITER(S): 9 INJECTION, SOLUTION INTRAVENOUS at 15:46

## 2021-08-28 RX ADMIN — HEPARIN SODIUM 5000 UNIT(S): 5000 INJECTION INTRAVENOUS; SUBCUTANEOUS at 15:45

## 2021-08-28 RX ADMIN — FENTANYL CITRATE 100 MICROGRAM(S): 50 INJECTION INTRAVENOUS at 13:10

## 2021-08-28 RX ADMIN — Medication 650 MILLIGRAM(S): at 21:43

## 2021-08-28 RX ADMIN — Medication 81 MILLIGRAM(S): at 11:19

## 2021-08-28 RX ADMIN — Medication 650 MILLIGRAM(S): at 05:07

## 2021-08-28 RX ADMIN — FENTANYL CITRATE 100 MICROGRAM(S): 50 INJECTION INTRAVENOUS at 10:30

## 2021-08-28 RX ADMIN — HEPARIN SODIUM 5000 UNIT(S): 5000 INJECTION INTRAVENOUS; SUBCUTANEOUS at 21:43

## 2021-08-28 RX ADMIN — FENTANYL CITRATE 27.9 MICROGRAM(S)/KG/HR: 50 INJECTION INTRAVENOUS at 09:19

## 2021-08-28 RX ADMIN — Medication 650 MILLIGRAM(S): at 05:30

## 2021-08-28 RX ADMIN — Medication 400 MILLIGRAM(S): at 15:45

## 2021-08-28 RX ADMIN — Medication 250 MILLIGRAM(S): at 20:10

## 2021-08-28 RX ADMIN — MEROPENEM 100 MILLIGRAM(S): 1 INJECTION INTRAVENOUS at 11:17

## 2021-08-28 RX ADMIN — Medication 400 MILLIGRAM(S): at 16:40

## 2021-08-28 RX ADMIN — Medication 250 MILLIGRAM(S): at 08:10

## 2021-08-28 RX ADMIN — Medication 650 MILLIGRAM(S): at 12:10

## 2021-08-28 RX ADMIN — FENTANYL CITRATE 27.9 MICROGRAM(S)/KG/HR: 50 INJECTION INTRAVENOUS at 01:11

## 2021-08-28 RX ADMIN — Medication 650 MILLIGRAM(S): at 11:20

## 2021-08-28 RX ADMIN — FENTANYL CITRATE 27.9 MICROGRAM(S)/KG/HR: 50 INJECTION INTRAVENOUS at 17:21

## 2021-08-28 RX ADMIN — FENTANYL CITRATE 100 MICROGRAM(S): 50 INJECTION INTRAVENOUS at 12:57

## 2021-08-28 RX ADMIN — FENTANYL CITRATE 50 MICROGRAM(S): 50 INJECTION INTRAVENOUS at 21:40

## 2021-08-28 RX ADMIN — PANTOPRAZOLE SODIUM 40 MILLIGRAM(S): 20 TABLET, DELAYED RELEASE ORAL at 11:18

## 2021-08-28 RX ADMIN — MIDAZOLAM HYDROCHLORIDE 1.86 MG/KG/HR: 1 INJECTION, SOLUTION INTRAMUSCULAR; INTRAVENOUS at 02:47

## 2021-08-28 RX ADMIN — CHLORHEXIDINE GLUCONATE 1 APPLICATION(S): 213 SOLUTION TOPICAL at 05:03

## 2021-08-28 RX ADMIN — HEPARIN SODIUM 5000 UNIT(S): 5000 INJECTION INTRAVENOUS; SUBCUTANEOUS at 05:04

## 2021-08-28 RX ADMIN — MIDAZOLAM HYDROCHLORIDE 1.86 MG/KG/HR: 1 INJECTION, SOLUTION INTRAMUSCULAR; INTRAVENOUS at 18:54

## 2021-08-28 RX ADMIN — FENTANYL CITRATE 50 MICROGRAM(S): 50 INJECTION INTRAVENOUS at 22:10

## 2021-08-28 RX ADMIN — FENTANYL CITRATE 100 MICROGRAM(S): 50 INJECTION INTRAVENOUS at 10:22

## 2021-08-28 RX ADMIN — CHLORHEXIDINE GLUCONATE 15 MILLILITER(S): 213 SOLUTION TOPICAL at 05:03

## 2021-08-28 RX ADMIN — MEROPENEM 100 MILLIGRAM(S): 1 INJECTION INTRAVENOUS at 17:18

## 2021-08-28 RX ADMIN — CHLORHEXIDINE GLUCONATE 15 MILLILITER(S): 213 SOLUTION TOPICAL at 17:19

## 2021-08-28 RX ADMIN — MEROPENEM 100 MILLIGRAM(S): 1 INJECTION INTRAVENOUS at 02:24

## 2021-08-28 RX ADMIN — Medication 30 MILLIGRAM(S): at 06:19

## 2021-08-28 NOTE — PROCEDURE NOTE - NSPROCDETAILS_GEN_ALL_CORE
guidewire recovered/lumen(s) aspirated and flushed/sterile dressing applied/sterile technique, catheter placed/ultrasound guidance with use of sterile gel and probe cove
location identified, draped/prepped, sterile technique used/sterile dressing applied/sterile technique, catheter placed/supine position/ultrasound guidance

## 2021-08-28 NOTE — CONSULT NOTE ADULT - SUBJECTIVE AND OBJECTIVE BOX
Surgeon: Marcus    Consult requesting by: KENDALL Woods    HISTORY OF PRESENT ILLNESS:  46M, pmhx HTN, DM, asthma, presents to the ED  via EMS after sister found him unresponsive in bed with vomit, intubated in ED for acute hypoxic respiratory failure, CXR with dense R consolidation, started on antibiotics, tox screen positive for cocaine and benzos, GARRETT, rhabdomyolysis, eventfully underwent MRI which demonstrated evidence of anoxic brain injury, EEG neg for seizures, now with persistent fevers despite antibiotic therapy, possible neurogenic. Thoracic surgery consulted for trach and PEG. Pt intubated and sedated, unable to participate in exam or answer questions.    PAST MEDICAL & SURGICAL HISTORY:  Asthma  HTN (hypertension)  DM (diabetes mellitus)  S/P elbow joint replacement  right elbow surgery.  S/P knee surgery  right thigh surgery.    MEDICATIONS  (STANDING):  aspirin  chewable 81 milliGRAM(s) Oral daily  chlorhexidine 0.12% Liquid 15 milliLiter(s) Oral Mucosa every 12 hours  chlorhexidine 2% Cloths 1 Application(s) Topical <User Schedule>  dextrose 40% Gel 15 Gram(s) Oral once  dextrose 5% + sodium chloride 0.45%. 1000 milliLiter(s) (75 mL/Hr) IV Continuous <Continuous>  dextrose 5%. 1000 milliLiter(s) (100 mL/Hr) IV Continuous <Continuous>  dextrose 5%. 1000 milliLiter(s) (50 mL/Hr) IV Continuous <Continuous>  dextrose 50% Injectable 25 Gram(s) IV Push once  dextrose 50% Injectable 12.5 Gram(s) IV Push once  dextrose 50% Injectable 25 Gram(s) IV Push once  fentaNYL   Infusion. 3 MICROgram(s)/kG/Hr (27.9 mL/Hr) IV Continuous <Continuous>  glucagon  Injectable 1 milliGRAM(s) IntraMuscular once  heparin   Injectable 5000 Unit(s) SubCutaneous every 8 hours  insulin lispro (ADMELOG) corrective regimen sliding scale   SubCutaneous every 6 hours  meropenem  IVPB 1000 milliGRAM(s) IV Intermittent every 8 hours  midazolam Infusion 0.02 mG/kG/Hr (1.86 mL/Hr) IV Continuous <Continuous>  pantoprazole  Injectable 40 milliGRAM(s) IV Push daily  thyroid 30 milliGRAM(s) Oral daily  vancomycin  IVPB 1000 milliGRAM(s) IV Intermittent <User Schedule>    MEDICATIONS  (PRN):  acetaminophen    Suspension .. 650 milliGRAM(s) Oral every 6 hours PRN Temp greater or equal to 38C (100.4F)  ALBUTerol    90 MICROgram(s) HFA Inhaler 2 Puff(s) Inhalation every 6 hours PRN Shortness of Breath  fentaNYL    Injectable 50 MICROGram(s) IV Push every 6 hours PRN Moderate Pain (4 - 6)  ibuprofen  Tablet. 400 milliGRAM(s) Oral every 8 hours PRN Temp greater or equal to 38C (100.4F)    Allergy Status:No Known Allergies    SOCIAL HISTORY:  limited obtained from chart review  +ETOH, cocaine, benzo abuse  owns [employer]    FAMILY HISTORY:  unable to obtain due to patient's mental status     Review of Systems:    PHYSICAL EXAM  Vital Signs Last 24 Hrs  T(C): 38.5 (28 Aug 2021 15:37), Max: 39.3 (28 Aug 2021 06:00)  T(F): 101.3 (28 Aug 2021 15:37), Max: 102.7 (28 Aug 2021 06:00)  HR: 108 (28 Aug 2021 16:00) (71 - 199)  BP: 121/85 (28 Aug 2021 16:00) (89/64 - 136/89)  BP(mean): 99 (28 Aug 2021 15:00) (73 - 108)  RR: 16 (28 Aug 2021 16:00) (12 - 328)  SpO2: 95% (28 Aug 2021 16:00) (82% - 100%)    Gen:  Neuro:  Neck:  CV:  Pulm:  Abd:  Ext:                                                          LABS:                        12.6   9.14  )-----------( 230      ( 28 Aug 2021 03:53 )             38.7         141  |  106  |  25.0<H>  ----------------------------<  133<H>  4.0   |  27.0  |  0.89    Ca    8.8      28 Aug 2021 03:53  Phos  2.7       Mg     2.0         Urinalysis Basic - ( 27 Aug 2021 17:18 )    Color: Yellow / Appearance: Clear / S.010 / pH: x  Gluc: x / Ketone: Negative  / Bili: Negative / Urobili: 4 mg/dL   Blood: x / Protein: 30 mg/dL / Nitrite: Negative   Leuk Esterase: Trace / RBC: 11-25 /HPF / WBC 0-2   Sq Epi: x / Non Sq Epi: x / Bacteria: x                         Surgeon: Marcus    Consult requesting by: KENDALL Woods    HISTORY OF PRESENT ILLNESS:  46M, pmhx HTN, DM, asthma, presents to the ED  via EMS after sister found him unresponsive in bed with vomit, intubated in ED for acute hypoxic respiratory failure, CXR with dense R consolidation, started on antibiotics, tox screen positive for cocaine and benzos, GARRETT, rhabdomyolysis, eventfully underwent MRI which demonstrated evidence of anoxic brain injury, EEG neg for seizures, now with persistent fevers despite antibiotic therapy, possible neurogenic. Thoracic surgery consulted for trach and PEG. Pt intubated and sedated, unable to participate in exam or answer questions.    PAST MEDICAL & SURGICAL HISTORY:  Asthma  HTN (hypertension)  DM (diabetes mellitus)  S/P elbow joint replacement  right elbow surgery.  S/P knee surgery  right thigh surgery.    MEDICATIONS  (STANDING):  aspirin  chewable 81 milliGRAM(s) Oral daily  chlorhexidine 0.12% Liquid 15 milliLiter(s) Oral Mucosa every 12 hours  chlorhexidine 2% Cloths 1 Application(s) Topical <User Schedule>  dextrose 40% Gel 15 Gram(s) Oral once  dextrose 5% + sodium chloride 0.45%. 1000 milliLiter(s) (75 mL/Hr) IV Continuous <Continuous>  dextrose 5%. 1000 milliLiter(s) (100 mL/Hr) IV Continuous <Continuous>  dextrose 5%. 1000 milliLiter(s) (50 mL/Hr) IV Continuous <Continuous>  dextrose 50% Injectable 25 Gram(s) IV Push once  dextrose 50% Injectable 12.5 Gram(s) IV Push once  dextrose 50% Injectable 25 Gram(s) IV Push once  fentaNYL   Infusion. 3 MICROgram(s)/kG/Hr (27.9 mL/Hr) IV Continuous <Continuous>  glucagon  Injectable 1 milliGRAM(s) IntraMuscular once  heparin   Injectable 5000 Unit(s) SubCutaneous every 8 hours  insulin lispro (ADMELOG) corrective regimen sliding scale   SubCutaneous every 6 hours  meropenem  IVPB 1000 milliGRAM(s) IV Intermittent every 8 hours  midazolam Infusion 0.02 mG/kG/Hr (1.86 mL/Hr) IV Continuous <Continuous>  pantoprazole  Injectable 40 milliGRAM(s) IV Push daily  thyroid 30 milliGRAM(s) Oral daily  vancomycin  IVPB 1000 milliGRAM(s) IV Intermittent <User Schedule>    MEDICATIONS  (PRN):  acetaminophen    Suspension .. 650 milliGRAM(s) Oral every 6 hours PRN Temp greater or equal to 38C (100.4F)  ALBUTerol    90 MICROgram(s) HFA Inhaler 2 Puff(s) Inhalation every 6 hours PRN Shortness of Breath  fentaNYL    Injectable 50 MICROGram(s) IV Push every 6 hours PRN Moderate Pain (4 - 6)  ibuprofen  Tablet. 400 milliGRAM(s) Oral every 8 hours PRN Temp greater or equal to 38C (100.4F)    Allergy Status:No Known Allergies    SOCIAL HISTORY:  limited obtained from chart review  +ETOH, cocaine, benzo abuse  owns [employer]    FAMILY HISTORY:  unable to obtain due to patient's mental status     Review of Systems:  unable to obtain due to patient's mental status     PHYSICAL EXAM  Vital Signs Last 24 Hrs  T(C): 38.5 (28 Aug 2021 15:37), Max: 39.3 (28 Aug 2021 06:00)  T(F): 101.3 (28 Aug 2021 15:37), Max: 102.7 (28 Aug 2021 06:00)  HR: 108 (28 Aug 2021 16:00) (71 - 199)  BP: 121/85 (28 Aug 2021 16:00) (89/64 - 136/89)  BP(mean): 99 (28 Aug 2021 15:00) (73 - 108)  RR: 16 (28 Aug 2021 16:00) (12 - 328)  SpO2: 95% (28 Aug 2021 16:00) (82% - 100%)    Gen: NAD  Neuro: unable to obtain due to patient's mental status  Neck: no JVD  CV: S1S2 RRR   Pulm: clear anteriorly  Abd: +BS soft NT ND  Ext: no edema, cyanosis, WWP                                                          LABS:                        12.6   9.14  )-----------( 230      ( 28 Aug 2021 03:53 )             38.7     08-28    141  |  106  |  25.0<H>  ----------------------------<  133<H>  4.0   |  27.0  |  0.89    Ca    8.8      28 Aug 2021 03:53  Phos  2.7     08-  Mg     2.0     08-28    Urinalysis Basic - ( 27 Aug 2021 17:18 )    Color: Yellow / Appearance: Clear / S.010 / pH: x  Gluc: x / Ketone: Negative  / Bili: Negative / Urobili: 4 mg/dL   Blood: x / Protein: 30 mg/dL / Nitrite: Negative   Leuk Esterase: Trace / RBC: 11-25 /HPF / WBC 0-2   Sq Epi: x / Non Sq Epi: x / Bacteria: x

## 2021-08-28 NOTE — PROVIDER CONTACT NOTE (EICU) - SITUATION
elerted by bedside team.  Patient desaturating and asynchronous with ventilator.  Request for lorazepam 2mg IVP x 2 requested.  Dr. Lopez bedside.  Order placed
43 y M pt intubated after OD. Currently agitated with vent dyssynchrony already on Fentanyl infusion  Additional bolus 100 mcg IV Fentanyl ordered to improve vent synchrony
GCS 3

## 2021-08-28 NOTE — PROGRESS NOTE ADULT - SUBJECTIVE AND OBJECTIVE BOX
Patient is a 46y old  Male who presents with a chief complaint of Acute hypoxemic respiratory failure, AMS (27 Aug 2021 14:11)      BRIEF HOSPITAL COURSE:   46M PMH HTN, DM, asthma who presented with AMS, possible BZD overdose, acute hypoxic respiratory failure with aspiration PNA, septic shock, GARRETT, rhabdomyolysis. Unknown amount of time had passed prior to family finding pt unresponsive. Found on MRI with anoxic brain injury. No seizures found on vEEG. Also with continued fevers possibly neurogenic.     PAST MEDICAL & SURGICAL HISTORY:  No pertinent past medical history    Asthma    HTN (hypertension)    DM (diabetes mellitus)    S/P elbow joint replacement  right elbow surgery.    S/P knee surgery  right thigh surgery.          Medications:  meropenem  IVPB 1000 milliGRAM(s) IV Intermittent every 8 hours  vancomycin  IVPB 1000 milliGRAM(s) IV Intermittent <User Schedule>      ALBUTerol    90 MICROgram(s) HFA Inhaler 2 Puff(s) Inhalation every 6 hours PRN    acetaminophen    Suspension .. 650 milliGRAM(s) Oral every 6 hours PRN  fentaNYL    Injectable 100 MICROGram(s) IV Push once  fentaNYL   Infusion. 3 MICROgram(s)/kG/Hr IV Continuous <Continuous>  midazolam Infusion 0.02 mG/kG/Hr IV Continuous <Continuous>      aspirin  chewable 81 milliGRAM(s) Oral daily  heparin   Injectable 5000 Unit(s) SubCutaneous every 8 hours    pantoprazole  Injectable 40 milliGRAM(s) IV Push daily      dextrose 40% Gel 15 Gram(s) Oral once  dextrose 50% Injectable 25 Gram(s) IV Push once  dextrose 50% Injectable 12.5 Gram(s) IV Push once  dextrose 50% Injectable 25 Gram(s) IV Push once  glucagon  Injectable 1 milliGRAM(s) IntraMuscular once  insulin lispro (ADMELOG) corrective regimen sliding scale   SubCutaneous every 6 hours  thyroid 30 milliGRAM(s) Oral daily    dextrose 5%. 1000 milliLiter(s) IV Continuous <Continuous>  dextrose 5%. 1000 milliLiter(s) IV Continuous <Continuous>      chlorhexidine 0.12% Liquid 15 milliLiter(s) Oral Mucosa every 12 hours  chlorhexidine 2% Cloths 1 Application(s) Topical <User Schedule>        Mode: CPAP with PS  FiO2: 40  PEEP: 5  PS: 10  MAP: 10      ICU Vital Signs Last 24 Hrs  T(C): 38.7 (28 Aug 2021 10:00), Max: 39.3 (27 Aug 2021 13:00)  T(F): 101.7 (28 Aug 2021 10:00), Max: 102.7 (27 Aug 2021 13:00)  HR: 108 (28 Aug 2021 10:00) (71 - 199)  BP: 111/84 (28 Aug 2021 10:00) (89/64 - 135/89)  BP(mean): 91 (28 Aug 2021 10:00) (69 - 108)  ABP: --  ABP(mean): --  RR: 13 (28 Aug 2021 10:00) (12 - 328)  SpO2: 96% (28 Aug 2021 10:00) (82% - 100%)      ABG - ( 27 Aug 2021 04:58 )  pH, Arterial: 7.460 pH, Blood: x     /  pCO2: 42    /  pO2: 77    / HCO3: 30    / Base Excess: 6.1   /  SaO2: 96.4                I&O's Detail    27 Aug 2021 07:01  -  28 Aug 2021 07:00  --------------------------------------------------------  IN:    Enteral Tube Flush: 310 mL    FentaNYL: 846.3 mL    IV PiggyBack: 50 mL    IV PiggyBack: 250 mL    IV PiggyBack: 100 mL    Jevity 1.5: 1200 mL    Midazolam: 275.7 mL  Total IN: 3032 mL    OUT:    Indwelling Catheter - Urethral (mL): 1395 mL  Total OUT: 1395 mL    Total NET: 1637 mL            LABS:                        12.6   9.14  )-----------( 230      ( 28 Aug 2021 03:53 )             38.7     08-28    141  |  106  |  25.0<H>  ----------------------------<  133<H>  4.0   |  27.0  |  0.89    Ca    8.8      28 Aug 2021 03:53  Phos  2.7     08-  Mg     2.0     08-28            CAPILLARY BLOOD GLUCOSE      POCT Blood Glucose.: 97 mg/dL (28 Aug 2021 06:21)      Urinalysis Basic - ( 27 Aug 2021 17:18 )    Color: Yellow / Appearance: Clear / S.010 / pH: x  Gluc: x / Ketone: Negative  / Bili: Negative / Urobili: 4 mg/dL   Blood: x / Protein: 30 mg/dL / Nitrite: Negative   Leuk Esterase: Trace / RBC: 11-25 /HPF / WBC 0-2   Sq Epi: x / Non Sq Epi: x / Bacteria: x      CULTURES:  Culture Results:   No growth at 3 days. ( @ 21:43)  Culture Results:   Testing in progress ( @ 21:43)  Culture Results:   Normal Respiratory Le present ( @ 12:18)  Culture Results:   No growth ( @ 02:40)  Rapid RVP Result: NotDetec ( @ 21:01)  Culture Results:   No growth at 5 days. ( @ 20:04)  Culture Results:   No growth at 5 days. ( @ 20:03)      Physical Examination:  GENERAL: Intubated, sedated  HEENT: Normocephalic  NECK: Supple  PULM: Coarse mechanical breath sounds B/L  CVS: +S1, S2  ABD: Soft, non-tender  EXT: No pedal edema  SKIN: Warm and perfused  NEURO: +Gag reflex, +pupils reactive to light      DEVICES:     RADIOLOGY:   < from: MR Head No Cont (21 @ 22:37) >  EXAM:  MR BRAIN                          PROCEDURE DATE:  2021          INTERPRETATION:  CLINICAL INFORMATION: altered mental status. found unresponsive and hypoxic, overdosed on cocaine and xanax, now poor exam. Admitting Dxs: A41.9 UNRESPONSIVE.    TECHNIQUE: Multiplanar, multisequence brain MRI was performed without intravenous contrast    COMPARISON: CT head 2021.    FINDINGS:    Numerous areas of restricted diffusion in the bilateral cerebral hemispheres, bilateral globus pallidi, left hippocampus and bilateral cerebellar hemispheres suggesting acute infarcts and hypoxic ischemic encephalopathy. A focus of susceptibility artifact in the right frontal lobe suggesting chronic microhemorrhage or calcification. No midline shift, hydrocephalus, or effacement of basal cisterns.    Flow voids of the major intracranial vessels at the skull base follow expected course and contour.    Paranasal sinus mucosal thickening. Soft tissue in the bilateral mastoids. Bilateral orbits are within normal limits.      IMPRESSION: Numerous areas of restricted diffusion in the bilateral cerebral hemispheres, bilateral globus pallidi, left hippocampus and bilateral cerebellar hemispheres suggesting acute infarcts and hypoxic ischemic encephalopathy.    These findings were discussed with RO Ruiz at 2021 8:43 AM by Dr. Amy Padilla with read back confirmation.    --- End of Report ---            AMY PADILLA MD; Attending Radiologist  This document has been electronically signed. Aug 27 2021  8:44AM    < end of copied text >

## 2021-08-28 NOTE — CONSULT NOTE ADULT - ASSESSMENT
46M, pmhx HTN, DM, asthma, presents to the ED 8/21 with AMS, intubated for acute hypoxic respiratory failure, presumed benzo overdose, unknown hypoxic period, MRI c/w anoxic brain injury;

## 2021-08-28 NOTE — CHART NOTE - NSCHARTNOTEFT_GEN_A_CORE
Source: Patient [ ]  Family [ ]   other [x ] EMR and staff     Enteral /Parenteral Nutrition: Diet, NPO with Tube Feed:   Tube Feeding Modality: Nasogastric  Jevity 1.5 Tolu (JEVITY1.5)  Total Volume for 24 Hours (mL): 1200  Continuous  Starting Tube Feed Rate {mL per Hour}: 30  Increase Tube Feed Rate by (mL): 10     Every 4 hours  Until Goal Tube Feed Rate (mL per Hour): 50  Tube Feed Duration (in Hours): 24  Tube Feed Start Time: 12:00 (08-24-21 @ 12:08)      Current Weight:   8/22: 93 kg     % Weight Change: N/A (No new weight to assess at this time) (Trace dependent edema, 3+ edema to b/L upper extremities)     Pertinent Medications: MEDICATIONS  (STANDING):  aspirin  chewable 81 milliGRAM(s) Oral daily  chlorhexidine 0.12% Liquid 15 milliLiter(s) Oral Mucosa every 12 hours  chlorhexidine 2% Cloths 1 Application(s) Topical <User Schedule>  dextrose 40% Gel 15 Gram(s) Oral once  dextrose 5%. 1000 milliLiter(s) (50 mL/Hr) IV Continuous <Continuous>  dextrose 5%. 1000 milliLiter(s) (100 mL/Hr) IV Continuous <Continuous>  dextrose 50% Injectable 25 Gram(s) IV Push once  dextrose 50% Injectable 12.5 Gram(s) IV Push once  dextrose 50% Injectable 25 Gram(s) IV Push once  fentaNYL   Infusion. 3 MICROgram(s)/kG/Hr (27.9 mL/Hr) IV Continuous <Continuous>  glucagon  Injectable 1 milliGRAM(s) IntraMuscular once  heparin   Injectable 5000 Unit(s) SubCutaneous every 8 hours  insulin lispro (ADMELOG) corrective regimen sliding scale   SubCutaneous every 6 hours  meropenem  IVPB 1000 milliGRAM(s) IV Intermittent every 8 hours  midazolam Infusion 0.02 mG/kG/Hr (1.86 mL/Hr) IV Continuous <Continuous>  pantoprazole  Injectable 40 milliGRAM(s) IV Push daily  thyroid 30 milliGRAM(s) Oral daily  vancomycin  IVPB 1000 milliGRAM(s) IV Intermittent <User Schedule>    MEDICATIONS  (PRN):  acetaminophen    Suspension .. 650 milliGRAM(s) Oral every 6 hours PRN Temp greater or equal to 38C (100.4F)  ALBUTerol    90 MICROgram(s) HFA Inhaler 2 Puff(s) Inhalation every 6 hours PRN Shortness of Breath    Pertinent Labs: CBC Full  -  ( 28 Aug 2021 03:53 )  WBC Count : 9.14 K/uL  RBC Count : 4.13 M/uL  Hemoglobin : 12.6 g/dL  Hematocrit : 38.7 %  Platelet Count - Automated : 230 K/uL  Mean Cell Volume : 93.7 fl  Mean Cell Hemoglobin : 30.5 pg  Mean Cell Hemoglobin Concentration : 32.6 gm/dL  Auto Neutrophil # : x  Auto Lymphocyte # : x  Auto Monocyte # : x  Auto Eosinophil # : x  Auto Basophil # : x  Auto Neutrophil % : x  Auto Lymphocyte % : x  Auto Monocyte % : x  Auto Eosinophil % : x  Auto Basophil % : x        Skin: R heel DTI     Nutrition focused physical exam not conducted - found signs of malnutrition [ ]absent [ ]present    Subcutaneous fat loss: [ ] Orbital fat pads region, [ ]Buccal fat region, [ ]Triceps region,  [ ]Ribs region    Muscle wasting: [ ]Temples region, [ ]Clavicle region, [ ]Shoulder region, [ ]Scapula region, [ ]Interosseous region,  [ ]thigh region, [ ]Calf region    Estimated Needs:   [x ] no change since previous assessment  [ ] recalculated:     Brief Hospital Course:   Pt is a 46 year old Male PMH HTN, DM, asthma who presented with AMS, possible BZD overdose, acute hypoxic respiratory failure with aspiration PNA, septic shock, GARRETT, rhabdomyolysis. Unknown amount of time had passed prior to family finding pt unresponsive. Found on MRI with anoxic brain injury. No seizures found on vEEG. Also with continued fevers possibly neurogenic.       Current Nutrition Diagnosis:  Pt remains at high nutrition risk secondary to increased nutrient needs related to increased physiologic demand for nutrient as evidenced by acute hypoxic resp failure, septic shock, rhabdomyolysis requiring vent support and skin breakdown. Aware pt remains full code at this time and Palliative following for GOC.  Pt receiving enteral feeds of Jevity @ 50ml/hr (x20 hours) 1000ml/day; 1500kcal, 64gm protein; inadequate to meet estimated nutrition needs at this time. Recommendations below:     Recommendations:   1. RX: MVI and Vit. C daily (500mg).   2. RX: Thiamine and folic acid daily   3. Consider changing enteral formula to Pivot @ 60ml/hr (x20 hours) 1200ml/day; 1800kcal, 113gm protein to better meet pt's estimated nutrition needs.     Monitoring and Evaluation:   [ ] PO intake [x ] Tolerance to diet prescription [X] Weights  [X] Follow up per protocol [X] Labs:

## 2021-08-28 NOTE — PROGRESS NOTE ADULT - ASSESSMENT
The patient is a 46y Male who is followed by neurology because of anoxic/hypoxic brain injury    Hypoxic-ischemic encephalopathy  s/p likely drug overdose with benzodiazepine and cocaine  His MRI brain appearance shows evidence of significant hypoxic/anoxic injury and strokes  Unable to perform adequate exam given sedation with versed and fentanyl although less sedated today and still with poor neurological exam  However, given reported history, MRI findings and lack of improvement on exam since admission his prognosis for meaningful neurological recovery is dismal    discussed with Dr Woods    Thank you for allowing me to participate in the care of your patient    oTby Sanchez MD, PhD   [id number]

## 2021-08-28 NOTE — PROGRESS NOTE ADULT - SUBJECTIVE AND OBJECTIVE BOX
[facility]                                     Neurology at [place]                                 Laura Lawrence, & Jalen                                  [address]                                        [place], [place], [address]                                             [phone]    CC: anoxic brain injury  HPI:  The patient is a 46y Male who presented with agonal respirations and emesis on 8/21/21 and was emergently intubated and admitted to MICU.  He had urine toxicology positive for benzodiazepines and cocaine.  Per chart he had been in [facility] ER for intoxication a few days prior to admission.  He was hypoxic for an unknown amount of time, however he had signs of anoxic brain injury on head CT on arrival. Neurology is asked to evaluate.    Interval history: still sedated on vent    Review of systems (neurology): Unable to obtain    MEDICATIONS  (STANDING):  aspirin  chewable 81 milliGRAM(s) Oral daily  chlorhexidine 0.12% Liquid 15 milliLiter(s) Oral Mucosa every 12 hours  chlorhexidine 2% Cloths 1 Application(s) Topical <User Schedule>  dextrose 40% Gel 15 Gram(s) Oral once  dextrose 5%. 1000 milliLiter(s) (50 mL/Hr) IV Continuous <Continuous>  dextrose 5%. 1000 milliLiter(s) (100 mL/Hr) IV Continuous <Continuous>  dextrose 50% Injectable 25 Gram(s) IV Push once  dextrose 50% Injectable 12.5 Gram(s) IV Push once  dextrose 50% Injectable 25 Gram(s) IV Push once  fentaNYL    Injectable 100 MICROGram(s) IV Push once  fentaNYL   Infusion. 3 MICROgram(s)/kG/Hr (27.9 mL/Hr) IV Continuous <Continuous>  glucagon  Injectable 1 milliGRAM(s) IntraMuscular once  heparin   Injectable 5000 Unit(s) SubCutaneous every 8 hours  insulin lispro (ADMELOG) corrective regimen sliding scale   SubCutaneous every 6 hours  meropenem  IVPB 1000 milliGRAM(s) IV Intermittent every 8 hours  midazolam Infusion 0.02 mG/kG/Hr (1.86 mL/Hr) IV Continuous <Continuous>  pantoprazole  Injectable 40 milliGRAM(s) IV Push daily  thyroid 30 milliGRAM(s) Oral daily  vancomycin  IVPB 1000 milliGRAM(s) IV Intermittent <User Schedule>    MEDICATIONS  (PRN):  acetaminophen    Suspension .. 650 milliGRAM(s) Oral every 6 hours PRN Temp greater or equal to 38C (100.4F)  ALBUTerol    90 MICROgram(s) HFA Inhaler 2 Puff(s) Inhalation every 6 hours PRN Shortness of Breath      Vital Signs Last 24 Hrs  T(C): 38.9 (28 Aug 2021 11:34), Max: 39.3 (27 Aug 2021 13:00)  T(F): 102 (28 Aug 2021 11:34), Max: 102.7 (27 Aug 2021 13:00)  HR: 106 (28 Aug 2021 11:59) (71 - 199)  BP: 136/89 (28 Aug 2021 11:00) (89/64 - 136/89)  BP(mean): 91 (28 Aug 2021 10:00) (69 - 108)  RR: 29 (28 Aug 2021 11:00) (12 - 328)  SpO2: 98% (28 Aug 2021 11:59) (82% - 100%)    Detailed Neurologic Exam:    Mental status: The patient is sedated, intubated on ventilator. Unable to assess orientation or language.    Cranial nerves: Pupils 2mm NR There is no blink  to threat. Extraocular motion is diminished with dolls eyes maneuvers. No corneal reflex b/l.  There is no ptosis. Facial sensation is not able to be assessed. Facial musculature is grossly symmetric.  (+) gag to ETT suction    Motor: There is normal bulk and tone.  There is no tremor.  no movement to nailbed pressure x 4 ext    Sensation: No grimace to pinch in 4 extremities    Reflexes: absent  throughout and plantar responses are silent.    Cerebellar: unable to assess  dysmetria on finger to nose testing.    Gait: unable to assess due to condition    LABS:                                    12.6   9.14  )-----------( 230      ( 28 Aug 2021 03:53 )             38.7     08-28    141  |  106  |  25.0<H>  ----------------------------<  133<H>  4.0   |  27.0  |  0.89    Ca    8.8      28 Aug 2021 03:53  Phos  2.7     08-28  Mg     2.0     08-28    RADIOLOGY & ADDITIONAL STUDIES (independently reviewed unless otherwise noted):  MRI brain- FLAIR and DWI hyperintensities in b/l BG/GP/ left hippocampus and scattered throughout both cerebral and cerebellar hemispheres, no blood or mass

## 2021-08-28 NOTE — PROGRESS NOTE ADULT - ASSESSMENT
46M PMH HTN, DM, asthma who presented with AMS, possible BZD overdose, acute hypoxic respiratory failure with aspiration PNA, septic shock, GARRETT, rhabdomyolysis. Unknown amount of time had passed prior to family finding pt unresponsive. Found on MRI with anoxic brain injury    Impression/Plan:    Unresponsiveness/AMS  Possible BZD/cocaine overdose  Anoxic brain injury  - Neurology input appreciated re: poor prognosis  - C/w full ventilator support  - Fentanyl/Versed for sedation    Fevers  - Possibly neurogenic  - S/p Zosyn (8/21-27)   - C/w Meropenem (8/27- ), Vanc (8/27- )  - Vanc trough 8/29 prior to 8AM dose  - Repeat BCx x2, sputum cultures, UA  - PRN APAP, cooling blanket  - Trend WBC count    GARRETT  - Resolved    Rhabdomyolysis    Shock  - Off pressors  - C/w ABx as above    GOC/Dispo  - Pt remains full code  - S/p family meeting 8/27  - Continue care in ICU  - Appreciate palliative input    Fili Woods M.D.  Pulmonary & Critical Care Medicine  [facility]  Pulmonary and Sleep Medicine at [place]  [address]  [place] [address]  T: [phone]  F: [phone]

## 2021-08-28 NOTE — PROCEDURE NOTE - ADDITIONAL PROCEDURE DETAILS
Dx: Acute hypoxic respiratory failure, anoxic encephalopathy
Patient with acute hypoxemic respiratory failure, aspiration pneumonia, septic shock, GARRETT.    Procedure performed independently of critical care time.

## 2021-08-28 NOTE — CONSULT NOTE ADULT - PROBLEM SELECTOR RECOMMENDATION 9
hemodynamically stable with minimal O2 requirements at this time  still with fevers, on broad spectrum antibiotics, recent cultures neg, possible neurogenic  plan for tentative trach/peg this week  thoracic surgery to follow  d/w Dr. Velazquez hemodynamically stable, on CPAP with minimal O2 requirements at this time  still with fevers, on broad spectrum antibiotics, recent cultures neg, possible neurogenic  plan for tentative trach/peg this week  thoracic surgery to follow  d/w Dr. Velazquez

## 2021-08-29 LAB
ANION GAP SERPL CALC-SCNC: 9 MMOL/L — SIGNIFICANT CHANGE UP (ref 5–17)
BUN SERPL-MCNC: 23.1 MG/DL — HIGH (ref 8–20)
CALCIUM SERPL-MCNC: 8.4 MG/DL — LOW (ref 8.6–10.2)
CHLORIDE SERPL-SCNC: 106 MMOL/L — SIGNIFICANT CHANGE UP (ref 98–107)
CO2 SERPL-SCNC: 27 MMOL/L — SIGNIFICANT CHANGE UP (ref 22–29)
CREAT SERPL-MCNC: 0.81 MG/DL — SIGNIFICANT CHANGE UP (ref 0.5–1.3)
CULTURE RESULTS: SIGNIFICANT CHANGE UP
GLUCOSE BLDC GLUCOMTR-MCNC: 93 MG/DL — SIGNIFICANT CHANGE UP (ref 70–99)
GLUCOSE BLDC GLUCOMTR-MCNC: 97 MG/DL — SIGNIFICANT CHANGE UP (ref 70–99)
GLUCOSE SERPL-MCNC: 118 MG/DL — HIGH (ref 70–99)
HCT VFR BLD CALC: 34.4 % — LOW (ref 39–50)
HGB BLD-MCNC: 11.3 G/DL — LOW (ref 13–17)
MAGNESIUM SERPL-MCNC: 1.9 MG/DL — SIGNIFICANT CHANGE UP (ref 1.6–2.6)
MCHC RBC-ENTMCNC: 30.8 PG — SIGNIFICANT CHANGE UP (ref 27–34)
MCHC RBC-ENTMCNC: 32.8 GM/DL — SIGNIFICANT CHANGE UP (ref 32–36)
MCV RBC AUTO: 93.7 FL — SIGNIFICANT CHANGE UP (ref 80–100)
PHOSPHATE SERPL-MCNC: 2.7 MG/DL — SIGNIFICANT CHANGE UP (ref 2.4–4.7)
PLATELET # BLD AUTO: 319 K/UL — SIGNIFICANT CHANGE UP (ref 150–400)
POTASSIUM SERPL-MCNC: 4.1 MMOL/L — SIGNIFICANT CHANGE UP (ref 3.5–5.3)
POTASSIUM SERPL-SCNC: 4.1 MMOL/L — SIGNIFICANT CHANGE UP (ref 3.5–5.3)
RBC # BLD: 3.67 M/UL — LOW (ref 4.2–5.8)
RBC # FLD: 12.9 % — SIGNIFICANT CHANGE UP (ref 10.3–14.5)
SODIUM SERPL-SCNC: 141 MMOL/L — SIGNIFICANT CHANGE UP (ref 135–145)
SPECIMEN SOURCE: SIGNIFICANT CHANGE UP
VANCOMYCIN TROUGH SERPL-MCNC: 5.8 UG/ML — LOW (ref 10–20)
WBC # BLD: 13.23 K/UL — HIGH (ref 3.8–10.5)
WBC # FLD AUTO: 13.23 K/UL — HIGH (ref 3.8–10.5)

## 2021-08-29 PROCEDURE — 99231 SBSQ HOSP IP/OBS SF/LOW 25: CPT

## 2021-08-29 PROCEDURE — 74018 RADEX ABDOMEN 1 VIEW: CPT | Mod: 26

## 2021-08-29 RX ORDER — VANCOMYCIN HCL 1 G
1500 VIAL (EA) INTRAVENOUS
Refills: 0 | Status: DISCONTINUED | OUTPATIENT
Start: 2021-08-29 | End: 2021-09-01

## 2021-08-29 RX ORDER — MEPERIDINE HYDROCHLORIDE 50 MG/ML
25 INJECTION INTRAMUSCULAR; INTRAVENOUS; SUBCUTANEOUS ONCE
Refills: 0 | Status: DISCONTINUED | OUTPATIENT
Start: 2021-08-29 | End: 2021-08-29

## 2021-08-29 RX ADMIN — PANTOPRAZOLE SODIUM 40 MILLIGRAM(S): 20 TABLET, DELAYED RELEASE ORAL at 11:30

## 2021-08-29 RX ADMIN — FENTANYL CITRATE 50 MICROGRAM(S): 50 INJECTION INTRAVENOUS at 12:12

## 2021-08-29 RX ADMIN — MEROPENEM 100 MILLIGRAM(S): 1 INJECTION INTRAVENOUS at 03:30

## 2021-08-29 RX ADMIN — MIDAZOLAM HYDROCHLORIDE 1.86 MG/KG/HR: 1 INJECTION, SOLUTION INTRAMUSCULAR; INTRAVENOUS at 11:40

## 2021-08-29 RX ADMIN — CHLORHEXIDINE GLUCONATE 15 MILLILITER(S): 213 SOLUTION TOPICAL at 17:48

## 2021-08-29 RX ADMIN — Medication 81 MILLIGRAM(S): at 11:30

## 2021-08-29 RX ADMIN — Medication 30 MILLIGRAM(S): at 05:26

## 2021-08-29 RX ADMIN — CHLORHEXIDINE GLUCONATE 1 APPLICATION(S): 213 SOLUTION TOPICAL at 05:26

## 2021-08-29 RX ADMIN — FENTANYL CITRATE 27.9 MICROGRAM(S)/KG/HR: 50 INJECTION INTRAVENOUS at 07:49

## 2021-08-29 RX ADMIN — FENTANYL CITRATE 27.9 MICROGRAM(S)/KG/HR: 50 INJECTION INTRAVENOUS at 00:31

## 2021-08-29 RX ADMIN — FENTANYL CITRATE 27.9 MICROGRAM(S)/KG/HR: 50 INJECTION INTRAVENOUS at 19:57

## 2021-08-29 RX ADMIN — Medication 650 MILLIGRAM(S): at 19:57

## 2021-08-29 RX ADMIN — MIDAZOLAM HYDROCHLORIDE 1.86 MG/KG/HR: 1 INJECTION, SOLUTION INTRAMUSCULAR; INTRAVENOUS at 02:23

## 2021-08-29 RX ADMIN — Medication 650 MILLIGRAM(S): at 05:32

## 2021-08-29 RX ADMIN — Medication 400 MILLIGRAM(S): at 13:48

## 2021-08-29 RX ADMIN — FENTANYL CITRATE 50 MICROGRAM(S): 50 INJECTION INTRAVENOUS at 20:28

## 2021-08-29 RX ADMIN — Medication 650 MILLIGRAM(S): at 06:02

## 2021-08-29 RX ADMIN — Medication 650 MILLIGRAM(S): at 20:27

## 2021-08-29 RX ADMIN — CHLORHEXIDINE GLUCONATE 15 MILLILITER(S): 213 SOLUTION TOPICAL at 05:26

## 2021-08-29 RX ADMIN — HEPARIN SODIUM 5000 UNIT(S): 5000 INJECTION INTRAVENOUS; SUBCUTANEOUS at 05:26

## 2021-08-29 RX ADMIN — Medication 300 MILLIGRAM(S): at 21:08

## 2021-08-29 RX ADMIN — MIDAZOLAM HYDROCHLORIDE 1.86 MG/KG/HR: 1 INJECTION, SOLUTION INTRAMUSCULAR; INTRAVENOUS at 19:57

## 2021-08-29 RX ADMIN — Medication 250 MILLIGRAM(S): at 09:22

## 2021-08-29 RX ADMIN — MEPERIDINE HYDROCHLORIDE 25 MILLIGRAM(S): 50 INJECTION INTRAMUSCULAR; INTRAVENOUS; SUBCUTANEOUS at 10:18

## 2021-08-29 RX ADMIN — HEPARIN SODIUM 5000 UNIT(S): 5000 INJECTION INTRAVENOUS; SUBCUTANEOUS at 13:14

## 2021-08-29 RX ADMIN — MEROPENEM 100 MILLIGRAM(S): 1 INJECTION INTRAVENOUS at 17:49

## 2021-08-29 RX ADMIN — MEROPENEM 100 MILLIGRAM(S): 1 INJECTION INTRAVENOUS at 11:30

## 2021-08-29 RX ADMIN — HEPARIN SODIUM 5000 UNIT(S): 5000 INJECTION INTRAVENOUS; SUBCUTANEOUS at 21:08

## 2021-08-29 RX ADMIN — Medication 400 MILLIGRAM(S): at 12:48

## 2021-08-29 RX ADMIN — FENTANYL CITRATE 50 MICROGRAM(S): 50 INJECTION INTRAVENOUS at 19:58

## 2021-08-29 RX ADMIN — FENTANYL CITRATE 50 MICROGRAM(S): 50 INJECTION INTRAVENOUS at 12:27

## 2021-08-29 NOTE — PROGRESS NOTE ADULT - SUBJECTIVE AND OBJECTIVE BOX
Patient is a 46y old  Male who presents with a chief complaint of Acute hypoxemic respiratory failure, AMS (28 Aug 2021 16:56)  Patinet found unresponsive at home unknown downtime ?drug overdose.  Not waking, remains febrile.  plan for trach/peg.     PAST MEDICAL & SURGICAL HISTORY:  No pertinent past medical history    Asthma    HTN (hypertension)    DM (diabetes mellitus)    S/P elbow joint replacement  right elbow surgery.    S/P knee surgery  right thigh surgery.        Review of Systems:  unable to assess due to clinical condition    Medications:  meropenem  IVPB 1000 milliGRAM(s) IV Intermittent every 8 hours  vancomycin  IVPB 1000 milliGRAM(s) IV Intermittent <User Schedule>      ALBUTerol    90 MICROgram(s) HFA Inhaler 2 Puff(s) Inhalation every 6 hours PRN    acetaminophen    Suspension .. 650 milliGRAM(s) Oral every 6 hours PRN  fentaNYL    Injectable 50 MICROGram(s) IV Push every 6 hours PRN  fentaNYL   Infusion. 3 MICROgram(s)/kG/Hr IV Continuous <Continuous>  ibuprofen  Tablet. 400 milliGRAM(s) Oral every 8 hours PRN  midazolam Infusion 0.02 mG/kG/Hr IV Continuous <Continuous>      aspirin  chewable 81 milliGRAM(s) Oral daily  heparin   Injectable 5000 Unit(s) SubCutaneous every 8 hours    pantoprazole  Injectable 40 milliGRAM(s) IV Push daily      dextrose 40% Gel 15 Gram(s) Oral once  dextrose 50% Injectable 25 Gram(s) IV Push once  dextrose 50% Injectable 25 Gram(s) IV Push once  dextrose 50% Injectable 12.5 Gram(s) IV Push once  glucagon  Injectable 1 milliGRAM(s) IntraMuscular once  insulin lispro (ADMELOG) corrective regimen sliding scale   SubCutaneous every 6 hours  thyroid 30 milliGRAM(s) Oral daily    dextrose 5% + sodium chloride 0.45%. 1000 milliLiter(s) IV Continuous <Continuous>  dextrose 5%. 1000 milliLiter(s) IV Continuous <Continuous>  dextrose 5%. 1000 milliLiter(s) IV Continuous <Continuous>      chlorhexidine 0.12% Liquid 15 milliLiter(s) Oral Mucosa every 12 hours  chlorhexidine 2% Cloths 1 Application(s) Topical <User Schedule>        Mode: CPAP with PS  FiO2: 30  PEEP: 5  PS: 10  MAP: 10      ICU Vital Signs Last 24 Hrs  T(C): 38.3 (29 Aug 2021 08:00), Max: 38.9 (28 Aug 2021 11:34)  T(F): 100.9 (29 Aug 2021 08:00), Max: 102 (28 Aug 2021 11:34)  HR: 108 (29 Aug 2021 08:34) (89 - 118)  BP: 116/83 (29 Aug 2021 08:00) (93/70 - 136/89)  BP(mean): 93 (29 Aug 2021 08:00) (66 - 111)  ABP: --  ABP(mean): --  RR: 16 (29 Aug 2021 08:00) (9 - 40)  SpO2: 98% (29 Aug 2021 08:34) (88% - 100%)          I&O's Detail    28 Aug 2021 07:01  -  29 Aug 2021 07:00  --------------------------------------------------------  IN:    dextrose 5% + sodium chloride 0.45%: 675 mL    Enteral Tube Flush: 120 mL    FentaNYL: 600 mL    IV PiggyBack: 150 mL    IV PiggyBack: 500 mL    Jevity 1.5: 350 mL    Midazolam: 208 mL  Total IN: 2603 mL    OUT:    Indwelling Catheter - Urethral (mL): 655 mL    Intermittent Catheterization - Urethral (mL): 1600 mL  Total OUT: 2255 mL    Total NET: 348 mL      29 Aug 2021 07:01  -  29 Aug 2021 09:27  --------------------------------------------------------  IN:    dextrose 5% + sodium chloride 0.45%: 150 mL    FentaNYL: 74.4 mL    Midazolam: 26 mL  Total IN: 250.4 mL    OUT:  Total OUT: 0 mL    Total NET: 250.4 mL            LABS:                        11.3   13.23 )-----------( 319      ( 29 Aug 2021 04:06 )             34.4     08-29    141  |  106  |  23.1<H>  ----------------------------<  118<H>  4.1   |  27.0  |  0.81    Ca    8.4<L>      29 Aug 2021 04:06  Phos  2.7       Mg     1.9                 CAPILLARY BLOOD GLUCOSE      POCT Blood Glucose.: 108 mg/dL (28 Aug 2021 23:26)      Urinalysis Basic - ( 27 Aug 2021 17:18 )    Color: Yellow / Appearance: Clear / S.010 / pH: x  Gluc: x / Ketone: Negative  / Bili: Negative / Urobili: 4 mg/dL   Blood: x / Protein: 30 mg/dL / Nitrite: Negative   Leuk Esterase: Trace / RBC: 11-25 /HPF / WBC 0-2   Sq Epi: x / Non Sq Epi: x / Bacteria: x      CULTURES:  Culture Results:   Normal Respiratory Le present (21 @ 22:07)  Culture Results:   No growth at 3 days. (21 @ 21:43)  Culture Results:   Testing in progress (21 @ 21:43)  Culture Results:   Normal Respiratory Le present (21 @ 12:18)      Physical Examination:    General: comatose +BS reflexes, +cough/gag, +rigors    PULM: course bs b/l    CVS: Regular rate and rhythm, no murmurs, rubs, or gallops    ABD: , hypoactive bowel sounds, no masses    EXT: 1+edema,     SKIN: on cooling blanket,     Radiology:  mri< from: MR Head No Cont (21 @ 22:37) >  IMPRESSION: Numerous areas of restricted diffusion in the bilateral cerebral hemispheres, bilateral globus pallidi, left hippocampus and bilateral cerebellar hemispheres suggesting acute infarcts and hypoxic ischemic encephalopathy.    These findings were discussed with RO Ruiz at 2021 8:43 AM by Dr. Dre Romero with read back confirmation.    < end of copied text >      CRITICAL CARE TIME SPENT: 34 minutes

## 2021-08-29 NOTE — PROGRESS NOTE ADULT - PROBLEM SELECTOR PLAN 1
hemodynamically stable, on CPAP with minimal O2 requirements at this time +5/30%  still with fevers, on broad spectrum antibiotics, recent cultures neg, possibly neurogenic  plan for tentative trach/peg this week  thoracic surgery to follow  d/w Dr. Velazquez.

## 2021-08-29 NOTE — PROGRESS NOTE ADULT - SUBJECTIVE AND OBJECTIVE BOX
Brief Hospital Course:  p/w acute hypoxic respiratory failure after being found unresponsive and with vomit by sister, intubated, tox screen + Benzos and cocaine, eventual MRI c/w anoxic brain, now with persistent fevers despite antibiotics and negative cultures, presumed neurogenic     Significant recent/past 24 hr events: no events    Subjective: unable to obtain due to patient's mental status     Review of Systems: unable to obtain due to patient's mental status          Patient is a 46y old  Male who presents with a chief complaint of Acute hypoxemic respiratory failure, AMS (28 Aug 2021 16:56)    HPI:  45 y/o M (real name: Ildefonso Pierre, : [birth year]) with a h/o HTN, DM, asthma, presents to the ED after being found unresponsive in bed by his sister this evening. Noted to have agonal respirations and emesis coming from mouth. He was found to be hypoxemic in the ED prompting emergent endotracheal intubation and appears to have an extensive right sided pneumonia on CXR. The patient's sister, Adela, reports that he had recently broken up with his girlfriend and has been having a hard time coping with it and has been noticeably depressed. Within the past 5 days he was taken to the ED at [facility] for some unclear intoxication. Adela also reports recently noticing a plethora of newly prescribed medications at his house and was able to identify clonazepam and alprazolam. Tonight he had told her that he was going out with friends and asked if she would come over and watch his dog, which she thought seemed unusual. Urine toxicology (+) for benzos and cocaine. CT head reveals symmetric low attenuation lesions in the bilateral globus pallidus regions of the basal ganglia, which can be seen with hypoxic ischemic encephalopathy or related to carbon monoxide positioning, as well as indeterminate areas of low attenuation in the bilateral cerebellum may reflect infarcts. He is sedated and now with progressive hypotension despite 3L IVF bolus. Started on IV vasopressor therapy. (21 Aug 2021 21:54)    PAST MEDICAL & SURGICAL HISTORY:  No pertinent past medical history  Asthma  HTN (hypertension)  DM (diabetes mellitus)  S/P elbow joint replacement  right elbow surgery.  S/P knee surgery  right thigh surgery.    Vitals   ICU Vital Signs Last 24 Hrs  T(C): 38.3 (29 Aug 2021 08:00), Max: 38.9 (28 Aug 2021 11:34)  T(F): 100.9 (29 Aug 2021 08:00), Max: 102 (28 Aug 2021 11:34)  HR: 106 (29 Aug 2021 10:00) (89 - 118)  BP: 116/89 (29 Aug 2021 10:00) (93/70 - 136/89)  BP(mean): 97 (29 Aug 2021 10:00) (66 - 111)  RR: 17 (29 Aug 2021 10:00) (9 - 29)  SpO2: 100% (29 Aug 2021 10:00) (95% - 100%)    VENT SETTINGS   Mode: CPAP with PS  FiO2: 30  PEEP: 5  PS: 10  MAP: 10    I&O's Detail    28 Aug 2021 07:01  -  29 Aug 2021 07:00  --------------------------------------------------------  IN:    dextrose 5% + sodium chloride 0.45%: 675 mL    Enteral Tube Flush: 120 mL    FentaNYL: 600 mL    IV PiggyBack: 150 mL    IV PiggyBack: 500 mL    Jevity 1.5: 350 mL    Midazolam: 208 mL  Total IN: 2603 mL    OUT:    Indwelling Catheter - Urethral (mL): 655 mL    Intermittent Catheterization - Urethral (mL): 1600 mL  Total OUT: 2255 mL  Total NET: 348 mL    29 Aug 2021 07:01  -  29 Aug 2021 10:54  --------------------------------------------------------  IN:    dextrose 5% + sodium chloride 0.45%: 225 mL    FentaNYL: 111.6 mL    Jevity 1.5: 150 mL    Midazolam: 39 mL  Total IN: 525.6 mL    OUT:  Total OUT: 0 mL  Total NET: 525.6 mL    LABS                        11.3   13.23 )-----------( 319      ( 29 Aug 2021 04:06 )             34.4     08-29    141  |  106  |  23.1<H>  ----------------------------<  118<H>  4.1   |  27.0  |  0.81    Ca    8.4<L>      29 Aug 2021 04:06  Phos  2.7       Mg     1.9         Urinalysis Basic - ( 27 Aug 2021 17:18 )    Color: Yellow / Appearance: Clear / S.010 / pH: x  Gluc: x / Ketone: Negative  / Bili: Negative / Urobili: 4 mg/dL   Blood: x / Protein: 30 mg/dL / Nitrite: Negative   Leuk Esterase: Trace / RBC: 11-25 /HPF / WBC 0-2   Sq Epi: x / Non Sq Epi: x / Bacteria: x    POCT Blood Glucose.: 108 mg/dL (21 @ 23:26)  POCT Blood Glucose.: 103 mg/dL (21 @ 17:17)  POCT Blood Glucose.: 87 mg/dL (21 @ 11:21)    MEDICATIONS  (STANDING):  aspirin  chewable 81 milliGRAM(s) Oral daily  chlorhexidine 0.12% Liquid 15 milliLiter(s) Oral Mucosa every 12 hours  chlorhexidine 2% Cloths 1 Application(s) Topical <User Schedule>  dextrose 40% Gel 15 Gram(s) Oral once  dextrose 5% + sodium chloride 0.45%. 1000 milliLiter(s) (75 mL/Hr) IV Continuous <Continuous>  dextrose 5%. 1000 milliLiter(s) (50 mL/Hr) IV Continuous <Continuous>  dextrose 5%. 1000 milliLiter(s) (100 mL/Hr) IV Continuous <Continuous>  dextrose 50% Injectable 25 Gram(s) IV Push once  dextrose 50% Injectable 25 Gram(s) IV Push once  dextrose 50% Injectable 12.5 Gram(s) IV Push once  fentaNYL   Infusion. 3 MICROgram(s)/kG/Hr (27.9 mL/Hr) IV Continuous <Continuous>  glucagon  Injectable 1 milliGRAM(s) IntraMuscular once  heparin   Injectable 5000 Unit(s) SubCutaneous every 8 hours  insulin lispro (ADMELOG) corrective regimen sliding scale   SubCutaneous every 6 hours  meropenem  IVPB 1000 milliGRAM(s) IV Intermittent every 8 hours  midazolam Infusion 0.02 mG/kG/Hr (1.86 mL/Hr) IV Continuous <Continuous>  pantoprazole  Injectable 40 milliGRAM(s) IV Push daily  thyroid 30 milliGRAM(s) Oral daily  vancomycin  IVPB 1500 milliGRAM(s) IV Intermittent <User Schedule>    MEDICATIONS  (PRN):  acetaminophen    Suspension .. 650 milliGRAM(s) Oral every 6 hours PRN Temp greater or equal to 38C (100.4F)  ALBUTerol    90 MICROgram(s) HFA Inhaler 2 Puff(s) Inhalation every 6 hours PRN Shortness of Breath  fentaNYL    Injectable 50 MICROGram(s) IV Push every 6 hours PRN Moderate Pain (4 - 6)  ibuprofen  Tablet. 400 milliGRAM(s) Oral every 8 hours PRN Temp greater or equal to 38C (100.4F)    Allergies:  Allergy Status Unknown  No Known Allergies    PE:   Gen: NAD  Neuro: unable to obtain due to patient's mental status  Neck: no JVD  CV: S1S2 RRR   Pulm: clear anteriorly  Abd: +BS soft NT ND  Ext: no edema, cyanosis, WWP     Brief Hospital Course:  p/w acute hypoxic respiratory failure after being found unresponsive and with vomit by sister, intubated, tox screen + Benzos and cocaine, eventual MRI c/w anoxic brain, now with persistent fevers despite antibiotics and negative cultures, presumed neurogenic     Significant recent/past 24 hr events: no events    Subjective: unable to obtain due to patient's mental status     Review of Systems: unable to obtain due to patient's mental status          Patient is a 46y old  Male who presents with a chief complaint of Acute hypoxemic respiratory failure, AMS (28 Aug 2021 16:56)    HPI:  47 y/o M (real name: Ildefonso Pierre, : 79) with a h/o HTN, DM, asthma, presents to the ED after being found unresponsive in bed by his sister this evening. Noted to have agonal respirations and emesis coming from mouth. He was found to be hypoxemic in the ED prompting emergent endotracheal intubation and appears to have an extensive right sided pneumonia on CXR. The patient's sister, Adela, reports that he had recently broken up with his girlfriend and has been having a hard time coping with it and has been noticeably depressed. Within the past 5 days he was taken to the ED at [facility] for some unclear intoxication. Adela also reports recently noticing a plethora of newly prescribed medications at his house and was able to identify clonazepam and alprazolam. Tonight he had told her that he was going out with friends and asked if she would come over and watch his dog, which she thought seemed unusual. Urine toxicology (+) for benzos and cocaine. CT head reveals symmetric low attenuation lesions in the bilateral globus pallidus regions of the basal ganglia, which can be seen with hypoxic ischemic encephalopathy or related to carbon monoxide positioning, as well as indeterminate areas of low attenuation in the bilateral cerebellum may reflect infarcts. He is sedated and now with progressive hypotension despite 3L IVF bolus. Started on IV vasopressor therapy. (21 Aug 2021 21:54)    PAST MEDICAL & SURGICAL HISTORY:  No pertinent past medical history  Asthma  HTN (hypertension)  DM (diabetes mellitus)  S/P elbow joint replacement  right elbow surgery.  S/P knee surgery  right thigh surgery.    Vitals   ICU Vital Signs Last 24 Hrs  T(C): 38.3 (29 Aug 2021 08:00), Max: 38.9 (28 Aug 2021 11:34)  T(F): 100.9 (29 Aug 2021 08:00), Max: 102 (28 Aug 2021 11:34)  HR: 106 (29 Aug 2021 10:00) (89 - 118)  BP: 116/89 (29 Aug 2021 10:00) (93/70 - 136/89)  BP(mean): 97 (29 Aug 2021 10:00) (66 - 111)  RR: 17 (29 Aug 2021 10:00) (9 - 29)  SpO2: 100% (29 Aug 2021 10:00) (95% - 100%)    VENT SETTINGS   Mode: CPAP with PS  FiO2: 30  PEEP: 5  PS: 10  MAP: 10    I&O's Detail    28 Aug 2021 07:01  -  29 Aug 2021 07:00  --------------------------------------------------------  IN:    dextrose 5% + sodium chloride 0.45%: 675 mL    Enteral Tube Flush: 120 mL    FentaNYL: 600 mL    IV PiggyBack: 150 mL    IV PiggyBack: 500 mL    Jevity 1.5: 350 mL    Midazolam: 208 mL  Total IN: 2603 mL    OUT:    Indwelling Catheter - Urethral (mL): 655 mL    Intermittent Catheterization - Urethral (mL): 1600 mL  Total OUT: 2255 mL  Total NET: 348 mL    29 Aug 2021 07:01  -  29 Aug 2021 10:54  --------------------------------------------------------  IN:    dextrose 5% + sodium chloride 0.45%: 225 mL    FentaNYL: 111.6 mL    Jevity 1.5: 150 mL    Midazolam: 39 mL  Total IN: 525.6 mL    OUT:  Total OUT: 0 mL  Total NET: 525.6 mL    LABS                        11.3   13.23 )-----------( 319      ( 29 Aug 2021 04:06 )             34.4     08-29    141  |  106  |  23.1<H>  ----------------------------<  118<H>  4.1   |  27.0  |  0.81    Ca    8.4<L>      29 Aug 2021 04:06  Phos  2.7       Mg     1.9         Urinalysis Basic - ( 27 Aug 2021 17:18 )    Color: Yellow / Appearance: Clear / S.010 / pH: x  Gluc: x / Ketone: Negative  / Bili: Negative / Urobili: 4 mg/dL   Blood: x / Protein: 30 mg/dL / Nitrite: Negative   Leuk Esterase: Trace / RBC: 11-25 /HPF / WBC 0-2   Sq Epi: x / Non Sq Epi: x / Bacteria: x    POCT Blood Glucose.: 108 mg/dL (21 @ 23:26)  POCT Blood Glucose.: 103 mg/dL (21 @ 17:17)  POCT Blood Glucose.: 87 mg/dL (21 @ 11:21)    MEDICATIONS  (STANDING):  aspirin  chewable 81 milliGRAM(s) Oral daily  chlorhexidine 0.12% Liquid 15 milliLiter(s) Oral Mucosa every 12 hours  chlorhexidine 2% Cloths 1 Application(s) Topical <User Schedule>  dextrose 40% Gel 15 Gram(s) Oral once  dextrose 5% + sodium chloride 0.45%. 1000 milliLiter(s) (75 mL/Hr) IV Continuous <Continuous>  dextrose 5%. 1000 milliLiter(s) (50 mL/Hr) IV Continuous <Continuous>  dextrose 5%. 1000 milliLiter(s) (100 mL/Hr) IV Continuous <Continuous>  dextrose 50% Injectable 25 Gram(s) IV Push once  dextrose 50% Injectable 25 Gram(s) IV Push once  dextrose 50% Injectable 12.5 Gram(s) IV Push once  fentaNYL   Infusion. 3 MICROgram(s)/kG/Hr (27.9 mL/Hr) IV Continuous <Continuous>  glucagon  Injectable 1 milliGRAM(s) IntraMuscular once  heparin   Injectable 5000 Unit(s) SubCutaneous every 8 hours  insulin lispro (ADMELOG) corrective regimen sliding scale   SubCutaneous every 6 hours  meropenem  IVPB 1000 milliGRAM(s) IV Intermittent every 8 hours  midazolam Infusion 0.02 mG/kG/Hr (1.86 mL/Hr) IV Continuous <Continuous>  pantoprazole  Injectable 40 milliGRAM(s) IV Push daily  thyroid 30 milliGRAM(s) Oral daily  vancomycin  IVPB 1500 milliGRAM(s) IV Intermittent <User Schedule>    MEDICATIONS  (PRN):  acetaminophen    Suspension .. 650 milliGRAM(s) Oral every 6 hours PRN Temp greater or equal to 38C (100.4F)  ALBUTerol    90 MICROgram(s) HFA Inhaler 2 Puff(s) Inhalation every 6 hours PRN Shortness of Breath  fentaNYL    Injectable 50 MICROGram(s) IV Push every 6 hours PRN Moderate Pain (4 - 6)  ibuprofen  Tablet. 400 milliGRAM(s) Oral every 8 hours PRN Temp greater or equal to 38C (100.4F)    Allergies:  Allergy Status Unknown  No Known Allergies    PE:   Gen: NAD  Neuro: unable to obtain due to patient's mental status  Neck: no JVD  CV: S1S2 RRR   Pulm: clear anteriorly  Abd: +BS soft NT ND  Ext: R foot edema, no cyanosis, WWP

## 2021-08-29 NOTE — PROGRESS NOTE ADULT - ASSESSMENT
46M presents unresponsive ?drug overdose, signs of anoxia on MRI and clinical exam.  Persistent fevers ?infectious vs neurogenic.  Condition guarded    anoxic brain injury  hypoxic respiratory failure  r/o sepsis    -f/u culture results  -continue abx  -repeat bcx  -will try demerol for rigors  -continue full vent support, sedation for vent syncrony  -continue tf  -plan for trach/peg  -d/w dr Woods in Am rounds in agreement with plan

## 2021-08-30 LAB
ANION GAP SERPL CALC-SCNC: 10 MMOL/L — SIGNIFICANT CHANGE UP (ref 5–17)
BUN SERPL-MCNC: 21.5 MG/DL — HIGH (ref 8–20)
CALCIUM SERPL-MCNC: 8.4 MG/DL — LOW (ref 8.6–10.2)
CHLORIDE SERPL-SCNC: 103 MMOL/L — SIGNIFICANT CHANGE UP (ref 98–107)
CO2 SERPL-SCNC: 25 MMOL/L — SIGNIFICANT CHANGE UP (ref 22–29)
CREAT SERPL-MCNC: 0.71 MG/DL — SIGNIFICANT CHANGE UP (ref 0.5–1.3)
GAS PNL BLDA: SIGNIFICANT CHANGE UP
GLUCOSE BLDC GLUCOMTR-MCNC: 110 MG/DL — HIGH (ref 70–99)
GLUCOSE BLDC GLUCOMTR-MCNC: 120 MG/DL — HIGH (ref 70–99)
GLUCOSE BLDC GLUCOMTR-MCNC: 86 MG/DL — SIGNIFICANT CHANGE UP (ref 70–99)
GLUCOSE SERPL-MCNC: 114 MG/DL — HIGH (ref 70–99)
HCT VFR BLD CALC: 38.1 % — LOW (ref 39–50)
HGB BLD-MCNC: 12.7 G/DL — LOW (ref 13–17)
MAGNESIUM SERPL-MCNC: 2 MG/DL — SIGNIFICANT CHANGE UP (ref 1.6–2.6)
MCHC RBC-ENTMCNC: 30.5 PG — SIGNIFICANT CHANGE UP (ref 27–34)
MCHC RBC-ENTMCNC: 33.3 GM/DL — SIGNIFICANT CHANGE UP (ref 32–36)
MCV RBC AUTO: 91.6 FL — SIGNIFICANT CHANGE UP (ref 80–100)
PHOSPHATE SERPL-MCNC: 2.9 MG/DL — SIGNIFICANT CHANGE UP (ref 2.4–4.7)
PLATELET # BLD AUTO: 402 K/UL — HIGH (ref 150–400)
POTASSIUM SERPL-MCNC: 4.2 MMOL/L — SIGNIFICANT CHANGE UP (ref 3.5–5.3)
POTASSIUM SERPL-SCNC: 4.2 MMOL/L — SIGNIFICANT CHANGE UP (ref 3.5–5.3)
RBC # BLD: 4.16 M/UL — LOW (ref 4.2–5.8)
RBC # FLD: 13 % — SIGNIFICANT CHANGE UP (ref 10.3–14.5)
SODIUM SERPL-SCNC: 138 MMOL/L — SIGNIFICANT CHANGE UP (ref 135–145)
WBC # BLD: 13.51 K/UL — HIGH (ref 3.8–10.5)
WBC # FLD AUTO: 13.51 K/UL — HIGH (ref 3.8–10.5)

## 2021-08-30 PROCEDURE — 99232 SBSQ HOSP IP/OBS MODERATE 35: CPT

## 2021-08-30 PROCEDURE — 99233 SBSQ HOSP IP/OBS HIGH 50: CPT

## 2021-08-30 PROCEDURE — 99231 SBSQ HOSP IP/OBS SF/LOW 25: CPT

## 2021-08-30 RX ORDER — MIDAZOLAM HYDROCHLORIDE 1 MG/ML
4 INJECTION, SOLUTION INTRAMUSCULAR; INTRAVENOUS ONCE
Refills: 0 | Status: DISCONTINUED | OUTPATIENT
Start: 2021-08-30 | End: 2021-08-30

## 2021-08-30 RX ORDER — DOXAZOSIN MESYLATE 4 MG
2 TABLET ORAL AT BEDTIME
Refills: 0 | Status: DISCONTINUED | OUTPATIENT
Start: 2021-08-30 | End: 2021-09-07

## 2021-08-30 RX ORDER — CLONAZEPAM 1 MG
1 TABLET ORAL THREE TIMES A DAY
Refills: 0 | Status: DISCONTINUED | OUTPATIENT
Start: 2021-08-30 | End: 2021-08-31

## 2021-08-30 RX ORDER — QUETIAPINE FUMARATE 200 MG/1
50 TABLET, FILM COATED ORAL EVERY 12 HOURS
Refills: 0 | Status: DISCONTINUED | OUTPATIENT
Start: 2021-08-30 | End: 2021-08-31

## 2021-08-30 RX ORDER — MIDAZOLAM HYDROCHLORIDE 1 MG/ML
4 INJECTION, SOLUTION INTRAMUSCULAR; INTRAVENOUS EVERY 4 HOURS
Refills: 0 | Status: DISCONTINUED | OUTPATIENT
Start: 2021-08-30 | End: 2021-08-31

## 2021-08-30 RX ORDER — FENTANYL CITRATE 50 UG/ML
100 INJECTION INTRAVENOUS ONCE
Refills: 0 | Status: DISCONTINUED | OUTPATIENT
Start: 2021-08-30 | End: 2021-08-30

## 2021-08-30 RX ORDER — DEXMEDETOMIDINE HYDROCHLORIDE IN 0.9% SODIUM CHLORIDE 4 UG/ML
0.2 INJECTION INTRAVENOUS
Qty: 200 | Refills: 0 | Status: DISCONTINUED | OUTPATIENT
Start: 2021-08-30 | End: 2021-08-31

## 2021-08-30 RX ORDER — TAMSULOSIN HYDROCHLORIDE 0.4 MG/1
0.4 CAPSULE ORAL AT BEDTIME
Refills: 0 | Status: DISCONTINUED | OUTPATIENT
Start: 2021-08-30 | End: 2021-08-30

## 2021-08-30 RX ORDER — ACETAMINOPHEN 500 MG
1000 TABLET ORAL ONCE
Refills: 0 | Status: COMPLETED | OUTPATIENT
Start: 2021-08-30 | End: 2021-08-30

## 2021-08-30 RX ADMIN — FENTANYL CITRATE 27.9 MICROGRAM(S)/KG/HR: 50 INJECTION INTRAVENOUS at 10:24

## 2021-08-30 RX ADMIN — DEXMEDETOMIDINE HYDROCHLORIDE IN 0.9% SODIUM CHLORIDE 4.66 MICROGRAM(S)/KG/HR: 4 INJECTION INTRAVENOUS at 21:05

## 2021-08-30 RX ADMIN — MIDAZOLAM HYDROCHLORIDE 4 MILLIGRAM(S): 1 INJECTION, SOLUTION INTRAMUSCULAR; INTRAVENOUS at 15:43

## 2021-08-30 RX ADMIN — Medication 1 MILLIGRAM(S): at 21:11

## 2021-08-30 RX ADMIN — HEPARIN SODIUM 5000 UNIT(S): 5000 INJECTION INTRAVENOUS; SUBCUTANEOUS at 05:31

## 2021-08-30 RX ADMIN — Medication 30 MILLIGRAM(S): at 05:31

## 2021-08-30 RX ADMIN — Medication 1 MILLIGRAM(S): at 13:19

## 2021-08-30 RX ADMIN — HEPARIN SODIUM 5000 UNIT(S): 5000 INJECTION INTRAVENOUS; SUBCUTANEOUS at 21:12

## 2021-08-30 RX ADMIN — CHLORHEXIDINE GLUCONATE 15 MILLILITER(S): 213 SOLUTION TOPICAL at 05:31

## 2021-08-30 RX ADMIN — MIDAZOLAM HYDROCHLORIDE 4 MILLIGRAM(S): 1 INJECTION, SOLUTION INTRAMUSCULAR; INTRAVENOUS at 18:50

## 2021-08-30 RX ADMIN — PANTOPRAZOLE SODIUM 40 MILLIGRAM(S): 20 TABLET, DELAYED RELEASE ORAL at 11:45

## 2021-08-30 RX ADMIN — HEPARIN SODIUM 5000 UNIT(S): 5000 INJECTION INTRAVENOUS; SUBCUTANEOUS at 13:19

## 2021-08-30 RX ADMIN — Medication 400 MILLIGRAM(S): at 04:18

## 2021-08-30 RX ADMIN — CHLORHEXIDINE GLUCONATE 1 APPLICATION(S): 213 SOLUTION TOPICAL at 05:30

## 2021-08-30 RX ADMIN — Medication 400 MILLIGRAM(S): at 15:05

## 2021-08-30 RX ADMIN — Medication 400 MILLIGRAM(S): at 03:48

## 2021-08-30 RX ADMIN — Medication 2 MILLIGRAM(S): at 21:12

## 2021-08-30 RX ADMIN — Medication 300 MILLIGRAM(S): at 21:11

## 2021-08-30 RX ADMIN — MIDAZOLAM HYDROCHLORIDE 1.86 MG/KG/HR: 1 INJECTION, SOLUTION INTRAMUSCULAR; INTRAVENOUS at 01:43

## 2021-08-30 RX ADMIN — DEXMEDETOMIDINE HYDROCHLORIDE IN 0.9% SODIUM CHLORIDE 4.66 MICROGRAM(S)/KG/HR: 4 INJECTION INTRAVENOUS at 17:53

## 2021-08-30 RX ADMIN — DEXMEDETOMIDINE HYDROCHLORIDE IN 0.9% SODIUM CHLORIDE 4.66 MICROGRAM(S)/KG/HR: 4 INJECTION INTRAVENOUS at 15:04

## 2021-08-30 RX ADMIN — DEXMEDETOMIDINE HYDROCHLORIDE IN 0.9% SODIUM CHLORIDE 4.66 MICROGRAM(S)/KG/HR: 4 INJECTION INTRAVENOUS at 22:00

## 2021-08-30 RX ADMIN — MEROPENEM 100 MILLIGRAM(S): 1 INJECTION INTRAVENOUS at 11:44

## 2021-08-30 RX ADMIN — CHLORHEXIDINE GLUCONATE 15 MILLILITER(S): 213 SOLUTION TOPICAL at 17:54

## 2021-08-30 RX ADMIN — FENTANYL CITRATE 27.9 MICROGRAM(S)/KG/HR: 50 INJECTION INTRAVENOUS at 23:57

## 2021-08-30 RX ADMIN — MIDAZOLAM HYDROCHLORIDE 1.86 MG/KG/HR: 1 INJECTION, SOLUTION INTRAMUSCULAR; INTRAVENOUS at 09:26

## 2021-08-30 RX ADMIN — MEROPENEM 100 MILLIGRAM(S): 1 INJECTION INTRAVENOUS at 18:50

## 2021-08-30 RX ADMIN — DEXMEDETOMIDINE HYDROCHLORIDE IN 0.9% SODIUM CHLORIDE 4.66 MICROGRAM(S)/KG/HR: 4 INJECTION INTRAVENOUS at 19:46

## 2021-08-30 RX ADMIN — Medication 81 MILLIGRAM(S): at 11:45

## 2021-08-30 RX ADMIN — QUETIAPINE FUMARATE 50 MILLIGRAM(S): 200 TABLET, FILM COATED ORAL at 17:54

## 2021-08-30 RX ADMIN — Medication 300 MILLIGRAM(S): at 09:31

## 2021-08-30 RX ADMIN — FENTANYL CITRATE 27.9 MICROGRAM(S)/KG/HR: 50 INJECTION INTRAVENOUS at 04:30

## 2021-08-30 RX ADMIN — FENTANYL CITRATE 100 MICROGRAM(S): 50 INJECTION INTRAVENOUS at 13:08

## 2021-08-30 RX ADMIN — MEROPENEM 100 MILLIGRAM(S): 1 INJECTION INTRAVENOUS at 03:45

## 2021-08-30 NOTE — PROGRESS NOTE ADULT - ASSESSMENT
ICU ASSESSMENT AND PLAN:   45yo M who presented with respiratory failure and altered mental status. He had been found on the floor unresponsive, possible cocaine overdose with aspiration pneumonia/hypoxia, and had remained unresponsive for many days. However today he is more alert, actually gave a thumbs up to the attending. Plans had been made for Trach/Peg in 48 hours, will keep on schedule for now, but likely to not need if remains alert and responsive. He has had persistent fevers without clear source as his MRSA pneumonia has been well controlled with Vanco/Meropenem.   He had Echo that revealed EF <20%.    PAST MEDICAL & SURGICAL HISTORY:  Asthma, HTN (hypertension), DM (diabetes mellitus), S/P elbow joint replacement, right elbow surgery., S/P knee surgery, right thigh surgery. Drug use    1- Drug Overdose  2- Acute Hypoxic Respiratory Failure with aspiration pneumonia  3- Anoxic findings on MRI brain with prolonged unresponsiveness  4- MRSA pneumonia, Treated with Vanco/Meropenem  5- Heart Failure with EF <20%, will need cardiac cath when stable  6- Urinary retention, started on Flomax    - Had planned on trach/peg in next 48 hours, however he is more alert at present after prolonged unresponsiveness, will continue to monitor and may not need to pursue these  - CPAP trials with goal of extubation at present  - Continue Vanco/Meropenem for MRSA pneumonia  - Eventual cardiac cath when more stable to evaluate cardiomyopathy  - Flomax started for urinary retention, will do trial of void in 48-72 hours  - Will need counselling, drug rehab when ready for discharge  - Insulin sliding scale  - Will start Coreg 3.125mg BID and monitor BP today 110/77, HR 86  - If tolerates Coreg can consider hydralazine as suggested by Cardiology for afterload reduction, but will progress slowly    Discussed with Dr. Chambers, ICU Attending

## 2021-08-30 NOTE — PROGRESS NOTE ADULT - ASSESSMENT
46M with HTN, DM, asthma, here with vent dependent respiratory failure, unresponsiveness, concerns for anoxic injury,  GARRETT, NSTEMI, acute systolic CHF, s/p shock (off pressors), possible aspiration related event on zosyn,  urine toxicology + for benzodiazepines and cocaine.     #1 Ventilator dependence - poor overall prognosis for meaningful weaning given poor mental status. MRI revealing anoxic injury.   #2 Anoxic brain injury - post possible overdose. MRI showing anoxic injury.   #3 Shock - possible aspiration event, resolved on antibiotics.   #4 Acute systolic CHF - not a candidate for intervention at this time.   #5 GARRETT - improved.  #6 Encounter for palliative care - spoke with July - sister - she is legal surrogate and she is not deferring decisions at this time. She does not agree with tracheostomy but she is giving the rest of the family time to process. I recommended another family meeting to discuss again prognosis and plans/wishes. She requested I call ex wife Nanci to coordinate.  46M with HTN, DM, asthma, here with vent dependent respiratory failure, unresponsiveness, concerns for anoxic injury,  GARRETT, NSTEMI, acute systolic CHF, s/p shock (off pressors), possible aspiration related event on zosyn,  urine toxicology + for benzodiazepines and cocaine.     #1 Ventilator dependence - poor overall prognosis for meaningful weaning given poor mental status. MRI revealing anoxic injury.   #2 Anoxic brain injury - post possible overdose. MRI showing anoxic injury.   #3 Shock - possible aspiration event, resolved on antibiotics.   #4 Acute systolic CHF - not a candidate for intervention at this time.   #5 GARRETT - improved.  #6 Encounter for palliative care - spoke with July - sister - she is legal surrogate and she is not deferring decisions at this time. Other sister Beverly . Surrogacy defaults to bvoth sisters equally. She does not agree with tracheostomy but she is giving the rest of the family time to process. I recommended another family meeting to discuss again prognosis and plans/wishes. She requested I call ex wife Nanci to coordinate.

## 2021-08-30 NOTE — PROGRESS NOTE ADULT - SUBJECTIVE AND OBJECTIVE BOX
[facility]                                        Neurology at [facility]                                 [facility]                                  [address]                                        [place], [place], [address]                                             [phone]        CC: Anoxic encephalopathy.    HPI:   The patient is a 46y Male who presented to the ER after being found unresponsive in bed by family. He was brought to the ER. He was noted to be hypoxic with agonal respirations and was intubated and admitted to ICU on a mechanical ventilator.   He was noted to have some twitching type movement of the extremities for which the Epilepsy service was consulted.   He has a history of substance abuse.     Interim history:  Remains in ICU on mechanical ventilator.   Sedation held.     ROS:   Unobtainable due to patient's condition.     MEDICATIONS  (STANDING):  aspirin  chewable 81 milliGRAM(s) Oral daily  chlorhexidine 0.12% Liquid 15 milliLiter(s) Oral Mucosa every 12 hours  chlorhexidine 2% Cloths 1 Application(s) Topical <User Schedule>  clonazePAM  Tablet 1 milliGRAM(s) Oral three times a day  dextrose 40% Gel 15 Gram(s) Oral once  dextrose 5% + sodium chloride 0.45%. 1000 milliLiter(s) (75 mL/Hr) IV Continuous <Continuous>  dextrose 5%. 1000 milliLiter(s) (50 mL/Hr) IV Continuous <Continuous>  fentaNYL   Infusion. 3 MICROgram(s)/kG/Hr (27.9 mL/Hr) IV Continuous <Continuous>  glucagon  Injectable 1 milliGRAM(s) IntraMuscular once  heparin   Injectable 5000 Unit(s) SubCutaneous every 8 hours  insulin lispro (ADMELOG) corrective regimen sliding scale   SubCutaneous every 6 hours  meropenem  IVPB 1000 milliGRAM(s) IV Intermittent every 8 hours  midazolam Infusion 0.02 mG/kG/Hr (1.86 mL/Hr) IV Continuous <Continuous>  pantoprazole  Injectable 40 milliGRAM(s) IV Push daily  QUEtiapine 50 milliGRAM(s) Oral every 12 hours  tamsulosin 0.4 milliGRAM(s) Oral at bedtime  thyroid 30 milliGRAM(s) Oral daily  vancomycin  IVPB 1500 milliGRAM(s) IV Intermittent <User Schedule>    Vital Signs Last 24 Hrs  T(C): 38.6 (30 Aug 2021 11:14), Max: 38.7 (29 Aug 2021 13:00)  T(F): 101.5 (30 Aug 2021 11:14), Max: 101.7 (29 Aug 2021 13:00)  HR: 109 (30 Aug 2021 10:00) (86 - 122)  BP: 101/80 (30 Aug 2021 10:00) (101/80 - 137/114)  BP(mean): 88 (30 Aug 2021 10:00) (87 - 121)  RR: 12 (30 Aug 2021 10:00) (10 - 33)  SpO2: 98% (30 Aug 2021 10:00) (94% - 100%)    Detailed Neurologic Exam:    Mental status: The patient is awake and alert. There is no aphasia. There is no dysarthria.     Cranial nerves: Pupils 2mm NR There is no blink  to threat. Extraocular motion is diminished with dolls eyes maneuvers. No corneal reflex b/l.  There is no ptosis. Facial sensation is not able to be assessed. Facial musculature is grossly symmetric.  (+) Gag to endotracheal tube suction.    Motor: There is normal bulk and tone.    Moving upper extremities more than lower extremities spontaneously.   No purposeful movement.    Sensation: No grimace to pinch in 4 extremities    Reflexes: absent  throughout and plantar responses are silent.    Cerebellar: unable to assess  dysmetria on finger to nose testing.    Labs:     08-30    138  |  103  |  21.5<H>  ----------------------------<  114<H>  4.2   |  25.0  |  0.71    Ca    8.4<L>      30 Aug 2021 03:55  Phos  2.9     08-30  Mg     2.0     08-30                              12.7   13.51 )-----------( 402      ( 30 Aug 2021 03:55 )             38.1       Rad:   MRI brain- FLAIR and DWI hyperintensity in b/l BG/GP/left hippocampus and scattered throughout both cerebral and cerebellar hemispheres, no blood or mass.

## 2021-08-30 NOTE — PROGRESS NOTE ADULT - ASSESSMENT
46y Male who is followed by neurology because of anoxic/hypoxic brain injury    Hypoxic-ischemic encephalopathy  s/p likely drug overdose with benzodiazepine and cocaine  His MRI brain appearance shows evidence of significant hypoxic/anoxic injury and strokes  Unable to perform adequate exam given sedation with versed and fentanyl although less sedated today and still with poor neurological exam  However, given reported history, MRI findings and lack of improvement on exam since admission his prognosis for meaningful neurological recovery is dismal    Seizure-like activity  Hyperkinetic movements captured on cv-EEG without ictal correlate on EEG. No epileptiform discharges or seizure seen.

## 2021-08-30 NOTE — PROGRESS NOTE ADULT - SUBJECTIVE AND OBJECTIVE BOX
Patient is a 46y old  Male who presents with a chief complaint of Acute hypoxemic respiratory failure, AMS (30 Aug 2021 11:48)      BRIEF HOSPITAL COURSE: 47yo M who presented with respiratory failure and altered mental status. He had been found on the floor unresponsive, possible overdose, and had remained unresponsive for many days. However today he is more alert, actually gave a thumbs up to the attending. Plans had been made for Trach/Peg in 48 hours, will keep on schedule for now, but likely to not need if remains alert and responsive. He has had persistent fevers without clear source as his MRSA pneumonia has been well controlled with Vanco/Meropenem.   He had    Events last 24 hours: ***    PAST MEDICAL & SURGICAL HISTORY:  No pertinent past medical history    Asthma    HTN (hypertension)    DM (diabetes mellitus)    S/P elbow joint replacement  right elbow surgery.    S/P knee surgery  right thigh surgery.        Review of Systems:  CONSTITUTIONAL: No fever, chills, or fatigue  EYES: No eye pain, visual disturbances, or discharge  ENMT:  No difficulty hearing, tinnitus, vertigo; No sinus or throat pain  NECK: No pain or stiffness  RESPIRATORY: No cough, wheezing, chills or hemoptysis; No shortness of breath  CARDIOVASCULAR: No chest pain, palpitations, dizziness, or leg swelling  GASTROINTESTINAL: No abdominal or epigastric pain. No nausea, vomiting, or hematemesis; No diarrhea or constipation. No melena or hematochezia.  GENITOURINARY: No dysuria, frequency, hematuria, or incontinence  NEUROLOGICAL: No headaches, memory loss, loss of strength, numbness, or tremors  SKIN: No itching, burning, rashes, or lesions   MUSCULOSKELETAL: No joint pain or swelling; No muscle, back, or extremity pain  PSYCHIATRIC: No depression, anxiety, mood swings, or difficulty sleeping      Medications:  meropenem  IVPB 1000 milliGRAM(s) IV Intermittent every 8 hours  vancomycin  IVPB 1500 milliGRAM(s) IV Intermittent <User Schedule>    tamsulosin 0.4 milliGRAM(s) Oral at bedtime    ALBUTerol    90 MICROgram(s) HFA Inhaler 2 Puff(s) Inhalation every 6 hours PRN    acetaminophen    Suspension .. 650 milliGRAM(s) Oral every 6 hours PRN  clonazePAM  Tablet 1 milliGRAM(s) Oral three times a day  dexMEDEtomidine Infusion 0.2 MICROgram(s)/kG/Hr IV Continuous <Continuous>  fentaNYL    Injectable 50 MICROGram(s) IV Push every 6 hours PRN  fentaNYL   Infusion. 3 MICROgram(s)/kG/Hr IV Continuous <Continuous>  ibuprofen  Tablet. 400 milliGRAM(s) Oral every 8 hours PRN  QUEtiapine 50 milliGRAM(s) Oral every 12 hours      aspirin  chewable 81 milliGRAM(s) Oral daily  heparin   Injectable 5000 Unit(s) SubCutaneous every 8 hours    pantoprazole  Injectable 40 milliGRAM(s) IV Push daily      dextrose 40% Gel 15 Gram(s) Oral once  dextrose 50% Injectable 25 Gram(s) IV Push once  dextrose 50% Injectable 12.5 Gram(s) IV Push once  dextrose 50% Injectable 25 Gram(s) IV Push once  glucagon  Injectable 1 milliGRAM(s) IntraMuscular once  insulin lispro (ADMELOG) corrective regimen sliding scale   SubCutaneous every 6 hours  thyroid 30 milliGRAM(s) Oral daily    dextrose 5% + sodium chloride 0.45%. 1000 milliLiter(s) IV Continuous <Continuous>  dextrose 5%. 1000 milliLiter(s) IV Continuous <Continuous>  dextrose 5%. 1000 milliLiter(s) IV Continuous <Continuous>      chlorhexidine 0.12% Liquid 15 milliLiter(s) Oral Mucosa every 12 hours  chlorhexidine 2% Cloths 1 Application(s) Topical <User Schedule>        Mode: CPAP with PS  FiO2: 30  PEEP: 5  PS: 10  MAP: 10      ICU Vital Signs Last 24 Hrs  T(C): 38.8 (30 Aug 2021 12:00), Max: 38.8 (30 Aug 2021 12:00)  T(F): 101.8 (30 Aug 2021 12:00), Max: 101.8 (30 Aug 2021 12:00)  HR: 108 (30 Aug 2021 13:08) (86 - 135)  BP: 118/74 (30 Aug 2021 13:00) (101/80 - 137/114)  BP(mean): 88 (30 Aug 2021 13:00) (87 - 121)  ABP: --  ABP(mean): --  RR: 18 (30 Aug 2021 13:08) (10 - 33)  SpO2: 94% (30 Aug 2021 13:08) (94% - 100%)      ABG - ( 30 Aug 2021 04:40 )  pH, Arterial: 7.410 pH, Blood: x     /  pCO2: 45    /  pO2: 72    / HCO3: 28    / Base Excess: 3.9   /  SaO2: 96.4                I&O's Detail    29 Aug 2021 07:01  -  30 Aug 2021 07:00  --------------------------------------------------------  IN:    dextrose 5% + sodium chloride 0.45%: 1800 mL    FentaNYL: 892.8 mL    IV PiggyBack: 250 mL    IV PiggyBack: 100 mL    Jevity 1.5: 1200 mL    Midazolam: 282 mL  Total IN: 4524.8 mL    OUT:    Indwelling Catheter - Urethral (mL): 1460 mL    Intermittent Catheterization - Urethral (mL): 600 mL  Total OUT: 2060 mL    Total NET: 2464.8 mL            LABS:                        12.7   13.51 )-----------( 402      ( 30 Aug 2021 03:55 )             38.1     08-30    138  |  103  |  21.5<H>  ----------------------------<  114<H>  4.2   |  25.0  |  0.71    Ca    8.4<L>      30 Aug 2021 03:55  Phos  2.9     08-30  Mg     2.0     08-30            CAPILLARY BLOOD GLUCOSE      POCT Blood Glucose.: 110 mg/dL (30 Aug 2021 11:51)        CULTURES:  Culture Results:   No growth at 48 hours (08-28 @ 05:42)  Culture Results:   Normal Respiratory Le present (08-27 @ 22:07)  Culture Results:   No growth at 3 days. (08-23 @ 21:43)  Culture Results:   Testing in progress (08-23 @ 21:43)      Physical Examination:    General: No acute distress.      HEENT: Pupils equal, reactive to light.  Symmetric.    PULM: Clear to auscultation bilaterally, no significant sputum production    NECK: Supple, no lymphadenopathy, trachea midline    CVS: Regular rate and rhythm, no murmurs, rubs, or gallops    ABD: Soft, nondistended, nontender, normoactive bowel sounds, no masses    EXT: No edema, nontender    SKIN: Warm and well perfused, no rashes noted.    NEURO: Alert, oriented, interactive, nonfocal    DEVICES:     RADIOLOGY: ***    CRITICAL CARE TIME SPENT: ***   Patient is a 46y old  Male who presents with a chief complaint of Acute hypoxemic respiratory failure, AMS (30 Aug 2021 11:48)      BRIEF HOSPITAL COURSE: 47yo M who presented with respiratory failure and altered mental status. He had been found on the floor unresponsive, possible cocaine overdose with aspiration pneumonia/hypoxia, and had remained unresponsive for many days. However today he is more alert, actually gave a thumbs up to the attending. Plans had been made for Trach/Peg in 48 hours, will keep on schedule for now, but likely to not need if remains alert and responsive. He has had persistent fevers without clear source as his MRSA pneumonia has been well controlled with Vanco/Meropenem.   He had Echo that revealed EF <20%.    PAST MEDICAL & SURGICAL HISTORY:  Asthma  HTN (hypertension)  DM (diabetes mellitus)  S/P elbow joint replacement  right elbow surgery.  S/P knee surgery  right thigh surgery.      Review of Systems:  CONSTITUTIONAL: No fever, chills, or fatigue  EYES: No eye pain, visual disturbances, or discharge  ENMT:  No difficulty hearing, tinnitus, vertigo; No sinus or throat pain  NECK: No pain or stiffness  RESPIRATORY: No cough, wheezing, chills or hemoptysis; No shortness of breath  CARDIOVASCULAR: No chest pain, palpitations, dizziness, or leg swelling  GASTROINTESTINAL: No abdominal or epigastric pain. No nausea, vomiting, or hematemesis; No diarrhea or constipation. No melena or hematochezia.  GENITOURINARY: No dysuria, frequency, hematuria, or incontinence  NEUROLOGICAL: No headaches, memory loss, loss of strength, numbness, or tremors  SKIN: No itching, burning, rashes, or lesions   MUSCULOSKELETAL: No joint pain or swelling; No muscle, back, or extremity pain  PSYCHIATRIC: No depression, anxiety, mood swings, or difficulty sleeping      Medications:  meropenem  IVPB 1000 milliGRAM(s) IV Intermittent every 8 hours  vancomycin  IVPB 1500 milliGRAM(s) IV Intermittent <User Schedule>    tamsulosin 0.4 milliGRAM(s) Oral at bedtime    ALBUTerol    90 MICROgram(s) HFA Inhaler 2 Puff(s) Inhalation every 6 hours PRN    acetaminophen    Suspension .. 650 milliGRAM(s) Oral every 6 hours PRN  clonazePAM  Tablet 1 milliGRAM(s) Oral three times a day  dexMEDEtomidine Infusion 0.2 MICROgram(s)/kG/Hr IV Continuous <Continuous>  fentaNYL    Injectable 50 MICROGram(s) IV Push every 6 hours PRN  fentaNYL   Infusion. 3 MICROgram(s)/kG/Hr IV Continuous <Continuous>  ibuprofen  Tablet. 400 milliGRAM(s) Oral every 8 hours PRN  QUEtiapine 50 milliGRAM(s) Oral every 12 hours      aspirin  chewable 81 milliGRAM(s) Oral daily  heparin   Injectable 5000 Unit(s) SubCutaneous every 8 hours    pantoprazole  Injectable 40 milliGRAM(s) IV Push daily      dextrose 40% Gel 15 Gram(s) Oral once  dextrose 50% Injectable 25 Gram(s) IV Push once  dextrose 50% Injectable 12.5 Gram(s) IV Push once  dextrose 50% Injectable 25 Gram(s) IV Push once  glucagon  Injectable 1 milliGRAM(s) IntraMuscular once  insulin lispro (ADMELOG) corrective regimen sliding scale   SubCutaneous every 6 hours  thyroid 30 milliGRAM(s) Oral daily    dextrose 5% + sodium chloride 0.45%. 1000 milliLiter(s) IV Continuous <Continuous>  dextrose 5%. 1000 milliLiter(s) IV Continuous <Continuous>  dextrose 5%. 1000 milliLiter(s) IV Continuous <Continuous>      chlorhexidine 0.12% Liquid 15 milliLiter(s) Oral Mucosa every 12 hours  chlorhexidine 2% Cloths 1 Application(s) Topical <User Schedule>        Mode: CPAP with PS  FiO2: 30  PEEP: 5  PS: 10  MAP: 10      ICU Vital Signs Last 24 Hrs  T(C): 38.8 (30 Aug 2021 12:00), Max: 38.8 (30 Aug 2021 12:00)  T(F): 101.8 (30 Aug 2021 12:00), Max: 101.8 (30 Aug 2021 12:00)  HR: 108 (30 Aug 2021 13:08) (86 - 135)  BP: 118/74 (30 Aug 2021 13:00) (101/80 - 137/114)  BP(mean): 88 (30 Aug 2021 13:00) (87 - 121)  ABP: --  ABP(mean): --  RR: 18 (30 Aug 2021 13:08) (10 - 33)  SpO2: 94% (30 Aug 2021 13:08) (94% - 100%)      ABG - ( 30 Aug 2021 04:40 )  pH, Arterial: 7.410 pH, Blood: x     /  pCO2: 45    /  pO2: 72    / HCO3: 28    / Base Excess: 3.9   /  SaO2: 96.4                I&O's Detail    29 Aug 2021 07:01  -  30 Aug 2021 07:00  --------------------------------------------------------  IN:    dextrose 5% + sodium chloride 0.45%: 1800 mL    FentaNYL: 892.8 mL    IV PiggyBack: 250 mL    IV PiggyBack: 100 mL    Jevity 1.5: 1200 mL    Midazolam: 282 mL  Total IN: 4524.8 mL    OUT:    Indwelling Catheter - Urethral (mL): 1460 mL    Intermittent Catheterization - Urethral (mL): 600 mL  Total OUT: 2060 mL    Total NET: 2464.8 mL            LABS:                        12.7   13.51 )-----------( 402      ( 30 Aug 2021 03:55 )             38.1     08-30    138  |  103  |  21.5<H>  ----------------------------<  114<H>  4.2   |  25.0  |  0.71    Ca    8.4<L>      30 Aug 2021 03:55  Phos  2.9     08-30  Mg     2.0     08-30            CAPILLARY BLOOD GLUCOSE      POCT Blood Glucose.: 110 mg/dL (30 Aug 2021 11:51)        CULTURES:  Culture Results:   No growth at 48 hours (08-28 @ 05:42)  Culture Results:   Normal Respiratory Le present (08-27 @ 22:07)  Culture Results:   No growth at 3 days. (08-23 @ 21:43)  Culture Results:   Testing in progress (08-23 @ 21:43)      Physical Examination:    General: No acute distress.      HEENT: Pupils equal, reactive to light.  Symmetric.    PULM: Clear to auscultation bilaterally, no significant sputum production    NECK: Supple, no lymphadenopathy, trachea midline    CVS: Regular rate and rhythm, no murmurs, rubs, or gallops    ABD: Soft, nondistended, nontender, normoactive bowel sounds, no masses    EXT: No edema, nontender    SKIN: Warm and well perfused, no rashes noted.    NEURO: Alert, oriented, interactive, nonfocal    DEVICES:     RADIOLOGY: ***    CRITICAL CARE TIME SPENT: ***

## 2021-08-30 NOTE — PROGRESS NOTE ADULT - SUBJECTIVE AND OBJECTIVE BOX
Brief Hospital Course:  p/w acute hypoxic respiratory failure after being found unresponsive and with vomit by sister, intubated, tox screen + Benzos and cocaine, eventual MRI c/w anoxic brain, now with persistent fevers despite antibiotics and negative cultures, presumed neurogenic     Significant recent/past 24 hr events: no events    Subjective: unable to obtain due to patient's mental status     Review of Systems: unable to obtain due to patient's mental status     Patient is a 46y old  Male who presents with a chief complaint of Acute hypoxemic respiratory failure, AMS (29 Aug 2021 10:53)    HPI:  47 y/o M (real name: Ildefonso Pierre, : [birth year]) with a h/o HTN, DM, asthma, presents to the ED after being found unresponsive in bed by his sister this evening. Noted to have agonal respirations and emesis coming from mouth. He was found to be hypoxemic in the ED prompting emergent endotracheal intubation and appears to have an extensive right sided pneumonia on CXR. The patient's sister, Adela, reports that he had recently broken up with his girlfriend and has been having a hard time coping with it and has been noticeably depressed. Within the past 5 days he was taken to the ED at [facility] for some unclear intoxication. Adela also reports recently noticing a plethora of newly prescribed medications at his house and was able to identify clonazepam and alprazolam. Tonight he had told her that he was going out with friends and asked if she would come over and watch his dog, which she thought seemed unusual. Urine toxicology (+) for benzos and cocaine. CT head reveals symmetric low attenuation lesions in the bilateral globus pallidus regions of the basal ganglia, which can be seen with hypoxic ischemic encephalopathy or related to carbon monoxide positioning, as well as indeterminate areas of low attenuation in the bilateral cerebellum may reflect infarcts. He is sedated and now with progressive hypotension despite 3L IVF bolus. Started on IV vasopressor therapy. (21 Aug 2021 21:54)    PAST MEDICAL & SURGICAL HISTORY:  No pertinent past medical history  Asthma  HTN (hypertension)  DM (diabetes mellitus)  S/P elbow joint replacement  right elbow surgery.  S/P knee surgery  right thigh surgery.    Vitals   ICU Vital Signs Last 24 Hrs  T(C): 37.7 (30 Aug 2021 08:00), Max: 38.7 (29 Aug 2021 13:00)  T(F): 99.9 (30 Aug 2021 08:00), Max: 101.7 (29 Aug 2021 13:00)  HR: 106 (30 Aug 2021 07:00) (86 - 122)  BP: 125/99 (30 Aug 2021 07:00) (105/93 - 137/114)  BP(mean): 106 (30 Aug 2021 07:00) (87 - 121)  RR: 15 (30 Aug 2021 07:) (10 - 33)  SpO2: 100% (30 Aug 2021 07:00) (94% - 100%)    VENT SETTINGS   Mode: CPAP with PS  FiO2: 30  PEEP: 5  PS: 10  MAP: 9    ABG - ( 30 Aug 2021 04:40 )  pH, Arterial: 7.410 pH, Blood: x     /  pCO2: 45    /  pO2: 72    / HCO3: 28    / Base Excess: 3.9   /  SaO2: 96.4      29 Aug 2021 07:01  -  30 Aug 2021 07:00  --------------------------------------------------------  IN:    dextrose 5% + sodium chloride 0.45%: 1800 mL    FentaNYL: 892.8 mL    IV PiggyBack: 250 mL    IV PiggyBack: 100 mL    Jevity 1.5: 1200 mL    Midazolam: 282 mL  Total IN: 4524.8 mL    OUT:    Indwelling Catheter - Urethral (mL): 1460 mL    Intermittent Catheterization - Urethral (mL): 600 mL  Total OUT: 2060 mL  Total NET: 2464.8 mL    LABS                        12.7   13.51 )-----------( 402      ( 30 Aug 2021 03:55 )             38.1     0830    138  |  103  |  21.5<H>  ----------------------------<  114<H>  4.2   |  25.0  |  0.71    Ca    8.4<L>      30 Aug 2021 03:55  Phos  2.9       Mg     2.0         POCT Blood Glucose.: 86 mg/dL (21 @ 00:44)  POCT Blood Glucose.: 97 mg/dL (21 @ 17:47)  POCT Blood Glucose.: 93 mg/dL (21 @ 11:28)    MEDICATIONS  (STANDING):  aspirin  chewable 81 milliGRAM(s) Oral daily  chlorhexidine 0.12% Liquid 15 milliLiter(s) Oral Mucosa every 12 hours  chlorhexidine 2% Cloths 1 Application(s) Topical <User Schedule>  dextrose 40% Gel 15 Gram(s) Oral once  dextrose 5% + sodium chloride 0.45%. 1000 milliLiter(s) (75 mL/Hr) IV Continuous <Continuous>  dextrose 5%. 1000 milliLiter(s) (50 mL/Hr) IV Continuous <Continuous>  dextrose 5%. 1000 milliLiter(s) (100 mL/Hr) IV Continuous <Continuous>  dextrose 50% Injectable 25 Gram(s) IV Push once  dextrose 50% Injectable 12.5 Gram(s) IV Push once  dextrose 50% Injectable 25 Gram(s) IV Push once  fentaNYL   Infusion. 3 MICROgram(s)/kG/Hr (27.9 mL/Hr) IV Continuous <Continuous>  glucagon  Injectable 1 milliGRAM(s) IntraMuscular once  heparin   Injectable 5000 Unit(s) SubCutaneous every 8 hours  insulin lispro (ADMELOG) corrective regimen sliding scale   SubCutaneous every 6 hours  meropenem  IVPB 1000 milliGRAM(s) IV Intermittent every 8 hours  midazolam Infusion 0.02 mG/kG/Hr (1.86 mL/Hr) IV Continuous <Continuous>  pantoprazole  Injectable 40 milliGRAM(s) IV Push daily  thyroid 30 milliGRAM(s) Oral daily  vancomycin  IVPB 1500 milliGRAM(s) IV Intermittent <User Schedule>    MEDICATIONS  (PRN):  acetaminophen    Suspension .. 650 milliGRAM(s) Oral every 6 hours PRN Temp greater or equal to 38C (100.4F)  ALBUTerol    90 MICROgram(s) HFA Inhaler 2 Puff(s) Inhalation every 6 hours PRN Shortness of Breath  fentaNYL    Injectable 50 MICROGram(s) IV Push every 6 hours PRN Moderate Pain (4 - 6)  ibuprofen  Tablet. 400 milliGRAM(s) Oral every 8 hours PRN Temp greater or equal to 38C (100.4F)    Allergies:  Allergy Status Unknown  No Known Allergies    PE:   Gen: NAD  Neuro: unable to obtain due to patient's mental status  Neck: no JVD  CV: S1S2 RRR   Pulm: clear anteriorly  Abd: +BS soft NT ND  Ext: R foot edema, no cyanosis, WWP   Brief Hospital Course:  p/w acute hypoxic respiratory failure after being found unresponsive and with vomit by sister, intubated, tox screen + Benzos and cocaine, eventual MRI c/w anoxic brain, now with persistent fevers despite antibiotics and negative cultures, presumed neurogenic     Significant recent/past 24 hr events: improving neuro exam, still with low grade fevers    Subjective: unable to obtain due to patient's mental status,    Review of Systems: unable to obtain due to patient's mental status     Patient is a 46y old  Male who presents with a chief complaint of Acute hypoxemic respiratory failure, AMS (29 Aug 2021 10:53)    HPI:  45 y/o M (real name: Ildefonso Pierre, : [birth year]) with a h/o HTN, DM, asthma, presents to the ED after being found unresponsive in bed by his sister this evening. Noted to have agonal respirations and emesis coming from mouth. He was found to be hypoxemic in the ED prompting emergent endotracheal intubation and appears to have an extensive right sided pneumonia on CXR. The patient's sister, Adela, reports that he had recently broken up with his girlfriend and has been having a hard time coping with it and has been noticeably depressed. Within the past 5 days he was taken to the ED at [facility] for some unclear intoxication. Adela also reports recently noticing a plethora of newly prescribed medications at his house and was able to identify clonazepam and alprazolam. Tonight he had told her that he was going out with friends and asked if she would come over and watch his dog, which she thought seemed unusual. Urine toxicology (+) for benzos and cocaine. CT head reveals symmetric low attenuation lesions in the bilateral globus pallidus regions of the basal ganglia, which can be seen with hypoxic ischemic encephalopathy or related to carbon monoxide positioning, as well as indeterminate areas of low attenuation in the bilateral cerebellum may reflect infarcts. He is sedated and now with progressive hypotension despite 3L IVF bolus. Started on IV vasopressor therapy. (21 Aug 2021 21:54)    PAST MEDICAL & SURGICAL HISTORY:  No pertinent past medical history  Asthma  HTN (hypertension)  DM (diabetes mellitus)  S/P elbow joint replacement  right elbow surgery.  S/P knee surgery  right thigh surgery.    Vitals   ICU Vital Signs Last 24 Hrs  T(C): 37.7 (30 Aug 2021 08:00), Max: 38.7 (29 Aug 2021 13:00)  T(F): 99.9 (30 Aug 2021 08:00), Max: 101.7 (29 Aug 2021 13:00)  HR: 106 (30 Aug 2021 07:00) (86 - 122)  BP: 125/99 (30 Aug 2021 07:00) (105/93 - 137/114)  BP(mean): 106 (30 Aug 2021 07:00) (87 - 121)  RR: 15 (30 Aug 2021 07:00) (10 - 33)  SpO2: 100% (30 Aug 2021 07:00) (94% - 100%)    VENT SETTINGS   Mode: CPAP with PS  FiO2: 30  PEEP: 5  PS: 10  MAP: 9    ABG - ( 30 Aug 2021 04:40 )  pH, Arterial: 7.410 pH, Blood: x     /  pCO2: 45    /  pO2: 72    / HCO3: 28    / Base Excess: 3.9   /  SaO2: 96.4      29 Aug 2021 07:01  -  30 Aug 2021 07:00  --------------------------------------------------------  IN:    dextrose 5% + sodium chloride 0.45%: 1800 mL    FentaNYL: 892.8 mL    IV PiggyBack: 250 mL    IV PiggyBack: 100 mL    Jevity 1.5: 1200 mL    Midazolam: 282 mL  Total IN: 4524.8 mL    OUT:    Indwelling Catheter - Urethral (mL): 1460 mL    Intermittent Catheterization - Urethral (mL): 600 mL  Total OUT: 2060 mL  Total NET: 2464.8 mL    LABS                        12.7   13.51 )-----------( 402      ( 30 Aug 2021 03:55 )             38.1     08-30    138  |  103  |  21.5<H>  ----------------------------<  114<H>  4.2   |  25.0  |  0.71    Ca    8.4<L>      30 Aug 2021 03:55  Phos  2.9     08-30  Mg     2.0     08-30    POCT Blood Glucose.: 86 mg/dL (21 @ 00:44)  POCT Blood Glucose.: 97 mg/dL (21 @ 17:47)  POCT Blood Glucose.: 93 mg/dL (21 @ 11:28)    MEDICATIONS  (STANDING):  aspirin  chewable 81 milliGRAM(s) Oral daily  chlorhexidine 0.12% Liquid 15 milliLiter(s) Oral Mucosa every 12 hours  chlorhexidine 2% Cloths 1 Application(s) Topical <User Schedule>  dextrose 40% Gel 15 Gram(s) Oral once  dextrose 5% + sodium chloride 0.45%. 1000 milliLiter(s) (75 mL/Hr) IV Continuous <Continuous>  dextrose 5%. 1000 milliLiter(s) (50 mL/Hr) IV Continuous <Continuous>  dextrose 5%. 1000 milliLiter(s) (100 mL/Hr) IV Continuous <Continuous>  dextrose 50% Injectable 25 Gram(s) IV Push once  dextrose 50% Injectable 12.5 Gram(s) IV Push once  dextrose 50% Injectable 25 Gram(s) IV Push once  fentaNYL   Infusion. 3 MICROgram(s)/kG/Hr (27.9 mL/Hr) IV Continuous <Continuous>  glucagon  Injectable 1 milliGRAM(s) IntraMuscular once  heparin   Injectable 5000 Unit(s) SubCutaneous every 8 hours  insulin lispro (ADMELOG) corrective regimen sliding scale   SubCutaneous every 6 hours  meropenem  IVPB 1000 milliGRAM(s) IV Intermittent every 8 hours  midazolam Infusion 0.02 mG/kG/Hr (1.86 mL/Hr) IV Continuous <Continuous>  pantoprazole  Injectable 40 milliGRAM(s) IV Push daily  thyroid 30 milliGRAM(s) Oral daily  vancomycin  IVPB 1500 milliGRAM(s) IV Intermittent <User Schedule>    MEDICATIONS  (PRN):  acetaminophen    Suspension .. 650 milliGRAM(s) Oral every 6 hours PRN Temp greater or equal to 38C (100.4F)  ALBUTerol    90 MICROgram(s) HFA Inhaler 2 Puff(s) Inhalation every 6 hours PRN Shortness of Breath  fentaNYL    Injectable 50 MICROGram(s) IV Push every 6 hours PRN Moderate Pain (4 - 6)  ibuprofen  Tablet. 400 milliGRAM(s) Oral every 8 hours PRN Temp greater or equal to 38C (100.4F)    Allergies:  Allergy Status Unknown  No Known Allergies    PE:   Gen: NAD  Neuro: opens eyes to name, squeezes hands b/l to command, weak but equal strength, wiggles toes b/l to command  Neck: no JVD  CV: S1S2 RRR   Pulm: clear anteriorly  Abd: +BS soft NT ND  Ext: R foot edema, no cyanosis, WWP

## 2021-08-30 NOTE — PROGRESS NOTE ADULT - SUBJECTIVE AND OBJECTIVE BOX
OVERNIGHT EVENTS: no acute issues     Present Symptoms:     Dyspnea: 0  Nausea/Vomiting: No  Anxiety:  unable   Depression: unable   Fatigue: unable   Loss of appetite: unable     Pain: none             Character-            Duration-            Effect-            Factors-            Frequency-            Location-            Severity-    Review of Systems: Reviewed                  Unable to obtain due to poor mentation   All others negative    MEDICATIONS  (STANDING):  aspirin  chewable 81 milliGRAM(s) Oral daily  chlorhexidine 0.12% Liquid 15 milliLiter(s) Oral Mucosa every 12 hours  chlorhexidine 2% Cloths 1 Application(s) Topical <User Schedule>  clonazePAM  Tablet 1 milliGRAM(s) Oral three times a day  dextrose 40% Gel 15 Gram(s) Oral once  dextrose 5% + sodium chloride 0.45%. 1000 milliLiter(s) (75 mL/Hr) IV Continuous <Continuous>  dextrose 5%. 1000 milliLiter(s) (50 mL/Hr) IV Continuous <Continuous>  dextrose 5%. 1000 milliLiter(s) (100 mL/Hr) IV Continuous <Continuous>  dextrose 50% Injectable 25 Gram(s) IV Push once  dextrose 50% Injectable 12.5 Gram(s) IV Push once  dextrose 50% Injectable 25 Gram(s) IV Push once  fentaNYL   Infusion. 3 MICROgram(s)/kG/Hr (27.9 mL/Hr) IV Continuous <Continuous>  glucagon  Injectable 1 milliGRAM(s) IntraMuscular once  heparin   Injectable 5000 Unit(s) SubCutaneous every 8 hours  insulin lispro (ADMELOG) corrective regimen sliding scale   SubCutaneous every 6 hours  meropenem  IVPB 1000 milliGRAM(s) IV Intermittent every 8 hours  midazolam Infusion 0.02 mG/kG/Hr (1.86 mL/Hr) IV Continuous <Continuous>  pantoprazole  Injectable 40 milliGRAM(s) IV Push daily  QUEtiapine 50 milliGRAM(s) Oral every 12 hours  tamsulosin 0.4 milliGRAM(s) Oral at bedtime  thyroid 30 milliGRAM(s) Oral daily  vancomycin  IVPB 1500 milliGRAM(s) IV Intermittent <User Schedule>    MEDICATIONS  (PRN):  acetaminophen    Suspension .. 650 milliGRAM(s) Oral every 6 hours PRN Temp greater or equal to 38C (100.4F)  ALBUTerol    90 MICROgram(s) HFA Inhaler 2 Puff(s) Inhalation every 6 hours PRN Shortness of Breath  fentaNYL    Injectable 50 MICROGram(s) IV Push every 6 hours PRN Moderate Pain (4 - 6)  ibuprofen  Tablet. 400 milliGRAM(s) Oral every 8 hours PRN Temp greater or equal to 38C (100.4F)    PHYSICAL EXAM:    Vital Signs Last 24 Hrs  T(C): 38.6 (30 Aug 2021 11:14), Max: 38.7 (29 Aug 2021 13:00)  T(F): 101.5 (30 Aug 2021 11:14), Max: 101.7 (29 Aug 2021 13:00)  HR: 109 (30 Aug 2021 10:00) (86 - 122)  BP: 101/80 (30 Aug 2021 10:00) (101/80 - 137/114)  BP(mean): 88 (30 Aug 2021 10:00) (87 - 121)  RR: 12 (30 Aug 2021 10:00) (10 - 33)  SpO2: 98% (30 Aug 2021 10:00) (94% - 100%)    General: ventilator dependent     Karnofsky:  10 %    HEENT: ET tube     Lungs: comfortable     CV: normal      GI: normal      : kati    MSK: bedbound/wheelchair bound    Skin: no rash    LABS:                      12.7   13.51 )-----------( 402      ( 30 Aug 2021 03:55 )             38.1     08-30    138  |  103  |  21.5<H>  ----------------------------<  114<H>  4.2   |  25.0  |  0.71    Ca    8.4<L>      30 Aug 2021 03:55  Phos  2.9     08-30  Mg     2.0     08-30    I&O's Summary    29 Aug 2021 07:01  -  30 Aug 2021 07:00  --------------------------------------------------------  IN: 4524.8 mL / OUT: 2060 mL / NET: 2464.8 mL    RADIOLOGY & ADDITIONAL STUDIES:    ADVANCE DIRECTIVES: Full code

## 2021-08-30 NOTE — CHART NOTE - NSCHARTNOTEFT_GEN_A_CORE
Palliative care social work note.    SUE reviewed clinical case and family dynamics with palliative care physician Dr Harris. SW contacted patients sister Beverly who lives in [place]. Beverly actively engaged with SW and addressed her concerns with patients medical issues and verbalized differencing opinions with her sister Pinky. Beverly states that she feels that he sister is rushing things and feels that she just wants to discontinue machines. Beverly states she wants more time and stated that she was part of meeting held last week and wishes to continue to be part of additional meetings. Palliative attempting to coordinate follow up family meeting.

## 2021-08-30 NOTE — PROGRESS NOTE ADULT - PROBLEM SELECTOR PLAN 1
hemodynamically stable, on CPAP with minimal O2 requirements at this time +5/30%  still with fevers, on broad spectrum antibiotics, recent cultures neg, possibly neurogenic  plan for trach peg tues/wed  COVID swab today   thoracic surgery to follow  d/w Dr. Velazquez. hemodynamically stable, on CPAP with minimal O2 requirements at this time +5/30%  still with fevers, on broad spectrum antibiotics, recent cultures neg, possibly neurogenic  plan for trach peg tues/wed  COVID neg 8/28, will need repeat if does not got to OR tomorrow   thoracic surgery to follow  d/w Dr. Velazquez. hemodynamically stable, on CPAP with minimal O2 requirements at this time +5/30%  still with fevers, on broad spectrum antibiotics, recent cultures neg, possibly neurogenic  marked improvement in neuro status  d/w MICU, will likely consider trial extubation in next 24-48 hours  tentatively on schedule for trach peg wed  COVID neg 8/28, will need repeat if going to OR  thoracic surgery to follow  d/w Dr. Velazquez.

## 2021-08-31 PROBLEM — I10 ESSENTIAL (PRIMARY) HYPERTENSION: Chronic | Status: ACTIVE | Noted: 2021-08-21

## 2021-08-31 PROBLEM — J45.909 UNSPECIFIED ASTHMA, UNCOMPLICATED: Chronic | Status: ACTIVE | Noted: 2021-08-21

## 2021-08-31 LAB
ANION GAP SERPL CALC-SCNC: 10 MMOL/L — SIGNIFICANT CHANGE UP (ref 5–17)
B BURGDOR DNA SPEC QL NAA+PROBE: NEGATIVE — SIGNIFICANT CHANGE UP
BUN SERPL-MCNC: 17.9 MG/DL — SIGNIFICANT CHANGE UP (ref 8–20)
CALCIUM SERPL-MCNC: 8.5 MG/DL — LOW (ref 8.6–10.2)
CHLORIDE SERPL-SCNC: 104 MMOL/L — SIGNIFICANT CHANGE UP (ref 98–107)
CO2 SERPL-SCNC: 24 MMOL/L — SIGNIFICANT CHANGE UP (ref 22–29)
CREAT SERPL-MCNC: 0.74 MG/DL — SIGNIFICANT CHANGE UP (ref 0.5–1.3)
GAS PNL BLDA: SIGNIFICANT CHANGE UP
GLUCOSE BLDC GLUCOMTR-MCNC: 123 MG/DL — HIGH (ref 70–99)
GLUCOSE BLDC GLUCOMTR-MCNC: 141 MG/DL — HIGH (ref 70–99)
GLUCOSE SERPL-MCNC: 128 MG/DL — HIGH (ref 70–99)
HCT VFR BLD CALC: 32.3 % — LOW (ref 39–50)
HGB BLD-MCNC: 11.1 G/DL — LOW (ref 13–17)
MAGNESIUM SERPL-MCNC: 1.9 MG/DL — SIGNIFICANT CHANGE UP (ref 1.6–2.6)
MCHC RBC-ENTMCNC: 31 PG — SIGNIFICANT CHANGE UP (ref 27–34)
MCHC RBC-ENTMCNC: 34.4 GM/DL — SIGNIFICANT CHANGE UP (ref 32–36)
MCV RBC AUTO: 90.2 FL — SIGNIFICANT CHANGE UP (ref 80–100)
PHOSPHATE SERPL-MCNC: 3.1 MG/DL — SIGNIFICANT CHANGE UP (ref 2.4–4.7)
PLATELET # BLD AUTO: 418 K/UL — HIGH (ref 150–400)
POTASSIUM SERPL-MCNC: 4.3 MMOL/L — SIGNIFICANT CHANGE UP (ref 3.5–5.3)
POTASSIUM SERPL-SCNC: 4.3 MMOL/L — SIGNIFICANT CHANGE UP (ref 3.5–5.3)
RBC # BLD: 3.58 M/UL — LOW (ref 4.2–5.8)
RBC # FLD: 12.4 % — SIGNIFICANT CHANGE UP (ref 10.3–14.5)
SODIUM SERPL-SCNC: 138 MMOL/L — SIGNIFICANT CHANGE UP (ref 135–145)
VANCOMYCIN TROUGH SERPL-MCNC: 15.6 UG/ML — SIGNIFICANT CHANGE UP (ref 10–20)
WBC # BLD: 10.74 K/UL — HIGH (ref 3.8–10.5)
WBC # FLD AUTO: 10.74 K/UL — HIGH (ref 3.8–10.5)

## 2021-08-31 PROCEDURE — 99232 SBSQ HOSP IP/OBS MODERATE 35: CPT

## 2021-08-31 PROCEDURE — 71045 X-RAY EXAM CHEST 1 VIEW: CPT | Mod: 26

## 2021-08-31 PROCEDURE — 99233 SBSQ HOSP IP/OBS HIGH 50: CPT

## 2021-08-31 RX ORDER — IPRATROPIUM/ALBUTEROL SULFATE 18-103MCG
3 AEROSOL WITH ADAPTER (GRAM) INHALATION EVERY 6 HOURS
Refills: 0 | Status: COMPLETED | OUTPATIENT
Start: 2021-08-31 | End: 2021-09-02

## 2021-08-31 RX ORDER — QUETIAPINE FUMARATE 200 MG/1
50 TABLET, FILM COATED ORAL EVERY 6 HOURS
Refills: 0 | Status: DISCONTINUED | OUTPATIENT
Start: 2021-08-31 | End: 2021-08-31

## 2021-08-31 RX ORDER — ONDANSETRON 8 MG/1
4 TABLET, FILM COATED ORAL ONCE
Refills: 0 | Status: COMPLETED | OUTPATIENT
Start: 2021-08-31 | End: 2021-08-31

## 2021-08-31 RX ORDER — FUROSEMIDE 40 MG
60 TABLET ORAL ONCE
Refills: 0 | Status: COMPLETED | OUTPATIENT
Start: 2021-08-31 | End: 2021-08-31

## 2021-08-31 RX ORDER — CLONAZEPAM 1 MG
0.5 TABLET ORAL EVERY 12 HOURS
Refills: 0 | Status: DISCONTINUED | OUTPATIENT
Start: 2021-08-31 | End: 2021-09-02

## 2021-08-31 RX ORDER — QUETIAPINE FUMARATE 200 MG/1
50 TABLET, FILM COATED ORAL EVERY 12 HOURS
Refills: 0 | Status: DISCONTINUED | OUTPATIENT
Start: 2021-08-31 | End: 2021-08-31

## 2021-08-31 RX ORDER — QUETIAPINE FUMARATE 200 MG/1
50 TABLET, FILM COATED ORAL AT BEDTIME
Refills: 0 | Status: DISCONTINUED | OUTPATIENT
Start: 2021-08-31 | End: 2021-08-31

## 2021-08-31 RX ORDER — BUDESONIDE, MICRONIZED 100 %
0.5 POWDER (GRAM) MISCELLANEOUS EVERY 12 HOURS
Refills: 0 | Status: DISCONTINUED | OUTPATIENT
Start: 2021-08-31 | End: 2021-09-07

## 2021-08-31 RX ADMIN — MEROPENEM 100 MILLIGRAM(S): 1 INJECTION INTRAVENOUS at 18:45

## 2021-08-31 RX ADMIN — HEPARIN SODIUM 5000 UNIT(S): 5000 INJECTION INTRAVENOUS; SUBCUTANEOUS at 14:13

## 2021-08-31 RX ADMIN — Medication 650 MILLIGRAM(S): at 18:44

## 2021-08-31 RX ADMIN — Medication 2 MILLIGRAM(S): at 21:07

## 2021-08-31 RX ADMIN — Medication 0.5 MILLIGRAM(S): at 23:55

## 2021-08-31 RX ADMIN — HEPARIN SODIUM 5000 UNIT(S): 5000 INJECTION INTRAVENOUS; SUBCUTANEOUS at 21:07

## 2021-08-31 RX ADMIN — QUETIAPINE FUMARATE 50 MILLIGRAM(S): 200 TABLET, FILM COATED ORAL at 06:08

## 2021-08-31 RX ADMIN — ONDANSETRON 4 MILLIGRAM(S): 8 TABLET, FILM COATED ORAL at 19:00

## 2021-08-31 RX ADMIN — Medication 300 MILLIGRAM(S): at 09:57

## 2021-08-31 RX ADMIN — CHLORHEXIDINE GLUCONATE 15 MILLILITER(S): 213 SOLUTION TOPICAL at 06:08

## 2021-08-31 RX ADMIN — DEXMEDETOMIDINE HYDROCHLORIDE IN 0.9% SODIUM CHLORIDE 4.66 MICROGRAM(S)/KG/HR: 4 INJECTION INTRAVENOUS at 04:32

## 2021-08-31 RX ADMIN — DEXMEDETOMIDINE HYDROCHLORIDE IN 0.9% SODIUM CHLORIDE 4.66 MICROGRAM(S)/KG/HR: 4 INJECTION INTRAVENOUS at 08:17

## 2021-08-31 RX ADMIN — HEPARIN SODIUM 5000 UNIT(S): 5000 INJECTION INTRAVENOUS; SUBCUTANEOUS at 06:08

## 2021-08-31 RX ADMIN — MEROPENEM 100 MILLIGRAM(S): 1 INJECTION INTRAVENOUS at 11:09

## 2021-08-31 RX ADMIN — PANTOPRAZOLE SODIUM 40 MILLIGRAM(S): 20 TABLET, DELAYED RELEASE ORAL at 11:10

## 2021-08-31 RX ADMIN — Medication 650 MILLIGRAM(S): at 09:58

## 2021-08-31 RX ADMIN — DEXMEDETOMIDINE HYDROCHLORIDE IN 0.9% SODIUM CHLORIDE 4.66 MICROGRAM(S)/KG/HR: 4 INJECTION INTRAVENOUS at 00:39

## 2021-08-31 RX ADMIN — Medication 30 MILLIGRAM(S): at 06:08

## 2021-08-31 RX ADMIN — Medication 60 MILLIGRAM(S): at 08:17

## 2021-08-31 RX ADMIN — MEROPENEM 100 MILLIGRAM(S): 1 INJECTION INTRAVENOUS at 03:25

## 2021-08-31 RX ADMIN — CHLORHEXIDINE GLUCONATE 1 APPLICATION(S): 213 SOLUTION TOPICAL at 06:09

## 2021-08-31 RX ADMIN — DEXMEDETOMIDINE HYDROCHLORIDE IN 0.9% SODIUM CHLORIDE 4.66 MICROGRAM(S)/KG/HR: 4 INJECTION INTRAVENOUS at 05:58

## 2021-08-31 RX ADMIN — DEXMEDETOMIDINE HYDROCHLORIDE IN 0.9% SODIUM CHLORIDE 4.66 MICROGRAM(S)/KG/HR: 4 INJECTION INTRAVENOUS at 02:03

## 2021-08-31 RX ADMIN — Medication 300 MILLIGRAM(S): at 21:07

## 2021-08-31 RX ADMIN — Medication 81 MILLIGRAM(S): at 11:10

## 2021-08-31 NOTE — SWALLOW BEDSIDE ASSESSMENT ADULT - SWALLOW EVAL: DIAGNOSIS
Pt presents w/moderate oral dysphagia, marked by initial refusal for PO, w/+benefit from verbal/tactile cue, subsequent reflexive biting on utensil noted, without significant improvement from cues. Small amounts of puree placed on anterior labial surface, with eventual initiation of bolus propulsion following manual elevation of mandible by SLP. Suspected pharyngeal dysphagia w/small amounts of puree, +congestion noted via cervical auscultation post PO intake, w/delayed weak cough observed x 1/2. No further PO provided. Pt presents w/moderate-severe oral dysphagia (negatively impacted upon by poor cognitive state), marked by initial refusal for PO, w/+benefit from verbal/tactile cue, subsequent reflexive biting on utensil noted, without significant improvement from cues. Small amounts of puree placed on anterior labial surface, with eventual initiation of bolus propulsion following manual elevation of mandible by SLP. Suspected pharyngeal dysphagia w/small amounts of puree, +congestion noted via cervical auscultation post PO intake, w/delayed weak cough observed x 1/2. No further PO provided.

## 2021-08-31 NOTE — CHART NOTE - NSCHARTNOTEFT_GEN_A_CORE
CTS PA    Asked to eval patient for trach/peg. Patient successfully extubated today. Will sign off, please reconsult if needed.  Thank you.

## 2021-08-31 NOTE — PROGRESS NOTE ADULT - ASSESSMENT
45yo M who presented with respiratory failure and altered mental status. He had been found on the floor unresponsive, possible cocaine overdose with aspiration pneumonia/hypoxia, and had remained unresponsive for many days. However today he is more alert, actually gave a thumbs up to the attending. Plans had been made for Trach/Peg in 48 hours, will keep on schedule for now, but likely to not need if remains alert and responsive. He has had persistent fevers without clear source as his MRSA pneumonia has been well controlled with Vanco/Meropenem.   He had Echo that revealed EF <20%.      1- acute hypoxemic resp failure   2-  drug overdose  3- MRI brain with anoxic  changes   4- MRSA pneumonia  treated  5- heart failure  EF < 20 %   ? cath  6- urinary retention  with  parikh  on flomax    neuro improved neurologic status  precedex  versed fentanyl increase  seroquil to   q 6   taper off  IV meds     resp    repeat cxr   diuresis   with   trial of psv  cpap  in am     gu   parikh   voiding trial      ID  vanco   merrem check vanco level     gi  ppi  feeds    heme heparin sq    cvs  coreg hydralazine    lasix     Critical  care TIme  > 35 minutes were spent assessing the patient's presenting problems of acute illness that pose a high probability   of life threatening  deterioration or end organ damage / dysfunction.  Medical desicion making includes initiation/ continuation of plan or care review data/labwork/  radiographic study,  direct patient  care bedside ,  discussions with  consultants regarding care,     evaluation  and  interpretation of vital signs,  any necessary ventilator management,   NIV or BIPAP changes  or initiation,    discussions with multidisipliary team,  am or pm rounds,  discussions of goals of care with patient and family  all non-inclusive of procedures.

## 2021-08-31 NOTE — PROGRESS NOTE ADULT - SUBJECTIVE AND OBJECTIVE BOX
MICU    Patient is a 46y old  Male who presents with a chief complaint of Acute hypoxemic respiratory failure, AMS (30 Aug 2021 13:31)      BRIEF HOSPITAL COURSE: **47yo M who presented with respiratory failure and altered mental status. He had been found on the floor unresponsive, possible cocaine overdose with aspiration pneumonia/hypoxia, and had remained unresponsive for many days. However today he is more alert, actually gave a thumbs up to the attending. Plans had been made for Trach/Peg in 48 hours, will keep on schedule for now, but likely to not need if remains alert and responsive. He has had persistent fevers without clear source as his MRSA pneumonia has been well controlled with Vanco/Meropenem.   He had Echo that revealed EF <20%.  *    Events last 24 hours: **more alert and  moving  at times uncomfortable  on precedex  30 %  rate 16/ 500/ peep 5  afebrile     ros cannot obtain     PAST MEDICAL & SURGICAL HISTORY:  No pertinent past medical history    Asthma    HTN (hypertension)    DM (diabetes mellitus)    S/P elbow joint replacement  right elbow surgery.    S/P knee surgery  right thigh surgery.          Medications:  meropenem  IVPB 1000 milliGRAM(s) IV Intermittent every 8 hours  vancomycin  IVPB 1500 milliGRAM(s) IV Intermittent <User Schedule>    doxazosin 2 milliGRAM(s) Oral at bedtime    ALBUTerol    90 MICROgram(s) HFA Inhaler 2 Puff(s) Inhalation every 6 hours PRN    acetaminophen    Suspension .. 650 milliGRAM(s) Oral every 6 hours PRN  clonazePAM  Tablet 1 milliGRAM(s) Oral three times a day  dexMEDEtomidine Infusion 0.2 MICROgram(s)/kG/Hr IV Continuous <Continuous>  fentaNYL    Injectable 50 MICROGram(s) IV Push every 6 hours PRN  fentaNYL   Infusion. 3 MICROgram(s)/kG/Hr IV Continuous <Continuous>  ibuprofen  Tablet. 400 milliGRAM(s) Oral every 8 hours PRN  midazolam Injectable 4 milliGRAM(s) IV Push every 4 hours PRN  QUEtiapine 50 milliGRAM(s) Oral every 12 hours      aspirin  chewable 81 milliGRAM(s) Oral daily  heparin   Injectable 5000 Unit(s) SubCutaneous every 8 hours    pantoprazole  Injectable 40 milliGRAM(s) IV Push daily      dextrose 40% Gel 15 Gram(s) Oral once  dextrose 50% Injectable 25 Gram(s) IV Push once  dextrose 50% Injectable 12.5 Gram(s) IV Push once  dextrose 50% Injectable 25 Gram(s) IV Push once  glucagon  Injectable 1 milliGRAM(s) IntraMuscular once  insulin lispro (ADMELOG) corrective regimen sliding scale   SubCutaneous every 6 hours  thyroid 30 milliGRAM(s) Oral daily    dextrose 5% + sodium chloride 0.45%. 1000 milliLiter(s) IV Continuous <Continuous>  dextrose 5%. 1000 milliLiter(s) IV Continuous <Continuous>  dextrose 5%. 1000 milliLiter(s) IV Continuous <Continuous>      chlorhexidine 0.12% Liquid 15 milliLiter(s) Oral Mucosa every 12 hours  chlorhexidine 2% Cloths 1 Application(s) Topical <User Schedule>        Mode: AC/ CMV (Assist Control/ Continuous Mandatory Ventilation)  RR (machine): 16  TV (machine): 500  FiO2: 30  PEEP: 5  MAP: 11  PIP: 36      ICU Vital Signs Last 24 Hrs  T(C): 37.5 (30 Aug 2021 23:41), Max: 38.8 (30 Aug 2021 12:00)  T(F): 99.5 (30 Aug 2021 23:41), Max: 101.8 (30 Aug 2021 12:00)  HR: 77 (31 Aug 2021 01:00) (77 - 135)  BP: 114/83 (31 Aug 2021 01:00) (101/80 - 133/104)  BP(mean): 93 (31 Aug 2021 01:00) (87 - 113)  ABP: --  ABP(mean): --  RR: 16 (31 Aug 2021 01:00) (11 - 29)  SpO2: 94% (31 Aug 2021 01:00) (92% - 100%)      ABG - ( 30 Aug 2021 04:40 )  pH, Arterial: 7.410 pH, Blood: x     /  pCO2: 45    /  pO2: 72    / HCO3: 28    / Base Excess: 3.9   /  SaO2: 96.4                I&O's Detail    29 Aug 2021 07:01  -  30 Aug 2021 07:00  --------------------------------------------------------  IN:    dextrose 5% + sodium chloride 0.45%: 1800 mL    FentaNYL: 892.8 mL    IV PiggyBack: 250 mL    IV PiggyBack: 100 mL    Jevity 1.5: 1200 mL    Midazolam: 282 mL  Total IN: 4524.8 mL    OUT:    Indwelling Catheter - Urethral (mL): 1460 mL    Intermittent Catheterization - Urethral (mL): 600 mL  Total OUT: 2060 mL    Total NET: 2464.8 mL      30 Aug 2021 07:01  -  31 Aug 2021 02:09  --------------------------------------------------------  IN:    Dexmedetomidine: 172.4 mL    dextrose 5% + sodium chloride 0.45%: 900 mL    FentaNYL: 206 mL    IV PiggyBack: 200 mL    Jevity 1.5: 600 mL    Midazolam: 30.7 mL  Total IN: 2109.1 mL    OUT:    Indwelling Catheter - Urethral (mL): 1975 mL  Total OUT: 1975 mL    Total NET: 134.1 mL            LABS:                        12.7   13.51 )-----------( 402      ( 30 Aug 2021 03:55 )             38.1     08-30    138  |  103  |  21.5<H>  ----------------------------<  114<H>  4.2   |  25.0  |  0.71    Ca    8.4<L>      30 Aug 2021 03:55  Phos  2.9     08-30  Mg     2.0     08-30            CAPILLARY BLOOD GLUCOSE      POCT Blood Glucose.: 120 mg/dL (30 Aug 2021 23:53)        CULTURES:  Culture Results:   No growth at 48 hours (08-28 @ 05:42)  Culture Results:   Normal Respiratory Le present (08-27 @ 22:07)      Physical Examination:    General: well built male  sedated No acute distress.      HEENT: Pupils equal, reactive to light.  Symmetric.  ng  and et in place     PULM: Clear to auscultation bilaterally, no significant sputum production    NECK: Supple, no lymphadenopathy, trachea midline    CVS: Regular rate and rhythm, no murmurs, rubs, or gallops    ABD: Soft, nondistended, nontender, normoactive bowel sounds, no masses    EXT: No edema, nontender    SKIN: Warm and well perfused, no rashes noted.    NEURO:  sedated     gu parikh in place   DEVICES:     RADIOLOGY: *The lungs show unchanged RIGHT perihilar/basilar diffuse airspace disease and/or effusion. LEFT lung parenchyma clear.. No pneumothorax.    The  heart is mildly enlarged in transverse diameter. No hilar mass.       Visualized osseous structures are intact.    IMPRESSION:   No interval change..    --- Endof Report ---            BREANA POLANCO MD; Attending Radiologist  This document has been electronically signed. Aug 24 2021 10:53AM  **    CRITICAL CARE TIME SPENT: ***

## 2021-08-31 NOTE — PROGRESS NOTE ADULT - ASSESSMENT
46M with HTN, DM, asthma, here with vent dependent respiratory failure, unresponsiveness, concerns for anoxic injury,  GARRETT, NSTEMI, acute systolic CHF, s/p shock (off pressors), possible aspiration related event on zosyn,  urine toxicology + for benzodiazepines and cocaine.     #1 Ventilator dependence - poor overall prognosis for meaningful weaning given poor mental status. MRI revealing anoxic injury.   #2 Anoxic brain injury - post possible overdose. MRI showing anoxic injury. patient  #3 Shock - possible aspiration event, resolved on antibiotics.   #4 Acute systolic CHF - not a candidate for intervention at this time.   #5 GARRETT - improved.  #6 Encounter for palliative care  46M with HTN, DM, asthma, here with vent dependent respiratory failure, unresponsiveness, concerns for anoxic injury,  GARRETT, NSTEMI, acute systolic CHF, s/p shock (off pressors), possible aspiration related event on zosyn,  urine toxicology + for benzodiazepines and cocaine.     #1 Ventilator dependence - poor overall prognosis for meaningful weaning given poor mental status. MRI revealing anoxic injury. now extubated 8/31.   #2 Anoxic brain injury - post possible overdose. MRI showing anoxic injury. patient more awake now, will wait and see how he does.   #3 Shock - possible aspiration event, resolved on antibiotics.   #4 Acute systolic CHF - repeat tte.   #5 GARRETT - improved.  #6 Encounter for palliative care - patient more awake and now extubated. Spoke with sister Carmita and ex wife Araceli to update them about his change in condition. I explained we still have to see how he progresses and explained he still has risk of deterioration and possible aspiration and reintubation. They will be coming to visit later.

## 2021-08-31 NOTE — SWALLOW BEDSIDE ASSESSMENT ADULT - SLP GENERAL OBSERVATIONS
Pt recd awake, yet lethargic, in bed, A&A Ox0, confusion w/unintelligible speech noted, absent command following, +HFNC 40% 40L SpO2 91-94% throughout, 0/10 nonverbal pain pre/post Pt recd awake, yet lethargic, in bed, A&A Ox0, confusion w/unintelligible speech noted, absent command following, +HFNC 40% 40L SpO2 91-94% throughout, +NGT, 0/10 nonverbal pain pre/post

## 2021-08-31 NOTE — SWALLOW BEDSIDE ASSESSMENT ADULT - SLP PERTINENT HISTORY OF CURRENT PROBLEM
As per MD note, "8/21 p/w acute hypoxic respiratory failure after being found unresponsive and with vomit by sister, intubated, tox screen + Benzos and cocaine, eventual MRI c/w anoxic brain, now with persistent fevers despite antibiotics and negative cultures, presumed neurogenic".

## 2021-08-31 NOTE — PROGRESS NOTE ADULT - SUBJECTIVE AND OBJECTIVE BOX
OVERNIGHT EVENTS: much more awake and alert, now extubated.    Present Symptoms:     Dyspnea: 0-1   Nausea/Vomiting: No  Anxiety:  No  Depression: unable   Fatigue: Yes  Loss of appetite: No    Pain: none currently             Character-            Duration-            Effect-            Factors-            Frequency-            Location-            Severity-    Review of Systems: Reviewed                     Negative: no chest pain                   All others negative    MEDICATIONS  (STANDING):  aspirin  chewable 81 milliGRAM(s) Oral daily  chlorhexidine 2% Cloths 1 Application(s) Topical <User Schedule>  dextrose 40% Gel 15 Gram(s) Oral once  dextrose 5%. 1000 milliLiter(s) (100 mL/Hr) IV Continuous <Continuous>  dextrose 5%. 1000 milliLiter(s) (50 mL/Hr) IV Continuous <Continuous>  dextrose 50% Injectable 25 Gram(s) IV Push once  dextrose 50% Injectable 12.5 Gram(s) IV Push once  dextrose 50% Injectable 25 Gram(s) IV Push once  doxazosin 2 milliGRAM(s) Oral at bedtime  glucagon  Injectable 1 milliGRAM(s) IntraMuscular once  heparin   Injectable 5000 Unit(s) SubCutaneous every 8 hours  insulin lispro (ADMELOG) corrective regimen sliding scale   SubCutaneous every 6 hours  meropenem  IVPB 1000 milliGRAM(s) IV Intermittent every 8 hours  pantoprazole  Injectable 40 milliGRAM(s) IV Push daily  QUEtiapine 50 milliGRAM(s) Oral every 12 hours  thyroid 30 milliGRAM(s) Oral daily  vancomycin  IVPB 1500 milliGRAM(s) IV Intermittent <User Schedule>    MEDICATIONS  (PRN):  acetaminophen    Suspension .. 650 milliGRAM(s) Oral every 6 hours PRN Temp greater or equal to 38C (100.4F)  ALBUTerol    90 MICROgram(s) HFA Inhaler 2 Puff(s) Inhalation every 6 hours PRN Shortness of Breath  ibuprofen  Tablet. 400 milliGRAM(s) Oral every 8 hours PRN Temp greater or equal to 38C (100.4F)    PHYSICAL EXAM:    Vital Signs Last 24 Hrs  T(C): 37.4 (31 Aug 2021 07:27), Max: 38.8 (30 Aug 2021 12:00)  T(F): 99.3 (31 Aug 2021 07:27), Max: 101.8 (30 Aug 2021 12:00)  HR: 108 (31 Aug 2021 08:30) (75 - 135)  BP: 117/86 (31 Aug 2021 08:00) (96/71 - 133/90)  BP(mean): 97 (31 Aug 2021 08:00) (80 - 103)  RR: 25 (31 Aug 2021 08:30) (13 - 29)  SpO2: 92% (31 Aug 2021 08:30) (92% - 99%)    General: alert. able to follow basic commands, answers yes and no to questions     Karnofsky:  %    HEENT: normal  dry mouth  ET tube/trach    Lungs: comfortable tachypnea/labored breathing  excessive secretions    CV: normal  tachycardia    GI: normal  distended  tender  no BS               PEG/NG/OG tube  constipation  last BM:     : normal  incontinent  oliguria/anuria  parikh    MSK: normal  weakness  edema             ambulatory  bedbound/wheelchair bound    Skin: normal  pressure ulcers- Stage_____  no rash    LABS:                      11.1   10.74 )-----------( 418      ( 31 Aug 2021 03:19 )             32.3     08-31    138  |  104  |  17.9  ----------------------------<  128<H>  4.3   |  24.0  |  0.74    Ca    8.5<L>      31 Aug 2021 03:19  Phos  3.1     08-31  Mg     1.9     08-31    I&O's Summary    30 Aug 2021 07:01  -  31 Aug 2021 07:00  --------------------------------------------------------  IN: 4274.7 mL / OUT: 2850 mL / NET: 1424.7 mL    31 Aug 2021 07:01  -  31 Aug 2021 10:37  --------------------------------------------------------  IN: 0 mL / OUT: 900 mL / NET: -900 mL        RADIOLOGY & ADDITIONAL STUDIES:    ADVANCE DIRECTIVES:   DNR YES NO  Completed on:                     MOLST  YES NO   Completed on:  Living Will  YES NO   Completed on:       OVERNIGHT EVENTS: much more awake and alert, now extubated.    Present Symptoms:     Dyspnea: 0-1   Nausea/Vomiting: No  Anxiety:  No  Depression: unable   Fatigue: Yes  Loss of appetite: No    Pain: none currently             Character-            Duration-            Effect-            Factors-            Frequency-            Location-            Severity-    Review of Systems: Reviewed                     Negative: no chest pain                   All others negative    MEDICATIONS  (STANDING):  aspirin  chewable 81 milliGRAM(s) Oral daily  chlorhexidine 2% Cloths 1 Application(s) Topical <User Schedule>  dextrose 40% Gel 15 Gram(s) Oral once  dextrose 5%. 1000 milliLiter(s) (100 mL/Hr) IV Continuous <Continuous>  dextrose 5%. 1000 milliLiter(s) (50 mL/Hr) IV Continuous <Continuous>  dextrose 50% Injectable 25 Gram(s) IV Push once  dextrose 50% Injectable 12.5 Gram(s) IV Push once  dextrose 50% Injectable 25 Gram(s) IV Push once  doxazosin 2 milliGRAM(s) Oral at bedtime  glucagon  Injectable 1 milliGRAM(s) IntraMuscular once  heparin   Injectable 5000 Unit(s) SubCutaneous every 8 hours  insulin lispro (ADMELOG) corrective regimen sliding scale   SubCutaneous every 6 hours  meropenem  IVPB 1000 milliGRAM(s) IV Intermittent every 8 hours  pantoprazole  Injectable 40 milliGRAM(s) IV Push daily  QUEtiapine 50 milliGRAM(s) Oral every 12 hours  thyroid 30 milliGRAM(s) Oral daily  vancomycin  IVPB 1500 milliGRAM(s) IV Intermittent <User Schedule>    MEDICATIONS  (PRN):  acetaminophen    Suspension .. 650 milliGRAM(s) Oral every 6 hours PRN Temp greater or equal to 38C (100.4F)  ALBUTerol    90 MICROgram(s) HFA Inhaler 2 Puff(s) Inhalation every 6 hours PRN Shortness of Breath  ibuprofen  Tablet. 400 milliGRAM(s) Oral every 8 hours PRN Temp greater or equal to 38C (100.4F)    PHYSICAL EXAM:    Vital Signs Last 24 Hrs  T(C): 37.4 (31 Aug 2021 07:27), Max: 38.8 (30 Aug 2021 12:00)  T(F): 99.3 (31 Aug 2021 07:27), Max: 101.8 (30 Aug 2021 12:00)  HR: 108 (31 Aug 2021 08:30) (75 - 135)  BP: 117/86 (31 Aug 2021 08:00) (96/71 - 133/90)  BP(mean): 97 (31 Aug 2021 08:00) (80 - 103)  RR: 25 (31 Aug 2021 08:30) (13 - 29)  SpO2: 92% (31 Aug 2021 08:30) (92% - 99%)    General: alert. able to follow basic commands, answers yes and no to questions     Karnofsky:  20 %    HEENT: normal      Lungs: excessive secretions    CV: normal      GI: normal      : kati    MSK: weakness     Skin: no rash    LABS:                      11.1   10.74 )-----------( 418      ( 31 Aug 2021 03:19 )             32.3     08-31    138  |  104  |  17.9  ----------------------------<  128<H>  4.3   |  24.0  |  0.74    Ca    8.5<L>      31 Aug 2021 03:19  Phos  3.1     08-31  Mg     1.9     08-31    I&O's Summary    30 Aug 2021 07:01  -  31 Aug 2021 07:00  --------------------------------------------------------  IN: 4274.7 mL / OUT: 2850 mL / NET: 1424.7 mL    31 Aug 2021 07:01  -  31 Aug 2021 10:37  --------------------------------------------------------  IN: 0 mL / OUT: 900 mL / NET: -900 mL    RADIOLOGY & ADDITIONAL STUDIES:    ADVANCE DIRECTIVES: Full code

## 2021-08-31 NOTE — PROGRESS NOTE ADULT - ASSESSMENT
46y Male who is followed by neurology because of anoxic/hypoxic brain injury    Hypoxic-ischemic encephalopathy  He is status post likely drug overdose with benzodiazepine and cocaine.  His MRI brain appearance shows evidence of significant hypoxic/anoxic injury and strokes.  Overall, exam is improving slowly.     Seizure-like activity  Hyperkinetic movements captured on c-EEG without ictal correlate on EEG.   No epileptiform discharges or seizure seen.    Case discussed with ICU team and with Palliative care team.

## 2021-08-31 NOTE — SWALLOW BEDSIDE ASSESSMENT ADULT - SWALLOW EVAL: RECOMMENDED DIET
NPO w/continuation of short-term non-oral alternative means of nutrition/hydration, via NGT, as per Pt/family wishes

## 2021-08-31 NOTE — PROGRESS NOTE ADULT - SUBJECTIVE AND OBJECTIVE BOX
[facility]                                        Ne[facility] at [place]                                 [facility]                                  [address]                                        [place], [place], [address]                                             [phone]        CC: Anoxic encephalopathy.    HPI:   The patient is a 46y Male who presented to the ER after being found unresponsive in bed by family. He was brought to the ER. He was noted to be hypoxic with agonal respirations and was intubated and admitted to ICU on a mechanical ventilator.   He was noted to have some twitching type movement of the extremities for which the Epilepsy service was consulted.   He has a history of substance abuse.     Interim history:  Remains in ICU.   Now extubated.     ROS:   Unobtainable due to patient's condition.     MEDICATIONS  (STANDING):  aspirin  chewable 81 milliGRAM(s) Oral daily  chlorhexidine 2% Cloths 1 Application(s) Topical <User Schedule>  dextrose 40% Gel 15 Gram(s) Oral once  dextrose 5%. 1000 milliLiter(s) (100 mL/Hr) IV Continuous <Continuous>  doxazosin 2 milliGRAM(s) Oral at bedtime  glucagon  Injectable 1 milliGRAM(s) IntraMuscular once  heparin   Injectable 5000 Unit(s) SubCutaneous every 8 hours  insulin lispro (ADMELOG) corrective regimen sliding scale   SubCutaneous every 6 hours  meropenem  IVPB 1000 milliGRAM(s) IV Intermittent every 8 hours  pantoprazole  Injectable 40 milliGRAM(s) IV Push daily  QUEtiapine 50 milliGRAM(s) Oral at bedtime  thyroid 30 milliGRAM(s) Oral daily  vancomycin  IVPB 1500 milliGRAM(s) IV Intermittent <User Schedule>      Vital Signs Last 24 Hrs  T(C): 37.4 (31 Aug 2021 07:27), Max: 38.8 (30 Aug 2021 12:00)  T(F): 99.3 (31 Aug 2021 07:27), Max: 101.8 (30 Aug 2021 12:00)  HR: 108 (31 Aug 2021 08:30) (75 - 135)  BP: 117/86 (31 Aug 2021 08:00) (96/71 - 133/90)  BP(mean): 97 (31 Aug 2021 08:00) (80 - 103)  RR: 25 (31 Aug 2021 08:30) (13 - 29)  SpO2: 92% (31 Aug 2021 08:30) (92% - 99%)    Detailed Neurologic Exam:    Mental status: The patient opens eyes and turns head toward voice.   Attempts verbalization to some questions. Gives name, and gives daughter's name.   Not following any instructions.    Cranial nerves: Pupils equal and react symmetrically to light. He blinks to threat bilaterally. Eyes move conjugately spontaneously. Face symmetric. Tongue midline.     Motor/Sensory: There is normal bulk and tone.  There is no tremor.  Moving upper extremities bilaterally spontaneously. Withdraws from noxious stimuli.    Reflexes: Absent throughout and plantar responses are flexor.    Cerebellar: Cannot be assessed.     Labs:     08-31    138  |  104  |  17.9  ----------------------------<  128<H>  4.3   |  24.0  |  0.74    Ca    8.5<L>      31 Aug 2021 03:19  Phos  3.1     08-31  Mg     1.9     08-31                              11.1   10.74 )-----------( 418      ( 31 Aug 2021 03:19 )             32.3       Rad:   MRI brain- FLAIR and DWI hyperintensity in b/l BG/GP/left hippocampus and scattered throughout both cerebral and cerebellar hemispheres, no blood or mass.

## 2021-09-01 ENCOUNTER — APPOINTMENT (OUTPATIENT)
Dept: THORACIC SURGERY | Facility: HOSPITAL | Age: 46
End: 2021-09-01

## 2021-09-01 DIAGNOSIS — T50.901A POISONING BY UNSPECIFIED DRUGS, MEDICAMENTS AND BIOLOGICAL SUBSTANCES, ACCIDENTAL (UNINTENTIONAL), INITIAL ENCOUNTER: ICD-10-CM

## 2021-09-01 DIAGNOSIS — J45.909 UNSPECIFIED ASTHMA, UNCOMPLICATED: ICD-10-CM

## 2021-09-01 DIAGNOSIS — G93.1 ANOXIC BRAIN DAMAGE, NOT ELSEWHERE CLASSIFIED: ICD-10-CM

## 2021-09-01 DIAGNOSIS — J69.0 PNEUMONITIS DUE TO INHALATION OF FOOD AND VOMIT: ICD-10-CM

## 2021-09-01 DIAGNOSIS — I50.9 HEART FAILURE, UNSPECIFIED: ICD-10-CM

## 2021-09-01 DIAGNOSIS — I10 ESSENTIAL (PRIMARY) HYPERTENSION: ICD-10-CM

## 2021-09-01 DIAGNOSIS — E11.9 TYPE 2 DIABETES MELLITUS WITHOUT COMPLICATIONS: ICD-10-CM

## 2021-09-01 LAB
ALBUMIN SERPL ELPH-MCNC: 2.4 G/DL — LOW (ref 3.3–5.2)
ALP SERPL-CCNC: 74 U/L — SIGNIFICANT CHANGE UP (ref 40–120)
ALT FLD-CCNC: 60 U/L — HIGH
ANION GAP SERPL CALC-SCNC: 12 MMOL/L — SIGNIFICANT CHANGE UP (ref 5–17)
AST SERPL-CCNC: 55 U/L — HIGH
BILIRUB SERPL-MCNC: 0.8 MG/DL — SIGNIFICANT CHANGE UP (ref 0.4–2)
BUN SERPL-MCNC: 17.1 MG/DL — SIGNIFICANT CHANGE UP (ref 8–20)
CALCIUM SERPL-MCNC: 8.6 MG/DL — SIGNIFICANT CHANGE UP (ref 8.6–10.2)
CHLORIDE SERPL-SCNC: 103 MMOL/L — SIGNIFICANT CHANGE UP (ref 98–107)
CO2 SERPL-SCNC: 22 MMOL/L — SIGNIFICANT CHANGE UP (ref 22–29)
CREAT SERPL-MCNC: 0.82 MG/DL — SIGNIFICANT CHANGE UP (ref 0.5–1.3)
GLUCOSE BLDC GLUCOMTR-MCNC: 95 MG/DL — SIGNIFICANT CHANGE UP (ref 70–99)
GLUCOSE BLDC GLUCOMTR-MCNC: 99 MG/DL — SIGNIFICANT CHANGE UP (ref 70–99)
GLUCOSE SERPL-MCNC: 103 MG/DL — HIGH (ref 70–99)
GRAM STN FLD: SIGNIFICANT CHANGE UP
HCT VFR BLD CALC: 32 % — LOW (ref 39–50)
HGB BLD-MCNC: 11.4 G/DL — LOW (ref 13–17)
MAGNESIUM SERPL-MCNC: 1.9 MG/DL — SIGNIFICANT CHANGE UP (ref 1.6–2.6)
MCHC RBC-ENTMCNC: 30.9 PG — SIGNIFICANT CHANGE UP (ref 27–34)
MCHC RBC-ENTMCNC: 35.6 GM/DL — SIGNIFICANT CHANGE UP (ref 32–36)
MCV RBC AUTO: 86.7 FL — SIGNIFICANT CHANGE UP (ref 80–100)
PHOSPHATE SERPL-MCNC: 2.7 MG/DL — SIGNIFICANT CHANGE UP (ref 2.4–4.7)
PLATELET # BLD AUTO: 529 K/UL — HIGH (ref 150–400)
POTASSIUM SERPL-MCNC: 3.4 MMOL/L — LOW (ref 3.5–5.3)
POTASSIUM SERPL-SCNC: 3.4 MMOL/L — LOW (ref 3.5–5.3)
PROCALCITONIN SERPL-MCNC: 0.24 NG/ML — HIGH (ref 0.02–0.1)
PROT SERPL-MCNC: 6.3 G/DL — LOW (ref 6.6–8.7)
RBC # BLD: 3.69 M/UL — LOW (ref 4.2–5.8)
RBC # FLD: 12.5 % — SIGNIFICANT CHANGE UP (ref 10.3–14.5)
SODIUM SERPL-SCNC: 137 MMOL/L — SIGNIFICANT CHANGE UP (ref 135–145)
SPECIMEN SOURCE: SIGNIFICANT CHANGE UP
WBC # BLD: 12.41 K/UL — HIGH (ref 3.8–10.5)
WBC # FLD AUTO: 12.41 K/UL — HIGH (ref 3.8–10.5)

## 2021-09-01 PROCEDURE — 99233 SBSQ HOSP IP/OBS HIGH 50: CPT

## 2021-09-01 PROCEDURE — 71045 X-RAY EXAM CHEST 1 VIEW: CPT | Mod: 26,77

## 2021-09-01 PROCEDURE — 71045 X-RAY EXAM CHEST 1 VIEW: CPT | Mod: 26

## 2021-09-01 PROCEDURE — 99232 SBSQ HOSP IP/OBS MODERATE 35: CPT

## 2021-09-01 RX ORDER — POTASSIUM CHLORIDE 20 MEQ
10 PACKET (EA) ORAL
Refills: 0 | Status: COMPLETED | OUTPATIENT
Start: 2021-09-01 | End: 2021-09-01

## 2021-09-01 RX ORDER — CARVEDILOL PHOSPHATE 80 MG/1
3.12 CAPSULE, EXTENDED RELEASE ORAL EVERY 12 HOURS
Refills: 0 | Status: DISCONTINUED | OUTPATIENT
Start: 2021-09-01 | End: 2021-09-03

## 2021-09-01 RX ORDER — DEXTROAMPHETAMINE SACCHARATE, AMPHETAMINE ASPARTATE, DEXTROAMPHETAMINE SULFATE AND AMPHETAMINE SULFATE 1.875; 1.875; 1.875; 1.875 MG/1; MG/1; MG/1; MG/1
15 TABLET ORAL DAILY
Refills: 0 | Status: DISCONTINUED | OUTPATIENT
Start: 2021-09-01 | End: 2021-09-08

## 2021-09-01 RX ADMIN — HEPARIN SODIUM 5000 UNIT(S): 5000 INJECTION INTRAVENOUS; SUBCUTANEOUS at 23:52

## 2021-09-01 RX ADMIN — Medication 0.5 MILLIGRAM(S): at 19:56

## 2021-09-01 RX ADMIN — Medication 81 MILLIGRAM(S): at 11:51

## 2021-09-01 RX ADMIN — Medication 3 MILLILITER(S): at 19:56

## 2021-09-01 RX ADMIN — Medication 0.5 MILLIGRAM(S): at 12:53

## 2021-09-01 RX ADMIN — Medication 3 MILLILITER(S): at 14:08

## 2021-09-01 RX ADMIN — HEPARIN SODIUM 5000 UNIT(S): 5000 INJECTION INTRAVENOUS; SUBCUTANEOUS at 05:56

## 2021-09-01 RX ADMIN — CHLORHEXIDINE GLUCONATE 1 APPLICATION(S): 213 SOLUTION TOPICAL at 05:56

## 2021-09-01 RX ADMIN — HEPARIN SODIUM 5000 UNIT(S): 5000 INJECTION INTRAVENOUS; SUBCUTANEOUS at 13:03

## 2021-09-01 RX ADMIN — Medication 650 MILLIGRAM(S): at 23:52

## 2021-09-01 RX ADMIN — Medication 100 MILLIEQUIVALENT(S): at 05:55

## 2021-09-01 RX ADMIN — Medication 0.5 MILLIGRAM(S): at 07:39

## 2021-09-01 RX ADMIN — Medication 30 MILLIGRAM(S): at 05:56

## 2021-09-01 RX ADMIN — Medication 100 MILLIEQUIVALENT(S): at 07:14

## 2021-09-01 RX ADMIN — MEROPENEM 100 MILLIGRAM(S): 1 INJECTION INTRAVENOUS at 11:50

## 2021-09-01 RX ADMIN — Medication 2 MILLIGRAM(S): at 23:51

## 2021-09-01 RX ADMIN — Medication 3 MILLILITER(S): at 04:09

## 2021-09-01 RX ADMIN — Medication 3 MILLILITER(S): at 07:39

## 2021-09-01 RX ADMIN — Medication 100 MILLIEQUIVALENT(S): at 09:06

## 2021-09-01 RX ADMIN — MEROPENEM 100 MILLIGRAM(S): 1 INJECTION INTRAVENOUS at 18:42

## 2021-09-01 RX ADMIN — Medication 650 MILLIGRAM(S): at 23:59

## 2021-09-01 RX ADMIN — MEROPENEM 100 MILLIGRAM(S): 1 INJECTION INTRAVENOUS at 03:44

## 2021-09-01 RX ADMIN — Medication 0.5 MILLIGRAM(S): at 04:09

## 2021-09-01 NOTE — PROGRESS NOTE ADULT - ASSESSMENT
47yo M who presented with respiratory failure and altered mental status. He had been found on the floor unresponsive, possible cocaine overdose with aspiration pneumonia/hypoxia, and had remained unresponsive for many days. However today he is more alert, actually gave a thumbs up to the attending. Plans had been made for Trach/Peg in 48 hours, will keep on schedule for now, but likely to not need if remains alert and responsive. He has had persistent fevers without clear source as his MRSA pneumonia has been well controlled with Vanco/Meropenem.   He had Echo that revealed EF <20%.      1- acute hypoxemic resp failure  extubated 8/31  2-  drug overdose  3- MRI brain with anoxic  changes   4- MRSA pneumonia  treated  5- heart failure  EF < 20 %   ? cath  6- urinary retention  with  parikh  on flomax  7- recurrent aspiration after extubation     neuro improved neurologic status    neurology consult   klonopin prn   resp      add chest PT   budesonide neb      duoneb  sputum culture / cxr   gu   parikh   voiding trial      ID  vanco   merrem check vanco level     gi  ppi  feeds    heme heparin sq    cvs  coreg hydralazine    lasix   repeat echo     Critical  care TIme  > 35 minutes were spent assessing the patient's presenting problems of acute illness that pose a high probability   of life threatening  deterioration or end organ damage / dysfunction.  Medical decision making includes initiation/ continuation of plan or care review data/labwork/  radiographic study,  direct patient  care bedside ,  discussions with  consultants regarding care,     evaluation  and  interpretation of vital signs,  any necessary ventilator management,   NIV or BIPAP changes  or initiation,    discussions with multidisipliary team,  am or pm rounds,  discussions of goals of care with patient and family  all non-inclusive of procedures.

## 2021-09-01 NOTE — PROGRESS NOTE ADULT - ASSESSMENT
46y Male who is followed by neurology because of anoxic/hypoxic brain injury    Hypoxic-ischemic encephalopathy  He is status post likely drug overdose with benzodiazepine and cocaine.  His MRI brain appearance shows evidence of significant hypoxic/anoxic injury and strokes.  Overall, exam is improving slowly.     Seizure-like activity  Hyperkinetic movements captured on c-EEG without ictal correlate on EEG.   No epileptiform discharges or seizure seen.    Case discussed  with Dr Harris    will follow with you    Toby Sanchez MD PhD   [phone]

## 2021-09-01 NOTE — PHARMACOTHERAPY INTERVENTION NOTE - NSPHARMCOMMASP
ASP - Duration of therapy
ASP - Lab/ test recommended
ASP - Therapy recommended/ Alternative therapy

## 2021-09-01 NOTE — PROGRESS NOTE ADULT - ASSESSMENT
46M with HTN, DM, asthma, here with vent dependent respiratory failure, unresponsiveness, concerns for anoxic injury,  GARRETT, NSTEMI, acute systolic CHF, s/p shock (off pressors), possible aspiration related event on zosyn,  urine toxicology + for benzodiazepines and cocaine, extubated 8/31.     #1 Ventilator dependence - poor overall prognosis for meaningful weaning given poor mental status. MRI revealing anoxic injury. extubated 8/31.   #2 Anoxic brain injury - post possible overdose. MRI showing anoxic injury. patient more awake now, will wait and see how he does.   #3 Dysphagia - possible aspiration pneumonia - on antibiotics. failing speech and swallow. currently with NG tube.   #4 Acute systolic CHF - repeat tte.   #5 GARRETT - improved.  #6 Encounter for palliative care - continuing to follow course. more awake and interactive. Remains high risk for recurrent aspiration and deterioration / reintubation. Legal surrogates at this time are his two sisters Carmita and Beverly, numbers in provider hand off.  46M with HTN, DM, asthma, here with vent dependent respiratory failure, unresponsiveness, GARRETT, NSTEMI, acute systolic CHF, s/p shock (off pressors), possible aspiration related event, urine toxicology + for benzodiazepines and cocaine, extubated 8/31, dysphagia.     #1 Ventilator dependence - extubated 8/31.   #2 Anoxic brain injury - post possible overdose. MRI showing anoxic injury. patient more awake now, will wait and see how he does.   #3 Dysphagia - possible aspiration pneumonia - on antibiotics. failing speech and swallow. currently with NG tube.   #4 Acute systolic CHF - repeat tte pending.   #5 GARRETT - improved.  #6 Encounter for palliative care - continuing to follow course. more awake and interactive. Remains high risk for recurrent aspiration and deterioration / reintubation. Legal surrogates at this time are his two sisters Carmita and Beverly, numbers in provider hand off.

## 2021-09-01 NOTE — PHYSICAL THERAPY INITIAL EVALUATION ADULT - FOLLOWS COMMANDS/ANSWERS QUESTIONS, REHAB EVAL
difficult to understand speech at times, very quiet soft spoken and mumbles/75% of the time/able to follow single-step instructions

## 2021-09-01 NOTE — PROGRESS NOTE ADULT - SUBJECTIVE AND OBJECTIVE BOX
MICU to Medicine Transfer Acceptance Note    [facility] Division of Hospital Medicine  Nii Salazar [phone]    Chief Complaint:  Patient is a 46y old  Male who presents with a chief complaint of Acute hypoxemic respiratory failure, AMS (01 Sep 2021 14:02)    Hospital Course:   Patient is a 45 yo F w/ PMH of DM2, HTN, Hyothyroid, Asthma, ADHD who presented after being found unresponsive. Admitted to MICU for possible benzo/cocaine overdose and aspiration PNA. Patient was intubated and started on vasopressors. Patient with anoxic brain injury, being followed by Neurology. Patient on IV antibiotics for presumed aspiration PNA. ECHO was done and significant for LVEF of <20. Per Cardiology, patient to have ischemic evaluation if meaningful neurological recovery. Patient also noted to have rhabdo/GARRETT which has now normalized. Trach was tentatively scheduled for 9/2. Patient with improvement in mental status and was extubated on 8/31. Patient was transitioned from HFNC and now on 5L NC. Patient now being downgraded to Medicine.    SUBJECTIVE / OVERNIGHT EVENTS:  Patient seen and examined at bedside. Opens eyes to verbal stimuli and able to nod yes/no to questions. Denying pain.    MEDICATIONS  (STANDING):  albuterol/ipratropium for Nebulization 3 milliLiter(s) Nebulizer every 6 hours  amphetamine/dextroamphetamine 15 milliGRAM(s) Oral daily  aspirin  chewable 81 milliGRAM(s) Oral daily  buDESOnide    Inhalation Suspension 0.5 milliGRAM(s) Inhalation every 12 hours  carvedilol 3.125 milliGRAM(s) Oral every 12 hours  chlorhexidine 2% Cloths 1 Application(s) Topical <User Schedule>  dextrose 40% Gel 15 Gram(s) Oral once  dextrose 5%. 1000 milliLiter(s) (50 mL/Hr) IV Continuous <Continuous>  dextrose 5%. 1000 milliLiter(s) (100 mL/Hr) IV Continuous <Continuous>  dextrose 50% Injectable 25 Gram(s) IV Push once  dextrose 50% Injectable 12.5 Gram(s) IV Push once  dextrose 50% Injectable 25 Gram(s) IV Push once  doxazosin 2 milliGRAM(s) Oral at bedtime  glucagon  Injectable 1 milliGRAM(s) IntraMuscular once  heparin   Injectable 5000 Unit(s) SubCutaneous every 8 hours  insulin lispro (ADMELOG) corrective regimen sliding scale   SubCutaneous every 6 hours  meropenem  IVPB 1000 milliGRAM(s) IV Intermittent every 8 hours  thyroid 30 milliGRAM(s) Oral daily    MEDICATIONS  (PRN):  acetaminophen    Suspension .. 650 milliGRAM(s) Oral every 6 hours PRN Temp greater or equal to 38C (100.4F)  ALBUTerol    90 MICROgram(s) HFA Inhaler 2 Puff(s) Inhalation every 6 hours PRN Shortness of Breath  clonazePAM  Tablet 0.5 milliGRAM(s) Oral every 12 hours PRN agitation  ibuprofen  Tablet. 400 milliGRAM(s) Oral every 8 hours PRN Temp greater or equal to 38C (100.4F)        I&O's Summary    31 Aug 2021 07:01  -  01 Sep 2021 07:00  --------------------------------------------------------  IN: 400 mL / OUT: 6795 mL / NET: -6395 mL    01 Sep 2021 07:01  -  01 Sep 2021 16:36  --------------------------------------------------------  IN: 150 mL / OUT: 1025 mL / NET: -875 mL        PHYSICAL EXAM:  Vital Signs Last 24 Hrs  T(C): 37.6 (01 Sep 2021 15:32), Max: 38.2 (31 Aug 2021 18:42)  T(F): 99.7 (01 Sep 2021 15:32), Max: 100.8 (31 Aug 2021 18:42)  HR: 101 (01 Sep 2021 16:00) (92 - 117)  BP: 111/89 (01 Sep 2021 16:00) (94/73 - 141/89)  BP(mean): 98 (01 Sep 2021 16:00) (72 - 105)  RR: 18 (01 Sep 2021 16:00) (13 - 39)  SpO2: 96% (01 Sep 2021 16:00) (90% - 100%)        CONSTITUTIONAL: NAD, opens eyes to verbal stimuli, nods yes/no  HEENT: NC/AT, PERRL, no JVD  RESPIRATORY: Bilateral rhonchi  CARDIOVASCULAR: RRR, S1/S2+, no m/g/r  ABDOMEN: Nontender to palpation, normoactive bowel sounds, no rebound/guarding; No hepatosplenomegaly  MUSCULOSKELETAL: No edema, cyanosis or deformities.  NEUROLOGY: Opens eyes, nods yes/no.   SKIN: No rashes; no palpable lesions  VASC: Distal pulses palpable    LABS:                        11.4   12.41 )-----------( 529      ( 01 Sep 2021 04:38 )             32.0     09-01    137  |  103  |  17.1  ----------------------------<  103<H>  3.4<L>   |  22.0  |  0.82    Ca    8.6      01 Sep 2021 04:38  Phos  2.7     09-01  Mg     1.9     09-01    TPro  6.3<L>  /  Alb  2.4<L>  /  TBili  0.8  /  DBili  x   /  AST  55<H>  /  ALT  60<H>  /  AlkPhos  74  09-01              Culture - Sputum (collected 01 Sep 2021 03:59)  Source: .Sputum Sputum  Gram Stain (01 Sep 2021 07:44):    Few polymorphonuclear leukocytes per low power field    Moderate Squamous epithelial cells per low power field    Few Gram positive cocci in pairs seen per oil power field      CAPILLARY BLOOD GLUCOSE      POCT Blood Glucose.: 95 mg/dL (01 Sep 2021 11:54)  POCT Blood Glucose.: 141 mg/dL (31 Aug 2021 23:08)        RADIOLOGY & ADDITIONAL TESTS:  Results Reviewed:   Imaging Personally Reviewed:  Electrocardiogram Personally Reviewed:

## 2021-09-01 NOTE — PROGRESS NOTE ADULT - SUBJECTIVE AND OBJECTIVE BOX
OVERNIGHT EVENTS: more alert, able to tell me he's in the hospital. had episode of vomiting yesterday, possible recurrent aspiration.     Present Symptoms:     Dyspnea: 0  Nausea/Vomiting: Yes   Anxiety:  No  Depression: unable   Fatigue: Yes   Loss of appetite: No    Pain: none currently             Character-            Duration-            Effect-            Factors-            Frequency-            Location-            Severity-    Review of Systems: Reviewed                    Unable to obtain due to poor mentation   All others negative    MEDICATIONS  (STANDING):  albuterol/ipratropium for Nebulization 3 milliLiter(s) Nebulizer every 6 hours  aspirin  chewable 81 milliGRAM(s) Oral daily  buDESOnide    Inhalation Suspension 0.5 milliGRAM(s) Inhalation every 12 hours  chlorhexidine 2% Cloths 1 Application(s) Topical <User Schedule>  dextrose 40% Gel 15 Gram(s) Oral once  dextrose 5%. 1000 milliLiter(s) (100 mL/Hr) IV Continuous <Continuous>  dextrose 5%. 1000 milliLiter(s) (50 mL/Hr) IV Continuous <Continuous>  dextrose 50% Injectable 25 Gram(s) IV Push once  dextrose 50% Injectable 12.5 Gram(s) IV Push once  dextrose 50% Injectable 25 Gram(s) IV Push once  doxazosin 2 milliGRAM(s) Oral at bedtime  glucagon  Injectable 1 milliGRAM(s) IntraMuscular once  heparin   Injectable 5000 Unit(s) SubCutaneous every 8 hours  insulin lispro (ADMELOG) corrective regimen sliding scale   SubCutaneous every 6 hours  meropenem  IVPB 1000 milliGRAM(s) IV Intermittent every 8 hours  pantoprazole  Injectable 40 milliGRAM(s) IV Push daily  thyroid 30 milliGRAM(s) Oral daily  vancomycin  IVPB 1500 milliGRAM(s) IV Intermittent <User Schedule>    MEDICATIONS  (PRN):  acetaminophen    Suspension .. 650 milliGRAM(s) Oral every 6 hours PRN Temp greater or equal to 38C (100.4F)  ALBUTerol    90 MICROgram(s) HFA Inhaler 2 Puff(s) Inhalation every 6 hours PRN Shortness of Breath  clonazePAM  Tablet 0.5 milliGRAM(s) Oral every 12 hours PRN agitation  ibuprofen  Tablet. 400 milliGRAM(s) Oral every 8 hours PRN Temp greater or equal to 38C (100.4F)    PHYSICAL EXAM:    Vital Signs Last 24 Hrs  T(C): 37.6 (01 Sep 2021 07:39), Max: 38.2 (31 Aug 2021 18:42)  T(F): 99.7 (01 Sep 2021 07:39), Max: 100.8 (31 Aug 2021 18:42)  HR: 104 (01 Sep 2021 09:00) (93 - 119)  BP: 112/79 (01 Sep 2021 09:00) (96/70 - 141/89)  BP(mean): 88 (01 Sep 2021 09:00) (72 - 105)  RR: 22 (01 Sep 2021 09:00) (15 - 39)  SpO2: 97% (01 Sep 2021 09:00) (90% - 100%)    General: alert, able to follow basic commands like thumbs up. knows he is in the hospital. slowed speech     Karnofsky: 30 %    HEENT: normal     Lungs: excessive secretions    CV: normal      GI: NG tube     : parikh    MSK: weakness      Skin: no rash    LABS:                      11.4   12.41 )-----------( 529      ( 01 Sep 2021 04:38 )             32.0     09-01    137  |  103  |  17.1  ----------------------------<  103<H>  3.4<L>   |  22.0  |  0.82    Ca    8.6      01 Sep 2021 04:38  Phos  2.7     09-01  Mg     1.9     09-01    TPro  6.3<L>  /  Alb  2.4<L>  /  TBili  0.8  /  DBili  x   /  AST  55<H>  /  ALT  60<H>  /  AlkPhos  74  09-01    I&O's Summary    31 Aug 2021 07:01  -  01 Sep 2021 07:00  --------------------------------------------------------  IN: 400 mL / OUT: 6795 mL / NET: -6395 mL    01 Sep 2021 07:01  -  01 Sep 2021 09:59  --------------------------------------------------------  IN: 100 mL / OUT: 125 mL / NET: -25 mL    RADIOLOGY & ADDITIONAL STUDIES:    < from: Xray Chest 1 View- PORTABLE-Routine (Xray Chest 1 View- PORTABLE-Routine in AM.) (09.01.21 @ 06:13) >     EXAM:  XR CHEST PORTABLE ROUTINE 1V                          PROCEDURE DATE:  09/01/2021        INTERPRETATION:  AP chest on September 1, 2021 at 5:43 AM.    Heart magnified by technique. Endotracheal tube present on August 31 has been removed. NG tube remains.    Fairly advanced infiltrates were seen on August 31 of the mid lower lung fields. These are somewhat improved.    IMPRESSION: Bilateral infiltrates somewhat improved. Endotracheal tube removed.    --- End of Report ---    < end of copied text >    ADVANCE DIRECTIVES: Full code

## 2021-09-01 NOTE — CHART NOTE - NSCHARTNOTEFT_GEN_A_CORE
Patient is a 46y old  Male who presents with a chief complaint of Acute hypoxemic respiratory failure, AMS (01 Sep 2021 14:02)      BRIEF HOSPITAL COURSE:     PAST MEDICAL & SURGICAL HISTORY: 47yo M who presented with respiratory failure and altered mental status. He had been found on the floor unresponsive, possible cocaine overdose with aspiration pneumonia/hypoxia, and had remained unresponsive for many days. However today he is more alert, actually gave a thumbs up to the attending. Plans had been made for Trach/Peg in 48 hours, will keep on schedule for now, but likely to not need if remains alert and responsive. He has had persistent fevers without clear source as his MRSA pneumonia has been well controlled with Vanco/Meropenem.   He had Echo that revealed EF <20%, tolerated extubation with episode of vomiting, currently off sedation, on 5L NC, more alert yet not conversant, afebrile  Signed out ot Dr. Salazar    No pertinent past medical history    Asthma    HTN (hypertension)    DM (diabetes mellitus)    S/P elbow joint replacement  right elbow surgery.    S/P knee surgery  right thigh surgery.      Allergies    Allergy Status Unknown  No Known Allergies    Intolerances      FAMILY HISTORY:      Review of Systems:  CONSTITUTIONAL: No fever, chills, or fatigue  EYES: No eye pain, visual disturbances, or discharge  ENMT:  No difficulty hearing, tinnitus, vertigo; No sinus or throat pain  NECK: No pain or stiffness  RESPIRATORY: No cough, wheezing, chills or hemoptysis; No shortness of breath  CARDIOVASCULAR: No chest pain, palpitations, dizziness, or leg swelling  GASTROINTESTINAL: No abdominal or epigastric pain. No nausea, vomiting, or hematemesis; No diarrhea or constipation. No melena or hematochezia.  GENITOURINARY: No dysuria, frequency, hematuria, or incontinence  NEUROLOGICAL: No headaches, memory loss, loss of strength, numbness, or tremors  SKIN: No itching, burning, rashes, or lesions   MUSCULOSKELETAL: No joint pain or swelling; No muscle, back, or extremity pain  PSYCHIATRIC: No depression, anxiety, mood swings, or difficulty sleeping      Medications:  meropenem  IVPB 1000 milliGRAM(s) IV Intermittent every 8 hours    doxazosin 2 milliGRAM(s) Oral at bedtime    ALBUTerol    90 MICROgram(s) HFA Inhaler 2 Puff(s) Inhalation every 6 hours PRN  albuterol/ipratropium for Nebulization 3 milliLiter(s) Nebulizer every 6 hours  buDESOnide    Inhalation Suspension 0.5 milliGRAM(s) Inhalation every 12 hours    acetaminophen    Suspension .. 650 milliGRAM(s) Oral every 6 hours PRN  amphetamine/dextroamphetamine 15 milliGRAM(s) Oral daily  clonazePAM  Tablet 0.5 milliGRAM(s) Oral every 12 hours PRN  ibuprofen  Tablet. 400 milliGRAM(s) Oral every 8 hours PRN      aspirin  chewable 81 milliGRAM(s) Oral daily  heparin   Injectable 5000 Unit(s) SubCutaneous every 8 hours        dextrose 40% Gel 15 Gram(s) Oral once  dextrose 50% Injectable 25 Gram(s) IV Push once  dextrose 50% Injectable 12.5 Gram(s) IV Push once  dextrose 50% Injectable 25 Gram(s) IV Push once  glucagon  Injectable 1 milliGRAM(s) IntraMuscular once  insulin lispro (ADMELOG) corrective regimen sliding scale   SubCutaneous every 6 hours  thyroid 30 milliGRAM(s) Oral daily    dextrose 5%. 1000 milliLiter(s) IV Continuous <Continuous>  dextrose 5%. 1000 milliLiter(s) IV Continuous <Continuous>      chlorhexidine 2% Cloths 1 Application(s) Topical <User Schedule>            ICU Vital Signs Last 24 Hrs  T(C): 37.3 (01 Sep 2021 11:59), Max: 38.2 (31 Aug 2021 18:42)  T(F): 99.1 (01 Sep 2021 11:59), Max: 100.8 (31 Aug 2021 18:42)  HR: 99 (01 Sep 2021 14:10) (93 - 119)  BP: 116/80 (01 Sep 2021 13:00) (106/72 - 141/89)  BP(mean): 87 (01 Sep 2021 11:00) (72 - 105)  ABP: --  ABP(mean): --  RR: 22 (01 Sep 2021 13:00) (15 - 39)  SpO2: 99% (01 Sep 2021 14:10) (90% - 100%)    Vital Signs Last 24 Hrs  T(C): 37.3 (01 Sep 2021 11:59), Max: 38.2 (31 Aug 2021 18:42)  T(F): 99.1 (01 Sep 2021 11:59), Max: 100.8 (31 Aug 2021 18:42)  HR: 99 (01 Sep 2021 14:10) (93 - 119)  BP: 116/80 (01 Sep 2021 13:00) (106/72 - 141/89)  BP(mean): 87 (01 Sep 2021 11:00) (72 - 105)  RR: 22 (01 Sep 2021 13:00) (15 - 39)  SpO2: 99% (01 Sep 2021 14:10) (90% - 100%)    ABG - ( 31 Aug 2021 04:45 )  pH, Arterial: 7.440 pH, Blood: x     /  pCO2: 39    /  pO2: 101   / HCO3: 26    / Base Excess: 2.3   /  SaO2: 99.2                I&O's Detail    31 Aug 2021 07:01  -  01 Sep 2021 07:00  --------------------------------------------------------  IN:    IV PiggyBack: 200 mL    IV PiggyBack: 200 mL  Total IN: 400 mL    OUT:    Dexmedetomidine: 0 mL    FentaNYL: 0 mL    Indwelling Catheter - Urethral (mL): 6795 mL  Total OUT: 6795 mL    Total NET: -6395 mL      01 Sep 2021 07:01  -  01 Sep 2021 14:57  --------------------------------------------------------  IN:    IV PiggyBack: 100 mL    IV PiggyBack: 50 mL  Total IN: 150 mL    OUT:    Indwelling Catheter - Urethral (mL): 775 mL  Total OUT: 775 mL    Total NET: -625 mL            LABS:                        11.4   12.41 )-----------( 529      ( 01 Sep 2021 04:38 )             32.0     09-01    137  |  103  |  17.1  ----------------------------<  103<H>  3.4<L>   |  22.0  |  0.82    Ca    8.6      01 Sep 2021 04:38  Phos  2.7     09-01  Mg     1.9     09-01    TPro  6.3<L>  /  Alb  2.4<L>  /  TBili  0.8  /  DBili  x   /  AST  55<H>  /  ALT  60<H>  /  AlkPhos  74  09-01          CAPILLARY BLOOD GLUCOSE      POCT Blood Glucose.: 95 mg/dL (01 Sep 2021 11:54)        CULTURES:  Culture Results:   No growth at 48 hours (08-29 @ 10:59)  Culture Results:   No growth at 48 hours (08-28 @ 05:42)  Culture Results:   Normal Respiratory Le present (08-27 @ 22:07) Patient is a 46y old  Male who presents with a chief complaint of Acute hypoxemic respiratory failure, AMS (01 Sep 2021 14:02)      BRIEF HOSPITAL COURSE:     PAST MEDICAL & SURGICAL HISTORY: 47yo M who presented with respiratory failure and altered mental status. He had been found on the floor unresponsive, possible cocaine overdose with aspiration pneumonia/hypoxia, and had remained unresponsive for many days. However today he is more alert, actually gave a thumbs up to the attending. Plans had been made for Trach/Peg in 48 hours, will keep on schedule for now, but likely to not need if remains alert and responsive. He has had persistent fevers without clear source as his MRSA pneumonia has been well controlled with Vanco/Meropenem.   He had Echo that revealed EF <20%, tolerated extubation with episode of vomiting, currently off sedation, on 5L NC, more alert yet not conversant, afebrile  Signed out ot Dr. Salazar    No pertinent past medical history    Asthma    HTN (hypertension)    DM (diabetes mellitus)    S/P elbow joint replacement  right elbow surgery.    S/P knee surgery  right thigh surgery.      Allergies    Allergy Status Unknown  No Known Allergies    Intolerances      Medications:  meropenem  IVPB 1000 milliGRAM(s) IV Intermittent every 8 hours    doxazosin 2 milliGRAM(s) Oral at bedtime    ALBUTerol    90 MICROgram(s) HFA Inhaler 2 Puff(s) Inhalation every 6 hours PRN  albuterol/ipratropium for Nebulization 3 milliLiter(s) Nebulizer every 6 hours  buDESOnide    Inhalation Suspension 0.5 milliGRAM(s) Inhalation every 12 hours    acetaminophen    Suspension .. 650 milliGRAM(s) Oral every 6 hours PRN  amphetamine/dextroamphetamine 15 milliGRAM(s) Oral daily  clonazePAM  Tablet 0.5 milliGRAM(s) Oral every 12 hours PRN  ibuprofen  Tablet. 400 milliGRAM(s) Oral every 8 hours PRN      aspirin  chewable 81 milliGRAM(s) Oral daily  heparin   Injectable 5000 Unit(s) SubCutaneous every 8 hours        dextrose 40% Gel 15 Gram(s) Oral once  dextrose 50% Injectable 25 Gram(s) IV Push once  dextrose 50% Injectable 12.5 Gram(s) IV Push once  dextrose 50% Injectable 25 Gram(s) IV Push once  glucagon  Injectable 1 milliGRAM(s) IntraMuscular once  insulin lispro (ADMELOG) corrective regimen sliding scale   SubCutaneous every 6 hours  thyroid 30 milliGRAM(s) Oral daily    dextrose 5%. 1000 milliLiter(s) IV Continuous <Continuous>  dextrose 5%. 1000 milliLiter(s) IV Continuous <Continuous>      chlorhexidine 2% Cloths 1 Application(s) Topical <User Schedule>            ICU Vital Signs Last 24 Hrs  T(C): 37.3 (01 Sep 2021 11:59), Max: 38.2 (31 Aug 2021 18:42)  T(F): 99.1 (01 Sep 2021 11:59), Max: 100.8 (31 Aug 2021 18:42)  HR: 99 (01 Sep 2021 14:10) (93 - 119)  BP: 116/80 (01 Sep 2021 13:00) (106/72 - 141/89)  BP(mean): 87 (01 Sep 2021 11:00) (72 - 105)  ABP: --  ABP(mean): --  RR: 22 (01 Sep 2021 13:00) (15 - 39)  SpO2: 99% (01 Sep 2021 14:10) (90% - 100%)    Vital Signs Last 24 Hrs  T(C): 37.3 (01 Sep 2021 11:59), Max: 38.2 (31 Aug 2021 18:42)  T(F): 99.1 (01 Sep 2021 11:59), Max: 100.8 (31 Aug 2021 18:42)  HR: 99 (01 Sep 2021 14:10) (93 - 119)  BP: 116/80 (01 Sep 2021 13:00) (106/72 - 141/89)  BP(mean): 87 (01 Sep 2021 11:00) (72 - 105)  RR: 22 (01 Sep 2021 13:00) (15 - 39)  SpO2: 99% (01 Sep 2021 14:10) (90% - 100%)    ABG - ( 31 Aug 2021 04:45 )  pH, Arterial: 7.440 pH, Blood: x     /  pCO2: 39    /  pO2: 101   / HCO3: 26    / Base Excess: 2.3   /  SaO2: 99.2                I&O's Detail    31 Aug 2021 07:01  -  01 Sep 2021 07:00  --------------------------------------------------------  IN:    IV PiggyBack: 200 mL    IV PiggyBack: 200 mL  Total IN: 400 mL    OUT:    Dexmedetomidine: 0 mL    FentaNYL: 0 mL    Indwelling Catheter - Urethral (mL): 6795 mL  Total OUT: 6795 mL    Total NET: -6395 mL      01 Sep 2021 07:01  -  01 Sep 2021 14:57  --------------------------------------------------------  IN:    IV PiggyBack: 100 mL    IV PiggyBack: 50 mL  Total IN: 150 mL    OUT:    Indwelling Catheter - Urethral (mL): 775 mL  Total OUT: 775 mL    Total NET: -625 mL            LABS:                        11.4   12.41 )-----------( 529      ( 01 Sep 2021 04:38 )             32.0     09-01    137  |  103  |  17.1  ----------------------------<  103<H>  3.4<L>   |  22.0  |  0.82    Ca    8.6      01 Sep 2021 04:38  Phos  2.7     09-01  Mg     1.9     09-01    TPro  6.3<L>  /  Alb  2.4<L>  /  TBili  0.8  /  DBili  x   /  AST  55<H>  /  ALT  60<H>  /  AlkPhos  74  09-01          CAPILLARY BLOOD GLUCOSE      POCT Blood Glucose.: 95 mg/dL (01 Sep 2021 11:54)        CULTURES:  Culture Results:   No growth at 48 hours (08-29 @ 10:59)  Culture Results:   No growth at 48 hours (08-28 @ 05:42)  Culture Results:   Normal Respiratory Le present (08-27 @ 22:07)

## 2021-09-01 NOTE — PHARMACOTHERAPY INTERVENTION NOTE - INTERVENTION TYPE RECOOMEND
Therapy Discontinuation Recommended - No indication
Therapy Recommended - Med Rec related
Therapy Recommended - Alternative treatment
Therapy Recommended - Med Rec related

## 2021-09-01 NOTE — PHYSICAL THERAPY INITIAL EVALUATION ADULT - LEVEL OF INDEPENDENCE: SIT/STAND, REHAB EVAL
pt dangled at edge of bed for ~5 minutes with varied assist from close supervision to maxAx1 for sitting balance. Asked pt if he wanted to attempt to stand pt stating no at this time, likely due to fatigue. TBA when able.

## 2021-09-01 NOTE — PROGRESS NOTE ADULT - SUBJECTIVE AND OBJECTIVE BOX
[facility]                                        Ne[facility] [place]                                 L[facility] Jalen                                  [address]                                        [place], [place], [address]                                             [phone]        CC: Anoxic encephalopathy.    HPI:   The patient is a 46y Male who presented to the ER after being found unresponsive in bed by family. He was brought to the ER. He was noted to be hypoxic with agonal respirations and was intubated and admitted to ICU on a mechanical ventilator.   He was noted to have some twitching type movement of the extremities for which the Epilepsy service was consulted.   He has a history of substance abuse.     Interim history: extubated, more awake, follows commands intermittently    ROS:   Unobtainable due to patient's condition.     MEDICATIONS  (STANDING):  albuterol/ipratropium for Nebulization 3 milliLiter(s) Nebulizer every 6 hours  amphetamine/dextroamphetamine 15 milliGRAM(s) Oral daily  aspirin  chewable 81 milliGRAM(s) Oral daily  buDESOnide    Inhalation Suspension 0.5 milliGRAM(s) Inhalation every 12 hours  chlorhexidine 2% Cloths 1 Application(s) Topical <User Schedule>  dextrose 40% Gel 15 Gram(s) Oral once  dextrose 5%. 1000 milliLiter(s) (50 mL/Hr) IV Continuous <Continuous>  dextrose 5%. 1000 milliLiter(s) (100 mL/Hr) IV Continuous <Continuous>  dextrose 50% Injectable 25 Gram(s) IV Push once  dextrose 50% Injectable 12.5 Gram(s) IV Push once  dextrose 50% Injectable 25 Gram(s) IV Push once  doxazosin 2 milliGRAM(s) Oral at bedtime  glucagon  Injectable 1 milliGRAM(s) IntraMuscular once  heparin   Injectable 5000 Unit(s) SubCutaneous every 8 hours  insulin lispro (ADMELOG) corrective regimen sliding scale   SubCutaneous every 6 hours  meropenem  IVPB 1000 milliGRAM(s) IV Intermittent every 8 hours  thyroid 30 milliGRAM(s) Oral daily    MEDICATIONS  (PRN):  acetaminophen    Suspension .. 650 milliGRAM(s) Oral every 6 hours PRN Temp greater or equal to 38C (100.4F)  ALBUTerol    90 MICROgram(s) HFA Inhaler 2 Puff(s) Inhalation every 6 hours PRN Shortness of Breath  clonazePAM  Tablet 0.5 milliGRAM(s) Oral every 12 hours PRN agitation  ibuprofen  Tablet. 400 milliGRAM(s) Oral every 8 hours PRN Temp greater or equal to 38C (100.4F)      Vital Signs Last 24 Hrs  T(C): 37.3 (01 Sep 2021 11:59), Max: 38.2 (31 Aug 2021 18:42)  T(F): 99.1 (01 Sep 2021 11:59), Max: 100.8 (31 Aug 2021 18:42)  HR: 95 (01 Sep 2021 13:00) (93 - 119)  BP: 116/80 (01 Sep 2021 13:00) (106/72 - 141/89)  BP(mean): 87 (01 Sep 2021 11:00) (72 - 105)  RR: 22 (01 Sep 2021 13:00) (15 - 39)  SpO2: 99% (01 Sep 2021 13:00) (90% - 100%)    Detailed Neurologic Exam:    Mental status: The patient opens eyes and turns head toward voice. follows simple commands intermittently, not oriented to place or time      Cranial nerves: Pupils equal and react symmetrically to light. He blinks to threat bilaterally. Eyes move conjugately spontaneously. Face symmetric. Tongue midline.     Motor/Sensory: There is normal bulk and tone.  There is no tremor.  Moves 4 extremities equally   (+) grimace to pinch    Reflexes: Absent throughout and plantar responses are flexor.    Cerebellar: Cannot be assessed.     Labs:     08-31    138  |  104  |  17.9  ----------------------------<  128<H>  4.3   |  24.0  |  0.74    Ca    8.5<L>      31 Aug 2021 03:19  Phos  3.1     08-31  Mg     1.9     08-31                              11.1   10.74 )-----------( 418      ( 31 Aug 2021 03:19 )             32.3       Rad:   MRI brain- FLAIR and DWI hyperintensity in b/l BG/GP/left hippocampus and scattered throughout both cerebral and cerebellar hemispheres, no blood or mass.

## 2021-09-01 NOTE — PHYSICAL THERAPY INITIAL EVALUATION ADULT - PLANNED THERAPY INTERVENTIONS, PT EVAL
Patient informed of this.   balance training/bed mobility training/gait training/neuromuscular re-education/ROM/strengthening/transfer training

## 2021-09-01 NOTE — PHYSICAL THERAPY INITIAL EVALUATION ADULT - ADDITIONAL COMMENTS
Pt with soft speech difficult to understand at times, pt reports he lives alone in a house with ~6-7 steps to enter with handrail, unsure if stairs are present inside. Independent prior to admission. Owns no DME.

## 2021-09-01 NOTE — PHYSICAL THERAPY INITIAL EVALUATION ADULT - PERTINENT HX OF CURRENT PROBLEM, REHAB EVAL
Per EMR: 47yo M who presented with respiratory failure and altered mental status. He had been found on the floor unresponsive, possible cocaine overdose with aspiration pneumonia/hypoxia, and had remained unresponsive for many days. MRI Anoxic findings on MRI brain with prolonged unresponsiveness

## 2021-09-01 NOTE — PHYSICAL THERAPY INITIAL EVALUATION ADULT - ORIENTATION, REHAB EVAL
place- with choices, however able to recall hospital ~10 minutes when another MD asked A&O questions. Time -  April 2011/person

## 2021-09-01 NOTE — PHYSICAL THERAPY INITIAL EVALUATION ADULT - SITTING BALANCE: STATIC
varied assist from close supervision to maxAx1, fatigued easily. Requires verbal cues to bring weight anteriorly and to use UE to assist in holding self up.

## 2021-09-01 NOTE — PHYSICAL THERAPY INITIAL EVALUATION ADULT - GENERAL OBSERVATIONS, REHAB EVAL
Pt received in bed, + IV, + NG tube + NC O2 +Tele//BP monitoring, + parikh + SCDs with PRAFO boots, in NAD, 0/10 pain, lethargic, had an episode of spitting on self, agreeable to PT evaluation

## 2021-09-01 NOTE — PROGRESS NOTE ADULT - SUBJECTIVE AND OBJECTIVE BOX
KENDALL Pierre    Patient is a 46y old  Male who presents with a chief complaint of Acute hypoxemic respiratory failure, AMS (31 Aug 2021 10:58)      BRIEF HOSPITAL COURSE: *45yo M who presented with respiratory failure and altered mental status. He had been found on the floor unresponsive, possible cocaine overdose with aspiration pneumonia/hypoxia, and had remained unresponsive for many days. However today he is more alert, actually gave a thumbs up to the attending. Plans had been made for Trach/Peg in 48 hours, will keep on schedule for now, but likely to not need if remains alert and responsive. He has had persistent fevers without clear source as his MRSA pneumonia has been well controlled with Vanco/Meropenem.   He had Echo that revealed EF <20%.  **    Events last 24 hours: ** tolerated extubation with episode of vomiting   off sedation  high flow sats > 90 %  more alert yet not conversant  afebrile     ros cannot obtain     PAST MEDICAL & SURGICAL HISTORY:  No pertinent past medical history    Asthma    HTN (hypertension)    DM (diabetes mellitus)    S/P elbow joint replacement  right elbow surgery.    S/P knee surgery  right thigh surgery.          Medications:  meropenem  IVPB 1000 milliGRAM(s) IV Intermittent every 8 hours  vancomycin  IVPB 1500 milliGRAM(s) IV Intermittent <User Schedule>    doxazosin 2 milliGRAM(s) Oral at bedtime    ALBUTerol    90 MICROgram(s) HFA Inhaler 2 Puff(s) Inhalation every 6 hours PRN  albuterol/ipratropium for Nebulization 3 milliLiter(s) Nebulizer every 6 hours  buDESOnide    Inhalation Suspension 0.5 milliGRAM(s) Inhalation every 12 hours    acetaminophen    Suspension .. 650 milliGRAM(s) Oral every 6 hours PRN  clonazePAM  Tablet 0.5 milliGRAM(s) Oral every 12 hours PRN  ibuprofen  Tablet. 400 milliGRAM(s) Oral every 8 hours PRN      aspirin  chewable 81 milliGRAM(s) Oral daily  heparin   Injectable 5000 Unit(s) SubCutaneous every 8 hours    pantoprazole  Injectable 40 milliGRAM(s) IV Push daily      dextrose 40% Gel 15 Gram(s) Oral once  dextrose 50% Injectable 25 Gram(s) IV Push once  dextrose 50% Injectable 12.5 Gram(s) IV Push once  dextrose 50% Injectable 25 Gram(s) IV Push once  glucagon  Injectable 1 milliGRAM(s) IntraMuscular once  insulin lispro (ADMELOG) corrective regimen sliding scale   SubCutaneous every 6 hours  thyroid 30 milliGRAM(s) Oral daily    dextrose 5%. 1000 milliLiter(s) IV Continuous <Continuous>  dextrose 5%. 1000 milliLiter(s) IV Continuous <Continuous>      chlorhexidine 2% Cloths 1 Application(s) Topical <User Schedule>        Mode: CPAP with PS  FiO2: 30  PEEP: 5  PS: 10  MAP: 9      ICU Vital Signs Last 24 Hrs  T(C): 37.6 (31 Aug 2021 23:51), Max: 38.2 (31 Aug 2021 18:42)  T(F): 99.7 (31 Aug 2021 23:51), Max: 100.8 (31 Aug 2021 18:42)  HR: 108 (01 Sep 2021 00:00) (75 - 119)  BP: 122/84 (01 Sep 2021 00:00) (96/70 - 136/95)  BP(mean): 96 (01 Sep 2021 00:00) (78 - 105)  ABP: --  ABP(mean): --  RR: 28 (01 Sep 2021 00:00) (13 - 32)  SpO2: 92% (01 Sep 2021 00:00) (90% - 98%)      ABG - ( 31 Aug 2021 04:45 )  pH, Arterial: 7.440 pH, Blood: x     /  pCO2: 39    /  pO2: 101   / HCO3: 26    / Base Excess: 2.3   /  SaO2: 99.2                I&O's Detail    30 Aug 2021 07:01  -  31 Aug 2021 07:00  --------------------------------------------------------  IN:    Dexmedetomidine: 507.2 mL    dextrose 5% + sodium chloride 0.45%: 1800 mL    FentaNYL: 586.8 mL    IV PiggyBack: 500 mL    IV PiggyBack: 250 mL    Jevity 1.5: 600 mL    Midazolam: 30.7 mL  Total IN: 4274.7 mL    OUT:    Indwelling Catheter - Urethral (mL): 2850 mL  Total OUT: 2850 mL    Total NET: 1424.7 mL      31 Aug 2021 07:01  -  01 Sep 2021 01:02  --------------------------------------------------------  IN:    IV PiggyBack: 200 mL  Total IN: 200 mL    OUT:    Dexmedetomidine: 0 mL    FentaNYL: 0 mL    Indwelling Catheter - Urethral (mL): 6160 mL  Total OUT: 6160 mL    Total NET: -5960 mL            LABS:                        11.1   10.74 )-----------( 418      ( 31 Aug 2021 03:19 )             32.3     08-31    138  |  104  |  17.9  ----------------------------<  128<H>  4.3   |  24.0  |  0.74    Ca    8.5<L>      31 Aug 2021 03:19  Phos  3.1     08-31  Mg     1.9     08-31            CAPILLARY BLOOD GLUCOSE      POCT Blood Glucose.: 141 mg/dL (31 Aug 2021 23:08)        CULTURES:  Culture Results:   No growth at 48 hours (08-29 @ 10:59)  Culture Results:   No growth at 48 hours (08-28 @ 05:42)  Culture Results:   Normal Respiratory Le present (08-27 @ 22:07)      Physical Examination:    General: No acute distress.   opens eyes      HEENT: Pupils equal, reactive to light.  Symmetric. nd in place     PULM:  bilateral rhonchi  no w  NECK: Supple, no lymphadenopathy, trachea midline    CVS: Regular rate and rhythm, no murmurs, rubs, or gallops    ABD: Soft, nondistended, nontender, normoactive bowel sounds, no masses    EXT: No edema, nontender    SKIN: Warm and well perfused, no rashes noted.    NEURO:  opens eyes   non verbal  some confusion     DEVICES:     RADIOLOGY: **IMPRESSION:   Residual Perihilar/basilar diffuse airspace disease and/or effusions..    --- End of Report ---            BREANA POLANCO MD; Attending Radiologist  This document has been electronically signed. Aug 31 2021  1:26PM  *    CRITICAL CARE TIME SPENT: ***

## 2021-09-01 NOTE — PROGRESS NOTE ADULT - ASSESSMENT
Patient is a 47 yo F w/ PMH of DM2, HTN, Hyothyroid, Asthma, ADHD who presented after being found unresponsive. Admitted to MICU for possible benzo/cocaine overdose and aspiration PNA. Patient was intubated and started on vasopressors. Patient with anoxic brain injury, being followed by Neurology. Patient on IV antibiotics for presumed aspiration PNA. ECHO was done and significant for LVEF of <20. Per Cardiology, patient to have ischemic evaluation if meaningful neurological recovery. Patient also noted to have rhabdo/GARRETT which has now normalized. Trach was tentatively scheduled for 9/2. Patient with improvement in mental status and was extubated on 8/31. Patient was transitioned from HFNC and now on 5L NC. Patient now being downgraded to Medicine.    #Anoxic brain injury 2/2 benzo overdose  - Utox positive for benzos and cocaine  - MRI brain: significant hypoxic/anoxic injury and strokes   - EEG: Moderate to severe nonspecific diffuse or multifocal cerebral dysfunction. No indication for anti-epileptic medication according to Epilepsy.  - Mentation improving, but waxing and waning per RN.  - Neuro following  - SLP, NPO w/ NGT. Patient removed NGT, replace and follow up CXR    #Acute hypoxemic respiratory failure 2/2 aspiration PNA, sepsis  - S/p Vancomycin. Currently on Merrem  - last febrile 8/31  - BCx: NGTD  - S/p extubation 8/31  - Supplemental oxygen via NC prn, currently on 5L saturating 5L   - CTS following for tentative trach tomorrow. May not need it.  - Will consult Pulmonary and ID    #Elevated troponin, new onset systolic CHF  - ECHO reviewed, LVEF of < 20%. severe AS  - Cardiology consult appreciated  - Start low dose Coreg  - C/w ASA  - Statin held because of transaminitis, will repeat.   - Ischemic evaluation once meaningful neurological recovery    #GARRETT w/ rhabdomyolysis  - Seen by Nephrology  - Renal function normalized  - Monitor BMP, correct electrolytes as needed    #Hypothyroid  - C/w [place]    #ADHD  - C/w Adderall    #AUR  - C/w parikh  - C/w Cardura      Advanced Directives: Full code, Palliative following.    Discussed POC w/ patients aunt.    Patient high risk for deterioration Downgraded to SDU, low threshold for MICU reconsult.       Patient is a 45 yo F w/ PMH of DM2, HTN, Hyothyroid, Asthma, ADHD who presented after being found unresponsive. Admitted to MICU for possible benzo/cocaine overdose and aspiration PNA. Patient was intubated and started on vasopressors. Patient with anoxic brain injury, being followed by Neurology. Patient on IV antibiotics for presumed aspiration PNA. ECHO was done and significant for LVEF of <20. Per Cardiology, patient to have ischemic evaluation if meaningful neurological recovery. Patient also noted to have rhabdo/GARRETT which has now normalized. Trach was tentatively scheduled for 9/2. Patient with improvement in mental status and was extubated on 8/31. Patient was transitioned from HFNC and now on 5L NC. Patient now being downgraded to Medicine.    #Anoxic brain injury 2/2 benzo overdose  - Utox positive for benzos and cocaine  - MRI brain: significant hypoxic/anoxic injury and strokes   - EEG: Moderate to severe nonspecific diffuse or multifocal cerebral dysfunction. No indication for anti-epileptic medication according to Epilepsy.  - Mentation improving, but waxing and waning per RN.  - Neuro following  - SLP, NPO w/ NGT. Patient removed NGT, replace and follow up CXR  - Psych consult once improvement in mentation    #Acute hypoxemic respiratory failure 2/2 aspiration PNA, sepsis  - S/p Vancomycin. Currently on Merrem  - last febrile 8/31  - BCx: NGTD  - S/p extubation 8/31  - Supplemental oxygen via NC prn, currently on 5L saturating 5L   - CTS following for tentative trach tomorrow. May not need it.  - Will consult Pulmonary and ID    #Elevated troponin, new onset systolic CHF  - ECHO reviewed, LVEF of < 20%. severe AS  - Cardiology consult appreciated  - Start low dose Coreg  - C/w ASA  - Statin held because of transaminitis, will repeat.   - Ischemic evaluation once meaningful neurological recovery    #GARRETT w/ rhabdomyolysis  - Seen by Nephrology  - Renal function normalized  - Monitor BMP, correct electrolytes as needed    #Hypothyroid  - C/w [place]    #ADHD  - C/w Adderall    #AUR  - C/w parikh  - C/w Cardura      Advanced Directives: Full code, Palliative following.    Discussed POC w/ patients aunt.    Patient high risk for deterioration Downgraded to SDU, low threshold for MICU reconsult.       Patient is a 47 yo F w/ PMH of DM2, HTN, Hyothyroid, Asthma, ADHD who presented after being found unresponsive. Admitted to MICU for possible benzo/cocaine overdose and aspiration PNA. Patient was intubated and started on vasopressors. Patient with anoxic brain injury, being followed by Neurology. Patient on IV antibiotics for presumed aspiration PNA. ECHO was done and significant for LVEF of <20. Per Cardiology, patient to have ischemic evaluation if meaningful neurological recovery. Patient also noted to have rhabdo/GARRETT which has now normalized. Trach was tentatively scheduled for 9/2. Patient with improvement in mental status and was extubated on 8/31. Patient was transitioned from HFNC and now on 5L NC. Patient now being downgraded to Medicine.    #Anoxic brain injury 2/2 benzodiazapine overdose  - Utox positive for benzos and cocaine  - MRI brain: significant hypoxic/anoxic injury and strokes   - EEG: Moderate to severe nonspecific diffuse or multifocal cerebral dysfunction. No indication for anti-epileptic medication according to Epilepsy.  - Mentation improving, but waxing and waning per RN.  - Neuro following  - SLP, NPO w/ NGT. Patient removed NGT, replace and follow up CXR  - Psych consult once improvement in mentation    #Acute hypoxemic respiratory failure 2/2 aspiration PNA, sepsis  - S/p Vancomycin. Currently on Merrem  - last febrile 8/31  - BCx: NGTD  - S/p extubation 8/31  - Supplemental oxygen via NC prn, currently on 5L saturating 5L   - CTS following for tentative trach tomorrow. May not need it.  - Will consult Pulmonary and ID    #Elevated troponin, new onset systolic CHF  - ECHO reviewed, LVEF of < 20%. severe AS  - Cardiology consult appreciated  - Start low dose Coreg  - C/w ASA  - Statin held because of transaminitis, will repeat.   - Ischemic evaluation once meaningful neurological recovery    #GARRETT w/ rhabdomyolysis  - Seen by Nephrology  - Renal function normalized  - Monitor BMP, correct electrolytes as needed    #Hypothyroid  - C/w [place]    #ADHD  - C/w Adderall    #AUR  - C/w parikh  - C/w Cardura    Advanced Directives: Full code, Palliative following.    Discussed POC w/ patients aunt.    Patient high risk for deterioration Downgraded to SDU, low threshold for MICU reconsult.       Patient is a 45 yo F w/ PMH of DM2, HTN, Hyothyroid, Asthma, ADHD who presented after being found unresponsive. Admitted to MICU for possible benzo/cocaine overdose and aspiration PNA. Patient was intubated and started on vasopressors. Patient with anoxic brain injury, being followed by Neurology. Patient on IV antibiotics for presumed aspiration PNA. ECHO was done and significant for LVEF of <20. Per Cardiology, patient to have ischemic evaluation if meaningful neurological recovery. Patient also noted to have rhabdo/GARRETT which has now normalized. Trach was tentatively scheduled for 9/2. Patient with improvement in mental status and was extubated on 8/31. Patient was transitioned from HFNC and now on 5L NC. Patient now being downgraded to Medicine.    #Anoxic brain injury 2/2 benzodiazapine overdose  - Utox positive for benzos and cocaine  - MRI brain: significant hypoxic/anoxic injury and strokes   - EEG: Moderate to severe nonspecific diffuse or multifocal cerebral dysfunction. No indication for anti-epileptic medication according to Epilepsy.  - Mentation improving, but waxing and waning per RN.  - Neuro following  - Dysphagia due to anoxic brain injury. Seen by SLP who recommended NPO w/ NGT. Patient removed NGT this afternoon, replace and follow up CXR after replacement.  - Psych consult once improvement in mentation    #Acute hypoxemic respiratory failure 2/2 aspiration PNA  Sepsis 2/2 PNA  - S/p Vancomycin. Currently on Merrem  - last febrile 8/31  - BCx: NGTD  - ID consult  - S/p extubation 8/31  - Supplemental oxygen via NC prn, currently on 5L saturating 5L. High risk for reintubation.  - CTS following for tentative trach tomorrow, likely will not require it as patient extubated and tolerating NC now.  - Will consult Pulmonary    #Elevated troponin, new onset systolic CHF  - ECHO reviewed, LVEF of < 20%. severe AS  - Cardiology consult appreciated  - Start low dose Coreg  - C/w ASA  - Statin held because of transaminitis, will repeat.  - Ischemic evaluation once meaningful neurological recovery    #GARRETT w/ rhabdomyolysis  - Seen by Nephrology  - Renal function normalized  - Monitor BMP, correct electrolytes as needed    #Hypothyroid  - C/w [place]    #ADHD  - C/w Adderall    #AUR  - C/w [place]  - C/w Cardura    Advanced Directives: Full code, Palliative following.    Discussed POC w/ patients aunt.    Patient high risk for clinical deterioration. Downgraded from MICU to SDU, low threshold for MICU reconsult.

## 2021-09-02 LAB
ALBUMIN SERPL ELPH-MCNC: 2.9 G/DL — LOW (ref 3.3–5.2)
ALP SERPL-CCNC: 73 U/L — SIGNIFICANT CHANGE UP (ref 40–120)
ALT FLD-CCNC: 56 U/L — HIGH
AMMONIA BLD-MCNC: 16 UMOL/L — SIGNIFICANT CHANGE UP (ref 11–55)
ANION GAP SERPL CALC-SCNC: 14 MMOL/L — SIGNIFICANT CHANGE UP (ref 5–17)
AST SERPL-CCNC: 46 U/L — HIGH
BASE EXCESS BLDA CALC-SCNC: -0.4 MMOL/L — SIGNIFICANT CHANGE UP (ref -2–3)
BILIRUB SERPL-MCNC: 0.8 MG/DL — SIGNIFICANT CHANGE UP (ref 0.4–2)
BLOOD GAS COMMENTS ARTERIAL: SIGNIFICANT CHANGE UP
BUN SERPL-MCNC: 15 MG/DL — SIGNIFICANT CHANGE UP (ref 8–20)
CALCIUM SERPL-MCNC: 8.4 MG/DL — LOW (ref 8.6–10.2)
CHLORIDE SERPL-SCNC: 105 MMOL/L — SIGNIFICANT CHANGE UP (ref 98–107)
CO2 SERPL-SCNC: 20 MMOL/L — LOW (ref 22–29)
CREAT SERPL-MCNC: 0.83 MG/DL — SIGNIFICANT CHANGE UP (ref 0.5–1.3)
CULTURE RESULTS: SIGNIFICANT CHANGE UP
GAS PNL BLDA: SIGNIFICANT CHANGE UP
GLUCOSE BLDC GLUCOMTR-MCNC: 115 MG/DL — HIGH (ref 70–99)
GLUCOSE BLDC GLUCOMTR-MCNC: 115 MG/DL — HIGH (ref 70–99)
GLUCOSE BLDC GLUCOMTR-MCNC: 93 MG/DL — SIGNIFICANT CHANGE UP (ref 70–99)
GLUCOSE SERPL-MCNC: 102 MG/DL — HIGH (ref 70–99)
HCO3 BLDA-SCNC: 23 MMOL/L — SIGNIFICANT CHANGE UP (ref 21–28)
HCT VFR BLD CALC: 33.7 % — LOW (ref 39–50)
HGB BLD-MCNC: 11.9 G/DL — LOW (ref 13–17)
HOROWITZ INDEX BLDA+IHG-RTO: SIGNIFICANT CHANGE UP
MAGNESIUM SERPL-MCNC: 2.1 MG/DL — SIGNIFICANT CHANGE UP (ref 1.6–2.6)
MCHC RBC-ENTMCNC: 30.8 PG — SIGNIFICANT CHANGE UP (ref 27–34)
MCHC RBC-ENTMCNC: 35.3 GM/DL — SIGNIFICANT CHANGE UP (ref 32–36)
MCV RBC AUTO: 87.3 FL — SIGNIFICANT CHANGE UP (ref 80–100)
PCO2 BLDA: 30 MMHG — LOW (ref 35–48)
PH BLDA: 7.49 — HIGH (ref 7.35–7.45)
PHOSPHATE SERPL-MCNC: 2.8 MG/DL — SIGNIFICANT CHANGE UP (ref 2.4–4.7)
PLATELET # BLD AUTO: 710 K/UL — HIGH (ref 150–400)
PO2 BLDA: 71 MMHG — LOW (ref 83–108)
POTASSIUM SERPL-MCNC: 4.1 MMOL/L — SIGNIFICANT CHANGE UP (ref 3.5–5.3)
POTASSIUM SERPL-SCNC: 4.1 MMOL/L — SIGNIFICANT CHANGE UP (ref 3.5–5.3)
PROCALCITONIN SERPL-MCNC: 0.19 NG/ML — HIGH (ref 0.02–0.1)
PROT SERPL-MCNC: 6.5 G/DL — LOW (ref 6.6–8.7)
RBC # BLD: 3.86 M/UL — LOW (ref 4.2–5.8)
RBC # FLD: 12.9 % — SIGNIFICANT CHANGE UP (ref 10.3–14.5)
SAO2 % BLDA: 96.5 % — SIGNIFICANT CHANGE UP (ref 94–98)
SODIUM SERPL-SCNC: 139 MMOL/L — SIGNIFICANT CHANGE UP (ref 135–145)
SPECIMEN SOURCE: SIGNIFICANT CHANGE UP
WBC # BLD: 12.93 K/UL — HIGH (ref 3.8–10.5)
WBC # FLD AUTO: 12.93 K/UL — HIGH (ref 3.8–10.5)

## 2021-09-02 PROCEDURE — 99233 SBSQ HOSP IP/OBS HIGH 50: CPT

## 2021-09-02 PROCEDURE — 71045 X-RAY EXAM CHEST 1 VIEW: CPT | Mod: 26

## 2021-09-02 PROCEDURE — 99222 1ST HOSP IP/OBS MODERATE 55: CPT

## 2021-09-02 RX ADMIN — Medication 3 MILLILITER(S): at 08:35

## 2021-09-02 RX ADMIN — Medication 3 MILLILITER(S): at 03:32

## 2021-09-02 RX ADMIN — Medication 0.5 MILLIGRAM(S): at 20:44

## 2021-09-02 RX ADMIN — CARVEDILOL PHOSPHATE 3.12 MILLIGRAM(S): 80 CAPSULE, EXTENDED RELEASE ORAL at 05:31

## 2021-09-02 RX ADMIN — CARVEDILOL PHOSPHATE 3.12 MILLIGRAM(S): 80 CAPSULE, EXTENDED RELEASE ORAL at 19:09

## 2021-09-02 RX ADMIN — Medication 2 MILLIGRAM(S): at 22:06

## 2021-09-02 RX ADMIN — Medication 0.5 MILLIGRAM(S): at 11:55

## 2021-09-02 RX ADMIN — Medication 0.5 MILLIGRAM(S): at 08:35

## 2021-09-02 RX ADMIN — Medication 650 MILLIGRAM(S): at 23:34

## 2021-09-02 RX ADMIN — HEPARIN SODIUM 5000 UNIT(S): 5000 INJECTION INTRAVENOUS; SUBCUTANEOUS at 22:06

## 2021-09-02 RX ADMIN — Medication 3 MILLILITER(S): at 14:55

## 2021-09-02 RX ADMIN — Medication 650 MILLIGRAM(S): at 11:56

## 2021-09-02 RX ADMIN — Medication 1 MILLIGRAM(S): at 14:46

## 2021-09-02 RX ADMIN — MEROPENEM 100 MILLIGRAM(S): 1 INJECTION INTRAVENOUS at 20:39

## 2021-09-02 RX ADMIN — HEPARIN SODIUM 5000 UNIT(S): 5000 INJECTION INTRAVENOUS; SUBCUTANEOUS at 05:31

## 2021-09-02 RX ADMIN — MEROPENEM 100 MILLIGRAM(S): 1 INJECTION INTRAVENOUS at 11:55

## 2021-09-02 RX ADMIN — CHLORHEXIDINE GLUCONATE 1 APPLICATION(S): 213 SOLUTION TOPICAL at 05:31

## 2021-09-02 RX ADMIN — Medication 0.5 MILLIGRAM(S): at 00:51

## 2021-09-02 RX ADMIN — Medication 81 MILLIGRAM(S): at 11:56

## 2021-09-02 RX ADMIN — HEPARIN SODIUM 5000 UNIT(S): 5000 INJECTION INTRAVENOUS; SUBCUTANEOUS at 13:14

## 2021-09-02 RX ADMIN — Medication 30 MILLIGRAM(S): at 05:31

## 2021-09-02 RX ADMIN — Medication 650 MILLIGRAM(S): at 12:56

## 2021-09-02 RX ADMIN — MEROPENEM 100 MILLIGRAM(S): 1 INJECTION INTRAVENOUS at 02:26

## 2021-09-02 RX ADMIN — Medication 3 MILLILITER(S): at 20:44

## 2021-09-02 NOTE — CONSULT NOTE ADULT - ASSESSMENT
This 47 y/o M (real name: Ildefonso Pierre, : [birth year]) with a h/o HTN, DM, asthma, presents to the ED after being found unresponsive in bed by his sister this evening. Noted to have agonal respirations and emesis coming from mouth. He was found to be hypoxemic in the ED prompting emergent endotracheal intubation and appears to have an extensive right sided pneumonia on CXR. The patient's sister, Adela, reports that he had recently broken up with his girlfriend and has been having a hard time coping with it and has been noticeably depressed. Within the past 5 days he was taken to the ED at [facility] for some unclear intoxication. Adela also reports recently noticing a plethora of newly prescribed medications at his house and was able to identify clonazepam and alprazolam. Tonight he had told her that he was going out with friends and asked if she would come over and watch his dog, which she thought seemed unusual. Urine toxicology (+) for benzos and cocaine. CT head reveals symmetric low attenuation lesions in the bilateral globus pallidus regions of the basal ganglia, which can be seen with hypoxic ischemic encephalopathy or related to carbon monoxide positioning, as well as indeterminate areas of low attenuation in the bilateral cerebellum may reflect infarcts. He is sedated and now with progressive hypotension despite 3L IVF bolus. Started on IV vasopressor therapy. (21 Aug 2021 21:54)     Patient with anoxic brain injury, attributed to benzo / cocaine overdose,, being followed by Neurology. Patient on IV antibiotics for presumed aspiration PNA. ECHO was done and significant for LVEF of <20. Per Cardiology, patient to have ischemic evaluation if meaningful neurological recovery. Patient also noted to have rhabdo/GARRETT which has now normalized. Trach was tentatively scheduled for . Patient with improvement in mental status and was extubated on . Patient was transitioned from HFNC and now on 5L NC. Patient was downgraded to Medicine service on 2021.    he is being treated for Acute hypoxemic respiratory failure 2/2 aspiration PNA, with a course antibiotics:  Vanco , , -   Zosyn  -   Merrem  - 21, current     last febrile  at 100.8.  This AM, he still has some low grade temp of 100.   ID was consulted for this.   He provides no history, but can vocalize some words.       Impression:  - anoxic brain injury  - drug overdose  - aspiration PNA  - fevers  - WBC elevation        Plan:  - he has been on Zosyn, vanco, then merrem since ;  - CXR shows improvement on infiltrates but still with fevers  - check PROCALCITONIN  - repeat CULTURES from blood x 2; SPUTUM if he produces it  - check URINE CULTURE, has kati, urine is slightly turbid  - Consider CT chest w/o contrast     IS HE having repeat aspirations?      WBC elevation is reactive  - will follow and trend    - follow up all outstanding cultures  - trend temperature and WBC curve  - repeat cultures from blood and all sources if febrile.    Maintain Merrem for now.       Finally, if above fever workup is negative  should consider repeat brain imaging to determine extent of damage.   large amount of brain tissue damage can also cause fevers.

## 2021-09-02 NOTE — PROGRESS NOTE ADULT - SUBJECTIVE AND OBJECTIVE BOX
YESI MIRZA    [id number]    46y      Male    INTERVAL HPI/OVERNIGHT EVENTS: patient being seen for overdose. Patient seen at bedside and is awake but not fully alert    as per rn, patient pulled out his ng tube again and restraints placed. Patient seen at bedside and denies any complaints      REVIEW OF SYSTEMS:    unable to obtain secondary to mental status       Vital Signs Last 24 Hrs  T(C): 37.8 (02 Sep 2021 07:51), Max: 37.8 (02 Sep 2021 00:19)  T(F): 100 (02 Sep 2021 07:51), Max: 100 (02 Sep 2021 00:19)  HR: 98 (02 Sep 2021 11:00) (92 - 125)  BP: 109/83 (02 Sep 2021 11:00) (83/60 - 121/70)  BP(mean): 92 (02 Sep 2021 11:00) (66 - 98)  RR: 33 (02 Sep 2021 11:00) (13 - 41)  SpO2: 94% (02 Sep 2021 11:00) (90% - 100%)    PHYSICAL EXAM:    CONSTITUTIONAL: NAD, nods yes/no, able to mouth some words  HEENT: NC/AT, ng tube   RESPIRATORY: Bilateral rhonchi  CARDIOVASCULAR: RRR, S1/S2+, no m/g/r  ABDOMEN: Nontender to palpation, normoactive bowel sounds, no rebound/guarding; No hepatosplenomegaly  MUSCULOSKELETAL: No edema, cyanosis or deformities.  NEUROLOGY: awake.   SKIN: No rashes; no palpable lesions  VASC: Distal pulses palpable      LABS:                        11.9   12.93 )-----------( 710      ( 02 Sep 2021 04:07 )             33.7     09-02    139  |  105  |  15.0  ----------------------------<  102<H>  4.1   |  20.0<L>  |  0.83    Ca    8.4<L>      02 Sep 2021 04:07  Phos  2.8     09-02  Mg     2.1     09-02    TPro  6.5<L>  /  Alb  2.9<L>  /  TBili  0.8  /  DBili  x   /  AST  46<H>  /  ALT  56<H>  /  AlkPhos  73  09-02      MEDICATIONS  (STANDING):  albuterol/ipratropium for Nebulization 3 milliLiter(s) Nebulizer every 6 hours  amphetamine/dextroamphetamine 15 milliGRAM(s) Oral daily  aspirin  chewable 81 milliGRAM(s) Oral daily  buDESOnide    Inhalation Suspension 0.5 milliGRAM(s) Inhalation every 12 hours  carvedilol 3.125 milliGRAM(s) Oral every 12 hours  chlorhexidine 2% Cloths 1 Application(s) Topical <User Schedule>  dextrose 40% Gel 15 Gram(s) Oral once  dextrose 5%. 1000 milliLiter(s) (100 mL/Hr) IV Continuous <Continuous>  dextrose 5%. 1000 milliLiter(s) (50 mL/Hr) IV Continuous <Continuous>  dextrose 50% Injectable 25 Gram(s) IV Push once  dextrose 50% Injectable 12.5 Gram(s) IV Push once  dextrose 50% Injectable 25 Gram(s) IV Push once  doxazosin 2 milliGRAM(s) Oral at bedtime  glucagon  Injectable 1 milliGRAM(s) IntraMuscular once  heparin   Injectable 5000 Unit(s) SubCutaneous every 8 hours  insulin lispro (ADMELOG) corrective regimen sliding scale   SubCutaneous every 6 hours  meropenem  IVPB 1000 milliGRAM(s) IV Intermittent every 8 hours  thyroid 30 milliGRAM(s) Oral daily    MEDICATIONS  (PRN):  acetaminophen    Suspension .. 650 milliGRAM(s) Oral every 6 hours PRN Temp greater or equal to 38C (100.4F)  ALBUTerol    90 MICROgram(s) HFA Inhaler 2 Puff(s) Inhalation every 6 hours PRN Shortness of Breath  clonazePAM  Tablet 0.5 milliGRAM(s) Oral every 12 hours PRN agitation  ibuprofen  Tablet. 400 milliGRAM(s) Oral every 8 hours PRN Temp greater or equal to 38C (100.4F)      RADIOLOGY & ADDITIONAL TESTS:

## 2021-09-02 NOTE — CONSULT NOTE ADULT - SUBJECTIVE AND OBJECTIVE BOX
[facility]  INFECTIOUS DISEASES  at [place]  =======================================================  Jaime Gudino MD  Diplomates American Board of Internal Medicine and Infectious Diseases  Tel  [phone]  Fax [phone]  =======================================================    [facility]-[id number]  ILDEFONSO PIERRE   HPI: This 45 y/o M (real name: Ildefonso Pierre, : [birth year]) with a h/o HTN, DM, asthma, presents to the ED after being found unresponsive in bed by his sister this evening. Noted to have agonal respirations and emesis coming from mouth. He was found to be hypoxemic in the ED prompting emergent endotracheal intubation and appears to have an extensive right sided pneumonia on CXR. The patient's sister, Adela, reports that he had recently broken up with his girlfriend and has been having a hard time coping with it and has been noticeably depressed. Within the past 5 days he was taken to the ED at [facility] for some unclear intoxication. Adela also reports recently noticing a plethora of newly prescribed medications at his house and was able to identify clonazepam and alprazolam. Tonight he had told her that he was going out with friends and asked if she would come over and watch his dog, which she thought seemed unusual. Urine toxicology (+) for benzos and cocaine. CT head reveals symmetric low attenuation lesions in the bilateral globus pallidus regions of the basal ganglia, which can be seen with hypoxic ischemic encephalopathy or related to carbon monoxide positioning, as well as indeterminate areas of low attenuation in the bilateral cerebellum may reflect infarcts. He is sedated and now with progressive hypotension despite 3L IVF bolus. Started on IV vasopressor therapy. (21 Aug 2021 21:54)     Patient with anoxic brain injury, attributed to benzo / cocaine overdose,, being followed by Neurology. Patient on IV antibiotics for presumed aspiration PNA. ECHO was done and significant for LVEF of <20. Per Cardiology, patient to have ischemic evaluation if meaningful neurological recovery. Patient also noted to have rhabdo/GARRETT which has now normalized. Trach was tentatively scheduled for . Patient with improvement in mental status and was extubated on . Patient was transitioned from HFNC and now on 5L NC. Patient was downgraded to Medicine service on 2021.    he is being treated for Acute hypoxemic respiratory failure  aspiration PNA, with a course antibiotics:  Vanco , , -   Zosyn  -   Merrem  - 21, current     last febrile  at 100.8.  This AM, he still has some low grade temp of 100.   ID was consulted for this.   He provides no history, but can vocalize some words.     I have personally reviewed the labs and data; pertinent labs and data are listed in this note; please see below.   =======================================================  Past Medical & Surgical Hx:  =====================  PAST MEDICAL & SURGICAL HISTORY:  No pertinent past medical history  Asthma  HTN (hypertension)  DM (diabetes mellitus)  S/P elbow joint replacement  right elbow surgery.  S/P knee surgery right thigh surgery.      Problem List:  ==========  HEALTH ISSUES - PROBLEM Dx:  Acute hypoxemic respiratory failure  Drug overdose  HTN (hypertension)  Asthma  DM (diabetes mellitus)  Anoxic brain injury  CHF (congestive heart failure)  Pneumonia, aspiration      Social Hx:  =======  no toxic habits currently    FAMILY HISTORY:  no significant family history of immunosuppressive disorders in mother or father   =======================================================    REVIEW OF SYSTEMS:  Limited due to medical condition    =======================================================  Allergies    Allergy Status Unknown  No Known Allergies       Antibiotics:  meropenem  IVPB 1000 milliGRAM(s) IV Intermittent every 8 hours    Other medications:  albuterol/ipratropium for Nebulization 3 milliLiter(s) Nebulizer every 6 hours  amphetamine/dextroamphetamine 15 milliGRAM(s) Oral daily  aspirin  chewable 81 milliGRAM(s) Oral daily  buDESOnide    Inhalation Suspension 0.5 milliGRAM(s) Inhalation every 12 hours  carvedilol 3.125 milliGRAM(s) Oral every 12 hours  chlorhexidine 2% Cloths 1 Application(s) Topical <User Schedule>  dextrose 40% Gel 15 Gram(s) Oral once  dextrose 5%. 1000 milliLiter(s) IV Continuous <Continuous>  dextrose 5%. 1000 milliLiter(s) IV Continuous <Continuous>  dextrose 50% Injectable 25 Gram(s) IV Push once  dextrose 50% Injectable 12.5 Gram(s) IV Push once  dextrose 50% Injectable 25 Gram(s) IV Push once  doxazosin 2 milliGRAM(s) Oral at bedtime  glucagon  Injectable 1 milliGRAM(s) IntraMuscular once  heparin   Injectable 5000 Unit(s) SubCutaneous every 8 hours  insulin lispro (ADMELOG) corrective regimen sliding scale   SubCutaneous every 6 hours  thyroid 30 milliGRAM(s) Oral daily     meropenem  IVPB   100 mL/Hr IV Intermittent (21 @ 19:31)   100 mL/Hr IV Intermittent (21 @ 02:24)   100 mL/Hr IV Intermittent (21 @ 11:17)   100 mL/Hr IV Intermittent (21 @ 17:18)   100 mL/Hr IV Intermittent (21 @ 03:30)   100 mL/Hr IV Intermittent (21 @ 11:30)   100 mL/Hr IV Intermittent (21 @ 17:49)   100 mL/Hr IV Intermittent (21 @ 03:45)   100 mL/Hr IV Intermittent (21 @ 11:44)   100 mL/Hr IV Intermittent (21 @ 18:50)   100 mL/Hr IV Intermittent (21 @ 03:25)   100 mL/Hr IV Intermittent (21 @ 11:09)   100 mL/Hr IV Intermittent (21 @ 18:45)   100 mL/Hr IV Intermittent (21 @ 03:44)   100 mL/Hr IV Intermittent (21 @ 11:50)   100 mL/Hr IV Intermittent (21 @ 18:42)   100 mL/Hr IV Intermittent (21 @ 02:26)    piperacillin/tazobactam IVPB..   200 mL/Hr IV Intermittent (21 @ 20:44)   25 mL/Hr IV Intermittent (21 @ 05:19)   25 mL/Hr IV Intermittent (21 @ 17:06)   25 mL/Hr IV Intermittent (21 @ 05:39)   25 mL/Hr IV Intermittent (21 @ 17:07)   25 mL/Hr IV Intermittent (21 @ 05:14)   25 mL/Hr IV Intermittent (21 @ 17:21)   25 mL/Hr IV Intermittent (21 @ 05:15)   25 mL/Hr IV Intermittent (21 @ 17:36)   25 mL/Hr IV Intermittent (21 @ 05:17)   25 mL/Hr IV Intermittent (21 @ 13:06)   25 mL/Hr IV Intermittent (21 @ 22:56)   25 mL/Hr IV Intermittent (21 @ 05:45)   25 mL/Hr IV Intermittent (21 @ 13:02)         vancomycin  IVPB   250 mL/Hr IV Intermittent (21 @ 20:54)   250 mL/Hr IV Intermittent (21 @ 23:47)    vancomycin  IVPB   250 mL/Hr IV Intermittent (21 @ 19:31)   250 mL/Hr IV Intermittent (21 @ 08:10)   250 mL/Hr IV Intermittent (21 @ 20:10)   250 mL/Hr IV Intermittent (21 @ 09:22)    300 mL/Hr IV Intermittent (21 @ 21:08)   300 mL/Hr IV Intermittent (21 @ 09:31)   300 mL/Hr IV Intermittent (21 @ 21:11)   300 mL/Hr IV Intermittent (21 @ 09:57)   300 mL/Hr IV Intermittent (21 @ 21:07)      ======================================================  Physical Exam:  ============  T(F): 100 (02 Sep 2021 07:51), Max: 100 (02 Sep 2021 00:19)  HR: 106 (02 Sep 2021 09:00)  BP: 97/52 (02 Sep 2021 09:00)  RR: 23 (02 Sep 2021 09:00)  SpO2: 99% (02 Sep 2021 09:00) (90% - 100%)  temp max in last 48H T(F): , Max: 100.8 (21 @ 18:42)    General:  No acute distress. SLEEPY  Eye: Pupils are equal, round and reactive to light, Extraocular movements are intact, Normal conjunctiva.  HENT: Normocephalic, dry mouth  Neck: Supple, No lymphadenopathy.  Respiratory: Lungs with COARSE right breath sounds  Cardiovascular: Normal rate, Regular rhythm,   Gastrointestinal: Soft,  Non-distended, Normal bowel sounds.  Genitourinary: No costovertebral angle tenderness.  + WEST with slightly turbid urine  Lymphatics: No lymphadenopathy neck,   Musculoskeletal: Normal range of motion, Normal strength.  Integumentary: No rash.  Neurologic: awake, moves some extremities    =======================================================  Labs:                        11.9   12.93 )-----------( 710      ( 02 Sep 2021 04:07 )             33.7     WBC Count: 12.93 K/uL (21 @ 04:07)  WBC Count: 12.41 K/uL (21 @ 04:38)  WBC Count: 10.74 K/uL (21 @ 03:19)  WBC Count: 13.51 K/uL (21 @ 03:55)  WBC Count: 13.23 K/uL (21 @ 04:06)          139  |  105  |  15.0  ----------------------------<  102<H>  4.1   |  20.0<L>  |  0.83    Ca    8.4<L>      02 Sep 2021 04:07  Phos  2.8       Mg     2.1         TPro  6.5<L>  /  Alb  2.9<L>  /  TBili  0.8  /  DBili  x   /  AST  46<H>  /  ALT  56<H>  /  AlkPhos  73        Culture - Sputum (collected 21 @ 03:59)  Source: .Sputum Sputum  Gram Stain (21 @ 07:44):    Few polymorphonuclear leukocytes per low power field    Moderate Squamous epithelial cells per low power field    Few Gram positive cocci in pairs seen per oil power field    Culture - Blood (collected 21 @ 10:59)  Source: .Blood Blood    Culture - Blood (collected 21 @ 05:42)  Source: .Blood Blood-Peripheral  Final Report (21 @ 07:00):    No growth at 5 days.    Culture - Sputum (collected 21 @ 22:07)  Source: .Sputum Sputum  Gram Stain (21 @ 23:51):    Moderate polymorphonuclear leukocytes per low power field    No Squamous epithelial cells per low power field    Few Gram Positive Rods per oil power field    Rare Gram positive cocci in pairs per oil power field  Final Report (21 @ 18:08):    Normal Respiratory Le present    Culture - Fungal, CSF (collected 21 @ 21:43)  Source: .CSF CSF    Culture - CSF with Gram Stain (collected 21 @ 21:43)  Source: .CSF CSF  Gram Stain (21 @ 22:10):    No polymorphonuclear leukocytes seen    No organisms seen    by cytocentrifuge  Final Report (21 @ 16:12):    No growth at 3 days.    Culture - Sputum (collected 21 @ 12:18)  Source: .Sputum Sputum  Gram Stain (21 @ 15:10):    No polymorphonuclear leukocytes per low power field    No Squamous epithelial cells per low power field    Few Gram positive cocci in pairs per oil power field  Final Report (21 @ 10:52):    Normal Respiratory Le present    Culture - Urine (collected 21 @ 02:40)  Source: Clean Catch Clean Catch (Midstream)  Final Report (21 @ 02:23):    No growth    Culture - Blood (collected 21 @ 20:04)  Source: .Blood Blood-Peripheral  Final Report (21 @ 21:00):    No growth at 5 days.    Culture - Blood (collected 21 @ 20:03)  Source: .Blood Blood-Peripheral  Final Report (21 @ 21:00):    No growth at 5 days.      Creatinine, Serum: 0.83 mg/dL (21 @ 04:07)  Creatinine, Serum: 0.82 mg/dL (21 @ 04:38)  Creatinine, Serum: 0.74 mg/dL (21 @ 03:19)  Creatinine, Serum: 0.71 mg/dL (21 @ 03:55)  Creatinine, Serum: 0.81 mg/dL (21 @ 04:06)        Procalcitonin, Serum: 0.19 ng/mL (21 @ 08:54)  Procalcitonin, Serum: 0.24 ng/mL (21 @ 04:38)    SARS-CoV-2: NotDetec (21 @ 21:01)  Rapid RVP Result: NotDetec (21 @ 21:01)    COVID-19 PCR: NotDetec (21 @ 03:51)       < from: Xray Chest 1 View- PORTABLE-Routine (Xray Chest 1 View- PORTABLE-Routine in AM.) (21 @ 06:13) >     EXAM:  XR CHEST PORTABLE ROUTINE 1V                          PROCEDURE DATE:  2021          INTERPRETATION:  AP chest on 2021 at 5:43 AM.    Heart magnified by technique. Endotracheal tube present on  has been removed. NG tube remains.    Fairly advanced infiltrates were seen on  of the mid lower lung fields. These are somewhat improved.    IMPRESSION: Bilateral infiltrates somewhat improved. Endotracheal tube removed.    --- End of Report ---            BREANA WU MD; Attending Radiologist  This document has been electronically signed. Sep  1 2021  8:52AM    < end of copied text >      < from: CT Head No Cont (21 @ 10:53) >     EXAM:  CT BRAIN                          PROCEDURE DATE:  2021          INTERPRETATION:  CLINICAL INDICATION: Seizure. Altered mental status.    TECHNIQUE: CT of the head was performed without the administration of intravenous contrast.    COMPARISON: CT head 2021.    FINDINGS:  Slightly increased prominence of hypoattenuating foci involving the bilateral globi pallidi. Additional likely infarcts are increased in conspicuity involving the right sensorimotor cortex, parietal centrum semiovale, left occipital lobe, and bilateral cerebellum.    No acute hemorrhage. No hydrocephalus. No extra-axial fluid collections. No midline shift.    The visualized intraorbital contents are unremarkable. Scattered mucosal thickening throughout the ethmoid air cells. The mastoid air cells are clear. The visualized soft tissues and osseous structures appear normal.    IMPRESSION:    -Slightly increased prominence of bilateral globus pallidus hypodense foci, likely representing hypoxic-ischemicinjury.    -Again noted multiple likely small acute infarcts involving the bilateral cerebral and cerebellar hemispheres.    -No acute intracranial hemorrhage.    --- End of Report ---            BONIFACIO LUU MD; Attending Radiologist  This document has been electronically signed. Aug 23 2021 12:26PM    < end of copied text >

## 2021-09-02 NOTE — PROGRESS NOTE ADULT - SUBJECTIVE AND OBJECTIVE BOX
OVERNIGHT EVENTS: awake, answers yes and no questions randomly but mostly nonsensical     Present Symptoms:     Dyspnea: 0   Nausea/Vomiting: No  Anxiety:  No  Depression: No  Fatigue: No  Loss of appetite: No    Pain: none             Character-            Duration-            Effect-            Factors-            Frequency-            Location-            Severity-    Review of Systems: Reviewed                   Unable to obtain due to poor mentation   All others negative    MEDICATIONS  (STANDING):  albuterol/ipratropium for Nebulization 3 milliLiter(s) Nebulizer every 6 hours  amphetamine/dextroamphetamine 15 milliGRAM(s) Oral daily  aspirin  chewable 81 milliGRAM(s) Oral daily  buDESOnide    Inhalation Suspension 0.5 milliGRAM(s) Inhalation every 12 hours  carvedilol 3.125 milliGRAM(s) Oral every 12 hours  chlorhexidine 2% Cloths 1 Application(s) Topical <User Schedule>  dextrose 40% Gel 15 Gram(s) Oral once  dextrose 5%. 1000 milliLiter(s) (50 mL/Hr) IV Continuous <Continuous>  dextrose 5%. 1000 milliLiter(s) (100 mL/Hr) IV Continuous <Continuous>  dextrose 50% Injectable 25 Gram(s) IV Push once  dextrose 50% Injectable 12.5 Gram(s) IV Push once  dextrose 50% Injectable 25 Gram(s) IV Push once  doxazosin 2 milliGRAM(s) Oral at bedtime  glucagon  Injectable 1 milliGRAM(s) IntraMuscular once  heparin   Injectable 5000 Unit(s) SubCutaneous every 8 hours  insulin lispro (ADMELOG) corrective regimen sliding scale   SubCutaneous every 6 hours  meropenem  IVPB 1000 milliGRAM(s) IV Intermittent every 8 hours  thyroid 30 milliGRAM(s) Oral daily    MEDICATIONS  (PRN):  acetaminophen    Suspension .. 650 milliGRAM(s) Oral every 6 hours PRN Temp greater or equal to 38C (100.4F)  ALBUTerol    90 MICROgram(s) HFA Inhaler 2 Puff(s) Inhalation every 6 hours PRN Shortness of Breath  clonazePAM  Tablet 0.5 milliGRAM(s) Oral every 12 hours PRN agitation  ibuprofen  Tablet. 400 milliGRAM(s) Oral every 8 hours PRN Temp greater or equal to 38C (100.4F)    PHYSICAL EXAM:    Vital Signs Last 24 Hrs  T(C): 37.8 (02 Sep 2021 07:51), Max: 37.8 (02 Sep 2021 00:19)  T(F): 100 (02 Sep 2021 07:51), Max: 100 (02 Sep 2021 00:19)  HR: 95 (02 Sep 2021 08:36) (92 - 125)  BP: 111/69 (02 Sep 2021 08:00) (83/60 - 121/70)  BP(mean): 83 (02 Sep 2021 08:00) (68 - 98)  RR: 37 (02 Sep 2021 08:00) (13 - 41)  SpO2: 97% (02 Sep 2021 08:00) (90% - 100%)    General: alert, intermittently able to answer yes and no questions     Karnofsky:  30 %    HEENT: normal     Lungs: comfortable     CV: normal      GI: normal. NG tube     : kati    MSK: bedbound/wheelchair bound    Skin: no rash    LABS:                      11.9   12.93 )-----------( 710      ( 02 Sep 2021 04:07 )             33.7     09-02    139  |  105  |  15.0  ----------------------------<  102<H>  4.1   |  20.0<L>  |  0.83    Ca    8.4<L>      02 Sep 2021 04:07  Phos  2.8     09-02  Mg     2.1     09-02    TPro  6.5<L>  /  Alb  2.9<L>  /  TBili  0.8  /  DBili  x   /  AST  46<H>  /  ALT  56<H>  /  AlkPhos  73  09-02    I&O's Summary    01 Sep 2021 07:01  -  02 Sep 2021 07:00  --------------------------------------------------------  IN: 250 mL / OUT: 2705 mL / NET: -2455 mL    02 Sep 2021 07:01  -  02 Sep 2021 09:11  --------------------------------------------------------  IN: 0 mL / OUT: 350 mL / NET: -350 mL    RADIOLOGY & ADDITIONAL STUDIES:    < from: Xray Chest 1 View- PORTABLE-Routine (Xray Chest 1 View- PORTABLE-Routine in AM.) (09.01.21 @ 06:13) >     EXAM:  XR CHEST PORTABLE ROUTINE 1V                          PROCEDURE DATE:  09/01/2021          INTERPRETATION:  AP chest on September 1, 2021 at 5:43 AM.    Heart magnified by technique. Endotracheal tube present on August 31 has been removed. NG tube remains.    Fairly advanced infiltrates were seen on August 31 of the mid lower lung fields. These are somewhat improved.    IMPRESSION: Bilateral infiltrates somewhat improved. Endotracheal tube removed.    --- End of Report ---    < end of copied text >        ADVANCE DIRECTIVES: Full code

## 2021-09-02 NOTE — PROGRESS NOTE ADULT - ASSESSMENT
Patient is a 45 yo F w/ PMH of DM2, HTN, Hyothyroid, Asthma, ADHD who presented after being found unresponsive. Admitted to MICU for possible benzo/cocaine overdose and aspiration PNA. Patient was intubated and started on vasopressors. Patient with anoxic brain injury, being followed by Neurology. Patient on IV antibiotics for presumed aspiration PNA. ECHO was done and significant for LVEF of <20. Per Cardiology, patient to have ischemic evaluation if meaningful neurological recovery. Patient also noted to have rhabdo/GARRETT which has now normalized. Trach was tentatively scheduled for 9/2. Patient with improvement in mental status and was extubated on 8/31. Patient was transitioned from HFNC and now on 5L NC. Patient now being downgraded to Medicine.    #Anoxic brain injury 2/2 benzodiazapine overdose  - Utox positive for benzos and cocaine  - MRI brain: significant hypoxic/anoxic injury and strokes   - EEG: Moderate to severe nonspecific diffuse or multifocal cerebral dysfunction. No indication for anti-epileptic medication according to Epilepsy.  - Mentation improving slowly   - Neuro following  - Dysphagia due to anoxic brain injury.  - ng tube replaced this am, repeat xray   - Psych consult once improvement in mentation  - start restraints    #Acute hypoxemic respiratory failure 2/2 aspiration PNA  Sepsis 2/2 PNA  - S/p Vancomycin. Currently on Merrem  - last febrile 8/31  - BCx: NGTD  - ID consult  - S/p extubation 8/31  - Supplemental oxygen via NC prn,   - CTS was following, but narciso doesnt need a trach, resp status improved  - pulm called by previous hospitalist    #Elevated troponin, new onset systolic CHF  - ECHO reviewed, LVEF of < 20%. severe AS  - Cardiology consult appreciated  -  low dose Coreg  - C/w ASA  - Statin held because of transaminitis,  - Ischemic evaluation once meaningful neurological recovery  - will repeat tte    #GARRETT w/ rhabdomyolysis  - Seen by Nephrology  - Renal function normalized  - Monitor BMP, correct electrolytes as needed    #Hypothyroid  - C/w [place]    #ADHD  - C/w Adderall    #AUR  - C/w Arguello  - C/w Cardura    Advanced Directives: Full code, Palliative following.    Patient high risk for clinical deterioration. Downgraded from MICU to SDU, low threshold for MICU reconsult.    prognosis guarded

## 2021-09-02 NOTE — PROGRESS NOTE ADULT - ASSESSMENT
46M with HTN, DM, asthma, here with vent dependent respiratory failure, unresponsiveness, GARRETT, NSTEMI, acute systolic CHF, s/p shock (off pressors), possible aspiration related event, urine toxicology + for benzodiazepines and cocaine, extubated 8/31, dysphagia.     #1 Ventilator dependence - extubated 8/31.   #2 Anoxic brain injury - post possible overdose. MRI showing anoxic injury. patient more awake now, will wait and see how he does.   #3 Dysphagia - possible aspiration pneumonia - on antibiotics. failing speech and swallow. currently with NG tube.   #4 Acute systolic CHF - needs repeat TTE, would re-engage cardiology.   #5 GARRETT - improved.  #6 Encounter for palliative care - continuing to follow course. more awake and interactive. Remains high risk for recurrent aspiration and deterioration / reintubation. Legal surrogates at this time are his two sisters Carmita and Beverly, numbers in provider hand off.

## 2021-09-02 NOTE — CHART NOTE - NSCHARTNOTEFT_GEN_A_CORE
Source: Patient [ ]  Family [ ]   other [ x]    Current Diet: Diet, NPO with Tube Feed:   Tube Feeding Modality: Nasogastric  Jevity 1.5 Tolu (JEVITY1.5)  Total Volume for 24 Hours (mL): 1200  Continuous  Starting Tube Feed Rate {mL per Hour}: 30  Increase Tube Feed Rate by (mL): 10     Every 4 hours  Until Goal Tube Feed Rate (mL per Hour): 50  Tube Feed Duration (in Hours): 24  Tube Feed Start Time: 12:00 (08-24-21 @ 12:08)    Enteral /Parenteral Nutrition: Tube feeds at goal rate of 50 ml/hr (x20 hrs) provides 1000 ml, 1500 kcal, 64g protein, 760 ml free water, and 100% of RDIs for vitamins/minerals.     Current Weight:   (9/2) 141 lbs  (9/1) 209.6 lbs  (8/30) 233.4 lbs  (8/22) 205.2 lbs  ? accuracy of weights, noted with 2+ generalized edema, continue to trend and maintain strict Is&Os     Pertinent Medications: MEDICATIONS  (STANDING):  albuterol/ipratropium for Nebulization 3 milliLiter(s) Nebulizer every 6 hours  amphetamine/dextroamphetamine 15 milliGRAM(s) Oral daily  aspirin  chewable 81 milliGRAM(s) Oral daily  buDESOnide    Inhalation Suspension 0.5 milliGRAM(s) Inhalation every 12 hours  carvedilol 3.125 milliGRAM(s) Oral every 12 hours  chlorhexidine 2% Cloths 1 Application(s) Topical <User Schedule>  dextrose 40% Gel 15 Gram(s) Oral once  dextrose 5%. 1000 milliLiter(s) (100 mL/Hr) IV Continuous <Continuous>  dextrose 5%. 1000 milliLiter(s) (50 mL/Hr) IV Continuous <Continuous>  dextrose 50% Injectable 25 Gram(s) IV Push once  dextrose 50% Injectable 12.5 Gram(s) IV Push once  dextrose 50% Injectable 25 Gram(s) IV Push once  doxazosin 2 milliGRAM(s) Oral at bedtime  glucagon  Injectable 1 milliGRAM(s) IntraMuscular once  heparin   Injectable 5000 Unit(s) SubCutaneous every 8 hours  insulin lispro (ADMELOG) corrective regimen sliding scale   SubCutaneous every 6 hours  meropenem  IVPB 1000 milliGRAM(s) IV Intermittent every 8 hours  thyroid 30 milliGRAM(s) Oral daily    MEDICATIONS  (PRN):  acetaminophen    Suspension .. 650 milliGRAM(s) Oral every 6 hours PRN Temp greater or equal to 38C (100.4F)  ALBUTerol    90 MICROgram(s) HFA Inhaler 2 Puff(s) Inhalation every 6 hours PRN Shortness of Breath  clonazePAM  Tablet 0.5 milliGRAM(s) Oral every 12 hours PRN agitation  ibuprofen  Tablet. 400 milliGRAM(s) Oral every 8 hours PRN Temp greater or equal to 38C (100.4F)    Pertinent Labs: CBC Full  -  ( 02 Sep 2021 04:07 )  WBC Count : 12.93 K/uL  RBC Count : 3.86 M/uL  Hemoglobin : 11.9 g/dL  Hematocrit : 33.7 %  Platelet Count - Automated : 710 K/uL  Mean Cell Volume : 87.3 fl  Mean Cell Hemoglobin : 30.8 pg  Mean Cell Hemoglobin Concentration : 35.3 gm/dL  Auto Neutrophil # : x  Auto Lymphocyte # : x  Auto Monocyte # : x  Auto Eosinophil # : x  Auto Basophil # : x  Auto Neutrophil % : x  Auto Lymphocyte % : x  Auto Monocyte % : x  Auto Eosinophil % : x  Auto Basophil % : x    09-02 Na139 mmol/L Glu 102 mg/dL<H> K+ 4.1 mmol/L Cr  0.83 mg/dL BUN 15.0 mg/dL Phos 2.8 mg/dL Alb 2.9 g/dL<L> PAB n/a   HgA1c 5.3%    Skin: Suspected DTI to right heel per documentation    Nutrition focused physical exam conducted - found signs of malnutrition [x ]absent [ ]present    Subcutaneous fat loss: [ ] Orbital fat pads region, [ ]Buccal fat region, [ ]Triceps region,  [ ]Ribs region    Muscle wasting: [ ]Temples region, [ ]Clavicle region, [ ]Shoulder region, [ ]Scapula region, [ ]Interosseous region,  [ ]thigh region, [ ]Calf region    Estimated Needs:   [ x] no change since previous assessment  [ ] recalculated:     Current Nutrition Diagnosis: Pt remains at nutrition risk secondary to swallowing difficulty related to anoxic brain injury 2/2 benzodiazapine overdose as evidenced by SLP recommendations for NPO and need for tube feeding support. Pt now extubated but unable to participate in interview given mental status. NGT in place for enteral access. Blood sugars WNL. Last BM 9/2. RD to follow up.     Recommendations:   1) Increase tube feeds to goal rate of 60 ml/hr (x20 hrs) to provide 1200 ml, 1800 kcal, 77g protein, 912 ml free water, and >100% of RDIs for vitamins/minerals; additional free water per MD discretion.   2) Rx: MVI, vitamin C, thiamine, and folic acid supplementation.  3) Monitor tube feed tolerance.  4) SLP re-evaluation as feasible/appropriate.  5) Obtain daily weights to monitor trends.     Monitoring and Evaluation:   [ ] PO intake [x ] Tolerance to diet prescription [X] Weights  [X] Follow up per protocol [X] Labs

## 2021-09-03 LAB
ALBUMIN SERPL ELPH-MCNC: 2.8 G/DL — LOW (ref 3.3–5.2)
ALP SERPL-CCNC: 79 U/L — SIGNIFICANT CHANGE UP (ref 40–120)
ALT FLD-CCNC: 50 U/L — HIGH
ANION GAP SERPL CALC-SCNC: 14 MMOL/L — SIGNIFICANT CHANGE UP (ref 5–17)
APPEARANCE UR: ABNORMAL
AST SERPL-CCNC: 37 U/L — SIGNIFICANT CHANGE UP
BILIRUB SERPL-MCNC: 0.5 MG/DL — SIGNIFICANT CHANGE UP (ref 0.4–2)
BILIRUB UR-MCNC: NEGATIVE — SIGNIFICANT CHANGE UP
BUN SERPL-MCNC: 17.8 MG/DL — SIGNIFICANT CHANGE UP (ref 8–20)
CALCIUM SERPL-MCNC: 8.8 MG/DL — SIGNIFICANT CHANGE UP (ref 8.6–10.2)
CHLORIDE SERPL-SCNC: 105 MMOL/L — SIGNIFICANT CHANGE UP (ref 98–107)
CO2 SERPL-SCNC: 18 MMOL/L — LOW (ref 22–29)
COLOR SPEC: YELLOW — SIGNIFICANT CHANGE UP
COMMENT - URINE: SIGNIFICANT CHANGE UP
CREAT SERPL-MCNC: 0.98 MG/DL — SIGNIFICANT CHANGE UP (ref 0.5–1.3)
CULTURE RESULTS: SIGNIFICANT CHANGE UP
CULTURE RESULTS: SIGNIFICANT CHANGE UP
DIFF PNL FLD: ABNORMAL
GLUCOSE BLDC GLUCOMTR-MCNC: 104 MG/DL — HIGH (ref 70–99)
GLUCOSE BLDC GLUCOMTR-MCNC: 126 MG/DL — HIGH (ref 70–99)
GLUCOSE BLDC GLUCOMTR-MCNC: 127 MG/DL — HIGH (ref 70–99)
GLUCOSE BLDC GLUCOMTR-MCNC: 99 MG/DL — SIGNIFICANT CHANGE UP (ref 70–99)
GLUCOSE SERPL-MCNC: 133 MG/DL — HIGH (ref 70–99)
GLUCOSE UR QL: NEGATIVE MG/DL — SIGNIFICANT CHANGE UP
HCT VFR BLD CALC: 36.7 % — LOW (ref 39–50)
HGB BLD-MCNC: 12.5 G/DL — LOW (ref 13–17)
KETONES UR-MCNC: NEGATIVE — SIGNIFICANT CHANGE UP
LACTATE SERPL-SCNC: 0.7 MMOL/L — SIGNIFICANT CHANGE UP (ref 0.5–2)
LEUKOCYTE ESTERASE UR-ACNC: NEGATIVE — SIGNIFICANT CHANGE UP
MCHC RBC-ENTMCNC: 30.6 PG — SIGNIFICANT CHANGE UP (ref 27–34)
MCHC RBC-ENTMCNC: 34.1 GM/DL — SIGNIFICANT CHANGE UP (ref 32–36)
MCV RBC AUTO: 90 FL — SIGNIFICANT CHANGE UP (ref 80–100)
NITRITE UR-MCNC: NEGATIVE — SIGNIFICANT CHANGE UP
PH UR: 7 — SIGNIFICANT CHANGE UP (ref 5–8)
PLATELET # BLD AUTO: 846 K/UL — HIGH (ref 150–400)
POTASSIUM SERPL-MCNC: 3.8 MMOL/L — SIGNIFICANT CHANGE UP (ref 3.5–5.3)
POTASSIUM SERPL-SCNC: 3.8 MMOL/L — SIGNIFICANT CHANGE UP (ref 3.5–5.3)
PROT SERPL-MCNC: 7 G/DL — SIGNIFICANT CHANGE UP (ref 6.6–8.7)
PROT UR-MCNC: 30 MG/DL
RBC # BLD: 4.08 M/UL — LOW (ref 4.2–5.8)
RBC # FLD: 13 % — SIGNIFICANT CHANGE UP (ref 10.3–14.5)
RBC CASTS # UR COMP ASSIST: ABNORMAL /HPF (ref 0–4)
SARS-COV-2 RNA SPEC QL NAA+PROBE: SIGNIFICANT CHANGE UP
SODIUM SERPL-SCNC: 137 MMOL/L — SIGNIFICANT CHANGE UP (ref 135–145)
SP GR SPEC: 1.01 — SIGNIFICANT CHANGE UP (ref 1.01–1.02)
SPECIMEN SOURCE: SIGNIFICANT CHANGE UP
SPECIMEN SOURCE: SIGNIFICANT CHANGE UP
UROBILINOGEN FLD QL: NEGATIVE MG/DL — SIGNIFICANT CHANGE UP
WBC # BLD: 16.93 K/UL — HIGH (ref 3.8–10.5)
WBC # FLD AUTO: 16.93 K/UL — HIGH (ref 3.8–10.5)
WBC UR QL: SIGNIFICANT CHANGE UP

## 2021-09-03 PROCEDURE — 99232 SBSQ HOSP IP/OBS MODERATE 35: CPT

## 2021-09-03 PROCEDURE — 72125 CT NECK SPINE W/O DYE: CPT | Mod: 26

## 2021-09-03 PROCEDURE — 70450 CT HEAD/BRAIN W/O DYE: CPT | Mod: 26

## 2021-09-03 PROCEDURE — 99233 SBSQ HOSP IP/OBS HIGH 50: CPT

## 2021-09-03 PROCEDURE — 93306 TTE W/DOPPLER COMPLETE: CPT | Mod: 26

## 2021-09-03 PROCEDURE — 99223 1ST HOSP IP/OBS HIGH 75: CPT

## 2021-09-03 PROCEDURE — 71045 X-RAY EXAM CHEST 1 VIEW: CPT | Mod: 26

## 2021-09-03 RX ORDER — METOPROLOL TARTRATE 50 MG
5 TABLET ORAL EVERY 6 HOURS
Refills: 0 | Status: COMPLETED | OUTPATIENT
Start: 2021-09-03 | End: 2021-09-05

## 2021-09-03 RX ORDER — SODIUM CHLORIDE 9 MG/ML
2000 INJECTION, SOLUTION INTRAVENOUS ONCE
Refills: 0 | Status: COMPLETED | OUTPATIENT
Start: 2021-09-03 | End: 2021-09-03

## 2021-09-03 RX ORDER — SODIUM CHLORIDE 9 MG/ML
1000 INJECTION INTRAMUSCULAR; INTRAVENOUS; SUBCUTANEOUS
Refills: 0 | Status: DISCONTINUED | OUTPATIENT
Start: 2021-09-03 | End: 2021-09-06

## 2021-09-03 RX ADMIN — Medication 81 MILLIGRAM(S): at 12:44

## 2021-09-03 RX ADMIN — MEROPENEM 100 MILLIGRAM(S): 1 INJECTION INTRAVENOUS at 04:28

## 2021-09-03 RX ADMIN — SODIUM CHLORIDE 4000 MILLILITER(S): 9 INJECTION, SOLUTION INTRAVENOUS at 18:15

## 2021-09-03 RX ADMIN — HEPARIN SODIUM 5000 UNIT(S): 5000 INJECTION INTRAVENOUS; SUBCUTANEOUS at 12:44

## 2021-09-03 RX ADMIN — Medication 30 MILLIGRAM(S): at 05:31

## 2021-09-03 RX ADMIN — Medication 650 MILLIGRAM(S): at 00:30

## 2021-09-03 RX ADMIN — HEPARIN SODIUM 5000 UNIT(S): 5000 INJECTION INTRAVENOUS; SUBCUTANEOUS at 22:54

## 2021-09-03 RX ADMIN — MEROPENEM 100 MILLIGRAM(S): 1 INJECTION INTRAVENOUS at 10:27

## 2021-09-03 RX ADMIN — CHLORHEXIDINE GLUCONATE 1 APPLICATION(S): 213 SOLUTION TOPICAL at 05:11

## 2021-09-03 RX ADMIN — HEPARIN SODIUM 5000 UNIT(S): 5000 INJECTION INTRAVENOUS; SUBCUTANEOUS at 05:35

## 2021-09-03 RX ADMIN — Medication 650 MILLIGRAM(S): at 17:48

## 2021-09-03 RX ADMIN — Medication 650 MILLIGRAM(S): at 17:34

## 2021-09-03 RX ADMIN — MEROPENEM 100 MILLIGRAM(S): 1 INJECTION INTRAVENOUS at 17:34

## 2021-09-03 RX ADMIN — Medication 1 MILLIGRAM(S): at 10:16

## 2021-09-03 RX ADMIN — Medication 0.5 MILLIGRAM(S): at 20:54

## 2021-09-03 RX ADMIN — Medication 2 MILLIGRAM(S): at 22:55

## 2021-09-03 RX ADMIN — DEXTROAMPHETAMINE SACCHARATE, AMPHETAMINE ASPARTATE, DEXTROAMPHETAMINE SULFATE AND AMPHETAMINE SULFATE 15 MILLIGRAM(S): 1.875; 1.875; 1.875; 1.875 TABLET ORAL at 05:33

## 2021-09-03 RX ADMIN — SODIUM CHLORIDE 50 MILLILITER(S): 9 INJECTION INTRAMUSCULAR; INTRAVENOUS; SUBCUTANEOUS at 17:50

## 2021-09-03 NOTE — PROGRESS NOTE ADULT - SUBJECTIVE AND OBJECTIVE BOX
[facility]                                        Neurology at [place]                                 Laura Lawrence & Jalen                                  [address]                                        [place], [place], [address]                                             [phone]        CC: Anoxic encephalopathy.    HPI:   The patient is a 46y Male who presented to the ER after being found unresponsive in bed by family. He was brought to the ER. He was noted to be hypoxic with agonal respirations and was intubated and admitted to ICU on a mechanical ventilator.   He was noted to have some twitching type movement of the extremities for which the Epilepsy service was consulted.   He has a history of substance abuse.     Interim history:  On 4 Tower.   Fell out of bed earlier today.    ROS:   Denies headache or dizziness.  Denies chest pain.  Denies shortness of breath.    MEDICATIONS  (STANDING):  amphetamine/dextroamphetamine 15 milliGRAM(s) Oral daily  aspirin  chewable 81 milliGRAM(s) Oral daily  buDESOnide    Inhalation Suspension 0.5 milliGRAM(s) Inhalation every 12 hours  carvedilol 3.125 milliGRAM(s) Oral every 12 hours  chlorhexidine 2% Cloths 1 Application(s) Topical <User Schedule>  dextrose 40% Gel 15 Gram(s) Oral once  dextrose 5%. 1000 milliLiter(s) (100 mL/Hr) IV Continuous <Continuous>  doxazosin 2 milliGRAM(s) Oral at bedtime  glucagon  Injectable 1 milliGRAM(s) IntraMuscular once  heparin   Injectable 5000 Unit(s) SubCutaneous every 8 hours  insulin lispro (ADMELOG) corrective regimen sliding scale   SubCutaneous every 6 hours  LORazepam   Injectable 1 milliGRAM(s) IV Push once  meropenem  IVPB 1000 milliGRAM(s) IV Intermittent every 8 hours  thyroid 30 milliGRAM(s) Oral daily      Vital Signs Last 24 Hrs  T(C): 37.4 (03 Sep 2021 09:35), Max: 38.8 (02 Sep 2021 16:00)  T(F): 99.3 (03 Sep 2021 09:35), Max: 101.8 (02 Sep 2021 16:00)  HR: 106 (03 Sep 2021 09:35) (69 - 118)  BP: 102/70 (03 Sep 2021 09:35) (99/68 - 114/89)  BP(mean): 80 (03 Sep 2021 09:35) (68 - 96)  RR: 18 (03 Sep 2021 09:35) (18 - 29)  SpO2: 97% (03 Sep 2021 09:35) (92% - 98%)    Detailed Neurologic Exam:    Mental status: The patient is sleepy but opens eyes to voice. He answers questions and follows simple  instructions.     Cranial nerves: Pupils equal and react symmetrically to light. He blinks to threat bilaterally. Extraocular motion is full with no nystagmus. Facial sensation is intact. Facial musculature is symmetric. Palate elevates symmetrically. Tongue is midline.    Motor: There is normal bulk and tone.  There is no tremor.  Strength grossly 5/5 bilaterally.    Sensation: Grossly intact to light touch and pin.    Reflexes: Trace throughout and plantar responses are flexor.    Cerebellar: No dysmetria on finger nose testing.    Labs:     09-02    139  |  105  |  15.0  ----------------------------<  102<H>  4.1   |  20.0<L>  |  0.83    Ca    8.4<L>      02 Sep 2021 04:07  Phos  2.8     09-02  Mg     2.1     09-02    TPro  6.5<L>  /  Alb  2.9<L>  /  TBili  0.8  /  DBili  x   /  AST  46<H>  /  ALT  56<H>  /  AlkPhos  73  09-02                            11.9   12.93 )-----------( 710      ( 02 Sep 2021 04:07 )             33.7       Rad:   ***

## 2021-09-03 NOTE — PROGRESS NOTE ADULT - SUBJECTIVE AND OBJECTIVE BOX
OVERNIGHT EVENTS: patient with legs dangling off bed but comfortable and in no acute distress.     Present Symptoms:     Dyspnea: 0  Nausea/Vomiting: No  Anxiety:  No  Depression: unable  Fatigue: Yes   Loss of appetite: No  Constipation: none     Pain: none             Character-            Duration-            Effect-            Factors-            Frequency-            Location-            Severity-    Pain AD Score:  [email or web address] (press ctrl + left click to view)    Review of Systems: Reviewed                     Negative:                     Positive:  Unable to obtain due to poor mentation   All others negative    MEDICATIONS  (STANDING):  amphetamine/dextroamphetamine 15 milliGRAM(s) Oral daily  aspirin  chewable 81 milliGRAM(s) Oral daily  buDESOnide    Inhalation Suspension 0.5 milliGRAM(s) Inhalation every 12 hours  carvedilol 3.125 milliGRAM(s) Oral every 12 hours  chlorhexidine 2% Cloths 1 Application(s) Topical <User Schedule>  dextrose 40% Gel 15 Gram(s) Oral once  dextrose 5%. 1000 milliLiter(s) (100 mL/Hr) IV Continuous <Continuous>  dextrose 5%. 1000 milliLiter(s) (50 mL/Hr) IV Continuous <Continuous>  dextrose 50% Injectable 25 Gram(s) IV Push once  dextrose 50% Injectable 12.5 Gram(s) IV Push once  dextrose 50% Injectable 25 Gram(s) IV Push once  doxazosin 2 milliGRAM(s) Oral at bedtime  glucagon  Injectable 1 milliGRAM(s) IntraMuscular once  heparin   Injectable 5000 Unit(s) SubCutaneous every 8 hours  insulin lispro (ADMELOG) corrective regimen sliding scale   SubCutaneous every 6 hours  LORazepam   Injectable 1 milliGRAM(s) IV Push once  meropenem  IVPB 1000 milliGRAM(s) IV Intermittent every 8 hours  thyroid 30 milliGRAM(s) Oral daily    MEDICATIONS  (PRN):  acetaminophen    Suspension .. 650 milliGRAM(s) Oral every 6 hours PRN Temp greater or equal to 38C (100.4F)  ALBUTerol    90 MICROgram(s) HFA Inhaler 2 Puff(s) Inhalation every 6 hours PRN Shortness of Breath  ibuprofen  Tablet. 400 milliGRAM(s) Oral every 8 hours PRN Temp greater or equal to 38C (100.4F)  LORazepam   Injectable 1 milliGRAM(s) IV Push every 3 hours PRN Agitation    PHYSICAL EXAM:    Vital Signs Last 24 Hrs  T(C): 37.4 (03 Sep 2021 08:00), Max: 38.8 (02 Sep 2021 16:00)  T(F): 99.3 (03 Sep 2021 08:00), Max: 101.8 (02 Sep 2021 16:00)  HR: 106 (03 Sep 2021 08:00) (69 - 118)  BP: 102/70 (03 Sep 2021 08:00) (99/68 - 114/89)  BP(mean): 80 (03 Sep 2021 08:00) (68 - 96)  RR: 18 (03 Sep 2021 08:00) (18 - 29)  SpO2: 98% (03 Sep 2021 08:44) (92% - 98%)    General: alert  oriented x ____ lethargic agitated                  cachexia  nonverbal  coma    Karnofsky:  %    HEENT: normal  dry mouth  ET tube/trach    Lungs: comfortable tachypnea/labored breathing  excessive secretions    CV: normal  tachycardia    GI: normal  distended  tender  no BS               PEG/NG/OG tube  constipation  last BM:     : normal  incontinent  oliguria/anuria  parikh    MSK: normal  weakness  edema             ambulatory  bedbound/wheelchair bound    Skin: normal  pressure ulcers- Stage_____  no rash    LABS:                      11.9   12.93 )-----------( 710      ( 02 Sep 2021 04:07 )             33.7     09-02    139  |  105  |  15.0  ----------------------------<  102<H>  4.1   |  20.0<L>  |  0.83    Ca    8.4<L>      02 Sep 2021 04:07  Phos  2.8     09-02  Mg     2.1     09-02    TPro  6.5<L>  /  Alb  2.9<L>  /  TBili  0.8  /  DBili  x   /  AST  46<H>  /  ALT  56<H>  /  AlkPhos  73  09-02    I&O's Summary    02 Sep 2021 07:01  -  03 Sep 2021 07:00  --------------------------------------------------------  IN: 1230 mL / OUT: 2970 mL / NET: -1740 mL    03 Sep 2021 07:01  -  03 Sep 2021 11:15  --------------------------------------------------------  IN: 100 mL / OUT: 275 mL / NET: -175 mL        RADIOLOGY & ADDITIONAL STUDIES:    ADVANCE DIRECTIVES/TREATMENT PREFERENCES:  DNR YES NO  Completed on:                     MOLST  YES NO   Completed on:  Living Will  YES NO   Completed on: OVERNIGHT EVENTS: patient with legs dangling off bed but comfortable and in no acute distress.     Present Symptoms:     Dyspnea: 0  Nausea/Vomiting: No  Anxiety:  No  Depression: unable  Fatigue: Yes   Loss of appetite: No  Constipation: none     Pain: none             Character-            Duration-            Effect-            Factors-            Frequency-            Location-            Severity-    Pain AD Score:  [email or web address] (press ctrl + left click to view)    Review of Systems: Reviewed                     Negative:                     Positive:  Unable to obtain due to poor mentation   All others negative    MEDICATIONS  (STANDING):  amphetamine/dextroamphetamine 15 milliGRAM(s) Oral daily  aspirin  chewable 81 milliGRAM(s) Oral daily  buDESOnide    Inhalation Suspension 0.5 milliGRAM(s) Inhalation every 12 hours  carvedilol 3.125 milliGRAM(s) Oral every 12 hours  chlorhexidine 2% Cloths 1 Application(s) Topical <User Schedule>  dextrose 40% Gel 15 Gram(s) Oral once  dextrose 5%. 1000 milliLiter(s) (100 mL/Hr) IV Continuous <Continuous>  dextrose 5%. 1000 milliLiter(s) (50 mL/Hr) IV Continuous <Continuous>  dextrose 50% Injectable 25 Gram(s) IV Push once  dextrose 50% Injectable 12.5 Gram(s) IV Push once  dextrose 50% Injectable 25 Gram(s) IV Push once  doxazosin 2 milliGRAM(s) Oral at bedtime  glucagon  Injectable 1 milliGRAM(s) IntraMuscular once  heparin   Injectable 5000 Unit(s) SubCutaneous every 8 hours  insulin lispro (ADMELOG) corrective regimen sliding scale   SubCutaneous every 6 hours  LORazepam   Injectable 1 milliGRAM(s) IV Push once  meropenem  IVPB 1000 milliGRAM(s) IV Intermittent every 8 hours  thyroid 30 milliGRAM(s) Oral daily    MEDICATIONS  (PRN):  acetaminophen    Suspension .. 650 milliGRAM(s) Oral every 6 hours PRN Temp greater or equal to 38C (100.4F)  ALBUTerol    90 MICROgram(s) HFA Inhaler 2 Puff(s) Inhalation every 6 hours PRN Shortness of Breath  ibuprofen  Tablet. 400 milliGRAM(s) Oral every 8 hours PRN Temp greater or equal to 38C (100.4F)  LORazepam   Injectable 1 milliGRAM(s) IV Push every 3 hours PRN Agitation    PHYSICAL EXAM:    Vital Signs Last 24 Hrs  T(C): 37.4 (03 Sep 2021 08:00), Max: 38.8 (02 Sep 2021 16:00)  T(F): 99.3 (03 Sep 2021 08:00), Max: 101.8 (02 Sep 2021 16:00)  HR: 106 (03 Sep 2021 08:00) (69 - 118)  BP: 102/70 (03 Sep 2021 08:00) (99/68 - 114/89)  BP(mean): 80 (03 Sep 2021 08:00) (68 - 96)  RR: 18 (03 Sep 2021 08:00) (18 - 29)  SpO2: 98% (03 Sep 2021 08:44) (92% - 98%)    General: alert, able to tell me hes in the hospital but not able to tell me year or president. able to tell me his name and date of birth. able to follow commands intermittently like raising his hand     Karnofsky:  30 %    HEENT: normal      Lungs: comfortable     CV: normal      GI: normal      : parikh    MSK: normal     Skin: no rash    LABS:                      11.9   12.93 )-----------( 710      ( 02 Sep 2021 04:07 )             33.7     09-02    139  |  105  |  15.0  ----------------------------<  102<H>  4.1   |  20.0<L>  |  0.83    Ca    8.4<L>      02 Sep 2021 04:07  Phos  2.8     09-02  Mg     2.1     09-02    TPro  6.5<L>  /  Alb  2.9<L>  /  TBili  0.8  /  DBili  x   /  AST  46<H>  /  ALT  56<H>  /  AlkPhos  73  09-02    I&O's Summary    02 Sep 2021 07:01  -  03 Sep 2021 07:00  --------------------------------------------------------  IN: 1230 mL / OUT: 2970 mL / NET: -1740 mL    03 Sep 2021 07:01  -  03 Sep 2021 11:15  --------------------------------------------------------  IN: 100 mL / OUT: 275 mL / NET: -175 mL    RADIOLOGY & ADDITIONAL STUDIES:    ADVANCE DIRECTIVES/TREATMENT PREFERENCES:  Full code, all aggressive measures desired

## 2021-09-03 NOTE — PROGRESS NOTE ADULT - SUBJECTIVE AND OBJECTIVE BOX
[facility]  INFECTIOUS DISEASES  at [place]  =======================================================  Jaime Gudino MD  Diplomates American Board of Internal Medicine and Infectious Diseases  Tel  [phone]  Fax [phone]  =======================================================    [facility]-[id number]  YESI MIRZA  follow up:  fevers  more awake today  not following commands    I have personally reviewed the labs and data; pertinent labs and data are listed in this note; please see below.   =======================================================  Past Medical & Surgical Hx:  =====================  PAST MEDICAL & SURGICAL HISTORY:  No pertinent past medical history  Asthma  HTN (hypertension)  DM (diabetes mellitus)  S/P elbow joint replacement  right elbow surgery.  S/P knee surgery right thigh surgery.      Problem List:  ==========  HEALTH ISSUES - PROBLEM Dx:  Acute hypoxemic respiratory failure  Drug overdose  HTN (hypertension)  Asthma  DM (diabetes mellitus)  Anoxic brain injury  CHF (congestive heart failure)  Pneumonia, aspiration      Social Hx:  =======  no toxic habits currently    FAMILY HISTORY:  no significant family history of immunosuppressive disorders in mother or father   =======================================================    REVIEW OF SYSTEMS:  Limited due to medical condition    =======================================================  Allergies    Allergy Status Unknown  No Known Allergies     ======================================================  Physical Exam:  ============     General:  No acute distress.  awake  Eye: Pupils are equal, round and reactive to light, Extraocular movements are intact, Normal conjunctiva.  HENT: Normocephalic, dry mouth  Neck: Supple, No lymphadenopathy.  Respiratory: Lungs with COARSE right breath sounds  Cardiovascular: Normal rate, Regular rhythm,   Gastrointestinal: Soft,  Non-distended, Normal bowel sounds.  Genitourinary: No costovertebral angle tenderness.  + WEST with slightly turbid urine  Lymphatics: No lymphadenopathy neck,   Musculoskeletal: Normal range of motion, Normal strength.  Integumentary: No rash.  Neurologic: awake, follow some commands    =======================================================  Vitals:  ============  T(F): 97.3 (03 Sep 2021 12:00), Max: 101.8 (02 Sep 2021 16:00)  HR: 123 (03 Sep 2021 12:00)  BP: 120/83 (03 Sep 2021 12:00)  RR: 18 (03 Sep 2021 12:00)  SpO2: 96% (03 Sep 2021 12:00) (92% - 98%)  temp max in last 48H T(F): , Max: 101.8 (09-02-21 @ 16:00)    =======================================================  Current Antibiotics:  meropenem  IVPB 1000 milliGRAM(s) IV Intermittent every 8 hours    Other medications:  amphetamine/dextroamphetamine 15 milliGRAM(s) Oral daily  aspirin  chewable 81 milliGRAM(s) Oral daily  buDESOnide    Inhalation Suspension 0.5 milliGRAM(s) Inhalation every 12 hours  carvedilol 3.125 milliGRAM(s) Oral every 12 hours  chlorhexidine 2% Cloths 1 Application(s) Topical <User Schedule>  dextrose 40% Gel 15 Gram(s) Oral once  dextrose 5%. 1000 milliLiter(s) IV Continuous <Continuous>  dextrose 5%. 1000 milliLiter(s) IV Continuous <Continuous>  dextrose 50% Injectable 25 Gram(s) IV Push once  dextrose 50% Injectable 12.5 Gram(s) IV Push once  dextrose 50% Injectable 25 Gram(s) IV Push once  doxazosin 2 milliGRAM(s) Oral at bedtime  glucagon  Injectable 1 milliGRAM(s) IntraMuscular once  heparin   Injectable 5000 Unit(s) SubCutaneous every 8 hours  insulin lispro (ADMELOG) corrective regimen sliding scale   SubCutaneous every 6 hours  thyroid 30 milliGRAM(s) Oral daily      =======================================================  Labs:                        11.9   12.93 )-----------( 710      ( 02 Sep 2021 04:07 )             33.7     09-02    139  |  105  |  15.0  ----------------------------<  102<H>  4.1   |  20.0<L>  |  0.83    Ca    8.4<L>      02 Sep 2021 04:07  Phos  2.8     09-02  Mg     2.1     09-02    TPro  6.5<L>  /  Alb  2.9<L>  /  TBili  0.8  /  DBili  x   /  AST  46<H>  /  ALT  56<H>  /  AlkPhos  73  09-02      Culture - Sputum (collected 09-01-21 @ 03:59)  Source: .Sputum Sputum  Gram Stain (09-01-21 @ 07:44):    Few polymorphonuclear leukocytes per low power field    Moderate Squamous epithelial cells per low power field    Few Gram positive cocci in pairs seen per oil power field  Final Report (09-03-21 @ 11:26):    Normal Respiratory Le present    Culture - Blood (collected 08-29-21 @ 10:59)  Source: .Blood Blood  Final Report (09-03-21 @ 11:00):    No growth at 5 days.    Culture - Blood (collected 08-28-21 @ 05:42)  Source: .Blood Blood-Peripheral  Final Report (09-02-21 @ 07:00):    No growth at 5 days.    Culture - Sputum (collected 08-27-21 @ 22:07)  Source: .Sputum Sputum  Gram Stain (08-27-21 @ 23:51):    Moderate polymorphonuclear leukocytes per low power field    No Squamous epithelial cells per low power field    Few Gram Positive Rods per oil power field    Rare Gram positive cocci in pairs per oil power field  Final Report (08-29-21 @ 18:08):    Normal Respiratory Le present    Culture - Fungal, CSF (collected 08-23-21 @ 21:43)  Source: .CSF CSF    Culture - CSF with Gram Stain (collected 08-23-21 @ 21:43)  Source: .CSF CSF  Gram Stain (08-23-21 @ 22:10):    No polymorphonuclear leukocytes seen    No organisms seen    by cytocentrifuge  Final Report (08-26-21 @ 16:12):    No growth at 3 days.    Culture - Sputum (collected 08-22-21 @ 12:18)  Source: .Sputum Sputum  Gram Stain (08-22-21 @ 15:10):    No polymorphonuclear leukocytes per low power field    No Squamous epithelial cells per low power field    Few Gram positive cocci in pairs per oil power field  Final Report (08-24-21 @ 10:52):    Normal Respiratory Le present    Culture - Urine (collected 08-22-21 @ 02:40)  Source: Clean Catch Clean Catch (Midstream)  Final Report (08-23-21 @ 02:23):    No growth    Culture - Blood (collected 08-21-21 @ 20:04)  Source: .Blood Blood-Peripheral  Final Report (08-26-21 @ 21:00):    No growth at 5 days.    Culture - Blood (collected 08-21-21 @ 20:03)  Source: .Blood Blood-Peripheral  Final Report (08-26-21 @ 21:00):    No growth at 5 days.         Procalcitonin, Serum: 0.19 ng/mL (09-02-21 @ 08:54)  Procalcitonin, Serum: 0.24 ng/mL (09-01-21 @ 04:38)    SARS-CoV-2: NotDetec (08-21-21 @ 21:01)  Rapid RVP Result: NotDetec (08-21-21 @ 21:01)    COVID-19 PCR: NotDetec (09-03-21 @ 06:58)  COVID-19 PCR: NotDetec (08-28-21 @ 03:51)

## 2021-09-03 NOTE — CHART NOTE - NSCHARTNOTEFT_GEN_A_CORE
Pt examined at bedside by PA for fever 103.8 rectally and tachycardic to 120's, all other VSS. Pt currently on 4LNC, O2 95%. Pt is obtunded, and unable to participate in ROS or answer any questions, however, after painful stimuli applied Pt was asked to open his eyes, Pt responded w/ "no I do not want to."     ROS: Pt unable to participate    Vital Signs Last 24 Hrs  T(C): 39.9 (03 Sep 2021 16:00), Max: 39.9 (03 Sep 2021 16:00)  T(F): 103.8 (03 Sep 2021 16:00), Max: 103.8 (03 Sep 2021 16:00)  HR: 134 (03 Sep 2021 16:00) (69 - 134)  BP: 122/69 (03 Sep 2021 16:00) (99/68 - 122/69)  BP(mean): 87 (03 Sep 2021 16:00) (80 - 95)  ABP: --  ABP(mean): --  RR: 22 (03 Sep 2021 16:00) (18 - 22)  SpO2: 96% (03 Sep 2021 16:00) (92% - 98%)    PHYSICAL EXAM:  GENERAL: NAD, lying in bed comfortably, unable to respond and follow commands,  HEAD:  Atraumatic, Normocephalic  EENT: PERRLA, conjunctiva and sclera clear, MMM, trachea midline, NC noted in BL nares  CHEST/LUNG: decreased breath sounds BL, BL ronchi appreciated; No rales, wheezing, or rubs. Unlabored respirations  HEART: RRR; No murmurs, rubs, or gallops  ABDOMEN: +BS present in all 4 quadrants; Soft, NTTP, ND  EXTREMITIES:  2+ Peripheral Pulses, brisk capillary refill. No clubbing, cyanosis, or edema  NERVOUS SYSTEM:  A&Ox0, Pt unable to answer questions appropriately, however, verbally responded to painful stimuli. No facial droop. Pt unable to participate in remainder of neuro exam.    SKIN: No rashes or lesions    A/P:  Pt is a 45yo male w/PMHx of DM2, HTN, Hyothyroid, Asthma, ADHD who presented after being found unresponsive. Admitted to MICU for possible benzo/cocaine OD and aspiration PNA. Pt was intubated and started on vasopressors. Pt with anoxic brain injury, being followed by Neurology.  CT head performed no acute pathology noted. ID following, Pt on IV ABx for presumed aspiration PNA, per ID order repeat Cx's if Pt becomes febrile again. ECHO performed- significant for LVEF of <20. Per Cardiology, Pt to have ischemic evaluation if meaningful neurological recovery. Pt also noted to have rhabdo/GARRETT which has now normalized. Trach was tentatively scheduled for 9/2. Pt with improvement in mental status and was extubated on 8/31, downgraded to medicine. Pt was transitioned from [facility] and now on 4L NC.     1. Fever/tachycardic  - C/w IV Abx  - CXR STAT  - Labs STAT  - UA STAT, if positive will order UCX  - BCx x2 ordered STAT  - C/W IVF  - Cooling blanket  - Tylenol for fever  - C/w Oxygen supplementation, titrate as tolerated, Notify Provider for O2 sat <88% or >95%.  - ID rec's appreciated  - Advised RN to notify Provider with further clinical changes  - Repeat VSS Q1hr  - Discussed POC w/Dr. Romero Pt examined at bedside by PA for fever 103.8 rectally and tachycardic to 120's, all other VSS. Pt currently on 4LNC, O2 95%. Pt is obtunded, and unable to participate in ROS or answer any questions, however, after painful stimuli applied Pt was asked to open his eyes, Pt responded w/ "no I do not want to."     ROS: Pt unable to participate    Vital Signs Last 24 Hrs  T(C): 39.9 (03 Sep 2021 16:00), Max: 39.9 (03 Sep 2021 16:00)  T(F): 103.8 (03 Sep 2021 16:00), Max: 103.8 (03 Sep 2021 16:00)  HR: 134 (03 Sep 2021 16:00) (69 - 134)  BP: 122/69 (03 Sep 2021 16:00) (99/68 - 122/69)  BP(mean): 87 (03 Sep 2021 16:00) (80 - 95)  ABP: --  ABP(mean): --  RR: 22 (03 Sep 2021 16:00) (18 - 22)  SpO2: 96% (03 Sep 2021 16:00) (92% - 98%)    PHYSICAL EXAM:  GENERAL: NAD, lying in bed comfortably, unable to respond and follow commands,  HEAD:  Atraumatic, Normocephalic  EENT: PERRLA, conjunctiva and sclera clear, MMM, trachea midline, NC noted in BL nares  CHEST/LUNG: decreased breath sounds BL, BL ronchi appreciated; No rales, wheezing, or rubs. Unlabored respirations  HEART: RRR; No murmurs, rubs, or gallops  ABDOMEN: +BS present in all 4 quadrants; Soft, NTTP, ND  : Arguello catheter noted w/aamir colored urine in catheter bag  EXTREMITIES:  2+ Peripheral Pulses, brisk capillary refill. No clubbing, cyanosis, or edema  NERVOUS SYSTEM:  A&Ox0, Pt unable to answer questions appropriately, however, verbally responded to painful stimuli. No facial droop. Pt unable to participate in remainder of neuro exam.    SKIN: No rashes or lesions    A/P:  Pt is a 45yo male w/PMHx of DM2, HTN, Hyothyroid, Asthma, ADHD who presented after being found unresponsive. Admitted to MICU for possible benzo/cocaine OD and aspiration PNA. Pt was intubated and started on vasopressors. Pt with anoxic brain injury, being followed by Neurology.  CT head performed no acute pathology noted. ID following, Pt on IV ABx for presumed aspiration PNA, per ID order repeat Cx's if Pt becomes febrile again. ECHO performed- significant for LVEF of <20. Per Cardiology, Pt to have ischemic evaluation if meaningful neurological recovery. Pt also noted to have rhabdo/GARRETT which has now normalized. Trach was tentatively scheduled for 9/2. Pt with improvement in mental status and was extubated on 8/31, downgraded to medicine. Pt was transitioned from [facility] and now on 4L NC.     1. Fever/tachycardic  - C/w IV Abx  - CXR STAT  - Labs STAT  - UA STAT, if positive will order UCX  - BCx x2 ordered STAT  - C/W IVF  - Cooling blanket  - Tylenol for fever  - C/w Oxygen supplementation, titrate as tolerated, Notify Provider for O2 sat <88% or >95%.  - ID rec's appreciated  - Advised RN to notify Provider with further clinical changes  - Repeat VSS Q1hr  - Discussed POC w/Dr. Romero Pt examined at bedside by PA for fever 103.8 rectally and tachycardic to 120's, all other VSS. Pt currently on 4LNC, O2 95%. Pt is obtunded, and unable to participate in ROS or answer any questions, however, after painful stimuli applied Pt was asked to open his eyes, Pt responded w/ "no I do not want to."     ROS: Pt unable to participate    Vital Signs Last 24 Hrs  T(C): 39.9 (03 Sep 2021 16:00), Max: 39.9 (03 Sep 2021 16:00)  T(F): 103.8 (03 Sep 2021 16:00), Max: 103.8 (03 Sep 2021 16:00)  HR: 134 (03 Sep 2021 16:00) (69 - 134)  BP: 122/69 (03 Sep 2021 16:00) (99/68 - 122/69)  BP(mean): 87 (03 Sep 2021 16:00) (80 - 95)  ABP: --  ABP(mean): --  RR: 22 (03 Sep 2021 16:00) (18 - 22)  SpO2: 96% (03 Sep 2021 16:00) (92% - 98%)    PHYSICAL EXAM:  GENERAL: NAD, lying in bed comfortably, unable to respond and follow commands,  HEAD:  Atraumatic, Normocephalic  EENT: PERRLA, conjunctiva and sclera clear, MMM, trachea midline, NC noted in BL nares  CHEST/LUNG: decreased breath sounds BL, BL ronchi appreciated; No rales, wheezing, or rubs. Unlabored respirations  HEART: RRR; No murmurs, rubs, or gallops  ABDOMEN: +BS present in all 4 quadrants; Soft, NTTP, ND  : Arguello catheter noted w/aamir colored urine in catheter bag  EXTREMITIES:  2+ Peripheral Pulses, brisk capillary refill. No clubbing, cyanosis, or edema  NERVOUS SYSTEM:  A&Ox0, Pt unable to answer questions appropriately, however, verbally responded to painful stimuli. No facial droop. Pt unable to participate in remainder of neuro exam.    SKIN: No rashes or lesions    A/P:  Pt is a 45yo male w/PMHx of DM2, HTN, Hyothyroid, Asthma, ADHD who presented after being found unresponsive. Admitted to MICU for possible benzo/cocaine OD and aspiration PNA. Pt was intubated and started on vasopressors. Pt with anoxic brain injury, being followed by Neurology.  CT head performed no acute pathology noted. ID following, Pt on IV ABx for presumed aspiration PNA, per ID order repeat Cx's if Pt becomes febrile again. ECHO performed- significant for LVEF of <20. Per Cardiology, Pt to have ischemic evaluation if meaningful neurological recovery. Pt also noted to have rhabdo/GARRETT which has now normalized. Trach was tentatively scheduled for 9/2. Pt with improvement in mental status and was extubated on 8/31, downgraded to medicine. Pt was transitioned from [facility] and now on 4L NC. Pt's fever resolved, however, is now febrile again and tachy to 120's.     1. Fever/tachycardic  - C/w IV Abx  - CXR STAT  - Labs STAT  - BCx x2 ordered STAT  - C/W IVF  - Cooling blanket  - Tylenol for fever  - C/w Oxygen supplementation, titrate as tolerated, Notify Provider for O2 sat <88% or >95%.  - ID rec's appreciated  - Advised RN to notify Provider with further clinical changes  - Repeat VSS Q1hr  - Discussed POC w/Dr. Romero Pt examined at bedside by PA for fever 103.8 rectally and tachycardic to 120's, all other VSS. Pt currently on 4LNC, O2 95%. Pt is obtunded, and unable to participate in ROS or answer any questions, however, after painful stimuli applied Pt was asked to open his eyes, Pt responded w/ "no I do not want to."     ROS: Pt unable to participate    Vital Signs Last 24 Hrs  T(C): 39.9 (03 Sep 2021 16:00), Max: 39.9 (03 Sep 2021 16:00)  T(F): 103.8 (03 Sep 2021 16:00), Max: 103.8 (03 Sep 2021 16:00)  HR: 134 (03 Sep 2021 16:00) (69 - 134)  BP: 122/69 (03 Sep 2021 16:00) (99/68 - 122/69)  BP(mean): 87 (03 Sep 2021 16:00) (80 - 95)  ABP: --  ABP(mean): --  RR: 22 (03 Sep 2021 16:00) (18 - 22)  SpO2: 96% (03 Sep 2021 16:00) (92% - 98%)    PHYSICAL EXAM:  GENERAL: NAD, lying in bed comfortably, however, is obtunded, unable to respond and follow commands   HEAD:  Atraumatic, Normocephalic  EENT: PERRLA, conjunctiva and sclera clear, MMM, trachea midline, NC noted in BL nares  CHEST/LUNG: decreased breath sounds BL, BL ronchi appreciated; No rales, wheezing, or rubs. Unlabored respirations  HEART: RRR; No murmurs, rubs, or gallops  ABDOMEN: +BS present in all 4 quadrants; Soft, NTTP, ND  : Arguello catheter noted w/aamir colored urine in catheter bag  EXTREMITIES:  2+ Peripheral Pulses, brisk capillary refill. No clubbing, cyanosis, or edema  NERVOUS SYSTEM:  A&Ox0, Pt unable to answer questions appropriately, and is obtunded, however, verbally responded to painful stimuli. No facial droop. Pt unable to participate in remainder of neuro exam.    SKIN: No rashes or lesions    A/P:  Pt is a 47yo male w/PMHx of DM2, HTN, Hyothyroid, Asthma, ADHD who presented after being found unresponsive. Admitted to MICU for possible benzo/cocaine OD and aspiration PNA. Pt was intubated and started on vasopressors. Pt with anoxic brain injury, being followed by Neurology.  CT head performed no acute pathology noted. ID following, Pt on IV ABx for presumed aspiration PNA, per ID order repeat Cx's if Pt becomes febrile again. ECHO performed- significant for LVEF of <20. Per Cardiology, Pt to have ischemic evaluation if meaningful neurological recovery. Pt also noted to have rhabdo/GARRETT which has now normalized. Trach was tentatively scheduled for 9/2. Pt with improvement in mental status and was extubated on 8/31, downgraded to medicine. Pt was transitioned from [facility] and now on 4L NC. Pt's fever resolved, however, is now febrile again and tachy to 120's.     1. Fever/tachycardic  - C/w IV Abx  - CXR STAT  - Labs STAT  - BCx x2 ordered STAT  - C/W IVF  - Cooling blanket  - Tylenol for fever  - C/w Oxygen supplementation, titrate as tolerated, Notify Provider for O2 sat <88% or >95%.  - ID rec's appreciated  - Advised RN to notify Provider with further clinical changes  - Repeat VSS Q1hr  - Discussed POC w/Dr. Romero    Addendum 17:35  - Per Dr. Romero, Indwelling Catheter D/C'd  - Ordered condom cath  - Advised RN to monitor for urine OP 4-6hrs

## 2021-09-03 NOTE — CONSULT NOTE ADULT - REASON FOR ADMISSION
Acute hypoxemic respiratory failure, AMS

## 2021-09-03 NOTE — CONSULT NOTE ADULT - PROVIDER SPECIALTY LIST ADULT
Palliative Care
Epilepsy
Rehab Medicine
Cardiology
Infectious Disease
Nephrology
Neurology
Thoracic Surgery

## 2021-09-03 NOTE — PROGRESS NOTE ADULT - ASSESSMENT
46M with HTN, DM, asthma, here with vent dependent respiratory failure, unresponsiveness, GARRETT, NSTEMI, acute systolic CHF, s/p shock (off pressors), possible aspiration related event, urine toxicology + for benzodiazepines and cocaine, extubated 8/31, dysphagia.     #1 Ventilator dependence - extubated 8/31.   #2 Anoxic brain injury - post possible overdose. MRI showing anoxic injury. patient more awake now, will wait and see how he does. may need repeat brain imaging to evaluate. neuro following.    #3 Dysphagia - possible aspiration pneumonia - on antibiotics. failing speech and swallow. currently with NG tube.   #4 Acute systolic CHF - needs repeat TTE, would re-engage cardiology.   #5 Fevers - workup per ID in progress. likely recurrently aspirating.   #6 Encounter for palliative care - continuing to follow course. more awake and interactive. Remains high risk for recurrent aspiration and deterioration / reintubation. Legal surrogates at this time are his two sisters Carmita and Beverly, numbers in provider hand off. would try to see if patient can assign health care proxy if his mental state continues to improve.

## 2021-09-03 NOTE — PROGRESS NOTE ADULT - ASSESSMENT
46y Male who is followed by neurology because of anoxic/hypoxic brain injury    Hypoxic-ischemic encephalopathy  He is status post likely drug overdose with benzodiazepine and cocaine.  His MRI brain appearance shows evidence of significant hypoxic/anoxic injury and strokes.  Overall, exam is improving slowly.     Seizure-like activity  Hyperkinetic movements captured on c-EEG without ictal correlate on EEG.   No epileptiform discharges or seizure seen.    Fall.   Agree with checking CT head/cervical spine.

## 2021-09-03 NOTE — PROGRESS NOTE ADULT - SUBJECTIVE AND OBJECTIVE BOX
YESI MIRZA    [id number]    46y      Male    INTERVAL HPI/OVERNIGHT EVENTS: patient being seen for overdose. Patient confused.     as per rn, patient was found on the floor. 1:1 started    REVIEW OF SYSTEMS:    unable to obtain secondary to mental status       Vital Signs Last 24 Hrs  T(C): 37 (03 Sep 2021 13:10), Max: 38.8 (02 Sep 2021 16:00)  T(F): 98.6 (03 Sep 2021 13:10), Max: 101.8 (02 Sep 2021 16:00)  HR: 123 (03 Sep 2021 12:00) (69 - 123)  BP: 120/83 (03 Sep 2021 12:00) (99/68 - 120/83)  BP(mean): 95 (03 Sep 2021 12:00) (80 - 95)  RR: 18 (03 Sep 2021 12:00) (18 - 23)  SpO2: 96% (03 Sep 2021 12:00) (92% - 98%)    PHYSICAL EXAM:    CONSTITUTIONAL: NAD, nods yes/no,   HEENT: NC/AT, ng tube   RESPIRATORY: Bilateral rhonchi  CARDIOVASCULAR: RRR, S1/S2+, no m/g/r  ABDOMEN: Nontender to palpation, normoactive bowel sounds, no rebound/guarding;  MUSCULOSKELETAL: No edema, cyanosis or deformities.  NEUROLOGY: awake.   SKIN: No rashes; no palpable lesions  VASC: Distal pulses palpable        LABS:                        11.9   12.93 )-----------( 710      ( 02 Sep 2021 04:07 )             33.7     09-02    139  |  105  |  15.0  ----------------------------<  102<H>  4.1   |  20.0<L>  |  0.83    Ca    8.4<L>      02 Sep 2021 04:07  Phos  2.8     09-02  Mg     2.1     09-02    TPro  6.5<L>  /  Alb  2.9<L>  /  TBili  0.8  /  DBili  x   /  AST  46<H>  /  ALT  56<H>  /  AlkPhos  73  09-02        MEDICATIONS  (STANDING):  amphetamine/dextroamphetamine 15 milliGRAM(s) Oral daily  aspirin  chewable 81 milliGRAM(s) Oral daily  buDESOnide    Inhalation Suspension 0.5 milliGRAM(s) Inhalation every 12 hours  carvedilol 3.125 milliGRAM(s) Oral every 12 hours  chlorhexidine 2% Cloths 1 Application(s) Topical <User Schedule>  dextrose 40% Gel 15 Gram(s) Oral once  dextrose 5%. 1000 milliLiter(s) (100 mL/Hr) IV Continuous <Continuous>  dextrose 5%. 1000 milliLiter(s) (50 mL/Hr) IV Continuous <Continuous>  dextrose 50% Injectable 25 Gram(s) IV Push once  dextrose 50% Injectable 12.5 Gram(s) IV Push once  dextrose 50% Injectable 25 Gram(s) IV Push once  doxazosin 2 milliGRAM(s) Oral at bedtime  glucagon  Injectable 1 milliGRAM(s) IntraMuscular once  heparin   Injectable 5000 Unit(s) SubCutaneous every 8 hours  insulin lispro (ADMELOG) corrective regimen sliding scale   SubCutaneous every 6 hours  meropenem  IVPB 1000 milliGRAM(s) IV Intermittent every 8 hours  thyroid 30 milliGRAM(s) Oral daily    MEDICATIONS  (PRN):  acetaminophen    Suspension .. 650 milliGRAM(s) Oral every 6 hours PRN Temp greater or equal to 38C (100.4F)  ALBUTerol    90 MICROgram(s) HFA Inhaler 2 Puff(s) Inhalation every 6 hours PRN Shortness of Breath  ibuprofen  Tablet. 400 milliGRAM(s) Oral every 8 hours PRN Temp greater or equal to 38C (100.4F)  LORazepam   Injectable 1 milliGRAM(s) IV Push every 3 hours PRN Agitation      RADIOLOGY & ADDITIONAL TESTS:

## 2021-09-03 NOTE — CONSULT NOTE ADULT - ATTENDING COMMENTS
Patient seen and examined by me.  I have discussed my recommendation with the PA which are outlined above.  Patient remains intubated and unresponsive.  < from: TTE Echo Complete w/ Contrast w/ Doppler (08.22.21 @ 13:19) >    Summary:   1. Left ventricular ejection fraction, by visual estimation, is <20%.   2. Severely decreased global left ventricular systolic function.   3. Spectral Doppler shows impaired relaxation pattern of left ventricular myocardial filling (Grade I diastolic dysfunction).   4. Moderately reduced RV systolic function.   5. Normal left atrial size.   6. Normal right atrial size.   7. There is no evidence of pericardial effusion.   8. Mild to moderate mitral valve regurgitation.   9. Mild aortic regurgitation.  10. Aortiv Valve is thickend and calcified with diminished opening, Calcuated JUAN DAVID is 0.88 sq cm, Mean gradient is 14 mm Hg. This probably represents Low Low gradient Severe AS.  11. Endocardial visualization was enhanced with intravenous echo contrast.    MD Jb Electronically signed on 8/22/2021 at 3:18:18 PM      ECHO results as noted above.    Overall prognosis is poor      Will follow.
Patient seen and examined, discussed patient with Dr. Bergeron and agree with recommendations.

## 2021-09-03 NOTE — CONSULT NOTE ADULT - CONSULT REQUESTED DATE/TIME
02-Sep-2021 10:09
22-Aug-2021
24-Aug-2021 09:45
22-Aug-2021 11:44
27-Aug-2021 14:07
03-Sep-2021 10:08
23-Aug-2021 13:24
28-Aug-2021

## 2021-09-03 NOTE — CONSULT NOTE ADULT - SUBJECTIVE AND OBJECTIVE BOX
46yM was admitted on 08-21      Patient is a 46y old  Male who presents with a chief complaint of Acute hypoxemic respiratory failure, AMS (03 Sep 2021 11:15)    HPI:  Mr. Pierre is a 46 year old male with PMHx HTN, DM, asthma who presented to [facility] on 8/21/21 after being found unresponsive with agonal breathing and emesis. He was intubated emergently and found with findings consistent with hypoxic ischemic encephalopathy. The patient was found positive for benzodiazepines and cocaine. Troponins were elevated on admission, and he was in septic shock due to pneumonia. He was noted with ATN. Patient was placed on IV antibiotics as well as vasopressor support. Patient was extubated on 8/31. This morning, patient was found on the floor and had to be jared lifted back to bed.      Imaging performed:  CT Head 8/21: Symmetric low attenuation lesions in the bilateral globus pallidus regions of the basal ganglia, which can be seen with hypoxic ischemic encephalopathy or related to carbon monoxide positioning.  Indeterminate areas of low attenuation in the bilateral cerebellum may reflect infarcts.  MRI is recommended for further evaluation.    CXR 8/22: Bilateral infiltrates right greater than left somewhat improved on the latest film. Tubes in place as above.    CT Head 8/23: Slightly increased prominence of bilateral globus pallidus hypodense foci, likely representing hypoxic-ischemic injury. Again noted multiple likely small acute infarcts involving the bilateral cerebral and cerebellar hemispheres. No acute intracranial hemorrhage.    CXR 8/24: No interval change.    US Duplex Venous Lower Ext Ltd, Right 8/24: No evidence of right lower extremity deep venous thrombosis.    US Duplex Arterial Upper Ext Ltd, Left  8/25: Normal left upper extremity arterial evaluation    MRI Brain 8/26: Numerous areas of restricted diffusion in the bilateral cerebral hemispheres, bilateral globus pallidi, left hippocampus and bilateral cerebellar hemispheres suggesting acute infarcts and hypoxic ischemic encephalopathy.    Xray Abdomen 8/29: No acute radiographic intra-abdominal findings.    CXR 8/31: Residual Perihilar/basilar diffuse airspace disease and/or effusions..    CXR 9/1: Bilateral infiltrates somewhat improved. Endotracheal tube removed.    CXR 9/1: NG tube at least to stomach.    CXR 9/2: NG tube tip in distal stomach. The cardiomediastinal silhouette is within normal limits. Bilateral lung infiltrates are unchanged - right lung more than left. No pleural effusion or pneumothorax.      REVIEW OF SYSTEMS  Unable to obtain ROS due to AMS    VITALS  T(C): 37.4 (09-03-21 @ 09:35), Max: 38.8 (09-02-21 @ 16:00)  HR: 106 (09-03-21 @ 09:35) (69 - 118)  BP: 102/70 (09-03-21 @ 09:35) (99/68 - 114/89)  RR: 18 (09-03-21 @ 09:35) (18 - 29)  SpO2: 97% (09-03-21 @ 09:35) (92% - 98%)  Wt(kg): --    PAST MEDICAL & SURGICAL HISTORY  No pertinent past medical history    Asthma    HTN (hypertension)    DM (diabetes mellitus)    S/P elbow joint replacement    S/P knee surgery        SOCIAL HISTORY - as per documentation/history  Smoking - None  EtOH - None  Drugs - None    FUNCTIONAL HISTORY  Lives alone in a private home. Owns a [employer].    CURRENT FUNCTIONAL STATUS  9/1  Bed Mobility: Sit to Supine:     · Level of [place]	maximum assist (25% patients effort)  · Physical Assist/Nonphysical Assist	1 person assist; verbal cues    Bed Mobility: Supine to Sit:     · Level of [place]	maximum assist (25% patients effort)  · Physical Assist/Nonphysical Assist	1 person assist; verbal cues    Bed Mobility Analysis:     · Bed Mobility Limitations	decreased ability to use arms for pushing/pulling; decreased ability to use legs for bridging/pushing  · Impairments Contributing to Impaired Bed Mobility	impaired balance; impaired postural control; decreased strength    Transfer: Sit to Stand:     · Level of [place]	pt dangled at edge of bed for ~5 minutes with varied assist from close supervision to maxAx1 for sitting balance. Asked pt if he wanted to attempt to stand pt stating no at this time, likely due to fatigue. TBA when able.    Transfer: Stand to Sit:     · Level of [place]	TBA    Gait Skills:     · Level of [place]	TBA    Balance Skills Assessment:     · Sitting Balance: Static	varied assist from close supervision to maxAx1, fatigued easily. Requires verbal cues to bring weight anteriorly and to use UE to assist in holding self up.  · Sitting Balance: Dynamic	see above  · Sit-to-Stand Balance	TBA  · Standing Balance: Static	TBA  · Standing Balance: Dynamic	TBA    Sensory Examination:    Sensory Examination:    Light Touch Sensation:   · Left UE	within normal limits  · Right UE	within normal limits  · Left LE	within normal limits  · Right LE	within normal limits      Clinical Impressions:   · Criteria for Skilled Therapeutic Interventions	impairments found; functional limitations in following categories  · Impairments Found (describe specific impairments)	gait, locomotion, and balance; ROM; muscle strength  · Functional Limitations in Following Categories (describe specific limitations)	self-care; home management; community/leisure  · Rehab Potential	good, to achieve stated therapy goals  · Therapy Frequency	3-5x/week  · Predicted Duration of Therapy Intervention (days/wks)	4-6 weeks  · Anticipated Equipment Needs at Discharge	ongoing assessment pending progress  · Anticipated Discharge Recommendations	Acute vs. ISA pending functional progress with therapy      FAMILY HISTORY   No pertinent family history in first degree relatives        RECENT LABS - Reviewed  CBC Full  -  ( 02 Sep 2021 04:07 )  WBC Count : 12.93 K/uL  RBC Count : 3.86 M/uL  Hemoglobin : 11.9 g/dL  Hematocrit : 33.7 %  Platelet Count - Automated : 710 K/uL  Mean Cell Volume : 87.3 fl  Mean Cell Hemoglobin : 30.8 pg  Mean Cell Hemoglobin Concentration : 35.3 gm/dL  Auto Neutrophil # : x  Auto Lymphocyte # : x  Auto Monocyte # : x  Auto Eosinophil # : x  Auto Basophil # : x  Auto Neutrophil % : x  Auto Lymphocyte % : x  Auto Monocyte % : x  Auto Eosinophil % : x  Auto Basophil % : x    09-02    139  |  105  |  15.0  ----------------------------<  102<H>  4.1   |  20.0<L>  |  0.83    Ca    8.4<L>      02 Sep 2021 04:07  Phos  2.8     09-02  Mg     2.1     09-02    TPro  6.5<L>  /  Alb  2.9<L>  /  TBili  0.8  /  DBili  x   /  AST  46<H>  /  ALT  56<H>  /  AlkPhos  73  09-02        ALLERGIES  Allergy Status Unknown  No Known Allergies      MEDICATIONS   acetaminophen    Suspension .. 650 milliGRAM(s) Oral every 6 hours PRN  ALBUTerol    90 MICROgram(s) HFA Inhaler 2 Puff(s) Inhalation every 6 hours PRN  amphetamine/dextroamphetamine 15 milliGRAM(s) Oral daily  aspirin  chewable 81 milliGRAM(s) Oral daily  buDESOnide    Inhalation Suspension 0.5 milliGRAM(s) Inhalation every 12 hours  carvedilol 3.125 milliGRAM(s) Oral every 12 hours  chlorhexidine 2% Cloths 1 Application(s) Topical <User Schedule>  dextrose 40% Gel 15 Gram(s) Oral once  dextrose 5%. 1000 milliLiter(s) IV Continuous <Continuous>  dextrose 5%. 1000 milliLiter(s) IV Continuous <Continuous>  dextrose 50% Injectable 25 Gram(s) IV Push once  dextrose 50% Injectable 12.5 Gram(s) IV Push once  dextrose 50% Injectable 25 Gram(s) IV Push once  doxazosin 2 milliGRAM(s) Oral at bedtime  glucagon  Injectable 1 milliGRAM(s) IntraMuscular once  heparin   Injectable 5000 Unit(s) SubCutaneous every 8 hours  ibuprofen  Tablet. 400 milliGRAM(s) Oral every 8 hours PRN  insulin lispro (ADMELOG) corrective regimen sliding scale   SubCutaneous every 6 hours  LORazepam   Injectable 1 milliGRAM(s) IV Push every 3 hours PRN  LORazepam   Injectable 1 milliGRAM(s) IV Push once  meropenem  IVPB 1000 milliGRAM(s) IV Intermittent every 8 hours  thyroid 30 milliGRAM(s) Oral daily      ----------------------------------------------------------------------------------------  PHYSICAL EXAM  Constitutional - NAD, Comfortable  HEENT - NCAT, + NGT  Neck - Supple, No limited ROM  Chest - Breathing comfortably, No wheezing  Cardiovascular - S1S2   Abdomen - Soft   Extremities - No C/C/E, No calf tenderness   Neurologic Exam -                    Cognitive - sedated from recent Ativan dose     Communication - able to speak in one word answers     Motor - Raises both arms to command but not the legs   Psychiatric - Somnolent  ----------------------------------------------------------------------------------------  ASSESSMENT/PLAN  46yMale with functional deficits after suffering from a 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 Limit nighttime interruptions. Avoid sedating medications whenever possible. Maintain hydration and nutrition, particularly protein.  Restlessness- Recommend Depakote 250 mg Q8AM, 250 mg Q12PM, 500 mg Q8PM. Can also consider Geodon 10 mg IM prn agitation  Sleep - Recommend starting melatonin 5 mg Q8PM  Pneumonia - Merrem  HTN - Coreg  Pain - Tylenol  DVT PPX - SCDs, heparin  Rehab -   Brain Injury Medicine team will continue to follow and make recommendations as needed. Functional progress will determine ongoing rehab dispo recommendations, which may change.    Continue bedside therapy as well as OOB throughout the day with mobilization by staff to maintain cardiopulmonary function and prevention of secondary complications related to debility.     Discussed with rehab team.  46yM was admitted on 08-21      Patient is a 46y old  Male who presents with a chief complaint of Acute hypoxemic respiratory failure, AMS (03 Sep 2021 11:15)    HPI:  Mr. Pierre is a 46 year old male with PMHx HTN, DM, asthma who presented to [facility] on 8/21/21 after being found unresponsive with agonal breathing and emesis. He was intubated emergently and found with findings consistent with hypoxic ischemic encephalopathy. The patient was found positive for benzodiazepines and cocaine. Troponins were elevated on admission, and he was in septic shock due to pneumonia. He was noted with ATN. Patient was placed on IV antibiotics as well as vasopressor support. Patient was extubated on 8/31. This morning, patient was found on the floor and had to be jared lifted back to bed.      Imaging performed:  CT Head 8/21: Symmetric low attenuation lesions in the bilateral globus pallidus regions of the basal ganglia, which can be seen with hypoxic ischemic encephalopathy or related to carbon monoxide positioning.  Indeterminate areas of low attenuation in the bilateral cerebellum may reflect infarcts.  MRI is recommended for further evaluation.    CXR 8/22: Bilateral infiltrates right greater than left somewhat improved on the latest film. Tubes in place as above.    CT Head 8/23: Slightly increased prominence of bilateral globus pallidus hypodense foci, likely representing hypoxic-ischemic injury. Again noted multiple likely small acute infarcts involving the bilateral cerebral and cerebellar hemispheres. No acute intracranial hemorrhage.    CXR 8/24: No interval change.    US Duplex Venous Lower Ext Ltd, Right 8/24: No evidence of right lower extremity deep venous thrombosis.    US Duplex Arterial Upper Ext Ltd, Left  8/25: Normal left upper extremity arterial evaluation    MRI Brain 8/26: Numerous areas of restricted diffusion in the bilateral cerebral hemispheres, bilateral globus pallidi, left hippocampus and bilateral cerebellar hemispheres suggesting acute infarcts and hypoxic ischemic encephalopathy.    Xray Abdomen 8/29: No acute radiographic intra-abdominal findings.    CXR 8/31: Residual Perihilar/basilar diffuse airspace disease and/or effusions..    CXR 9/1: Bilateral infiltrates somewhat improved. Endotracheal tube removed.    CXR 9/1: NG tube at least to stomach.    CXR 9/2: NG tube tip in distal stomach. The cardiomediastinal silhouette is within normal limits. Bilateral lung infiltrates are unchanged - right lung more than left. No pleural effusion or pneumothorax.      REVIEW OF SYSTEMS  Unable to obtain ROS due to AMS    VITALS  T(C): 37.4 (09-03-21 @ 09:35), Max: 38.8 (09-02-21 @ 16:00)  HR: 106 (09-03-21 @ 09:35) (69 - 118)  BP: 102/70 (09-03-21 @ 09:35) (99/68 - 114/89)  RR: 18 (09-03-21 @ 09:35) (18 - 29)  SpO2: 97% (09-03-21 @ 09:35) (92% - 98%)  Wt(kg): --    PAST MEDICAL & SURGICAL HISTORY  No pertinent past medical history    Asthma    HTN (hypertension)    DM (diabetes mellitus)    S/P elbow joint replacement    S/P knee surgery        SOCIAL HISTORY - as per documentation/history  Smoking - None  EtOH - None  Drugs - None    FUNCTIONAL HISTORY  Lives alone in a private home. Owns a [employer].    CURRENT FUNCTIONAL STATUS  9/1  Bed Mobility: Sit to Supine:     · Level of [place]	maximum assist (25% patients effort)  · Physical Assist/Nonphysical Assist	1 person assist; verbal cues    Bed Mobility: Supine to Sit:     · Level of [place]	maximum assist (25% patients effort)  · Physical Assist/Nonphysical Assist	1 person assist; verbal cues    Bed Mobility Analysis:     · Bed Mobility Limitations	decreased ability to use arms for pushing/pulling; decreased ability to use legs for bridging/pushing  · Impairments Contributing to Impaired Bed Mobility	impaired balance; impaired postural control; decreased strength    Transfer: Sit to Stand:     · Level of [place]	pt dangled at edge of bed for ~5 minutes with varied assist from close supervision to maxAx1 for sitting balance. Asked pt if he wanted to attempt to stand pt stating no at this time, likely due to fatigue. TBA when able.    Transfer: Stand to Sit:     · Level of [place]	TBA    Gait Skills:     · Level of [place]	TBA    Balance Skills Assessment:     · Sitting Balance: Static	varied assist from close supervision to maxAx1, fatigued easily. Requires verbal cues to bring weight anteriorly and to use UE to assist in holding self up.  · Sitting Balance: Dynamic	see above  · Sit-to-Stand Balance	TBA  · Standing Balance: Static	TBA  · Standing Balance: Dynamic	TBA    Sensory Examination:    Sensory Examination:    Light Touch Sensation:   · Left UE	within normal limits  · Right UE	within normal limits  · Left LE	within normal limits  · Right LE	within normal limits      Clinical Impressions:   · Criteria for Skilled Therapeutic Interventions	impairments found; functional limitations in following categories  · Impairments Found (describe specific impairments)	gait, locomotion, and balance; ROM; muscle strength  · Functional Limitations in Following Categories (describe specific limitations)	self-care; home management; community/leisure  · Rehab Potential	good, to achieve stated therapy goals  · Therapy Frequency	3-5x/week  · Predicted Duration of Therapy Intervention (days/wks)	4-6 weeks  · Anticipated Equipment Needs at Discharge	ongoing assessment pending progress  · Anticipated Discharge Recommendations	Acute vs. ISA pending functional progress with therapy      FAMILY HISTORY   No pertinent family history in first degree relatives        RECENT LABS - Reviewed  CBC Full  -  ( 02 Sep 2021 04:07 )  WBC Count : 12.93 K/uL  RBC Count : 3.86 M/uL  Hemoglobin : 11.9 g/dL  Hematocrit : 33.7 %  Platelet Count - Automated : 710 K/uL  Mean Cell Volume : 87.3 fl  Mean Cell Hemoglobin : 30.8 pg  Mean Cell Hemoglobin Concentration : 35.3 gm/dL  Auto Neutrophil # : x  Auto Lymphocyte # : x  Auto Monocyte # : x  Auto Eosinophil # : x  Auto Basophil # : x  Auto Neutrophil % : x  Auto Lymphocyte % : x  Auto Monocyte % : x  Auto Eosinophil % : x  Auto Basophil % : x    09-02    139  |  105  |  15.0  ----------------------------<  102<H>  4.1   |  20.0<L>  |  0.83    Ca    8.4<L>      02 Sep 2021 04:07  Phos  2.8     09-02  Mg     2.1     09-02    TPro  6.5<L>  /  Alb  2.9<L>  /  TBili  0.8  /  DBili  x   /  AST  46<H>  /  ALT  56<H>  /  AlkPhos  73  09-02        ALLERGIES  Allergy Status Unknown  No Known Allergies      MEDICATIONS   acetaminophen    Suspension .. 650 milliGRAM(s) Oral every 6 hours PRN  ALBUTerol    90 MICROgram(s) HFA Inhaler 2 Puff(s) Inhalation every 6 hours PRN  amphetamine/dextroamphetamine 15 milliGRAM(s) Oral daily  aspirin  chewable 81 milliGRAM(s) Oral daily  buDESOnide    Inhalation Suspension 0.5 milliGRAM(s) Inhalation every 12 hours  carvedilol 3.125 milliGRAM(s) Oral every 12 hours  chlorhexidine 2% Cloths 1 Application(s) Topical <User Schedule>  dextrose 40% Gel 15 Gram(s) Oral once  dextrose 5%. 1000 milliLiter(s) IV Continuous <Continuous>  dextrose 5%. 1000 milliLiter(s) IV Continuous <Continuous>  dextrose 50% Injectable 25 Gram(s) IV Push once  dextrose 50% Injectable 12.5 Gram(s) IV Push once  dextrose 50% Injectable 25 Gram(s) IV Push once  doxazosin 2 milliGRAM(s) Oral at bedtime  glucagon  Injectable 1 milliGRAM(s) IntraMuscular once  heparin   Injectable 5000 Unit(s) SubCutaneous every 8 hours  ibuprofen  Tablet. 400 milliGRAM(s) Oral every 8 hours PRN  insulin lispro (ADMELOG) corrective regimen sliding scale   SubCutaneous every 6 hours  LORazepam   Injectable 1 milliGRAM(s) IV Push every 3 hours PRN  LORazepam   Injectable 1 milliGRAM(s) IV Push once  meropenem  IVPB 1000 milliGRAM(s) IV Intermittent every 8 hours  thyroid 30 milliGRAM(s) Oral daily      ----------------------------------------------------------------------------------------  PHYSICAL EXAM  Constitutional - NAD, Comfortable  HEENT - NCAT, + NGT  Neck - Supple, No limited ROM  Chest - Breathing comfortably, No wheezing  Cardiovascular - S1S2   Abdomen - Soft   Extremities - No C/C/E, No calf tenderness   Neurologic Exam -                    Cognitive - sedated from recent Ativan dose     Communication - able to speak in one word answers     Motor - Raises both arms to command but not the legs   Psychiatric - Somnolent  ----------------------------------------------------------------------------------------  ASSESSMENT/PLAN  46yMale with functional deficits after suffering from a 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 Limit nighttime interruptions. Avoid sedating medications whenever possible. Maintain hydration and nutrition, particularly protein.  Restlessness- Recommend Depakote 250 mg Q8AM, 250 mg Q12PM, 500 mg Q8PM. Can also consider Geodon 10 mg IM prn agitation  Sleep - Recommend starting melatonin 5 mg Q8PM  Pneumonia - Merrem  HTN - Coreg  Pain - Tylenol  Dysphagia - +NGT  DVT PPX - SCDs, heparin  Rehab -   Brain Injury Medicine team will continue to follow and make recommendations as needed. Functional progress will determine ongoing rehab dispo recommendations, which may change.    Continue bedside therapy as well as OOB throughout the day with mobilization by staff to maintain cardiopulmonary function and prevention of secondary complications related to debility.     Discussed with rehab team.  46yM was admitted on 08-21      Patient is a 46y old  Male who presents with a chief complaint of Acute hypoxemic respiratory failure, AMS (03 Sep 2021 11:15)    HPI:  Mr. Pierre is a 46 year old male with PMHx HTN, DM, asthma who presented to [facility] on 8/21/21 after being found unresponsive with agonal breathing and emesis. He was intubated emergently and found with findings consistent with hypoxic ischemic encephalopathy. The patient was found positive for benzodiazepines and cocaine. Troponins were elevated on admission, and he was in septic shock due to pneumonia. He was noted with ATN. Patient was placed on IV antibiotics as well as vasopressor support. Patient was extubated on 8/31. This morning, patient was found on the floor and had to be jared lifted back to bed.      Imaging performed:  CT Head 8/21: Symmetric low attenuation lesions in the bilateral globus pallidus regions of the basal ganglia, which can be seen with hypoxic ischemic encephalopathy or related to carbon monoxide positioning.  Indeterminate areas of low attenuation in the bilateral cerebellum may reflect infarcts.  MRI is recommended for further evaluation.    CXR 8/22: Bilateral infiltrates right greater than left somewhat improved on the latest film. Tubes in place as above.    CT Head 8/23: Slightly increased prominence of bilateral globus pallidus hypodense foci, likely representing hypoxic-ischemic injury. Again noted multiple likely small acute infarcts involving the bilateral cerebral and cerebellar hemispheres. No acute intracranial hemorrhage.    CXR 8/24: No interval change.    US Duplex Venous Lower Ext Ltd, Right 8/24: No evidence of right lower extremity deep venous thrombosis.    US Duplex Arterial Upper Ext Ltd, Left  8/25: Normal left upper extremity arterial evaluation    MRI Brain 8/26: Numerous areas of restricted diffusion in the bilateral cerebral hemispheres, bilateral globus pallidi, left hippocampus and bilateral cerebellar hemispheres suggesting acute infarcts and hypoxic ischemic encephalopathy.    Xray Abdomen 8/29: No acute radiographic intra-abdominal findings.    CXR 8/31: Residual Perihilar/basilar diffuse airspace disease and/or effusions..    CXR 9/1: Bilateral infiltrates somewhat improved. Endotracheal tube removed.    CXR 9/1: NG tube at least to stomach.    CXR 9/2: NG tube tip in distal stomach. The cardiomediastinal silhouette is within normal limits. Bilateral lung infiltrates are unchanged - right lung more than left. No pleural effusion or pneumothorax.      REVIEW OF SYSTEMS  Unable to obtain ROS due to AMS    VITALS  T(C): 37.4 (09-03-21 @ 09:35), Max: 38.8 (09-02-21 @ 16:00)  HR: 106 (09-03-21 @ 09:35) (69 - 118)  BP: 102/70 (09-03-21 @ 09:35) (99/68 - 114/89)  RR: 18 (09-03-21 @ 09:35) (18 - 29)  SpO2: 97% (09-03-21 @ 09:35) (92% - 98%)  Wt(kg): --    PAST MEDICAL & SURGICAL HISTORY  No pertinent past medical history    Asthma    HTN (hypertension)    DM (diabetes mellitus)    S/P elbow joint replacement    S/P knee surgery        SOCIAL HISTORY - as per documentation/history  Smoking - None  EtOH - None  Drugs - None    FUNCTIONAL HISTORY  Lives alone in a private home. Owns a [employer].    CURRENT FUNCTIONAL STATUS  9/1  Bed Mobility: Sit to Supine:     · Level of [place]	maximum assist (25% patients effort)  · Physical Assist/Nonphysical Assist	1 person assist; verbal cues    Bed Mobility: Supine to Sit:     · Level of [place]	maximum assist (25% patients effort)  · Physical Assist/Nonphysical Assist	1 person assist; verbal cues    Bed Mobility Analysis:     · Bed Mobility Limitations	decreased ability to use arms for pushing/pulling; decreased ability to use legs for bridging/pushing  · Impairments Contributing to Impaired Bed Mobility	impaired balance; impaired postural control; decreased strength    Transfer: Sit to Stand:     · Level of [place]	pt dangled at edge of bed for ~5 minutes with varied assist from close supervision to maxAx1 for sitting balance. Asked pt if he wanted to attempt to stand pt stating no at this time, likely due to fatigue. TBA when able.    Transfer: Stand to Sit:     · Level of [place]	TBA    Gait Skills:     · Level of [place]	TBA    Balance Skills Assessment:     · Sitting Balance: Static	varied assist from close supervision to maxAx1, fatigued easily. Requires verbal cues to bring weight anteriorly and to use UE to assist in holding self up.  · Sitting Balance: Dynamic	see above  · Sit-to-Stand Balance	TBA  · Standing Balance: Static	TBA  · Standing Balance: Dynamic	TBA    Sensory Examination:    Sensory Examination:    Light Touch Sensation:   · Left UE	within normal limits  · Right UE	within normal limits  · Left LE	within normal limits  · Right LE	within normal limits      Clinical Impressions:   · Criteria for Skilled Therapeutic Interventions	impairments found; functional limitations in following categories  · Impairments Found (describe specific impairments)	gait, locomotion, and balance; ROM; muscle strength  · Functional Limitations in Following Categories (describe specific limitations)	self-care; home management; community/leisure  · Rehab Potential	good, to achieve stated therapy goals  · Therapy Frequency	3-5x/week  · Predicted Duration of Therapy Intervention (days/wks)	4-6 weeks  · Anticipated Equipment Needs at Discharge	ongoing assessment pending progress  · Anticipated Discharge Recommendations	Acute vs. ISA pending functional progress with therapy      FAMILY HISTORY   No pertinent family history in first degree relatives        RECENT LABS - Reviewed  CBC Full  -  ( 02 Sep 2021 04:07 )  WBC Count : 12.93 K/uL  RBC Count : 3.86 M/uL  Hemoglobin : 11.9 g/dL  Hematocrit : 33.7 %  Platelet Count - Automated : 710 K/uL  Mean Cell Volume : 87.3 fl  Mean Cell Hemoglobin : 30.8 pg  Mean Cell Hemoglobin Concentration : 35.3 gm/dL  Auto Neutrophil # : x  Auto Lymphocyte # : x  Auto Monocyte # : x  Auto Eosinophil # : x  Auto Basophil # : x  Auto Neutrophil % : x  Auto Lymphocyte % : x  Auto Monocyte % : x  Auto Eosinophil % : x  Auto Basophil % : x    09-02    139  |  105  |  15.0  ----------------------------<  102<H>  4.1   |  20.0<L>  |  0.83    Ca    8.4<L>      02 Sep 2021 04:07  Phos  2.8     09-02  Mg     2.1     09-02    TPro  6.5<L>  /  Alb  2.9<L>  /  TBili  0.8  /  DBili  x   /  AST  46<H>  /  ALT  56<H>  /  AlkPhos  73  09-02        ALLERGIES  Allergy Status Unknown  No Known Allergies      MEDICATIONS   acetaminophen    Suspension .. 650 milliGRAM(s) Oral every 6 hours PRN  ALBUTerol    90 MICROgram(s) HFA Inhaler 2 Puff(s) Inhalation every 6 hours PRN  amphetamine/dextroamphetamine 15 milliGRAM(s) Oral daily  aspirin  chewable 81 milliGRAM(s) Oral daily  buDESOnide    Inhalation Suspension 0.5 milliGRAM(s) Inhalation every 12 hours  carvedilol 3.125 milliGRAM(s) Oral every 12 hours  chlorhexidine 2% Cloths 1 Application(s) Topical <User Schedule>  dextrose 40% Gel 15 Gram(s) Oral once  dextrose 5%. 1000 milliLiter(s) IV Continuous <Continuous>  dextrose 5%. 1000 milliLiter(s) IV Continuous <Continuous>  dextrose 50% Injectable 25 Gram(s) IV Push once  dextrose 50% Injectable 12.5 Gram(s) IV Push once  dextrose 50% Injectable 25 Gram(s) IV Push once  doxazosin 2 milliGRAM(s) Oral at bedtime  glucagon  Injectable 1 milliGRAM(s) IntraMuscular once  heparin   Injectable 5000 Unit(s) SubCutaneous every 8 hours  ibuprofen  Tablet. 400 milliGRAM(s) Oral every 8 hours PRN  insulin lispro (ADMELOG) corrective regimen sliding scale   SubCutaneous every 6 hours  LORazepam   Injectable 1 milliGRAM(s) IV Push every 3 hours PRN  LORazepam   Injectable 1 milliGRAM(s) IV Push once  meropenem  IVPB 1000 milliGRAM(s) IV Intermittent every 8 hours  thyroid 30 milliGRAM(s) Oral daily      ----------------------------------------------------------------------------------------  PHYSICAL EXAM  Constitutional - NAD, Comfortable  HEENT - NCAT, + NGT  Neck - Supple, No limited ROM  Chest - Breathing comfortably, No wheezing  Cardiovascular - S1S2   Abdomen - Soft   Extremities - No C/C/E, No calf tenderness   Neurologic Exam -                    Cognitive - Sedated, Opens eyes intermittently, Fixates, Tracks     Communication - Severely dysarthric      Motor - Difficult to assess, able to follow commands and move BUE   Psychiatric - Somnolent  ----------------------------------------------------------------------------------------  ASSESSMENT/PLAN  46yMale with functional deficits after suffering from a 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 Limit nighttime interruptions. Avoid sedating medications whenever possible. Maintain hydration and nutrition, particularly protein.  Restlessness- Recommend Depakote 250 mg Q8AM, 250 mg Q12PM, 500 mg Q8PM. Can also consider Geodon 10 mg IM prn agitation  Sleep - Recommend starting melatonin 5 mg Q8PM  Pneumonia - Merrem  HTN - Coreg  Pain - Tylenol  Dysphagia - +NGT  DVT PPX - SCDs, heparin  Rehab -   Brain Injury Medicine team will continue to follow and make recommendations as needed. Functional progress will determine ongoing rehab dispo recommendations, which may change.    Continue bedside therapy as well as OOB throughout the day with mobilization by staff to maintain cardiopulmonary function and prevention of secondary complications related to debility.     Discussed with rehab team.

## 2021-09-03 NOTE — PROGRESS NOTE ADULT - ASSESSMENT
This 45 y/o M (real name: Ildefonso Pierre, : [birth year]) with a h/o HTN, DM, asthma, presents to the ED after being found unresponsive in bed by his sister this evening. Noted to have agonal respirations and emesis coming from mouth. He was found to be hypoxemic in the ED prompting emergent endotracheal intubation and appears to have an extensive right sided pneumonia on CXR. The patient's sister, Adela, reports that he had recently broken up with his girlfriend and has been having a hard time coping with it and has been noticeably depressed. Within the past 5 days he was taken to the ED at [facility] for some unclear intoxication. Adela also reports recently noticing a plethora of newly prescribed medications at his house and was able to identify clonazepam and alprazolam. Tonight he had told her that he was going out with friends and asked if she would come over and watch his dog, which she thought seemed unusual. Urine toxicology (+) for benzos and cocaine. CT head reveals symmetric low attenuation lesions in the bilateral globus pallidus regions of the basal ganglia, which can be seen with hypoxic ischemic encephalopathy or related to carbon monoxide positioning, as well as indeterminate areas of low attenuation in the bilateral cerebellum may reflect infarcts. He is sedated and now with progressive hypotension despite 3L IVF bolus. Started on IV vasopressor therapy. (21 Aug 2021 21:54)     Patient with anoxic brain injury, attributed to benzo / cocaine overdose,, being followed by Neurology. Patient on IV antibiotics for presumed aspiration PNA. ECHO was done and significant for LVEF of <20. Per Cardiology, patient to have ischemic evaluation if meaningful neurological recovery. Patient also noted to have rhabdo/GARRETT which has now normalized. Trach was tentatively scheduled for . Patient with improvement in mental status and was extubated on . Patient was transitioned from HFNC and now on 5L NC. Patient was downgraded to Medicine service on 2021.    he is being treated for Acute hypoxemic respiratory failure 2/2 aspiration PNA, with a course antibiotics:  Vanco , , -   Zosyn  -   Merrem  - 21, current     last febrile  at 100.8.  This AM, he still has some low grade temp of 100.   ID was consulted for this.   He provides no history, but can vocalize some words.       Impression:  - anoxic brain injury  - drug overdose  - aspiration PNA  - fevers  - WBC elevation        Plan:  - he has been on Zosyn, vanco, then merrem since ;  - CXR shows improvement on infiltrates but still with fevers  -  procalcitonin is coming down  no clear source of infection    - follow up all outstanding cultures  - trend temperature and WBC curve  - repeat cultures from blood and all sources if febrile.      WBC elevation is reactive  - will follow and trend        CONSIDER CT chest scan    continue merrem thru 21      Finally, if above fever workup is negative  should consider repeat brain imaging to determine extent of damage.   large amount of brain tissue damage can also cause fevers.    This 47 y/o M (real name: Ildefonso Pierre, : [birth year]) with a h/o HTN, DM, asthma, presents to the ED after being found unresponsive in bed by his sister this evening. Noted to have agonal respirations and emesis coming from mouth. He was found to be hypoxemic in the ED prompting emergent endotracheal intubation and appears to have an extensive right sided pneumonia on CXR. The patient's sister, Adela, reports that he had recently broken up with his girlfriend and has been having a hard time coping with it and has been noticeably depressed. Within the past 5 days he was taken to the ED at [facility] for some unclear intoxication. Adela also reports recently noticing a plethora of newly prescribed medications at his house and was able to identify clonazepam and alprazolam. Tonight he had told her that he was going out with friends and asked if she would come over and watch his dog, which she thought seemed unusual. Urine toxicology (+) for benzos and cocaine. CT head reveals symmetric low attenuation lesions in the bilateral globus pallidus regions of the basal ganglia, which can be seen with hypoxic ischemic encephalopathy or related to carbon monoxide positioning, as well as indeterminate areas of low attenuation in the bilateral cerebellum may reflect infarcts. He is sedated and now with progressive hypotension despite 3L IVF bolus. Started on IV vasopressor therapy. (21 Aug 2021 21:54)     Patient with anoxic brain injury, attributed to benzo / cocaine overdose,, being followed by Neurology. Patient on IV antibiotics for presumed aspiration PNA. ECHO was done and significant for LVEF of <20. Per Cardiology, patient to have ischemic evaluation if meaningful neurological recovery. Patient also noted to have rhabdo/GARRETT which has now normalized. Trach was tentatively scheduled for . Patient with improvement in mental status and was extubated on . Patient was transitioned from HFNC and now on 5L NC. Patient was downgraded to Medicine service on 2021.    he is being treated for Acute hypoxemic respiratory failure 2/2 aspiration PNA, with a course antibiotics:  Vanco , , -   Zosyn  -   Merrem  - 21, current     last febrile  at 100.8.  This AM, he still has some low grade temp of 100.   ID was consulted for this.   He provides no history, but can vocalize some words.       Impression:  - anoxic brain injury  - drug overdose  - aspiration PNA  - fevers  - WBC elevation        Plan:  - he has been on Zosyn, vanco, then merrem since ;  - CXR shows improvement on infiltrates but still with fevers  -  procalcitonin is coming down  no clear source of infection    - follow up all outstanding cultures  - trend temperature and WBC curve  - repeat cultures from blood and all sources if febrile.      WBC elevation is reactive  - will follow and trend        CONSIDER CT chest scan    continue merrem thru 9/3/21      Finally, if above fever workup is negative  should consider repeat brain imaging to determine extent of damage.   large amount of brain tissue damage can also cause fevers.

## 2021-09-03 NOTE — PROGRESS NOTE ADULT - ASSESSMENT
Patient is a 45 yo F w/ PMH of DM2, HTN, Hyothyroid, Asthma, ADHD who presented after being found unresponsive. Admitted to MICU for possible benzo/cocaine overdose and aspiration PNA. Patient was intubated and started on vasopressors. Patient with anoxic brain injury, being followed by Neurology. Patient on IV antibiotics for presumed aspiration PNA. ECHO was done and significant for LVEF of <20. Per Cardiology, patient to have ischemic evaluation if meaningful neurological recovery. Patient also noted to have rhabdo/GARRETT which has now normalized. Trach was tentatively scheduled for 9/2. Patient with improvement in mental status and was extubated on 8/31. Patient was transitioned from HFNC and now on 5L NC. Patient now being downgraded to Medicine.    #Anoxic brain injury 2/2 benzodiazapine overdose  - Utox positive for benzos and cocaine  - MRI brain: significant hypoxic/anoxic injury and strokes   - EEG: Moderate to severe nonspecific diffuse or multifocal cerebral dysfunction. No indication for anti-epileptic medication according to Epilepsy.  - Neuro following  - Dysphagia due to anoxic brain injury.  - Psych consult once improvement in mentation    #fall - ct head and ct c spine ordered  - negative  start 1:1    #Acute hypoxemic respiratory failure 2/2 aspiration PNA  Sepsis 2/2 PNA  - S/p Vancomycin. Currently on Merrem  - last febrile 8/31  - BCx: NGTD  - ID consult following   - S/p extubation 8/31  - Supplemental oxygen via NC prn,   - CTS was following, but narciso doesnt need a trach, resp status improved    #Elevated troponin, new onset systolic CHF  - ECHO reviewed, LVEF of < 20%. severe AS  - Cardiology consult appreciated  -  low dose Coreg  - C/w ASA  - Statin held because of transaminitis,  - Ischemic evaluation once meaningful neurological recovery  - will repeat tte    #GARRETT w/ rhabdomyolysis  - Seen by Nephrology  - Renal function normalized  - Monitor BMP, correct electrolytes as needed    #Hypothyroid  - C/w [place]    #ADHD  - C/w Adderall    #AUR  - C/w [place]  - C/w Cardura    Advanced Directives: Full code, Palliative following.    Patient high risk for clinical deterioration. Downgraded from MICU to SDU, low threshold for MICU reconsult.    prognosis guarded

## 2021-09-04 LAB
ANION GAP SERPL CALC-SCNC: 10 MMOL/L — SIGNIFICANT CHANGE UP (ref 5–17)
BUN SERPL-MCNC: 17.6 MG/DL — SIGNIFICANT CHANGE UP (ref 8–20)
CALCIUM SERPL-MCNC: 8.7 MG/DL — SIGNIFICANT CHANGE UP (ref 8.6–10.2)
CHLORIDE SERPL-SCNC: 101 MMOL/L — SIGNIFICANT CHANGE UP (ref 98–107)
CO2 SERPL-SCNC: 24 MMOL/L — SIGNIFICANT CHANGE UP (ref 22–29)
CREAT SERPL-MCNC: 1.01 MG/DL — SIGNIFICANT CHANGE UP (ref 0.5–1.3)
GLUCOSE BLDC GLUCOMTR-MCNC: 107 MG/DL — HIGH (ref 70–99)
GLUCOSE BLDC GLUCOMTR-MCNC: 107 MG/DL — HIGH (ref 70–99)
GLUCOSE BLDC GLUCOMTR-MCNC: 115 MG/DL — HIGH (ref 70–99)
GLUCOSE BLDC GLUCOMTR-MCNC: 93 MG/DL — SIGNIFICANT CHANGE UP (ref 70–99)
GLUCOSE SERPL-MCNC: 109 MG/DL — HIGH (ref 70–99)
HCT VFR BLD CALC: 37.5 % — LOW (ref 39–50)
HGB BLD-MCNC: 12.4 G/DL — LOW (ref 13–17)
LACTATE SERPL-SCNC: 0.7 MMOL/L — SIGNIFICANT CHANGE UP (ref 0.5–2)
MCHC RBC-ENTMCNC: 30.4 PG — SIGNIFICANT CHANGE UP (ref 27–34)
MCHC RBC-ENTMCNC: 33.1 GM/DL — SIGNIFICANT CHANGE UP (ref 32–36)
MCV RBC AUTO: 91.9 FL — SIGNIFICANT CHANGE UP (ref 80–100)
PLATELET # BLD AUTO: 833 K/UL — HIGH (ref 150–400)
POTASSIUM SERPL-MCNC: 4 MMOL/L — SIGNIFICANT CHANGE UP (ref 3.5–5.3)
POTASSIUM SERPL-SCNC: 4 MMOL/L — SIGNIFICANT CHANGE UP (ref 3.5–5.3)
PROCALCITONIN SERPL-MCNC: 0.14 NG/ML — HIGH (ref 0.02–0.1)
RBC # BLD: 4.08 M/UL — LOW (ref 4.2–5.8)
RBC # FLD: 13.2 % — SIGNIFICANT CHANGE UP (ref 10.3–14.5)
SODIUM SERPL-SCNC: 135 MMOL/L — SIGNIFICANT CHANGE UP (ref 135–145)
WBC # BLD: 12.28 K/UL — HIGH (ref 3.8–10.5)
WBC # FLD AUTO: 12.28 K/UL — HIGH (ref 3.8–10.5)

## 2021-09-04 PROCEDURE — 99232 SBSQ HOSP IP/OBS MODERATE 35: CPT

## 2021-09-04 RX ORDER — MEROPENEM 1 G/30ML
1000 INJECTION INTRAVENOUS EVERY 8 HOURS
Refills: 0 | Status: COMPLETED | OUTPATIENT
Start: 2021-09-04 | End: 2021-09-06

## 2021-09-04 RX ADMIN — Medication 650 MILLIGRAM(S): at 11:39

## 2021-09-04 RX ADMIN — DEXTROAMPHETAMINE SACCHARATE, AMPHETAMINE ASPARTATE, DEXTROAMPHETAMINE SULFATE AND AMPHETAMINE SULFATE 15 MILLIGRAM(S): 1.875; 1.875; 1.875; 1.875 TABLET ORAL at 06:32

## 2021-09-04 RX ADMIN — Medication 650 MILLIGRAM(S): at 21:38

## 2021-09-04 RX ADMIN — Medication 1 MILLIGRAM(S): at 00:17

## 2021-09-04 RX ADMIN — MEROPENEM 100 MILLIGRAM(S): 1 INJECTION INTRAVENOUS at 16:50

## 2021-09-04 RX ADMIN — HEPARIN SODIUM 5000 UNIT(S): 5000 INJECTION INTRAVENOUS; SUBCUTANEOUS at 13:47

## 2021-09-04 RX ADMIN — Medication 650 MILLIGRAM(S): at 21:08

## 2021-09-04 RX ADMIN — Medication 5 MILLIGRAM(S): at 00:19

## 2021-09-04 RX ADMIN — Medication 650 MILLIGRAM(S): at 11:49

## 2021-09-04 RX ADMIN — Medication 30 MILLIGRAM(S): at 06:33

## 2021-09-04 RX ADMIN — HEPARIN SODIUM 5000 UNIT(S): 5000 INJECTION INTRAVENOUS; SUBCUTANEOUS at 21:09

## 2021-09-04 RX ADMIN — Medication 81 MILLIGRAM(S): at 11:32

## 2021-09-04 RX ADMIN — Medication 0.5 MILLIGRAM(S): at 08:53

## 2021-09-04 RX ADMIN — Medication 2 MILLIGRAM(S): at 21:09

## 2021-09-04 RX ADMIN — Medication 0.5 MILLIGRAM(S): at 20:47

## 2021-09-04 RX ADMIN — HEPARIN SODIUM 5000 UNIT(S): 5000 INJECTION INTRAVENOUS; SUBCUTANEOUS at 06:33

## 2021-09-04 RX ADMIN — CHLORHEXIDINE GLUCONATE 1 APPLICATION(S): 213 SOLUTION TOPICAL at 06:38

## 2021-09-04 RX ADMIN — MEROPENEM 100 MILLIGRAM(S): 1 INJECTION INTRAVENOUS at 21:16

## 2021-09-04 NOTE — PROGRESS NOTE ADULT - ASSESSMENT
Patient is a 47 yo F w/ PMH of DM2, HTN, Hyothyroid, Asthma, ADHD who presented after being found unresponsive. Admitted to MICU for possible benzo/cocaine overdose and aspiration PNA. Patient was intubated and started on vasopressors. Patient with anoxic brain injury, being followed by Neurology. Patient on IV antibiotics for presumed aspiration PNA. ECHO was done and significant for LVEF of <20. Per Cardiology, patient to have ischemic evaluation if meaningful neurological recovery. Patient also noted to have rhabdo/GARRETT which has now normalized. Trach was tentatively scheduled for 9/2. Patient with improvement in mental status and was extubated on 8/31. Patient was transitioned from HFNC. Patient downgraded to medicine. Patient puleld out ng tube and restraints placed. on 9/3 patient found on floor and was unwitnessed . CT head and c spine were negative and placed on 1:1    #Anoxic brain injury 2/2 benzodiazapine overdose  - Utox positive for benzos and cocaine  - MRI brain: significant hypoxic/anoxic injury and strokes   - EEG: Moderate to severe nonspecific diffuse or multifocal cerebral dysfunction. No indication for anti-epileptic medication according to Epilepsy.  - Neuro following  - Dysphagia due to anoxic brain injury.  - Psych consult once improvement in mentation    #fall - ct head and ct c spine negative 9/3  c/w 1:1  c.w restraints    #Acute hypoxemic respiratory failure 2/2 aspiration PNA  Sepsis 2/2 PNA  - S/p Vancomycin. Continue Merrem  had fever of 102 yesterday, recultured  - BCx: NGTD  - ID consult following   - S/p extubation 8/31  - CTS was following, but narciso doesnt need a trach, resp status improved    #Elevated troponin, new onset systolic CHF  - ECHO reviewed, LVEF of < 20%. severe AS  - Cardiology consult appreciated  -  low dose Coreg  - C/w ASA  - Statin held because of transaminitis,  - Ischemic evaluation once meaningful neurological recovery  tte shows improvement in tte    #GARRETT w/ rhabdomyolysis  - Seen by Nephrology  - Renal function normalized  - Monitor BMP, correct electrolytes as needed    #Hypothyroid  - C/w [place]    #ADHD  - C/w Adderall    #AUR  dc parikh on 9/3    Advanced Directives: Full code, Palliative following.    Patient high risk for clinical deterioration.   transfer to any monitored bed  prognosis guarded

## 2021-09-04 NOTE — PROGRESS NOTE ADULT - SUBJECTIVE AND OBJECTIVE BOX
YESI MIRZA    [id number]    46y      Male    INTERVAL HPI/OVERNIGHT EVENTS: patient being seen for benzo overdose. Patient is on 1:1 and continues to have ng tube.     patient is slightly more awake and alert    REVIEW OF SYSTEMS:    unable to obtain accurate ros      Vital Signs Last 24 Hrs  T(C): 38 (04 Sep 2021 12:10), Max: 39.9 (03 Sep 2021 16:00)  T(F): 100.4 (04 Sep 2021 12:10), Max: 103.8 (03 Sep 2021 16:00)  HR: 100 (04 Sep 2021 12:00) (71 - 134)  BP: 103/68 (04 Sep 2021 12:00) (102/72 - 131/76)  BP(mean): 79 (04 Sep 2021 12:00) (79 - 97)  RR: 18 (04 Sep 2021 12:00) (18 - 22)  SpO2: 98% (04 Sep 2021 12:00) (95% - 100%)    PHYSICAL EXAM    CONSTITUTIONAL: NAD,   HEENT: NC/AT, ng tube   RESPIRATORY: Bilateral rhonchi  CARDIOVASCULAR: RRR, S1/S2+, no m/g/r  ABDOMEN: Nontender to palpation, normoactive bowel sounds, no rebound/guarding;  MUSCULOSKELETAL: No edema, cyanosis or deformities.  NEUROLOGY: awake. more alert, restraitns  SKIN: No rashes; no palpable lesions  VASC: Distal pulses palpable      LABS:                        12.4   12.28 )-----------( 833      ( 04 Sep 2021 06:33 )             37.5     09-04    135  |  101  |  17.6  ----------------------------<  109<H>  4.0   |  24.0  |  1.01    Ca    8.7      04 Sep 2021 06:33    TPro  7.0  /  Alb  2.8<L>  /  TBili  0.5  /  DBili  x   /  AST  37  /  ALT  50<H>  /  AlkPhos  79  09-03      Urinalysis Basic - ( 03 Sep 2021 18:05 )    Color: Yellow / Appearance: Slightly Turbid / S.015 / pH: x  Gluc: x / Ketone: Negative  / Bili: Negative / Urobili: Negative mg/dL   Blood: x / Protein: 30 mg/dL / Nitrite: Negative   Leuk Esterase: Negative / RBC: 11-25 /HPF / WBC 0-2   Sq Epi: x / Non Sq Epi: x / Bacteria: x          MEDICATIONS  (STANDING):  amphetamine/dextroamphetamine 15 milliGRAM(s) Oral daily  aspirin  chewable 81 milliGRAM(s) Oral daily  buDESOnide    Inhalation Suspension 0.5 milliGRAM(s) Inhalation every 12 hours  chlorhexidine 2% Cloths 1 Application(s) Topical <User Schedule>  dextrose 40% Gel 15 Gram(s) Oral once  dextrose 5%. 1000 milliLiter(s) (100 mL/Hr) IV Continuous <Continuous>  dextrose 5%. 1000 milliLiter(s) (50 mL/Hr) IV Continuous <Continuous>  dextrose 50% Injectable 25 Gram(s) IV Push once  dextrose 50% Injectable 12.5 Gram(s) IV Push once  dextrose 50% Injectable 25 Gram(s) IV Push once  doxazosin 2 milliGRAM(s) Oral at bedtime  glucagon  Injectable 1 milliGRAM(s) IntraMuscular once  heparin   Injectable 5000 Unit(s) SubCutaneous every 8 hours  insulin lispro (ADMELOG) corrective regimen sliding scale   SubCutaneous every 6 hours  metoprolol tartrate Injectable 5 milliGRAM(s) IV Push every 6 hours  sodium chloride 0.9%. 1000 milliLiter(s) (50 mL/Hr) IV Continuous <Continuous>  thyroid 30 milliGRAM(s) Oral daily    MEDICATIONS  (PRN):  acetaminophen    Suspension .. 650 milliGRAM(s) Oral every 6 hours PRN Temp greater or equal to 38C (100.4F)  ALBUTerol    90 MICROgram(s) HFA Inhaler 2 Puff(s) Inhalation every 6 hours PRN Shortness of Breath  LORazepam   Injectable 1 milliGRAM(s) IV Push every 3 hours PRN Agitation      RADIOLOGY & ADDITIONAL TESTS:

## 2021-09-05 LAB
BASOPHILS # BLD AUTO: 0.09 K/UL — SIGNIFICANT CHANGE UP (ref 0–0.2)
BASOPHILS NFR BLD AUTO: 0.9 % — SIGNIFICANT CHANGE UP (ref 0–2)
EOSINOPHIL # BLD AUTO: 0.38 K/UL — SIGNIFICANT CHANGE UP (ref 0–0.5)
EOSINOPHIL NFR BLD AUTO: 3.8 % — SIGNIFICANT CHANGE UP (ref 0–6)
GLUCOSE BLDC GLUCOMTR-MCNC: 106 MG/DL — HIGH (ref 70–99)
GLUCOSE BLDC GLUCOMTR-MCNC: 106 MG/DL — HIGH (ref 70–99)
GLUCOSE BLDC GLUCOMTR-MCNC: 116 MG/DL — HIGH (ref 70–99)
GLUCOSE BLDC GLUCOMTR-MCNC: 95 MG/DL — SIGNIFICANT CHANGE UP (ref 70–99)
HCT VFR BLD CALC: 35.9 % — LOW (ref 39–50)
HGB BLD-MCNC: 12.2 G/DL — LOW (ref 13–17)
IMM GRANULOCYTES NFR BLD AUTO: 0.2 % — SIGNIFICANT CHANGE UP (ref 0–1.5)
LYMPHOCYTES # BLD AUTO: 2.11 K/UL — SIGNIFICANT CHANGE UP (ref 1–3.3)
LYMPHOCYTES # BLD AUTO: 21.2 % — SIGNIFICANT CHANGE UP (ref 13–44)
MCHC RBC-ENTMCNC: 30.6 PG — SIGNIFICANT CHANGE UP (ref 27–34)
MCHC RBC-ENTMCNC: 34 GM/DL — SIGNIFICANT CHANGE UP (ref 32–36)
MCV RBC AUTO: 90 FL — SIGNIFICANT CHANGE UP (ref 80–100)
MONOCYTES # BLD AUTO: 0.88 K/UL — SIGNIFICANT CHANGE UP (ref 0–0.9)
MONOCYTES NFR BLD AUTO: 8.9 % — SIGNIFICANT CHANGE UP (ref 2–14)
NEUTROPHILS # BLD AUTO: 6.46 K/UL — SIGNIFICANT CHANGE UP (ref 1.8–7.4)
NEUTROPHILS NFR BLD AUTO: 65 % — SIGNIFICANT CHANGE UP (ref 43–77)
PLATELET # BLD AUTO: 868 K/UL — HIGH (ref 150–400)
RBC # BLD: 3.99 M/UL — LOW (ref 4.2–5.8)
RBC # FLD: 13.2 % — SIGNIFICANT CHANGE UP (ref 10.3–14.5)
WBC # BLD: 9.94 K/UL — SIGNIFICANT CHANGE UP (ref 3.8–10.5)
WBC # FLD AUTO: 9.94 K/UL — SIGNIFICANT CHANGE UP (ref 3.8–10.5)

## 2021-09-05 PROCEDURE — 99232 SBSQ HOSP IP/OBS MODERATE 35: CPT

## 2021-09-05 RX ORDER — ACETAMINOPHEN 500 MG
1000 TABLET ORAL ONCE
Refills: 0 | Status: COMPLETED | OUTPATIENT
Start: 2021-09-05 | End: 2021-09-05

## 2021-09-05 RX ADMIN — HEPARIN SODIUM 5000 UNIT(S): 5000 INJECTION INTRAVENOUS; SUBCUTANEOUS at 06:19

## 2021-09-05 RX ADMIN — Medication 1 MILLIGRAM(S): at 11:27

## 2021-09-05 RX ADMIN — HEPARIN SODIUM 5000 UNIT(S): 5000 INJECTION INTRAVENOUS; SUBCUTANEOUS at 21:19

## 2021-09-05 RX ADMIN — Medication 400 MILLIGRAM(S): at 21:55

## 2021-09-05 RX ADMIN — Medication 81 MILLIGRAM(S): at 11:27

## 2021-09-05 RX ADMIN — MEROPENEM 100 MILLIGRAM(S): 1 INJECTION INTRAVENOUS at 21:18

## 2021-09-05 RX ADMIN — CHLORHEXIDINE GLUCONATE 1 APPLICATION(S): 213 SOLUTION TOPICAL at 06:24

## 2021-09-05 RX ADMIN — Medication 650 MILLIGRAM(S): at 06:43

## 2021-09-05 RX ADMIN — Medication 5 MILLIGRAM(S): at 12:23

## 2021-09-05 RX ADMIN — DEXTROAMPHETAMINE SACCHARATE, AMPHETAMINE ASPARTATE, DEXTROAMPHETAMINE SULFATE AND AMPHETAMINE SULFATE 15 MILLIGRAM(S): 1.875; 1.875; 1.875; 1.875 TABLET ORAL at 06:43

## 2021-09-05 RX ADMIN — MEROPENEM 100 MILLIGRAM(S): 1 INJECTION INTRAVENOUS at 06:20

## 2021-09-05 RX ADMIN — Medication 650 MILLIGRAM(S): at 07:13

## 2021-09-05 RX ADMIN — MEROPENEM 100 MILLIGRAM(S): 1 INJECTION INTRAVENOUS at 16:39

## 2021-09-05 RX ADMIN — HEPARIN SODIUM 5000 UNIT(S): 5000 INJECTION INTRAVENOUS; SUBCUTANEOUS at 16:39

## 2021-09-05 RX ADMIN — Medication 0.5 MILLIGRAM(S): at 20:42

## 2021-09-05 RX ADMIN — Medication 30 MILLIGRAM(S): at 06:43

## 2021-09-05 NOTE — PROGRESS NOTE ADULT - SUBJECTIVE AND OBJECTIVE BOX
YESI MIRZA    [id number]    46y      Male    INTERVAL HPI/OVERNIGHT EVENTS: patient being seen for benzo overdose and anoxic brain injury./     patient is more awake and alert  last 24 hours tmax 100.6    REVIEW OF SYSTEMS:    denies any complaints    Vital Signs Last 24 Hrs  T(C): 37.2 (05 Sep 2021 15:31), Max: 38.1 (04 Sep 2021 18:15)  T(F): 99 (05 Sep 2021 15:31), Max: 100.6 (04 Sep 2021 18:15)  HR: 93 (05 Sep 2021 15:31) (69 - 102)  BP: 105/69 (05 Sep 2021 15:31) (91/61 - 122/75)  BP(mean): 81 (05 Sep 2021 15:31) (81 - 81)  RR: 20 (05 Sep 2021 15:31) (18 - 26)  SpO2: 94% (05 Sep 2021 15:31) (94% - 100%)    PHYSICAL EXAM:    CONSTITUTIONAL: NAD,   HEENT: NC/AT, ng tube   RESPIRATORY: Bilateral rhonchi  CARDIOVASCULAR: RRR, S1/S2+, no m/g/r  ABDOMEN: Nontender to palpation, normoactive bowel sounds, no rebound/guarding;  MUSCULOSKELETAL: No edema, cyanosis or deformities.  NEUROLOGY: awake. more alert, restraitns  SKIN: No rashes; no palpable lesions  VASC: Distal pulses palpable        LABS:                        12.2   9.94  )-----------( 868      ( 05 Sep 2021 08:05 )             35.9     09-04    135  |  101  |  17.6  ----------------------------<  109<H>  4.0   |  24.0  |  1.01    Ca    8.7      04 Sep 2021 06:33    TPro  7.0  /  Alb  2.8<L>  /  TBili  0.5  /  DBili  x   /  AST  37  /  ALT  50<H>  /  AlkPhos  79  09-03      Urinalysis Basic - ( 03 Sep 2021 18:05 )    Color: Yellow / Appearance: Slightly Turbid / S.015 / pH: x  Gluc: x / Ketone: Negative  / Bili: Negative / Urobili: Negative mg/dL   Blood: x / Protein: 30 mg/dL / Nitrite: Negative   Leuk Esterase: Negative / RBC: 11-25 /HPF / WBC 0-2   Sq Epi: x / Non Sq Epi: x / Bacteria: x          MEDICATIONS  (STANDING):  amphetamine/dextroamphetamine 15 milliGRAM(s) Oral daily  aspirin  chewable 81 milliGRAM(s) Oral daily  buDESOnide    Inhalation Suspension 0.5 milliGRAM(s) Inhalation every 12 hours  chlorhexidine 2% Cloths 1 Application(s) Topical <User Schedule>  dextrose 40% Gel 15 Gram(s) Oral once  dextrose 5%. 1000 milliLiter(s) (100 mL/Hr) IV Continuous <Continuous>  dextrose 5%. 1000 milliLiter(s) (50 mL/Hr) IV Continuous <Continuous>  dextrose 50% Injectable 25 Gram(s) IV Push once  dextrose 50% Injectable 12.5 Gram(s) IV Push once  dextrose 50% Injectable 25 Gram(s) IV Push once  doxazosin 2 milliGRAM(s) Oral at bedtime  glucagon  Injectable 1 milliGRAM(s) IntraMuscular once  heparin   Injectable 5000 Unit(s) SubCutaneous every 8 hours  insulin lispro (ADMELOG) corrective regimen sliding scale   SubCutaneous every 6 hours  meropenem  IVPB 1000 milliGRAM(s) IV Intermittent every 8 hours  sodium chloride 0.9%. 1000 milliLiter(s) (50 mL/Hr) IV Continuous <Continuous>  thyroid 30 milliGRAM(s) Oral daily    MEDICATIONS  (PRN):  acetaminophen    Suspension .. 650 milliGRAM(s) Oral every 6 hours PRN Temp greater or equal to 38C (100.4F)  ALBUTerol    90 MICROgram(s) HFA Inhaler 2 Puff(s) Inhalation every 6 hours PRN Shortness of Breath  LORazepam   Injectable 1 milliGRAM(s) IV Push every 3 hours PRN Agitation      RADIOLOGY & ADDITIONAL TESTS:

## 2021-09-05 NOTE — PROGRESS NOTE ADULT - ASSESSMENT
46y Male who is followed by neurology because of anoxic/hypoxic brain injury    Hypoxic-ischemic encephalopathy  He is status post likely drug overdose with benzodiazepine and cocaine.  His MRI brain appearance shows evidence of significant hypoxic/anoxic injury and strokes.  Overall, exam is improving slowly.     Seizure-like activity  Hyperkinetic movements captured on c-EEG without ictal correlate on EEG.   No epileptiform discharges or seizure seen.    Dysphagia.  Has nasogastric tube.  Would suggest repeat formal swallow evaluation in a few days.

## 2021-09-05 NOTE — PROGRESS NOTE ADULT - ASSESSMENT
Patient is a 47 yo F w/ PMH of DM2, HTN, Hyothyroid, Asthma, ADHD who presented after being found unresponsive. Admitted to MICU for possible benzo/cocaine overdose and aspiration PNA. Patient was intubated and started on vasopressors. Patient with anoxic brain injury, being followed by Neurology. Patient on IV antibiotics for presumed aspiration PNA. ECHO was done and significant for LVEF of <20. Per Cardiology, patient to have ischemic evaluation if meaningful neurological recovery. Patient also noted to have rhabdo/GARRETT which has now normalized. Trach was tentatively scheduled for 9/2. Patient with improvement in mental status and was extubated on 8/31. Patient was transitioned from HFNC. Patient downgraded to medicine. Patient puleld out ng tube and restraints placed. on 9/3 patient found on floor and was unwitnessed . CT head and c spine were negative and placed on 1:1    #Anoxic brain injury 2/2 benzodiazapine overdose  - Utox positive for benzos and cocaine  - MRI brain: significant hypoxic/anoxic injury and strokes   - EEG: Moderate to severe nonspecific diffuse or multifocal cerebral dysfunction. No indication for anti-epileptic medication according to Epilepsy.  - Neuro following  - Dysphagia due to anoxic brain injury.  - Psych consult this week if continues to improved    #fall - ct head and ct c spine negative 9/3  dc 1:1  c.w restraints    #Acute hypoxemic respiratory failure 2/2 aspiration PNA  Sepsis 2/2 PNA  - S/p Vancomycin. Continue Merrem  id following  - will dc ng tube and start pureed  speech consult placed    #Elevated troponin, new onset systolic CHF  - ECHO reviewed, LVEF of < 20%. severe AS  - Cardiology consult appreciated  -  low dose Coreg  - C/w ASA  - Statin held because of transaminitis,  - Ischemic evaluation once meaningful neurological recovery  tte shows improvement in ef    #GARRETT w/ rhabdomyolysis  - Seen by Nephrology  - Renal function normalized  - Monitor BMP, correct electrolytes as needed    #Hypothyroid  - C/w [place]    #ADHD  - C/w Adderall    #Acute urinary rentention   parikh discontinued    Advanced Directives: Full code, Palliative following.

## 2021-09-05 NOTE — PROGRESS NOTE ADULT - SUBJECTIVE AND OBJECTIVE BOX
[facility]                                        [facility] [place]                                 Laura Lawrence & Jalen                                  [address]                                        [place], [place], [address]                                             [phone]        CC: Anoxic encephalopathy.    HPI:   The patient is a 46y Male who presented to the ER after being found unresponsive in bed by family. He was brought to the ER. He was noted to be hypoxic with agonal respirations and was intubated and admitted to ICU on a mechanical ventilator.   He was noted to have some twitching type movement of the extremities for which the Epilepsy service was consulted.   He has a history of substance abuse.     Interim history:  Remains on [address] [place].   Has nasogastric tube in place.    ROS:   Denies headache or dizziness.  Denies chest pain.  Denies shortness of breath.    MEDICATIONS  (STANDING):  amphetamine/dextroamphetamine 15 milliGRAM(s) Oral daily  aspirin  chewable 81 milliGRAM(s) Oral daily  buDESOnide    Inhalation Suspension 0.5 milliGRAM(s) Inhalation every 12 hours  chlorhexidine 2% Cloths 1 Application(s) Topical <User Schedule>  dextrose 40% Gel 15 Gram(s) Oral once  dextrose 5%. 1000 milliLiter(s) (50 mL/Hr) IV Continuous <Continuous>  doxazosin 2 milliGRAM(s) Oral at bedtime  glucagon  Injectable 1 milliGRAM(s) IntraMuscular once  heparin   Injectable 5000 Unit(s) SubCutaneous every 8 hours  insulin lispro (ADMELOG) corrective regimen sliding scale   SubCutaneous every 6 hours  meropenem  IVPB 1000 milliGRAM(s) IV Intermittent every 8 hours  metoprolol tartrate Injectable 5 milliGRAM(s) IV Push every 6 hours  sodium chloride 0.9%. 1000 milliLiter(s) (50 mL/Hr) IV Continuous <Continuous>  thyroid 30 milliGRAM(s) Oral daily    Vital Signs Last 24 Hrs  T(C): 37.6 (05 Sep 2021 09:04), Max: 38.1 (04 Sep 2021 18:15)  T(F): 99.6 (05 Sep 2021 09:04), Max: 100.6 (04 Sep 2021 18:15)  HR: 96 (05 Sep 2021 09:04) (69 - 113)  BP: 108/64 (05 Sep 2021 09:04) (91/61 - 118/91)  BP(mean): 82 (04 Sep 2021 13:51) (82 - 82)  RR: 20 (05 Sep 2021 09:04) (18 - 26)  SpO2: 97% (05 Sep 2021 09:04) (96% - 100%)    Detailed Neurologic Exam:    Mental status: The patient is awake. He gives name and answers some simple questions. He follows simple instructions.    Cranial nerves: Pupils equal and react symmetrically to light. He blinks to threat bilaterally. Extraocular motion is full with no nystagmus. Facial sensation is intact. Facial musculature is symmetric. Palate elevates symmetrically. Tongue is midline.    Motor: There is normal bulk and tone.  There is no tremor.  Strength grossly symmetric but diffusely weak.    Sensation: Grossly intact to light touch and pin.    Reflexes: Trace throughout and plantar responses are flexor.    Cerebellar: No dysmetria on finger nose testing.    Labs:     09-04    135  |  101  |  17.6  ----------------------------<  109<H>  4.0   |  24.0  |  1.01    Ca    8.7      04 Sep 2021 06:33    TPro  7.0  /  Alb  2.8<L>  /  TBili  0.5  /  DBili  x   /  AST  37  /  ALT  50<H>  /  AlkPhos  79  09-03                            12.2   9.94  )-----------( 868      ( 05 Sep 2021 08:05 )             35.9

## 2021-09-06 LAB
GLUCOSE BLDC GLUCOMTR-MCNC: 86 MG/DL — SIGNIFICANT CHANGE UP (ref 70–99)
GLUCOSE BLDC GLUCOMTR-MCNC: 87 MG/DL — SIGNIFICANT CHANGE UP (ref 70–99)
GLUCOSE BLDC GLUCOMTR-MCNC: 90 MG/DL — SIGNIFICANT CHANGE UP (ref 70–99)
GLUCOSE BLDC GLUCOMTR-MCNC: 95 MG/DL — SIGNIFICANT CHANGE UP (ref 70–99)
GLUCOSE BLDC GLUCOMTR-MCNC: 97 MG/DL — SIGNIFICANT CHANGE UP (ref 70–99)

## 2021-09-06 PROCEDURE — 99233 SBSQ HOSP IP/OBS HIGH 50: CPT

## 2021-09-06 PROCEDURE — 99232 SBSQ HOSP IP/OBS MODERATE 35: CPT

## 2021-09-06 RX ADMIN — Medication 30 MILLIGRAM(S): at 06:29

## 2021-09-06 RX ADMIN — Medication 0.5 MILLIGRAM(S): at 20:07

## 2021-09-06 RX ADMIN — Medication 0.5 MILLIGRAM(S): at 08:03

## 2021-09-06 RX ADMIN — HEPARIN SODIUM 5000 UNIT(S): 5000 INJECTION INTRAVENOUS; SUBCUTANEOUS at 21:42

## 2021-09-06 RX ADMIN — MEROPENEM 100 MILLIGRAM(S): 1 INJECTION INTRAVENOUS at 06:30

## 2021-09-06 RX ADMIN — MEROPENEM 100 MILLIGRAM(S): 1 INJECTION INTRAVENOUS at 21:42

## 2021-09-06 RX ADMIN — Medication 2 MILLIGRAM(S): at 21:42

## 2021-09-06 RX ADMIN — HEPARIN SODIUM 5000 UNIT(S): 5000 INJECTION INTRAVENOUS; SUBCUTANEOUS at 06:30

## 2021-09-06 RX ADMIN — Medication 1000 MILLIGRAM(S): at 00:00

## 2021-09-06 RX ADMIN — HEPARIN SODIUM 5000 UNIT(S): 5000 INJECTION INTRAVENOUS; SUBCUTANEOUS at 14:56

## 2021-09-06 RX ADMIN — CHLORHEXIDINE GLUCONATE 1 APPLICATION(S): 213 SOLUTION TOPICAL at 06:33

## 2021-09-06 RX ADMIN — MEROPENEM 100 MILLIGRAM(S): 1 INJECTION INTRAVENOUS at 14:56

## 2021-09-06 RX ADMIN — DEXTROAMPHETAMINE SACCHARATE, AMPHETAMINE ASPARTATE, DEXTROAMPHETAMINE SULFATE AND AMPHETAMINE SULFATE 15 MILLIGRAM(S): 1.875; 1.875; 1.875; 1.875 TABLET ORAL at 06:29

## 2021-09-06 RX ADMIN — Medication 81 MILLIGRAM(S): at 14:56

## 2021-09-06 NOTE — PROGRESS NOTE ADULT - SUBJECTIVE AND OBJECTIVE BOX
OVERNIGHT EVENTS: afebrile.     Present Symptoms:     Dyspnea: 0  Nausea/Vomiting: No  Anxiety:  No  Depression: unable  Fatigue: Yes   Loss of appetite: No  Constipation: none     Pain: none             Character-            Duration-            Effect-            Factors-            Frequency-            Location-            Severity-    Pain AD Score:  [email or web address] (press ctrl + left click to view)    Review of Systems: Reviewed               Unable to obtain due to poor mentation   All others negative    MEDICATIONS  (STANDING):  amphetamine/dextroamphetamine 15 milliGRAM(s) Oral daily  aspirin  chewable 81 milliGRAM(s) Oral daily  buDESOnide    Inhalation Suspension 0.5 milliGRAM(s) Inhalation every 12 hours  chlorhexidine 2% Cloths 1 Application(s) Topical <User Schedule>  dextrose 40% Gel 15 Gram(s) Oral once  dextrose 5%. 1000 milliLiter(s) (50 mL/Hr) IV Continuous <Continuous>  dextrose 5%. 1000 milliLiter(s) (100 mL/Hr) IV Continuous <Continuous>  dextrose 50% Injectable 25 Gram(s) IV Push once  dextrose 50% Injectable 12.5 Gram(s) IV Push once  dextrose 50% Injectable 25 Gram(s) IV Push once  doxazosin 2 milliGRAM(s) Oral at bedtime  glucagon  Injectable 1 milliGRAM(s) IntraMuscular once  heparin   Injectable 5000 Unit(s) SubCutaneous every 8 hours  insulin lispro (ADMELOG) corrective regimen sliding scale   SubCutaneous every 6 hours  meropenem  IVPB 1000 milliGRAM(s) IV Intermittent every 8 hours  sodium chloride 0.9%. 1000 milliLiter(s) (50 mL/Hr) IV Continuous <Continuous>  thyroid 30 milliGRAM(s) Oral daily    MEDICATIONS  (PRN):  acetaminophen    Suspension .. 650 milliGRAM(s) Oral every 6 hours PRN Temp greater or equal to 38C (100.4F)  ALBUTerol    90 MICROgram(s) HFA Inhaler 2 Puff(s) Inhalation every 6 hours PRN Shortness of Breath  LORazepam   Injectable 1 milliGRAM(s) IV Push every 3 hours PRN Agitation    PHYSICAL EXAM:    Vital Signs Last 24 Hrs  T(C): 36.5 (06 Sep 2021 06:25), Max: 37.9 (05 Sep 2021 20:30)  T(F): 97.7 (06 Sep 2021 06:25), Max: 100.2 (05 Sep 2021 20:30)  HR: 95 (06 Sep 2021 08:06) (71 - 102)  BP: 99/69 (06 Sep 2021 06:25) (99/69 - 122/75)  BP(mean): 81 (05 Sep 2021 15:31) (81 - 81)  RR: 16 (06 Sep 2021 06:25) (16 - 20)  SpO2: 98% (06 Sep 2021 08:06) (92% - 98%)    General: restless in the bed     Karnofsky:  30 %    HEENT: normal      Lungs: comfortable    CV: normal      GI: normal     : normal     MSK: weakness     Skin: no rash    LABS:                      12.2   9.94  )-----------( 868      ( 05 Sep 2021 08:05 )             35.9       I&O's Summary    05 Sep 2021 07:01  -  06 Sep 2021 07:00  --------------------------------------------------------  IN: 0 mL / OUT: 1300 mL / NET: -1300 mL    RADIOLOGY & ADDITIONAL STUDIES:    < from: Xray Chest 1 View- PORTABLE-Urgent (Xray Chest 1 View- PORTABLE-Urgent .) (09.03.21 @ 18:21) >  INTERPRETATION:  AP chest on September 3, 2021 at 5:23 PM. Patient has increasing cough with fever.    Heart magnified by technique. NG tube remains.    On September 1 there was significant infiltration of the right base is slight infiltration at left base.    These findings are again noted. NG tube remains.    IMPRESSION: Unchanged infiltrates as above.    --- End of Report ---    < end of copied text >      ADVANCE DIRECTIVES/TREATMENT PREFERENCES: Full code, all aggressive measures desired

## 2021-09-06 NOTE — PROGRESS NOTE ADULT - ASSESSMENT
Patient is a 45 yo F w/ PMH of DM2, HTN, Hyothyroid, Asthma, ADHD who presented after being found unresponsive. Admitted to MICU for possible benzo/cocaine overdose and aspiration PNA. Patient was intubated and started on vasopressors. Patient with anoxic brain injury, being followed by Neurology. Patient on IV antibiotics for presumed aspiration PNA. ECHO was done and significant for LVEF of <20. Per Cardiology, patient to have ischemic evaluation if meaningful neurological recovery. Patient also noted to have rhabdo/GARRETT which has now normalized. Trach was tentatively scheduled for 9/2. Patient with improvement in mental status and was extubated on 8/31. Patient was transitioned from HFNC. Patient downgraded to medicine. Patient puleld out ng tube and restraints placed. on 9/3 patient found on floor and was unwitnessed . CT head and c spine were negative and placed on 1:1. Patient slowly improving and ng tube removed on 9.5 and started on a diet     #Anoxic brain injury 2/2 benzodiazapine overdose  - Utox positive for benzos and cocaine  - MRI brain: significant hypoxic/anoxic injury and strokes   - EEG: Moderate to severe nonspecific diffuse or multifocal cerebral dysfunction. No indication for anti-epileptic medication according to Epilepsy.  - Neuro following  - Dysphagia due to anoxic brain injury.  - Psych consult this week if continues to improved    #fall - ct head and ct c spine negative 9/3  dc 1:1  c.w restraints    #Acute hypoxemic respiratory failure 2/2 aspiration PNA  Sepsis 2/2 PNA  - S/p Vancomycin. Continue Merrem  id following  speech consult placed  pureed diet   ng tube removed    #Elevated troponin, new onset systolic CHF  - ECHO reviewed, LVEF of < 20%. severe AS  - Cardiology consult appreciated  -  low dose Coreg  - C/w ASA  - Statin held because of transaminitis,  - Ischemic evaluation once meaningful neurological recovery  tte shows improvement in ef    #GARRETT w/ rhabdomyolysis  - Seen by Nephrology  - Renal function normalized  - Monitor BMP, correct electrolytes as needed    #Hypothyroid  - C/w [facility]    #ADHD  - C/w Adderall    #Acute urinary rentention   - parikh discontinued and urinating well     Advanced Directives: Full code, Palliative following.  c.w current care

## 2021-09-06 NOTE — PROGRESS NOTE ADULT - ASSESSMENT
46M with HTN, DM, asthma, here with vent dependent respiratory failure, unresponsiveness, GARRETT, NSTEMI, acute systolic CHF, s/p shock (off pressors), possible aspiration related event, urine toxicology + for benzodiazepines and cocaine, extubated 8/31, dysphagia.     #1 Ventilator dependence - extubated 8/31.   #2 Anoxic brain injury - post possible overdose. MRI showing anoxic injury. patient more awake now, will wait and see how he does. may need repeat brain imaging to evaluate. neuro following.    #3 Dysphagia - possible aspiration pneumonia - on antibiotics. failing speech and swallow. currently with NG tube.   #4 Acute systolic CHF - needs repeat TTE, would re-engage cardiology.   #5 Fevers - workup per ID in progress. likely recurrently aspirating.   #6 Encounter for palliative care - continuing to follow course. more awake and interactive. Remains high risk for recurrent aspiration and deterioration / reintubation. Legal surrogates at this time are his two sisters Carmita and Beverly, numbers in provider hand off. would try to see if patient can assign health care proxy if his mental state continues to improve.    46M with HTN, DM, asthma, here with vent dependent respiratory failure, unresponsiveness, GARRETT, NSTEMI, acute systolic CHF, s/p shock (off pressors), possible aspiration related event, urine toxicology + for benzodiazepines and cocaine, extubated 8/31, dysphagia, now on diet.     #1 Ventilator dependence - extubated 8/31.   #2 Anoxic brain injury - post possible overdose. MRI showing anoxic injury. patient more awake now, will wait and see how he does. may need repeat brain imaging to evaluate. neuro following.    #3 Dysphagia - possible aspiration pneumonia - on antibiotics. now on dysphagia I honey consistency diet.   #4 Acute systolic CHF - repeat TTE with improvement in EF to 60%.    #5 Fevers - workup per ID in progress. likely recurrently aspirating.   #6 Encounter for palliative care - continuing to follow course. more awake and interactive. Remains high risk for recurrent aspiration and deterioration / reintubation. Legal surrogates at this time are his two sisters Carmita and Beverly, numbers in provider hand off. would try to see if patient can assign health care proxy if his mental state continues to improve.

## 2021-09-06 NOTE — PROGRESS NOTE ADULT - SUBJECTIVE AND OBJECTIVE BOX
YESI MIRZA    [id number]    46y      Male    INTERVAL HPI/OVERNIGHT EVENTS: patient being seen for benzo overdose and anoxic brain injury.     ng tube removed yesterday    patient seen at bedside and is more awake and alert.     REVIEW OF SYSTEMS:  complains of fatigue    Vital Signs Last 24 Hrs  T(C): 36.3 (06 Sep 2021 15:30), Max: 37.9 (05 Sep 2021 20:30)  T(F): 97.4 (06 Sep 2021 15:30), Max: 100.2 (05 Sep 2021 20:30)  HR: 108 (06 Sep 2021 15:30) (74 - 108)  BP: 121/82 (06 Sep 2021 15:30) (99/67 - 121/82)  BP(mean): --  RR: 17 (06 Sep 2021 15:30) (16 - 20)  SpO2: 97% (06 Sep 2021 15:30) (92% - 98%)    PHYSICAL EXAM:    CONSTITUTIONAL: NAD,   HEENT: NC/AT,  RESPIRATORY: improved  CARDIOVASCULAR: RRR, S1/S2+, no m  ABDOMEN: Nontender to palpation, normoactive bowel sounds,  MUSCULOSKELETAL: No edema, cyanosis or deformities.  NEUROLOGY: awake. more alert, restraitns  SKIN: No rashes; no palpable lesions  VASC: Distal pulses palpable      LABS:                        12.2   9.94  )-----------( 868      ( 05 Sep 2021 08:05 )             35.9       MEDICATIONS  (STANDING):  amphetamine/dextroamphetamine 15 milliGRAM(s) Oral daily  aspirin  chewable 81 milliGRAM(s) Oral daily  buDESOnide    Inhalation Suspension 0.5 milliGRAM(s) Inhalation every 12 hours  chlorhexidine 2% Cloths 1 Application(s) Topical <User Schedule>  dextrose 40% Gel 15 Gram(s) Oral once  dextrose 5%. 1000 milliLiter(s) (100 mL/Hr) IV Continuous <Continuous>  dextrose 5%. 1000 milliLiter(s) (50 mL/Hr) IV Continuous <Continuous>  dextrose 50% Injectable 25 Gram(s) IV Push once  dextrose 50% Injectable 12.5 Gram(s) IV Push once  dextrose 50% Injectable 25 Gram(s) IV Push once  doxazosin 2 milliGRAM(s) Oral at bedtime  glucagon  Injectable 1 milliGRAM(s) IntraMuscular once  heparin   Injectable 5000 Unit(s) SubCutaneous every 8 hours  insulin lispro (ADMELOG) corrective regimen sliding scale   SubCutaneous every 6 hours  meropenem  IVPB 1000 milliGRAM(s) IV Intermittent every 8 hours  thyroid 30 milliGRAM(s) Oral daily    MEDICATIONS  (PRN):  acetaminophen    Suspension .. 650 milliGRAM(s) Oral every 6 hours PRN Temp greater or equal to 38C (100.4F)  ALBUTerol    90 MICROgram(s) HFA Inhaler 2 Puff(s) Inhalation every 6 hours PRN Shortness of Breath  LORazepam   Injectable 1 milliGRAM(s) IV Push every 3 hours PRN Agitation      RADIOLOGY & ADDITIONAL TESTS:

## 2021-09-06 NOTE — PROGRESS NOTE ADULT - ASSESSMENT
This 47 y/o M (real name: Ildefonso Pierre, : [birth year]) with a h/o HTN, DM, asthma, presents to the ED after being found unresponsive in bed by his sister this evening. Noted to have agonal respirations and emesis coming from mouth. He was found to be hypoxemic in the ED prompting emergent endotracheal intubation and appears to have an extensive right sided pneumonia on CXR. The patient's sister, Adela, reports that he had recently broken up with his girlfriend and has been having a hard time coping with it and has been noticeably depressed. Within the past 5 days he was taken to the ED at [facility] for some unclear intoxication. Adela also reports recently noticing a plethora of newly prescribed medications at his house and was able to identify clonazepam and alprazolam. Tonight he had told her that he was going out with friends and asked if she would come over and watch his dog, which she thought seemed unusual. Urine toxicology (+) for benzos and cocaine. CT head reveals symmetric low attenuation lesions in the bilateral globus pallidus regions of the basal ganglia, which can be seen with hypoxic ischemic encephalopathy or related to carbon monoxide positioning, as well as indeterminate areas of low attenuation in the bilateral cerebellum may reflect infarcts. He is sedated and now with progressive hypotension despite 3L IVF bolus. Started on IV vasopressor therapy. (21 Aug 2021 21:54)     Patient with anoxic brain injury, attributed to benzo / cocaine overdose,, being followed by Neurology. Patient on IV antibiotics for presumed aspiration PNA. ECHO was done and significant for LVEF of <20. Per Cardiology, patient to have ischemic evaluation if meaningful neurological recovery. Patient also noted to have rhabdo/GARRETT which has now normalized. Trach was tentatively scheduled for . Patient with improvement in mental status and was extubated on . Patient was transitioned from HFNC and now on 5L NC. Patient was downgraded to Medicine service on 2021.    he is being treated for Acute hypoxemic respiratory failure 2/2 aspiration PNA, with a course antibiotics:  Vanco , , -   Zosyn  -   Merrem  - 21, current     last febrile  at 100.8.  This AM, he still has some low grade temp of 100.   ID was consulted for this.   He provides no history, but can vocalize some words.       Impression:  - anoxic brain injury  - drug overdose  - aspiration PNA  - fevers  - WBC elevation        Plan:  - he has been on Zosyn, vanco, then merrem since ; - EXTENEDED a few times  - CXR shows improvement on infiltrates   - WILL STOP MERREM on 21 and then watch  ALL CULTURES NEGATIVE  NO CLEAR SOURCE OF INFECTION       procalcitonin is coming down    WBC elevation is reactive; NORMALIZED  WBC Count: 9.94 K/uL (21 @ 08:05)  WBC Count: 12.28 K/uL (21 @ 06:33)  WBC Count: 16.93 K/uL (21 @ 17:28)  WBC Count: 12.93 K/uL (21 @ 04:07)  - will follow and trend        CONSIDER CT chest scan        Finally, if above fever workup is negative  should consider repeat brain imaging (MRI) to determine extent of damage.   large amount of brain tissue damage can also cause fevers.

## 2021-09-06 NOTE — PROGRESS NOTE ADULT - SUBJECTIVE AND OBJECTIVE BOX
[facility]  INFECTIOUS DISEASES  at [place]  =======================================================  Jaime Gudino MD  Diplomates American Board of Internal Medicine and Infectious Diseases  Tel  [phone]  Fax [phone]  =======================================================    [facility]-[id number]  YESI MIRZA  follow up:  fevers  no new issues  awake; conversant now and follows some commands.         I have personally reviewed the labs and data; pertinent labs and data are listed in this note; please see below.   =======================================================  Past Medical & Surgical Hx:  =====================  PAST MEDICAL & SURGICAL HISTORY:  No pertinent past medical history  Asthma  HTN (hypertension)  DM (diabetes mellitus)  S/P elbow joint replacement  right elbow surgery.  S/P knee surgery right thigh surgery.      Problem List:  ==========  HEALTH ISSUES - PROBLEM Dx:  Acute hypoxemic respiratory failure  Drug overdose  HTN (hypertension)  Asthma  DM (diabetes mellitus)  Anoxic brain injury  CHF (congestive heart failure)  Pneumonia, aspiration      Social Hx:  =======  no toxic habits currently    FAMILY HISTORY:  no significant family history of immunosuppressive disorders in mother or father   =======================================================    REVIEW OF SYSTEMS:  CONSTITUTIONAL:  No Fever or chills.  REPORTS FEELING HOT  HEENT:  No diplopia or blurred vision.  No earache, sore throat or runny nose.  CARDIOVASCULAR:  No pressure, squeezing, strangling, tightness, heaviness or aching about the chest, neck, axilla or epigastrium.  RESPIRATORY:  No cough, shortness of breath  GASTROINTESTINAL:  No nausea, vomiting or diarrhea.  GENITOURINARY:  No dysuria, frequency or urgency. No Blood in urine  MUSCULOSKELETAL:  no joint aches, no muscle pain  SKIN:  No change in skin, hair or nails.  NEUROLOGIC:  No Headaches, seizures or weakness.  PSYCHIATRIC:  No disorder of thought or mood.  ENDOCRINE:  No heat or cold intolerance  HEMATOLOGICAL:  No easy bruising or bleeding.    =======================================================  Allergies    Allergy Status Unknown  No Known Allergies     ======================================================  Physical Exam:  ============     General:  No acute distress.  awake  Eye: Pupils are equal, round and reactive to light, Extraocular movements are intact, Normal conjunctiva.  HENT: Normocephalic,    Neck: Supple, No lymphadenopathy.  Respiratory: Lungs with fair right breath sounds  Cardiovascular: Normal rate, Regular rhythm,   Gastrointestinal: Soft,  Non-distended, Normal bowel sounds.  Genitourinary: No costovertebral angle tenderness.  + CONDOM CATHETER    Lymphatics: No lymphadenopathy neck,   Musculoskeletal: Normal range of motion, Normal strength.  Integumentary: No rash.  Neurologic: awake, able to answer questions, follow some commands    =======================================================    Vitals:  ============  T(F): 97.7 (06 Sep 2021 10:30), Max: 100.2 (05 Sep 2021 20:30)  HR: 107 (06 Sep 2021 10:30)  BP: 99/67 (06 Sep 2021 10:30)  RR: 16 (06 Sep 2021 10:30)  SpO2: 98% (06 Sep 2021 10:30) (92% - 98%)  temp max in last 48H T(F): , Max: 100.6 (09-04-21 @ 18:15)    =======================================================  Current Antibiotics:  meropenem  IVPB 1000 milliGRAM(s) IV Intermittent every 8 hours    Other medications:  amphetamine/dextroamphetamine 15 milliGRAM(s) Oral daily  aspirin  chewable 81 milliGRAM(s) Oral daily  buDESOnide    Inhalation Suspension 0.5 milliGRAM(s) Inhalation every 12 hours  chlorhexidine 2% Cloths 1 Application(s) Topical <User Schedule>  dextrose 40% Gel 15 Gram(s) Oral once  dextrose 5%. 1000 milliLiter(s) IV Continuous <Continuous>  dextrose 5%. 1000 milliLiter(s) IV Continuous <Continuous>  dextrose 50% Injectable 25 Gram(s) IV Push once  dextrose 50% Injectable 12.5 Gram(s) IV Push once  dextrose 50% Injectable 25 Gram(s) IV Push once  doxazosin 2 milliGRAM(s) Oral at bedtime  glucagon  Injectable 1 milliGRAM(s) IntraMuscular once  heparin   Injectable 5000 Unit(s) SubCutaneous every 8 hours  insulin lispro (ADMELOG) corrective regimen sliding scale   SubCutaneous every 6 hours  thyroid 30 milliGRAM(s) Oral daily      =======================================================  Labs:                        12.2   9.94  )-----------( 868      ( 05 Sep 2021 08:05 )             35.9            Culture - Blood (collected 09-03-21 @ 17:29)  Source: .Blood Blood-Peripheral    Culture - Blood (collected 09-03-21 @ 17:28)  Source: .Blood Blood-Peripheral    Culture - Sputum (collected 09-01-21 @ 03:59)  Source: .Sputum Sputum  Gram Stain (09-01-21 @ 07:44):    Few polymorphonuclear leukocytes per low power field    Moderate Squamous epithelial cells per low power field    Few Gram positive cocci in pairs seen per oil power field  Final Report (09-03-21 @ 11:26):    Normal Respiratory Le present    Culture - Blood (collected 08-29-21 @ 10:59)  Source: .Blood Blood  Final Report (09-03-21 @ 11:00):    No growth at 5 days.    Culture - Blood (collected 08-28-21 @ 05:42)  Source: .Blood Blood-Peripheral  Final Report (09-02-21 @ 07:00):    No growth at 5 days.    Culture - Sputum (collected 08-27-21 @ 22:07)  Source: .Sputum Sputum  Gram Stain (08-27-21 @ 23:51):    Moderate polymorphonuclear leukocytes per low power field    No Squamous epithelial cells per low power field    Few Gram Positive Rods per oil power field    Rare Gram positive cocci in pairs per oil power field  Final Report (08-29-21 @ 18:08):    Normal Respiratory Le present    Culture - Fungal, CSF (collected 08-23-21 @ 21:43)  Source: .CSF CSF    Culture - CSF with Gram Stain (collected 08-23-21 @ 21:43)  Source: .CSF CSF  Gram Stain (08-23-21 @ 22:10):    No polymorphonuclear leukocytes seen    No organisms seen    by cytocentrifuge  Final Report (08-26-21 @ 16:12):    No growth at 3 days.      Procalcitonin, Serum: 0.14 ng/mL (09-04-21 @ 06:31)  Procalcitonin, Serum: 0.19 ng/mL (09-02-21 @ 08:54)  Procalcitonin, Serum: 0.24 ng/mL (09-01-21 @ 04:38)    SARS-CoV-2: NotDetec (08-21-21 @ 21:01)  Rapid RVP Result: NotDetec (08-21-21 @ 21:01)    COVID-19 PCR: NotDetec (09-03-21 @ 06:58)  COVID-19 PCR: NotDetec (08-28-21 @ 03:51)      < from: CT Head No Cont (09.03.21 @ 12:06) >     EXAM:  CT CERVICAL SPINE                         EXAM:  CT BRAIN                          PROCEDURE DATE:  09/03/2021          INTERPRETATION:  CLINICAL Indications:  fall out of bed    COMPARISON: CT head dated 8/23/2021. MRI brain dated 8/26/2020    TECHNIQUE: Noncontrast CT of the head. Multiplanar reformations are submitted.    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 No acute intracranial hemorrhage.  There is no compelling evidence for an acute transcortical infarction. There is no evidence of mass, mass effect, midline shift or extra-axial fluid collection. The lateral ventricles and cortical sulci are age-appropriate in size and configuration. Right-sided NG tube. Mild inflammatory mucosal changes are seen throughout the various portions of the paranasal sinuses. The orbits and mastoid air cells are unremarkable. The calvarium is intact. Consider follow-up CT or MRI as clinically warranted.          ============================    CLINICAL INDICATIONS: fall out of bed    COMPARISON: None.    TECHNIQUE: Noncontrast CT of the cervical spine. Multiple contiguous axial images through the cervical spine as well as multiplanar reformatted images are submitted for interpretation without the administration of intravenous contrast. 3-D images.    FINDINGS:  There is no evidence for acute fracture. Moderate leftward curvature to the mid cervical spine likely on the basis of patient positioning. A normal lordosis is noted. Craniocervical junction is normal. The cervicovertebral body heights and intervertebral disc spaces are preserved. There is no prevertebral soft tissue abnormality. The odontoid process is intact. The spinal canal and foramen are widely patent. There is no evidence of significant disc herniation. Thyroid gland is markedly. The airway is patent. The upper lung apices are unremarkable.    IMPRESSION:    CT head: . No acute intracranial hemorrhage.    CT C-spine: No acute cervical spine fracture. Consider MRI as clinically warranted.    --- End of Report ---            LUIGI JOHN MD; Attending Radiologist  This document has been electronically signed. Sep  3 2021 12:35PM    < end of copied text >

## 2021-09-07 LAB
ALBUMIN SERPL ELPH-MCNC: 3.1 G/DL — LOW (ref 3.3–5.2)
ALP SERPL-CCNC: 75 U/L — SIGNIFICANT CHANGE UP (ref 40–120)
ALT FLD-CCNC: 27 U/L — SIGNIFICANT CHANGE UP
AST SERPL-CCNC: 25 U/L — SIGNIFICANT CHANGE UP
BASOPHILS # BLD AUTO: 0.12 K/UL — SIGNIFICANT CHANGE UP (ref 0–0.2)
BASOPHILS NFR BLD AUTO: 1.4 % — SIGNIFICANT CHANGE UP (ref 0–2)
BILIRUB SERPL-MCNC: 0.6 MG/DL — SIGNIFICANT CHANGE UP (ref 0.4–2)
BUN SERPL-MCNC: 19.2 MG/DL — SIGNIFICANT CHANGE UP (ref 8–20)
CALCIUM SERPL-MCNC: 9.6 MG/DL — SIGNIFICANT CHANGE UP (ref 8.6–10.2)
CHLORIDE SERPL-SCNC: 101 MMOL/L — SIGNIFICANT CHANGE UP (ref 98–107)
CO2 SERPL-SCNC: 19 MMOL/L — LOW (ref 22–29)
CREAT SERPL-MCNC: 0.82 MG/DL — SIGNIFICANT CHANGE UP (ref 0.5–1.3)
EOSINOPHIL # BLD AUTO: 0.35 K/UL — SIGNIFICANT CHANGE UP (ref 0–0.5)
EOSINOPHIL NFR BLD AUTO: 4.2 % — SIGNIFICANT CHANGE UP (ref 0–6)
GLUCOSE BLDC GLUCOMTR-MCNC: 80 MG/DL — SIGNIFICANT CHANGE UP (ref 70–99)
GLUCOSE BLDC GLUCOMTR-MCNC: 83 MG/DL — SIGNIFICANT CHANGE UP (ref 70–99)
GLUCOSE SERPL-MCNC: 92 MG/DL — SIGNIFICANT CHANGE UP (ref 70–99)
HCT VFR BLD CALC: 43.8 % — SIGNIFICANT CHANGE UP (ref 39–50)
HGB BLD-MCNC: 14.8 G/DL — SIGNIFICANT CHANGE UP (ref 13–17)
IMM GRANULOCYTES NFR BLD AUTO: 0.5 % — SIGNIFICANT CHANGE UP (ref 0–1.5)
LYMPHOCYTES # BLD AUTO: 1.88 K/UL — SIGNIFICANT CHANGE UP (ref 1–3.3)
LYMPHOCYTES # BLD AUTO: 22.4 % — SIGNIFICANT CHANGE UP (ref 13–44)
MAGNESIUM SERPL-MCNC: 2.2 MG/DL — SIGNIFICANT CHANGE UP (ref 1.6–2.6)
MCHC RBC-ENTMCNC: 30.5 PG — SIGNIFICANT CHANGE UP (ref 27–34)
MCHC RBC-ENTMCNC: 33.8 GM/DL — SIGNIFICANT CHANGE UP (ref 32–36)
MCV RBC AUTO: 90.1 FL — SIGNIFICANT CHANGE UP (ref 80–100)
MONOCYTES # BLD AUTO: 0.81 K/UL — SIGNIFICANT CHANGE UP (ref 0–0.9)
MONOCYTES NFR BLD AUTO: 9.6 % — SIGNIFICANT CHANGE UP (ref 2–14)
NEUTROPHILS # BLD AUTO: 5.21 K/UL — SIGNIFICANT CHANGE UP (ref 1.8–7.4)
NEUTROPHILS NFR BLD AUTO: 61.9 % — SIGNIFICANT CHANGE UP (ref 43–77)
PLATELET # BLD AUTO: 850 K/UL — HIGH (ref 150–400)
POTASSIUM SERPL-MCNC: 4.2 MMOL/L — SIGNIFICANT CHANGE UP (ref 3.5–5.3)
POTASSIUM SERPL-SCNC: 4.2 MMOL/L — SIGNIFICANT CHANGE UP (ref 3.5–5.3)
PROT SERPL-MCNC: 7.9 G/DL — SIGNIFICANT CHANGE UP (ref 6.6–8.7)
RBC # BLD: 4.86 M/UL — SIGNIFICANT CHANGE UP (ref 4.2–5.8)
RBC # FLD: 12.7 % — SIGNIFICANT CHANGE UP (ref 10.3–14.5)
SODIUM SERPL-SCNC: 135 MMOL/L — SIGNIFICANT CHANGE UP (ref 135–145)
WBC # BLD: 8.41 K/UL — SIGNIFICANT CHANGE UP (ref 3.8–10.5)
WBC # FLD AUTO: 8.41 K/UL — SIGNIFICANT CHANGE UP (ref 3.8–10.5)

## 2021-09-07 PROCEDURE — 99232 SBSQ HOSP IP/OBS MODERATE 35: CPT

## 2021-09-07 RX ORDER — DIVALPROEX SODIUM 500 MG/1
250 TABLET, DELAYED RELEASE ORAL
Refills: 0 | Status: DISCONTINUED | OUTPATIENT
Start: 2021-09-07 | End: 2021-09-13

## 2021-09-07 RX ORDER — DIVALPROEX SODIUM 500 MG/1
500 TABLET, DELAYED RELEASE ORAL
Refills: 0 | Status: DISCONTINUED | OUTPATIENT
Start: 2021-09-07 | End: 2021-09-21

## 2021-09-07 RX ORDER — ASPIRIN/CALCIUM CARB/MAGNESIUM 324 MG
81 TABLET ORAL DAILY
Refills: 0 | Status: DISCONTINUED | OUTPATIENT
Start: 2021-09-07 | End: 2021-09-23

## 2021-09-07 RX ORDER — DOXAZOSIN MESYLATE 4 MG
2 TABLET ORAL AT BEDTIME
Refills: 0 | Status: DISCONTINUED | OUTPATIENT
Start: 2021-09-07 | End: 2021-09-09

## 2021-09-07 RX ORDER — LANOLIN ALCOHOL/MO/W.PET/CERES
5 CREAM (GRAM) TOPICAL AT BEDTIME
Refills: 0 | Status: DISCONTINUED | OUTPATIENT
Start: 2021-09-07 | End: 2021-09-23

## 2021-09-07 RX ADMIN — HEPARIN SODIUM 5000 UNIT(S): 5000 INJECTION INTRAVENOUS; SUBCUTANEOUS at 21:10

## 2021-09-07 RX ADMIN — HEPARIN SODIUM 5000 UNIT(S): 5000 INJECTION INTRAVENOUS; SUBCUTANEOUS at 05:56

## 2021-09-07 RX ADMIN — CHLORHEXIDINE GLUCONATE 1 APPLICATION(S): 213 SOLUTION TOPICAL at 05:56

## 2021-09-07 RX ADMIN — Medication 5 MILLIGRAM(S): at 21:10

## 2021-09-07 RX ADMIN — Medication 0.5 MILLIGRAM(S): at 08:38

## 2021-09-07 RX ADMIN — DIVALPROEX SODIUM 250 MILLIGRAM(S): 500 TABLET, DELAYED RELEASE ORAL at 12:19

## 2021-09-07 RX ADMIN — Medication 1 MILLIGRAM(S): at 02:47

## 2021-09-07 RX ADMIN — Medication 2 MILLIGRAM(S): at 21:10

## 2021-09-07 RX ADMIN — Medication 30 MILLIGRAM(S): at 05:56

## 2021-09-07 RX ADMIN — DIVALPROEX SODIUM 500 MILLIGRAM(S): 500 TABLET, DELAYED RELEASE ORAL at 20:58

## 2021-09-07 RX ADMIN — Medication 81 MILLIGRAM(S): at 12:19

## 2021-09-07 RX ADMIN — DEXTROAMPHETAMINE SACCHARATE, AMPHETAMINE ASPARTATE, DEXTROAMPHETAMINE SULFATE AND AMPHETAMINE SULFATE 15 MILLIGRAM(S): 1.875; 1.875; 1.875; 1.875 TABLET ORAL at 05:56

## 2021-09-07 RX ADMIN — HEPARIN SODIUM 5000 UNIT(S): 5000 INJECTION INTRAVENOUS; SUBCUTANEOUS at 12:20

## 2021-09-07 NOTE — PROGRESS NOTE ADULT - SUBJECTIVE AND OBJECTIVE BOX
[facility]                                        Neurology at [place]                                 Laura Lawrence & Jalen                                  [address]                                        [place], [place], [address]                                             [phone]        CC: Anoxic encephalopathy.    HPI:   The patient is a 46y Male who presented to the ER after being found unresponsive in bed by family. He was brought to the ER. He was noted to be hypoxic with agonal respirations and was intubated and admitted to ICU on a mechanical ventilator.   He was noted to have some twitching type movement of the extremities for which the Epilepsy service was consulted.   He has a history of substance abuse.     Interim history:  Remains on [address] [place].     ROS:   Denies headache or dizziness.  Denies chest pain.  Denies shortness of breath.    MEDICATIONS  (STANDING):  amphetamine/dextroamphetamine 15 milliGRAM(s) Oral daily  aspirin  chewable 81 milliGRAM(s) Oral daily  buDESOnide    Inhalation Suspension 0.5 milliGRAM(s) Inhalation every 12 hours  diVALproex  milliGRAM(s) Oral <User Schedule>  diVALproex  milliGRAM(s) Oral <User Schedule>  doxazosin 2 milliGRAM(s) Oral at bedtime  heparin   Injectable 5000 Unit(s) SubCutaneous every 8 hours  melatonin 5 milliGRAM(s) Oral at bedtime  thyroid 30 milliGRAM(s) Oral daily      Vital Signs Last 24 Hrs  T(C): 36.7 (07 Sep 2021 10:00), Max: 36.7 (07 Sep 2021 10:00)  T(F): 98 (07 Sep 2021 10:00), Max: 98 (07 Sep 2021 10:00)  HR: 100 (07 Sep 2021 10:00) (88 - 114)  BP: 120/60 (07 Sep 2021 10:00) (120/60 - 121/82)  RR: 16 (07 Sep 2021 10:00) (16 - 17)  SpO2: 98% (07 Sep 2021 10:00) (96% - 98%)    Detailed Neurologic Exam:    Mental status: The patient is awake. He gives name and answers some simple questions. He follows simple instructions.    Cranial nerves: Pupils equal and react symmetrically to light. He blinks to threat bilaterally. Extraocular motion is full with no nystagmus. Facial sensation is intact. Facial musculature is symmetric. Palate elevates symmetrically. Tongue is midline.    Motor: There is normal bulk and tone.  There is no tremor.  Strength grossly symmetric but diffusely weak.    Sensation: Grossly intact to light touch and pin.    Reflexes: Trace throughout and plantar responses are flexor.    Cerebellar: Cannot be assessed.       Labs:     09-07    135  |  101  |  19.2  ----------------------------<  92  4.2   |  19.0<L>  |  0.82    Ca    9.6      07 Sep 2021 07:14  Mg     2.2     09-07    TPro  7.9  /  Alb  3.1<L>  /  TBili  0.6  /  DBili  x   /  AST  25  /  ALT  27  /  AlkPhos  75  09-07                            14.8   8.41  )-----------( 850      ( 07 Sep 2021 07:14 )             43.8

## 2021-09-07 NOTE — PROGRESS NOTE ADULT - SUBJECTIVE AND OBJECTIVE BOX
YESI MIRZA    [id number]    46y      Male    INTERVAL HPI/OVERNIGHT EVENTS: patient being seen for anoxic brain injury. Patient seen by speech today and diet advanced    patient improving slolwy     REVIEW OF SYSTEMS:    CONSTITUTIONAL: No fever, weight loss, or fatigue  RESPIRATORY: No cough, wheezing, hemoptysis; No shortness of breath  CARDIOVASCULAR: No chest pain, palpitations  GASTROINTESTINAL: No abdominal or epigastric pain. No nausea, vomiting  NEUROLOGICAL: No headaches, memory loss, loss of strength.  MISCELLANEOUS:      Vital Signs Last 24 Hrs  T(C): 36.7 (07 Sep 2021 10:00), Max: 36.7 (07 Sep 2021 10:00)  T(F): 98 (07 Sep 2021 10:00), Max: 98 (07 Sep 2021 10:00)  HR: 100 (07 Sep 2021 10:00) (88 - 114)  BP: 120/60 (07 Sep 2021 10:00) (120/60 - 121/82)  BP(mean): --  RR: 16 (07 Sep 2021 10:00) (16 - 17)  SpO2: 98% (07 Sep 2021 10:00) (96% - 98%)    PHYSICAL EXAM:    CONSTITUTIONAL: NAD,   HEENT: NC/AT,  RESPIRATORY: improved  CARDIOVASCULAR: RRR, S1/S2+, no m  ABDOMEN: Nontender to palpation, normoactive bowel sounds,  MUSCULOSKELETAL: No edema, cyanosis or deformities.  NEUROLOGY: awake. more alert, restraitns  SKIN: No rashes; no palpable lesions  VASC: Distal pulses palpable      LABS:                        14.8   8.41  )-----------( 850      ( 07 Sep 2021 07:14 )             43.8     09-07    135  |  101  |  19.2  ----------------------------<  92  4.2   |  19.0<L>  |  0.82    Ca    9.6      07 Sep 2021 07:14  Mg     2.2     09-07    TPro  7.9  /  Alb  3.1<L>  /  TBili  0.6  /  DBili  x   /  AST  25  /  ALT  27  /  AlkPhos  75  09-07        MEDICATIONS  (STANDING):  amphetamine/dextroamphetamine 15 milliGRAM(s) Oral daily  aspirin  chewable 81 milliGRAM(s) Oral daily  buDESOnide    Inhalation Suspension 0.5 milliGRAM(s) Inhalation every 12 hours  diVALproex  milliGRAM(s) Oral <User Schedule>  diVALproex  milliGRAM(s) Oral <User Schedule>  doxazosin 2 milliGRAM(s) Oral at bedtime  heparin   Injectable 5000 Unit(s) SubCutaneous every 8 hours  melatonin 5 milliGRAM(s) Oral at bedtime  thyroid 30 milliGRAM(s) Oral daily    MEDICATIONS  (PRN):  ALBUTerol    90 MICROgram(s) HFA Inhaler 2 Puff(s) Inhalation every 6 hours PRN Shortness of Breath  LORazepam   Injectable 1 milliGRAM(s) IV Push every 3 hours PRN Agitation      RADIOLOGY & ADDITIONAL TESTS:

## 2021-09-07 NOTE — PROGRESS NOTE ADULT - SUBJECTIVE AND OBJECTIVE BOX
[facility]  INFECTIOUS DISEASES  at [place]  =======================================================  Jaime Gudino MD  Diplomates American Board of Internal Medicine and Infectious Diseases  Tel  [phone]  Fax [phone]  =======================================================    [facility]-[id number]  YESI MIRZA  follow up:  fevers - resolved    no new issues  awake; conversant now and follows some commands.       I have personally reviewed the labs and data; pertinent labs and data are listed in this note; please see below.   =======================================================  Past Medical & Surgical Hx:  =====================  PAST MEDICAL & SURGICAL HISTORY:  No pertinent past medical history  Asthma  HTN (hypertension)  DM (diabetes mellitus)  S/P elbow joint replacement  right elbow surgery.  S/P knee surgery right thigh surgery.      Problem List:  ==========  HEALTH ISSUES - PROBLEM Dx:  Acute hypoxemic respiratory failure  Drug overdose  HTN (hypertension)  Asthma  DM (diabetes mellitus)  Anoxic brain injury  CHF (congestive heart failure)  Pneumonia, aspiration      Social Hx:  =======  no toxic habits currently    FAMILY HISTORY:  no significant family history of immunosuppressive disorders in mother or father   =======================================================    REVIEW OF SYSTEMS:  CONSTITUTIONAL:  No Fever or chills.  REPORTS FEELING HOT  HEENT:  No diplopia or blurred vision.  No earache, sore throat or runny nose.  CARDIOVASCULAR:  No pressure, squeezing, strangling, tightness, heaviness or aching about the chest, neck, axilla or epigastrium.  RESPIRATORY:  No cough, shortness of breath  GASTROINTESTINAL:  No nausea, vomiting or diarrhea.  GENITOURINARY:  No dysuria, frequency or urgency. No Blood in urine  MUSCULOSKELETAL:  no joint aches, no muscle pain  SKIN:  No change in skin, hair or nails.  NEUROLOGIC:  No Headaches, seizures or weakness.  PSYCHIATRIC:  No disorder of thought or mood.  ENDOCRINE:  No heat or cold intolerance  HEMATOLOGICAL:  No easy bruising or bleeding.    =======================================================  Allergies  Allergy Status Unknown  No Known Allergies     ======================================================  Physical Exam:  ============     General:  No acute distress.  awake  Eye: Pupils are equal, round and reactive to light, Extraocular movements are intact, Normal conjunctiva.  HENT: Normocephalic,    Neck: Supple, No lymphadenopathy.  Respiratory: Lungs with fair right breath sounds  Cardiovascular: Normal rate, Regular rhythm,   Gastrointestinal: Soft,  Non-distended, Normal bowel sounds.  Genitourinary: No costovertebral angle tenderness.  + CONDOM CATHETER with dark urine  Lymphatics: No lymphadenopathy neck,   Musculoskeletal: Normal range of motion, Normal strength.  Integumentary: No rash.  Neurologic: awake, able to answer questions, follow some commands    =======================================================  Vitals:  ============  T(F): 98 (07 Sep 2021 10:00), Max: 98 (07 Sep 2021 10:00)  HR: 100 (07 Sep 2021 10:00)  BP: 120/60 (07 Sep 2021 10:00)  RR: 16 (07 Sep 2021 10:00)  SpO2: 98% (07 Sep 2021 10:00) (96% - 98%)  temp max in last 48H T(F): , Max: 100.2 (09-05-21 @ 20:30)    =======================================================  Current Antibiotics:    Other medications:  amphetamine/dextroamphetamine 15 milliGRAM(s) Oral daily  aspirin  chewable 81 milliGRAM(s) Oral daily  diVALproex  milliGRAM(s) Oral <User Schedule>  diVALproex  milliGRAM(s) Oral <User Schedule>  doxazosin 2 milliGRAM(s) Oral at bedtime  heparin   Injectable 5000 Unit(s) SubCutaneous every 8 hours  melatonin 5 milliGRAM(s) Oral at bedtime  thyroid 30 milliGRAM(s) Oral daily      =======================================================  Labs:                        14.8   8.41  )-----------( 850      ( 07 Sep 2021 07:14 )             43.8     09-07    135  |  101  |  19.2  ----------------------------<  92  4.2   |  19.0<L>  |  0.82    Ca    9.6      07 Sep 2021 07:14  Mg     2.2     09-07    TPro  7.9  /  Alb  3.1<L>  /  TBili  0.6  /  DBili  x   /  AST  25  /  ALT  27  /  AlkPhos  75  09-07      Culture - Blood (collected 09-03-21 @ 17:29)  Source: .Blood Blood-Peripheral    Culture - Blood (collected 09-03-21 @ 17:28)  Source: .Blood Blood-Peripheral    Culture - Sputum (collected 09-01-21 @ 03:59)  Source: .Sputum Sputum  Gram Stain (09-01-21 @ 07:44):    Few polymorphonuclear leukocytes per low power field    Moderate Squamous epithelial cells per low power field    Few Gram positive cocci in pairs seen per oil power field  Final Report (09-03-21 @ 11:26):    Normal Respiratory Le present    Culture - Blood (collected 08-29-21 @ 10:59)  Source: .Blood Blood  Final Report (09-03-21 @ 11:00):    No growth at 5 days.    Culture - Blood (collected 08-28-21 @ 05:42)  Source: .Blood Blood-Peripheral  Final Report (09-02-21 @ 07:00):    No growth at 5 days.    Culture - Sputum (collected 08-27-21 @ 22:07)  Source: .Sputum Sputum  Gram Stain (08-27-21 @ 23:51):    Moderate polymorphonuclear leukocytes per low power field    No Squamous epithelial cells per low power field    Few Gram Positive Rods per oil power field    Rare Gram positive cocci in pairs per oil power field  Final Report (08-29-21 @ 18:08):    Normal Respiratory Le present           Procalcitonin, Serum: 0.14 ng/mL (09-04-21 @ 06:31)  Procalcitonin, Serum: 0.19 ng/mL (09-02-21 @ 08:54)  Procalcitonin, Serum: 0.24 ng/mL (09-01-21 @ 04:38)    SARS-CoV-2: NotDetec (08-21-21 @ 21:01)  Rapid RVP Result: NotDetec (08-21-21 @ 21:01)    COVID-19 PCR: NotDetec (09-03-21 @ 06:58)  COVID-19 PCR: NotDetec (08-28-21 @ 03:51)        < from: CT Head No Cont (09.03.21 @ 12:06) >     EXAM:  CT CERVICAL SPINE                         EXAM:  CT BRAIN                          PROCEDURE DATE:  09/03/2021          INTERPRETATION:  CLINICAL Indications:  fall out of bed    COMPARISON: CT head dated 8/23/2021. MRI brain dated 8/26/2020    TECHNIQUE: Noncontrast CT of the head. Multiplanar reformations are submitted.    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 No acute intracranial hemorrhage.  There is no compelling evidence for an acute transcortical infarction. There is no evidence of mass, mass effect, midline shift or extra-axial fluid collection. The lateral ventricles and cortical sulci are age-appropriate in size and configuration. Right-sided NG tube. Mild inflammatory mucosal changes are seen throughout the various portions of the paranasal sinuses. The orbits and mastoid air cells are unremarkable. The calvarium is intact. Consider follow-up CT or MRI as clinically warranted.          ============================    CLINICAL INDICATIONS: fall out of bed    COMPARISON: None.    TECHNIQUE: Noncontrast CT of the cervical spine. Multiple contiguous axial images through the cervical spine as well as multiplanar reformatted images are submitted for interpretation without the administration of intravenous contrast. 3-D images.    FINDINGS:  There is no evidence for acute fracture. Moderate leftward curvature to the mid cervical spine likely on the basis of patient positioning. A normal lordosis is noted. Craniocervical junction is normal. The cervicovertebral body heights and intervertebral disc spaces are preserved. There is no prevertebral soft tissue abnormality. The odontoid process is intact. The spinal canal and foramen are widely patent. There is no evidence of significant disc herniation. Thyroid gland is markedly. The airway is patent. The upper lung apices are unremarkable.    IMPRESSION:    CT head: . No acute intracranial hemorrhage.    CT C-spine: No acute cervical spine fracture. Consider MRI as clinically warranted.    --- End of Report ---            LUIGI JOHN MD; Attending Radiologist  This document has been electronically signed. Sep  3 2021 12:35PM    < end of copied text >

## 2021-09-07 NOTE — CHART NOTE - NSCHARTNOTEFT_GEN_A_CORE
Source: Patient [ ]  Family [ ]   other [x ] EMR and staff     Current Diet: Diet, Dysphagia 2 Mechanical Soft-Honey Consistency Fluid (09-07-21 @ 10:12)    PO intake:  < 50% [ ]   50-75%  [x ]   %  [ ]  other :    Source for PO intake [ ] Patient [ ] family [x ] chart [ ] staff [ ] other    Current Weight:   (9/7) 185.2 lbs   (9/2) 141 lbs  (9/1) 209.6 lbs  (8/30) 233.4 lbs  (8/22) 205.2 lbs  ? accuracy of weights, noted with 2+ generalized edema, continue to trend and maintain strict Is&Os      Pertinent Medications: MEDICATIONS  (STANDING):  amphetamine/dextroamphetamine 15 milliGRAM(s) Oral daily  aspirin  chewable 81 milliGRAM(s) Oral daily  buDESOnide    Inhalation Suspension 0.5 milliGRAM(s) Inhalation every 12 hours  diVALproex  milliGRAM(s) Oral <User Schedule>  diVALproex  milliGRAM(s) Oral <User Schedule>  doxazosin 2 milliGRAM(s) Oral at bedtime  heparin   Injectable 5000 Unit(s) SubCutaneous every 8 hours  melatonin 5 milliGRAM(s) Oral at bedtime  thyroid 30 milliGRAM(s) Oral daily    MEDICATIONS  (PRN):  ALBUTerol    90 MICROgram(s) HFA Inhaler 2 Puff(s) Inhalation every 6 hours PRN Shortness of Breath  LORazepam   Injectable 1 milliGRAM(s) IV Push every 3 hours PRN Agitation    Pertinent Labs: CBC Full  -  ( 07 Sep 2021 07:14 )  WBC Count : 8.41 K/uL  RBC Count : 4.86 M/uL  Hemoglobin : 14.8 g/dL  Hematocrit : 43.8 %  Platelet Count - Automated : 850 K/uL  Mean Cell Volume : 90.1 fl  Mean Cell Hemoglobin : 30.5 pg  Mean Cell Hemoglobin Concentration : 33.8 gm/dL  Auto Neutrophil # : 5.21 K/uL  Auto Lymphocyte # : 1.88 K/uL  Auto Monocyte # : 0.81 K/uL  Auto Eosinophil # : 0.35 K/uL  Auto Basophil # : 0.12 K/uL  Auto Neutrophil % : 61.9 %  Auto Lymphocyte % : 22.4 %  Auto Monocyte % : 9.6 %  Auto Eosinophil % : 4.2 %  Auto Basophil % : 1.4 %        Skin: Suspected DTI to right heel per documentation    Nutrition focused physical exam conducted - found signs of malnutrition [ ]absent [x ]present    Subcutaneous fat loss: Mild [x ] Orbital fat pads region, [x ]Buccal fat region, [ ]Triceps region,  [x ]Ribs region    Muscle wasting: Mild [x ]Temples region, [x ]Clavicle region, [ x]Shoulder region, [ ]Scapula region, [ ]Interosseous region,  [ ]thigh region, [ ]Calf region\      Brief Hospital Course:   Patient is a 47 yo F w/ PMH of DM2, HTN, Hyothyroid, Asthma, ADHD who presented after being found unresponsive. Admitted to MICU for possible benzo/cocaine overdose and aspiration PNA. Patient was intubated and started on vasopressors. Patient with anoxic brain injury, being followed by Neurology. Patient on IV antibiotics for presumed aspiration PNA. ECHO was done and significant for LVEF of <20. Per Cardiology, patient to have ischemic evaluation if meaningful neurological recovery. Patient also noted to have rhabdo/GARRETT which has now normalized. Trach was tentatively scheduled for 9/2. Patient with improvement in mental status and was extubated on 8/31. Patient was transitioned from HFNC. Patient downgraded to medicine. Patient pulled out ng tube and restraints placed. on 9/3 patient found on floor and was unwitnessed . CT head and c spine were negative and placed on 1:1. Patient slowly improving and ng tube removed on (9/5)  and started on PO diet.        Estimated Needs:   [x ] no change since previous assessment  [ ] recalculated:     Current Nutrition Diagnosis:  Pt remains at high nutrition risk secondary to Moderate (acute) protein calorie malnutrition related to inability to meet sufficient protein energy requirements in setting of possible benzo/cocaine overdose and aspiration PNA, Anoxic brain injury; recent extubation and dysphagia as evidenced by meeting less then 75% estimated energy requirements > 7 days, and physical signs of mild muscle mass and fat loss. Per nursing staff; pt requiring total assistance with meals, consuming 50-60% at meals noted. Recommendations below:     Recommendations:   1. RX: MVI and Vit. C daily (500mg).   2. Check weight daily to monitor trends   3. Total assistance with meals.   4. Ensure Enlive TID (Honey thick TID)   5. Diet consistency per SLP Recommendations      Monitoring and Evaluation:   [x ] PO intake [x ] Tolerance to diet prescription [X] Weights  [X] Follow up per protocol [X] Labs:

## 2021-09-07 NOTE — PROGRESS NOTE ADULT - ASSESSMENT
Patient is a 45 yo F w/ PMH of DM2, HTN, Hyothyroid, Asthma, ADHD who presented after being found unresponsive. Admitted to MICU for possible benzo/cocaine overdose and aspiration PNA. Patient was intubated and started on vasopressors. Patient with anoxic brain injury, being followed by Neurology. Patient on IV antibiotics for presumed aspiration PNA. ECHO was done and significant for LVEF of <20. Per Cardiology, patient to have ischemic evaluation if meaningful neurological recovery. Patient also noted to have rhabdo/GARRETT which has now normalized. Trach was tentatively scheduled for 9/2. Patient with improvement in mental status and was extubated on 8/31. Patient was transitioned from HFNC. Patient downgraded to medicine. Patient puleld out ng tube and restraints placed. on 9/3 patient found on floor and was unwitnessed . CT head and c spine were negative and placed on 1:1. Patient slowly improving and ng tube removed on 9.5 and started on a diet     #Anoxic brain injury 2/2 benzodiazapine overdose  - Utox positive for benzos and cocaine  - MRI brain: significant hypoxic/anoxic injury and strokes   - EEG: Moderate to severe nonspecific diffuse or multifocal cerebral dysfunction. No indication for anti-epileptic medication according to Epilepsy.  - Neuro following  - Dysphagia due to anoxic brain injury.  - Psych consult , will place for tomorrow   PMR consult appreciated, start melatonin and depakote    #fall - ct head and ct c spine negative 9/3  c.w restraints  pt consult  narciso needs rehab on dc    #Acute hypoxemic respiratory failure 2/2 aspiration PNA  Sepsis 2/2 PNA  - S/p Vancomycin. Continue Merrem  [facility]    #Elevated troponin, new onset systolic CHF  - ECHO reviewed, LVEF of < 20%. severe AS  - Cardiology consult appreciated  -  low dose Coreg  - C/w ASA  - Statin held because of transaminitis,  - Ischemic evaluation once meaningful neurological recovery  may consult cardio lather this week   tte shows improvement in ef    #GARRETT w/ rhabdomyolysis  - Seen by Nephrology  - Renal function normalized  - Monitor BMP, correct electrolytes as needed    #Hypothyroid  - C/w [place]    #ADHD  - C/w Adderall    #Acute urinary rentention   - parikh discontinued and urinating well     Advanced Directives: Full code, Palliative following.  c.w current care

## 2021-09-07 NOTE — PROGRESS NOTE ADULT - ASSESSMENT
46y Male who is followed by neurology because of anoxic/hypoxic brain injury    Hypoxic-ischemic encephalopathy  He is status post likely drug overdose with benzodiazepine and cocaine.  His MRI brain appearance shows evidence of significant hypoxic/anoxic injury and strokes.  Overall, exam is improving slowly.     Seizure-like activity  Hyperkinetic movements captured on c-EEG without ictal correlate on EEG.   No epileptiform discharges or seizure seen.    Dysphagia.   Tolerating some PO.

## 2021-09-07 NOTE — PROGRESS NOTE ADULT - ASSESSMENT
This 45 y/o M (real name: Ildefonso Pierre, : [birth year]) with a h/o HTN, DM, asthma, presents to the ED after being found unresponsive in bed by his sister this evening. Noted to have agonal respirations and emesis coming from mouth. He was found to be hypoxemic in the ED prompting emergent endotracheal intubation and appears to have an extensive right sided pneumonia on CXR. The patient's sister, Adela, reports that he had recently broken up with his girlfriend and has been having a hard time coping with it and has been noticeably depressed. Within the past 5 days he was taken to the ED at [facility] for some unclear intoxication. Adela also reports recently noticing a plethora of newly prescribed medications at his house and was able to identify clonazepam and alprazolam. Tonight he had told her that he was going out with friends and asked if she would come over and watch his dog, which she thought seemed unusual. Urine toxicology (+) for benzos and cocaine. CT head reveals symmetric low attenuation lesions in the bilateral globus pallidus regions of the basal ganglia, which can be seen with hypoxic ischemic encephalopathy or related to carbon monoxide positioning, as well as indeterminate areas of low attenuation in the bilateral cerebellum may reflect infarcts. He is sedated and now with progressive hypotension despite 3L IVF bolus. Started on IV vasopressor therapy. (21 Aug 2021 21:54)     Patient with anoxic brain injury, attributed to benzo / cocaine overdose,, being followed by Neurology. Patient on IV antibiotics for presumed aspiration PNA. ECHO was done and significant for LVEF of <20. Per Cardiology, patient to have ischemic evaluation if meaningful neurological recovery. Patient also noted to have rhabdo/GARRETT which has now normalized. Trach was tentatively scheduled for . Patient with improvement in mental status and was extubated on . Patient was transitioned from HFNC and now on 5L NC. Patient was downgraded to Medicine service on 2021.    he is being treated for Acute hypoxemic respiratory failure 2/2 aspiration PNA, with a course antibiotics:  Vanco , , -   Zosyn  -     last febrile  at 100.8.  This AM, he still has some low grade temp of 100.   ID was consulted for this.   He provides no history, but can vocalize some words.       Impression:  - anoxic brain injury  - drug overdose  - aspiration PNA  - fevers  - WBC elevation        Plan:  - he has been on Zosyn, vanco, then merrem since ; - EXTENEDED a few times  - CXR shows improvement on infiltrates   - stable   now off MERREM on 21   ALL CULTURES NEGATIVE  NO CLEAR SOURCE OF INFECTION     procalcitonin is coming down    WBC elevation is reactive; NORMALIZED  WBC Count: 9.94 K/uL (21 @ 08:05)  WBC Count: 12.28 K/uL (21 @ 06:33)  WBC Count: 16.93 K/uL (21 @ 17:28)  WBC Count: 12.93 K/uL (21 @ 04:07)  - will follow and trend          CONTINUE to watch.       No further specific infectious disease recommendations. Will be available as needed.

## 2021-09-08 LAB
CULTURE RESULTS: SIGNIFICANT CHANGE UP
CULTURE RESULTS: SIGNIFICANT CHANGE UP
SPECIMEN SOURCE: SIGNIFICANT CHANGE UP
SPECIMEN SOURCE: SIGNIFICANT CHANGE UP

## 2021-09-08 PROCEDURE — 99232 SBSQ HOSP IP/OBS MODERATE 35: CPT

## 2021-09-08 PROCEDURE — 99223 1ST HOSP IP/OBS HIGH 75: CPT

## 2021-09-08 RX ADMIN — DIVALPROEX SODIUM 250 MILLIGRAM(S): 500 TABLET, DELAYED RELEASE ORAL at 11:56

## 2021-09-08 RX ADMIN — DIVALPROEX SODIUM 500 MILLIGRAM(S): 500 TABLET, DELAYED RELEASE ORAL at 21:55

## 2021-09-08 RX ADMIN — DIVALPROEX SODIUM 250 MILLIGRAM(S): 500 TABLET, DELAYED RELEASE ORAL at 08:34

## 2021-09-08 RX ADMIN — HEPARIN SODIUM 5000 UNIT(S): 5000 INJECTION INTRAVENOUS; SUBCUTANEOUS at 06:52

## 2021-09-08 RX ADMIN — Medication 30 MILLIGRAM(S): at 06:53

## 2021-09-08 RX ADMIN — Medication 5 MILLIGRAM(S): at 21:55

## 2021-09-08 RX ADMIN — HEPARIN SODIUM 5000 UNIT(S): 5000 INJECTION INTRAVENOUS; SUBCUTANEOUS at 14:08

## 2021-09-08 RX ADMIN — DEXTROAMPHETAMINE SACCHARATE, AMPHETAMINE ASPARTATE, DEXTROAMPHETAMINE SULFATE AND AMPHETAMINE SULFATE 15 MILLIGRAM(S): 1.875; 1.875; 1.875; 1.875 TABLET ORAL at 06:53

## 2021-09-08 RX ADMIN — Medication 2 MILLIGRAM(S): at 21:55

## 2021-09-08 RX ADMIN — HEPARIN SODIUM 5000 UNIT(S): 5000 INJECTION INTRAVENOUS; SUBCUTANEOUS at 21:55

## 2021-09-08 RX ADMIN — Medication 81 MILLIGRAM(S): at 08:34

## 2021-09-08 NOTE — BH CONSULTATION LIAISON ASSESSMENT NOTE - NSBHCHARTREVIEWVS_PSY_A_CORE FT
Vital Signs Last 24 Hrs  T(C): 36.6 (08 Sep 2021 10:00), Max: 36.9 (08 Sep 2021 06:00)  T(F): 97.8 (08 Sep 2021 10:00), Max: 98.4 (08 Sep 2021 06:00)  HR: 86 (08 Sep 2021 10:00) (86 - 116)  BP: 106/72 (08 Sep 2021 10:00) (104/73 - 114/83)  BP(mean): --  RR: 17 (08 Sep 2021 10:00) (16 - 17)  SpO2: 96% (08 Sep 2021 10:00) (95% - 96%)

## 2021-09-08 NOTE — PROGRESS NOTE ADULT - SUBJECTIVE AND OBJECTIVE BOX
[facility]                                        Neurology at [place]                                 Laura Lawrence & Jalen                                  [address]                                        [place], [place], [address]                                             [phone]        CC: Anoxic encephalopathy.    HPI:   The patient is a 46y Male who presented to the ER after being found unresponsive in bed by family. He was brought to the ER. He was noted to be hypoxic with agonal respirations and was intubated and admitted to ICU on a mechanical ventilator.   He was noted to have some twitching type movement of the extremities for which the Epilepsy service was consulted.   He has a history of substance abuse.     Interim history: awake alert, a bit slow to answer    ROS:   Denies headache or dizziness.  Denies chest pain.  Denies shortness of breath.    MEDICATIONS  (STANDING):  aspirin  chewable 81 milliGRAM(s) Oral daily  diVALproex  milliGRAM(s) Oral <User Schedule>  diVALproex  milliGRAM(s) Oral <User Schedule>  doxazosin 2 milliGRAM(s) Oral at bedtime  heparin   Injectable 5000 Unit(s) SubCutaneous every 8 hours  melatonin 5 milliGRAM(s) Oral at bedtime  thyroid 30 milliGRAM(s) Oral daily    MEDICATIONS  (PRN):  ALBUTerol    90 MICROgram(s) HFA Inhaler 2 Puff(s) Inhalation every 6 hours PRN Shortness of Breath      Vital Signs Last 24 Hrs  T(C): 36.6 (08 Sep 2021 10:00), Max: 36.9 (07 Sep 2021 15:55)  T(F): 97.8 (08 Sep 2021 10:00), Max: 98.5 (07 Sep 2021 15:55)  HR: 86 (08 Sep 2021 10:00) (86 - 116)  BP: 106/72 (08 Sep 2021 10:00) (104/73 - 114/83)  BP(mean): --  RR: 17 (08 Sep 2021 10:00) (16 - 17)  SpO2: 96% (08 Sep 2021 10:00) (95% - 96%)    Detailed Neurologic Exam:    Mental status: The patient is awake. He is oriented to self, hospital and 2021 He gives name and answers simple questions. He follows simple instructions. (+) bradyphrenia    Cranial nerves: Pupils equal and react symmetrically to light. He blinks to threat bilaterally. Extraocular motion is full with no nystagmus. Facial sensation is intact. Facial musculature is symmetric. Palate elevates symmetrically. Tongue is midline.    Motor: There is normal bulk and tone.  There is no tremor.  Strength grossly symmetric  with mild diffuse weakness    Sensation: Grossly intact to light touch and pin.    Reflexes: diminished throughout and plantar responses are flexor.    Cerebellar: Cannot be assessed.       Labs:                           14.8   8.41  )-----------( 850      ( 07 Sep 2021 07:14 )             43.8     09-07    135  |  101  |  19.2  ----------------------------<  92  4.2   |  19.0<L>  |  0.82    Ca    9.6      07 Sep 2021 07:14  Mg     2.2     09-07    TPro  7.9  /  Alb  3.1<L>  /  TBili  0.6  /  DBili  x   /  AST  25  /  ALT  27  /  AlkPhos  75  09-07    LIVER FUNCTIONS - ( 07 Sep 2021 07:14 )  Alb: 3.1 g/dL / Pro: 7.9 g/dL / ALK PHOS: 75 U/L / ALT: 27 U/L / AST: 25 U/L / GGT: x

## 2021-09-08 NOTE — PROGRESS NOTE ADULT - SUBJECTIVE AND OBJECTIVE BOX
[place] [facility]                                                               Office: [address] [address]                                                              Telephone: [phone]. Fax:[phone]                                                                             PROGRESS NOTE  Reason for follow up:   Overnight: No new events.   Update:     Subjective: Fevers, negative blood cx, echodensity on echo      	  Vitals:  T(C): 36.6 (09-08-21 @ 10:00), Max: 36.9 (09-07-21 @ 15:55)  HR: 86 (09-08-21 @ 10:00) (86 - 116)  BP: 106/72 (09-08-21 @ 10:00) (104/73 - 114/83)  RR: 17 (09-08-21 @ 10:00) (16 - 17)  SpO2: 96% (09-08-21 @ 10:00) (95% - 96%)    I&O's Summary    07 Sep 2021 07:01  -  08 Sep 2021 07:00  --------------------------------------------------------  IN: 960 mL / OUT: 2250 mL / NET: -1290 mL    08 Sep 2021 07:01  -  08 Sep 2021 12:26  --------------------------------------------------------  IN: 0 mL / OUT: 300 mL / NET: -300 mL            PHYSICAL EXAM:  Appearance: Comfortable. No acute distress  HEENT:  Head and neck: Atraumatic. Normocephalic.  Normal oral mucosa, PERRL, Neck is supple. No JVD, No carotid bruit.   Neurologic: A & O x 2-3, very lethargic   Lymphatic: No cervical lymphadenopathy  Cardiovascular: Normal S1 S2, No murmur, rubs/gallops. No JVD, No edema  Respiratory: Lungs clear to auscultation  Gastrointestinal:  Soft, Non-tender, + BS  Lower Extremities: No edema  Psychiatry: Patient is calm. No agitation. Mood & affect appropriate  Skin: No rashes/ ecchymoses/cyanosis/ulcers visualized on the face, hands or feet.      CURRENT MEDICATIONS:  doxazosin 2 milliGRAM(s) Oral at bedtime  diVALproex DR  diVALproex DR  melatonin  thyroid  aspirin  chewable  heparin   Injectable      DIAGNOSTIC TESTING:  [ ] Echocardiogram: < from: TTE Echo Limited or F/U (09.03.21 @ 13:05) >    Summary:   1. Left ventricular ejection fraction, by visual estimation, is 55 to 60%.   2. Normal global left ventricular systolic function.   3. Spectral Doppler shows impaired relaxation pattern of left ventricular myocardial filling (Grade I diastolic dysfunction).   4. There is mild concentric left ventricular hypertrophy.   5. Normal right ventricular size and function.   6. Mild mitral annular calcification.   7. Mobile echogenic foci noted on the ventricular surface of the aortic valve. PETE is recommended for further evaluation.   8. Peak transaortic gradient equals 38.6 mmHg, mean transaortic gradient equals 24.3 mmHg, the calculated aortic valve area equals 1.25 cm² by the continuity equation consistent with moderate aortic stenosis.   9. There is no evidence of pericardial effusion.  10. Compared to a prior study from 8/22/21, there is significant improvement in LV and RV function.      LABS:	 	                            14.8   8.41  )-----------( 850      ( 07 Sep 2021 07:14 )             43.8     09-07    135  |  101  |  19.2  ----------------------------<  92  4.2   |  19.0<L>  |  0.82    Ca    9.6      07 Sep 2021 07:14  Mg     2.2     09-07    TPro  7.9  /  Alb  3.1<L>  /  TBili  0.6  /  DBili  x   /  AST  25  /  ALT  27  /  AlkPhos  75  09-07      TELEMETRY:   	                                                                      [place] [facility]                                                               Office: [address] [address]                                                              Telephone: [phone]. Fax:[phone]                                                                             PROGRESS NOTE  Reason for follow up: Acute hypoxemic respiratory failure, AMS  Overnight: No new events.   Update:     Subjective: Fevers, negative blood cx, echodensity on echo      	  Vitals:  T(C): 36.6 (09-08-21 @ 10:00), Max: 36.9 (09-07-21 @ 15:55)  HR: 86 (09-08-21 @ 10:00) (86 - 116)  BP: 106/72 (09-08-21 @ 10:00) (104/73 - 114/83)  RR: 17 (09-08-21 @ 10:00) (16 - 17)  SpO2: 96% (09-08-21 @ 10:00) (95% - 96%)    I&O's Summary    07 Sep 2021 07:01  -  08 Sep 2021 07:00  --------------------------------------------------------  IN: 960 mL / OUT: 2250 mL / NET: -1290 mL    08 Sep 2021 07:01  -  08 Sep 2021 12:26  --------------------------------------------------------  IN: 0 mL / OUT: 300 mL / NET: -300 mL      PHYSICAL EXAM:  Appearance: Comfortable. No acute distress  HEENT:  Head and neck: Atraumatic. Normocephalic.  Normal oral mucosa, PERRL, Neck is supple. No JVD, No carotid bruit.   Neurologic: A & O x 2-3, very lethargic   Lymphatic: No cervical lymphadenopathy  Cardiovascular: Normal S1 S2, No murmur, rubs/gallops. No JVD, No edema  Respiratory: Lungs clear to auscultation  Gastrointestinal:  Soft, Non-tender, + BS  Lower Extremities: No edema  Psychiatry: Patient is calm. No agitation. Mood & affect appropriate  Skin: No rashes/ ecchymoses/cyanosis/ulcers visualized on the face, hands or feet.      CURRENT MEDICATIONS:  doxazosin 2 milliGRAM(s) Oral at bedtime  diVALproex DR  diVALproex DR  melatonin  thyroid  aspirin  chewable  heparin   Injectable      DIAGNOSTIC TESTING:  [ ] Echocardiogram: < from: TTE Echo Limited or F/U (09.03.21 @ 13:05) >    Summary:   1. Left ventricular ejection fraction, by visual estimation, is 55 to 60%.   2. Normal global left ventricular systolic function.   3. Spectral Doppler shows impaired relaxation pattern of left ventricular myocardial filling (Grade I diastolic dysfunction).   4. There is mild concentric left ventricular hypertrophy.   5. Normal right ventricular size and function.   6. Mild mitral annular calcification.   7. Mobile echogenic foci noted on the ventricular surface of the aortic valve. PETE is recommended for further evaluation.   8. Peak transaortic gradient equals 38.6 mmHg, mean transaortic gradient equals 24.3 mmHg, the calculated aortic valve area equals 1.25 cm² by the continuity equation consistent with moderate aortic stenosis.   9. There is no evidence of pericardial effusion.  10. Compared to a prior study from 8/22/21, there is significant improvement in LV and RV function.      LABS:	 	                            14.8   8.41  )-----------( 850      ( 07 Sep 2021 07:14 )             43.8     09-07    135  |  101  |  19.2  ----------------------------<  92  4.2   |  19.0<L>  |  0.82    Ca    9.6      07 Sep 2021 07:14  Mg     2.2     09-07    TPro  7.9  /  Alb  3.1<L>  /  TBili  0.6  /  DBili  x   /  AST  25  /  ALT  27  /  AlkPhos  75  09-07        	                                                                      [place] [facility]                                                               Office: [address] [address]                                                              Telephone: [phone]. Fax:[phone]                                                                             PROGRESS NOTE  Reason for follow up: Acute hypoxemic respiratory failure, AMS  Overnight: No new events.   Update: 9/8: Pt denies chest pain, palpitations, SOB. Discussed PETE in great detail risks vs. benefits. Pt currently refusing procedure. Decision Capacity questionable,  evaluation states that patient is delusional. Attempted to speak to HCP sister Beverly powell on phone, no answer left VM.     Subjective: Fevers, negative blood cx, echodensity on echo      	  Vitals:  T(C): 36.6 (09-08-21 @ 10:00), Max: 36.9 (09-07-21 @ 15:55)  HR: 86 (09-08-21 @ 10:00) (86 - 116)  BP: 106/72 (09-08-21 @ 10:00) (104/73 - 114/83)  RR: 17 (09-08-21 @ 10:00) (16 - 17)  SpO2: 96% (09-08-21 @ 10:00) (95% - 96%)    I&O's Summary    07 Sep 2021 07:01  -  08 Sep 2021 07:00  --------------------------------------------------------  IN: 960 mL / OUT: 2250 mL / NET: -1290 mL    08 Sep 2021 07:01  -  08 Sep 2021 12:26  --------------------------------------------------------  IN: 0 mL / OUT: 300 mL / NET: -300 mL      PHYSICAL EXAM:  Appearance: Comfortable. No acute distress  HEENT:  Head and neck: Atraumatic. Normocephalic.  Normal oral mucosa, PERRL, Neck is supple. No JVD, No carotid bruit.   Neurologic: A & O x 1-2, very lethargic   Lymphatic: No cervical lymphadenopathy  Cardiovascular: Normal S1 S2, No murmur, rubs/gallops. No JVD, No edema  Respiratory: Lungs clear to auscultation  Gastrointestinal:  Soft, Non-tender, + BS  Lower Extremities: No edema  Psychiatry: Patient is calm. No agitation. Mood & affect appropriate  Skin: No rashes/ ecchymoses/cyanosis/ulcers visualized on the face, hands or feet.      CURRENT MEDICATIONS:  doxazosin 2 milliGRAM(s) Oral at bedtime  diVALproex DR  diVALproex DR  melatonin  thyroid  aspirin  chewable  heparin   Injectable      DIAGNOSTIC TESTING:  [ ] Echocardiogram: < from: TTE Echo Limited or F/U (09.03.21 @ 13:05) >    Summary:   1. Left ventricular ejection fraction, by visual estimation, is 55 to 60%.   2. Normal global left ventricular systolic function.   3. Spectral Doppler shows impaired relaxation pattern of left ventricular myocardial filling (Grade I diastolic dysfunction).   4. There is mild concentric left ventricular hypertrophy.   5. Normal right ventricular size and function.   6. Mild mitral annular calcification.   7. Mobile echogenic foci noted on the ventricular surface of the aortic valve. PETE is recommended for further evaluation.   8. Peak transaortic gradient equals 38.6 mmHg, mean transaortic gradient equals 24.3 mmHg, the calculated aortic valve area equals 1.25 cm² by the continuity equation consistent with moderate aortic stenosis.   9. There is no evidence of pericardial effusion.  10. Compared to a prior study from 8/22/21, there is significant improvement in LV and RV function.      LABS:	 	                            14.8   8.41  )-----------( 850      ( 07 Sep 2021 07:14 )             43.8     09-07    135  |  101  |  19.2  ----------------------------<  92  4.2   |  19.0<L>  |  0.82    Ca    9.6      07 Sep 2021 07:14  Mg     2.2     09-07    TPro  7.9  /  Alb  3.1<L>  /  TBili  0.6  /  DBili  x   /  AST  25  /  ALT  27  /  AlkPhos  75  09-07

## 2021-09-08 NOTE — PROGRESS NOTE ADULT - SUBJECTIVE AND OBJECTIVE BOX
He denies any complaints.  He is eating well. He had a bowel movement.      REVIEW OF SYSTEMS  Constitutional - No fever,  No fatigue  HEENT - No vertigo, No neck pain  Neurological - No headaches, No memory loss, No loss of strength, No numbness, No tremors  Skin - No rashes, No lesions   Musculoskeletal - No joint pain, No joint swelling, No muscle pain  Psychiatric - No depression, No anxiety    FUNCTIONAL PROGRESS  21  Bed Mobility  Bed Mobility Training Sit-to-Supine: minimum assist (75% patient effort)  Bed Mobility Training Supine-to-Sit: minimum assist (75% patient effort)    Sit-Stand Transfer Training  Transfer Training Sit-to-Stand Transfer: minimum assist (75% patient effort)  Transfer Training Stand-to-Sit Transfer: minimum assist (75% patient effort)    Gait Training  Gait Trainin' RW Larry   Gait Analysis: unsteadiness c turns requiring assist for safety, decreased mike step length, narrow base of support, verbal cues throughout for sequencing      VITALS  T(C): 36.6 (21 @ 10:00), Max: 36.9 (21 @ 15:55)  HR: 86 (21 @ 10:00) (86 - 116)  BP: 106/72 (21 @ 10:00) (104/73 - 114/83)  RR: 17 (21 @ 10:00) (16 - 17)  SpO2: 96% (21 @ 10:00) (95% - 96%)  Wt(kg): --    MEDICATIONS   ALBUTerol    90 MICROgram(s) HFA Inhaler 2 Puff(s) every 6 hours PRN  aspirin  chewable 81 milliGRAM(s) daily  diVALproex  milliGRAM(s) <User Schedule>  diVALproex  milliGRAM(s) <User Schedule>  doxazosin 2 milliGRAM(s) at bedtime  heparin   Injectable 5000 Unit(s) every 8 hours  melatonin 5 milliGRAM(s) at bedtime  thyroid 30 milliGRAM(s) daily      RECENT LABS/IMAGING - Reviewed                        14.8   8.41  )-----------( 850      ( 07 Sep 2021 07:14 )             43.8         135  |  101  |  19.2  ----------------------------<  92  4.2   |  19.0<L>  |  0.82    Ca    9.6      07 Sep 2021 07:14  Mg     2.2         TPro  7.9  /  Alb  3.1<L>  /  TBili  0.6  /  DBili  x   /  AST  25  /  ALT  27  /  AlkPhos  75      ---------------------------------------------------------------------------------------  PHYSICAL EXAM  Constitutional - NAD, Comfortable  HEENT - NCAT  Neck - Supple, No limited ROM  Chest - Breathing comfortably, No wheezing  Cardiovascular - No cyanosis    Abdomen - Soft   Extremities - No C/C/E, No calf tenderness   Neurologic Exam -                    Cognitive - Alert, oriented to person and year, not month or place.  Follows 2 step commands.      Communication - Mild dysarthria      Motor - Grossly 4/5 throughout     ----------------------------------------------------------------------------------------  ASSESSMENT/PLAN  46yMale with functional deficits after suffering from a hypoxic ischemic brain injury  Hypoxic Brain Injury - Maximize sleep/wake cycles. Limit nighttime interruptions. Avoid sedating medications whenever possible. Maintain hydration and nutrition, particularly protein.  Sleep - Melatonin 5mg bedtime   Pain - Tylenol  DVT PPX - SCDs, heparin  Rehab - Continue PT/OT.  Recommend acute inpatient rehabilitation with continued intensive PT/OT and SLP.      Continue bedside therapy as well as OOB throughout the day with mobilization by staff to maintain cardiopulmonary function and prevention of secondary complications related to debility.

## 2021-09-08 NOTE — PROGRESS NOTE ADULT - ASSESSMENT
43y old  Male with PMH of HTN, DM, Asthma who presents with a chief complaint of Acute hypoxemic respiratory failure, AMS  from suspected overdose of benzo and cocaine.  Patient found  by sister unresponsive.  Arrived in the ER with agonal breath breathing and was intubated. Cxr c/w extensive right infiltrate likely aspiration pneumonia.  CT head reveals symmetric low attenuation lesions in the bilateral globus pallidus regions of the basal ganglia, which can be seen with hypoxic ischemic encephalopathy or related to carbon monoxide positioning, as well as indeterminate areas of low attenuation in the bilateral cerebellum may reflect infarcts.  Patient developed shock symptoms of hypotension requiring pressors to maintain b/p. Resuscitated with fluids and receiving maintenance fluids. We were asked to see patient for Elevated trops from 0.71 on admission to 1.25.  Ekg on admit without ischemic changes and repeated now and sinus tach with no ischemic changes.    9/8: Pt denies chest pain, palpitations, SOB. Tele-   Discussed PETE in great detail risks vs. benefits. Pt currently refusing procedure. Decision Capacity questionable, BH evaluation pending. Will speak to HCP.     ELEVATED TROP/CPK   CK/CKMB trending down  EKG without acute changes  Recommend ASA 81mg via NGT if ok with neuro    Systolic heart failure (LVEF <20%)  TTE: LVEF <20%, grade I DD, moderately reduced RV systolic function, mild-moderate MVR, mild AI, LF/LG severe AS  Once BP can tolerate would recommend introducing hydralazine for afterload reduction and low dose BB  Avoid ACE/ARB/ARNi until renal function normalizes  If pt should have meaningful neurologic recovery he will eventually need ischemic eval ([facility])    LIKELY SEPTIC SHOCK FROM ASPIRATION PNEUMONIA  On Zosyn  BP improving, no longer requiring vasopressors  On Midodrine     Anoxic Encephalopathy/Shock Liver  Avoid statin in view of transaminitis/rhabdo?  EEG pending     43y old  Male with PMH of HTN, DM, Asthma who presents with a chief complaint of Acute hypoxemic respiratory failure, AMS  from suspected overdose of benzo and cocaine.  Patient found  by sister unresponsive.  Arrived in the ER with agonal breath breathing and was intubated. Cxr c/w extensive right infiltrate likely aspiration pneumonia.  CT head reveals symmetric low attenuation lesions in the bilateral globus pallidus regions of the basal ganglia, which can be seen with hypoxic ischemic encephalopathy or related to carbon monoxide positioning, as well as indeterminate areas of low attenuation in the bilateral cerebellum may reflect infarcts.  Patient developed shock symptoms of hypotension requiring pressors to maintain b/p. Resuscitated with fluids and receiving maintenance fluids. We were asked to see patient for Elevated trops from 0.71 on admission to 1.25.  Ekg on admit without ischemic changes and repeated now and sinus tach with no ischemic changes.    9/8: Pt denies chest pain, palpitations, SOB. Discussed PETE in great detail risks vs. benefits. Pt currently refusing procedure. Decision Capacity questionable, BH evaluation pending. Will speak to HCP.     ELEVATED TROP/CPK   CK/CKMB trending down  EKG without acute changes  Recommend ASA 81mg via NGT if ok with neuro    Systolic heart failure (LVEF <20%)  TTE: LVEF <20%, grade I DD, moderately reduced RV systolic function, mild-moderate MVR, mild AI, LF/LG severe AS  Once BP can tolerate would recommend introducing hydralazine for afterload reduction and low dose BB  Avoid ACE/ARB/ARNi until renal function normalizes  If pt should have meaningful neurologic recovery he will eventually need ischemic eval ([facility])    LIKELY SEPTIC SHOCK FROM ASPIRATION PNEUMONIA  On Zosyn  BP improving, no longer requiring vasopressors  On Midodrine     Anoxic Encephalopathy/Shock Liver  Avoid statin in view of transaminitis/rhabdo?  EEG pending     43y old  Male with PMH of HTN, DM, Asthma who presents with a chief complaint of Acute hypoxemic respiratory failure, AMS  from suspected overdose of benzo and cocaine.  Patient found  by sister unresponsive.  Arrived in the ER with agonal breath breathing and was intubated. Cxr c/w extensive right infiltrate likely aspiration pneumonia.  CT head reveals symmetric low attenuation lesions in the bilateral globus pallidus regions of the basal ganglia, which can be seen with hypoxic ischemic encephalopathy or related to carbon monoxide positioning, as well as indeterminate areas of low attenuation in the bilateral cerebellum may reflect infarcts.  Patient developed shock symptoms of hypotension requiring pressors to maintain b/p. Resuscitated with fluids and receiving maintenance fluids. We were asked to see patient for Elevated trops from 0.71 on admission to 1.25.  Ekg on admit without ischemic changes and repeated now and sinus tach with no ischemic changes.    9/8: Pt denies chest pain, palpitations, SOB. Discussed PETE in great detail risks vs. benefits. Pt currently refusing procedure. Decision Capacity questionable, BH evaluation states that patient is delusional. Attempted to speak to HCP sister Beverly x2 on phone, no answer left VM.     ELEVATED TROP/CPK   CK/CKMB trended down  EKG without acute changes  Cont. ASA    R/O Endocarditis  -Repeat Echo-EF 55-60%, grade I diastolic dysfx., mild LVH, normal RV size and fx., mild mitral annular calcification, mobile echogenic foci noted on ventricular surface of aortic valve, moderate AS  -Discussed PETE in great detail risks vs. benefits. Pt currently refusing procedure.   Decision Capacity questionable, BH evaluation states that patient is delusional.   -Attempted to speak to HCP sister Beverly x2 on phone, no answer left VM.     Systolic heart failure (LVEF <20%)  TTE: LVEF <20%, grade I DD, moderately reduced RV systolic function, mild-moderate MVR, mild AI, LF/LG severe AS  Once BP can tolerate would recommend introducing hydralazine for afterload reduction and low dose BB  Avoid ACE/ARB/ARNi until renal function normalizes  Need to discuss plan for ischemic eval with patient sister Beverly    LIKELY SEPTIC SHOCK FROM ASPIRATION PNEUMONIA  On Dysphagia II Mechanical soft diet    Anoxic Encephalopathy/Shock Liver  Avoid statin in view of transaminitis/rhabdo?  As per neuro evaluation "Hyperkinetic movements captured on c-EEG without ictal correlate on EEG.   No epileptiform discharges or seizure seen"

## 2021-09-08 NOTE — PROGRESS NOTE ADULT - ASSESSMENT
Patient is a 47 yo F w/ PMH of DM2, HTN, Hyothyroid, Asthma, ADHD who presented after being found unresponsive. Admitted to MICU for possible benzo/cocaine overdose and aspiration PNA. Patient was intubated and started on vasopressors. Patient with anoxic brain injury, being followed by Neurology. Patient on IV antibiotics for presumed aspiration PNA. ECHO was done and significant for LVEF of <20. Per Cardiology, patient to have ischemic evaluation if meaningful neurological recovery. Patient also noted to have rhabdo/GARRETT which has now normalized. Trach was tentatively scheduled for 9/2. Patient with improvement in mental status and was extubated on 8/31. Patient was transitioned from HFNC. Patient downgraded to medicine. Patient puleld out ng tube and restraints placed. on 9/3 patient found on floor and was unwitnessed . CT head and c spine were negative and placed on 1:1. Patient slowly improving and ng tube removed on 9.5 and started on a diet     #Anoxic brain injury 2/2 benzodiazapine overdose  - Utox positive for benzos and cocaine  - MRI brain: significant hypoxic/anoxic injury and strokes   - EEG: Moderate to severe nonspecific diffuse or multifocal cerebral dysfunction. No indication for anti-epileptic medication according to Epilepsy.  - Neuro following  - Dysphagia due to anoxic brain injury.  - Psych consult for depression, substance abuse  PMR following,  on  melatonin and depakote    #fall - ct head and ct c spine negative 9/3  Fall precautions  pt consult   needs rehab on dc    #Acute hypoxemic respiratory failure 2/2 aspiration PNA  Sepsis 2/2 PNA  -speech recommendations appreciated  - S/p Vancomycin. Continue Merrem  id following  Repeat ECHO shows mobile echodensity- Cardiology recalled, recommend PETE      #Elevated troponin, new onset systolic CHF  - ECHO reviewed, LVEF of < 20%. severe AS  - Cardiology consult appreciated  -  low dose Coreg  - C/w ASA  - Statin held because of transaminitis,  - Ischemic evaluation once meaningful neurological recovery  - Repeat tte shows improvement in ef, however mobile echodensity noted, Cardiology recalled, will need PETE    #GARRETT w/ rhabdomyolysis  - Seen by Nephrology  - Renal function normalized  - Monitor BMP, correct electrolytes as needed    #Hypothyroid  - C/w [place]    #ADHD  - C/w Adderall    #Acute urinary rentention   - parikh discontinued and urinating well. Continue condom catheter. Avoid constipation    Dispo: PT recalled today, OOB daily, PM&R following    Advanced Directives: Full code, Palliative following. Patient is a 47 yo F w/ PMH of DM2, HTN, Hyothyroid, Asthma, ADHD who presented after being found unresponsive. Admitted to MICU for possible benzo/cocaine overdose and aspiration PNA. Patient was intubated and started on vasopressors. Patient with anoxic brain injury, being followed by Neurology. Patient on IV antibiotics for presumed aspiration PNA. ECHO was done and significant for LVEF of <20. Per Cardiology, patient to have ischemic evaluation if meaningful neurological recovery. Patient also noted to have rhabdo/GARRETT which has now normalized. Trach was tentatively scheduled for 9/2. Patient with improvement in mental status and was extubated on 8/31. Patient was transitioned from HFNC. Patient downgraded to medicine. Patient puleld out ng tube and restraints placed. on 9/3 patient found on floor and was unwitnessed . CT head and c spine were negative and placed on 1:1. Patient slowly improving and ng tube removed on 9.5 and started on a diet     #Anoxic brain injury 2/2 benzodiazapine overdose  - Utox positive for benzos and cocaine  - MRI brain: significant hypoxic/anoxic injury and strokes   - EEG: Moderate to severe nonspecific diffuse or multifocal cerebral dysfunction. No indication for anti-epileptic medication according to Epilepsy.  - Neuro following  - Dysphagia due to anoxic brain injury.  - Psych consult for depression, substance abuse  PMR following,  on  melatonin and depakote  -Constant observation    #fall - ct head and ct c spine negative 9/3  Fall precautions/Constant observation  pt consult   needs rehab on dc    #Acute hypoxemic respiratory failure 2/2 aspiration PNA  Sepsis 2/2 PNA  -speech recommendations appreciated  - S/p Vancomycin. Continue Merrem  id following  Repeat ECHO shows mobile echodensity- Cardiology recalled, recommend PETE, discussed with ID repeat blood cultures while off ABX      #Elevated troponin, new onset systolic CHF  - ECHO reviewed, LVEF of < 20%. severe AS  - Cardiology consult appreciated  -  low dose Coreg  - C/w ASA  - Statin held because of transaminitis,  - Ischemic evaluation once meaningful neurological recovery  - Repeat tte shows improvement in ef, however mobile echodensity noted, Cardiology recalled, will need PETE    #GARRETT w/ rhabdomyolysis  - Seen by Nephrology  - Renal function normalized  - Monitor BMP, correct electrolytes as needed    #Hypothyroid  - C/w [place]    #ADHD  - C/w Adderall    #Acute urinary rentention   - parikh discontinued and urinating well. Continue condom catheter. Avoid constipation    Dispo: PT recalled today, OOB daily, PM&R following    Advanced Directives: Full code, Palliative following.

## 2021-09-08 NOTE — BH CONSULTATION LIAISON ASSESSMENT NOTE - NSICDXPASTMEDICALHX_GEN_ALL_CORE_FT
PAST MEDICAL HISTORY:  Asthma     DM (diabetes mellitus)     HTN (hypertension)     No pertinent past medical history

## 2021-09-08 NOTE — BH CONSULTATION LIAISON ASSESSMENT NOTE - NSBHMSETHTPROC_PSY_A_CORE
X Size Of Lesion In Cm (Optional): 0 Was A Bandage Applied: Yes Biopsy Type: H and E Anesthesia Type: 1% lidocaine without epinephrine Billing Type: Third-Party Bill Render Post-Care Instructions In Note?: no Wound Care: Bacitracin Consent: Written consent was obtained and risks were reviewed including but not limited to scarring, infection, bleeding, scabbing, incomplete removal, nerve damage and allergy to anesthesia. Information: Selecting Yes will display possible errors in your note based on the variables you have selected. This validation is only offered as a suggestion for you. PLEASE NOTE THAT THE VALIDATION TEXT WILL BE REMOVED WHEN YOU FINALIZE YOUR NOTE. IF YOU WANT TO FAX A PRELIMINARY NOTE YOU WILL NEED TO TOGGLE THIS TO 'NO' IF YOU DO NOT WANT IT IN YOUR FAXED NOTE. Anesthesia Volume In Cc (Will Not Render If 0): 0.5 Home Suture Removal Text: Patient was provided a home suture removal kit and will remove their sutures at home.  If they have any questions or difficulties they will call the office. Hemostasis: None Notification Instructions: Patient will be notified of biopsy results. However, patient instructed to call the office if not contacted within 2 weeks. Post-Care Instructions: I reviewed with the patient in detail post-care instructions. Patient is to keep the biopsy site dry overnight, and then wash with hydrogen peroxide 50/50 mix or antibacterial soap and water twice daily and apply bacitracin twice daily healed. Patient may apply hydrogen peroxide soaks to remove any crusting. Punch Size In Mm: 3 Epidermal Sutures: 5-0 Nylon Suture Removal: 14 days Detail Level: Detailed Dressing: no dressing applied Linear

## 2021-09-08 NOTE — PROGRESS NOTE ADULT - ASSESSMENT
46y Male who is followed by neurology because of anoxic/hypoxic brain injury    Hypoxic-ischemic encephalopathy  He is status post likely drug overdose with benzodiazepine and cocaine.  His MRI brain appearance shows evidence of significant hypoxic/anoxic injury and strokes.  Overall, exam is showing steady improvement    Seizure-like activity  Hyperkinetic movements captured on c-EEG without ictal correlate on EEG.   No epileptiform discharges or seizure seen.    Neurological status continues to improve    Thank you for allowing me to participate in the care of your patient    Toby Sanchez MD, PhD   [id number]

## 2021-09-08 NOTE — PROGRESS NOTE ADULT - SUBJECTIVE AND OBJECTIVE BOX
CC: Acute hypoxemic respiratory failure, AMS/anoxic brain injury    INTERVAL HPI/OVERNIGHT EVENTS: Patient seen and examined. Improvement in mentation noted. Knows what year it is. Knows he is in hospital but does not know why. Denies chest pain, SOB, nausea, vomiting, fever, chills. Asked for restraints  to be removed.     Vital Signs Last 24 Hrs  T(C): 36.9 (08 Sep 2021 06:00), Max: 36.9 (07 Sep 2021 15:55)  T(F): 98.4 (08 Sep 2021 06:00), Max: 98.5 (07 Sep 2021 15:55)  HR: 116 (08 Sep 2021 06:00) (109 - 116)  BP: 104/73 (08 Sep 2021 06:00) (104/73 - 114/83)  BP(mean): --  RR: 16 (08 Sep 2021 06:00) (16 - 16)  SpO2: 95% (08 Sep 2021 06:00) (95% - 95%)  PHYSICAL EXAM:    CONSTITUTIONAL: NAD  HEENT: NC/AT,  RESPIRATORY: CTA B/L  CARDIOVASCULAR: RRR, S1/S2+, no m  ABDOMEN: Nontender to palpation, normoactive bowel sounds,  MUSCULOSKELETAL: No edema, cyanosis or deformities.  NEUROLOGY: awake, alert, oriented x 2-3. GUILLEN x 4  SKIN: No rashes; no palpable lesions  VASC: Distal pulses palpable        I&O's Detail    07 Sep 2021 07:01  -  08 Sep 2021 07:00  --------------------------------------------------------  IN:    Oral Fluid: 960 mL  Total IN: 960 mL    OUT:    Voided (mL): 2250 mL  Total OUT: 2250 mL    Total NET: -1290 mL                                    14.8   8.41  )-----------( 850      ( 07 Sep 2021 07:14 )             43.8     07 Sep 2021 07:14    135    |  101    |  19.2   ----------------------------<  92     4.2     |  19.0   |  0.82     Ca    9.6        07 Sep 2021 07:14  Mg     2.2       07 Sep 2021 07:14    TPro  7.9    /  Alb  3.1    /  TBili  0.6    /  DBili  x      /  AST  25     /  ALT  27     /  AlkPhos  75     07 Sep 2021 07:14      CAPILLARY BLOOD GLUCOSE      POCT Blood Glucose.: 83 mg/dL (07 Sep 2021 12:05)    LIVER FUNCTIONS - ( 07 Sep 2021 07:14 )  Alb: 3.1 g/dL / Pro: 7.9 g/dL / ALK PHOS: 75 U/L / ALT: 27 U/L / AST: 25 U/L / GGT: x               MEDICATIONS  (STANDING):  aspirin  chewable 81 milliGRAM(s) Oral daily  diVALproex  milliGRAM(s) Oral <User Schedule>  diVALproex  milliGRAM(s) Oral <User Schedule>  doxazosin 2 milliGRAM(s) Oral at bedtime  heparin   Injectable 5000 Unit(s) SubCutaneous every 8 hours  melatonin 5 milliGRAM(s) Oral at bedtime  thyroid 30 milliGRAM(s) Oral daily    MEDICATIONS  (PRN):  ALBUTerol    90 MICROgram(s) HFA Inhaler 2 Puff(s) Inhalation every 6 hours PRN Shortness of Breath  LORazepam   Injectable 1 milliGRAM(s) IV Push every 3 hours PRN Agitation      RADIOLOGY & ADDITIONAL TESTS:   CC: Acute hypoxemic respiratory failure, AMS/anoxic brain injury    INTERVAL HPI/OVERNIGHT EVENTS: Patient seen and examined. Improvement in mentation noted. Knows what year it is. Knows he is in hospital but does not know why. Denies chest pain, SOB, nausea, vomiting, fever, chills. Asked for restraints  to be removed.     Vital Signs Last 24 Hrs  T(C): 36.9 (08 Sep 2021 06:00), Max: 36.9 (07 Sep 2021 15:55)  T(F): 98.4 (08 Sep 2021 06:00), Max: 98.5 (07 Sep 2021 15:55)  HR: 116 (08 Sep 2021 06:00) (109 - 116)  BP: 104/73 (08 Sep 2021 06:00) (104/73 - 114/83)  BP(mean): --  RR: 16 (08 Sep 2021 06:00) (16 - 16)  SpO2: 95% (08 Sep 2021 06:00) (95% - 95%)  PHYSICAL EXAM:    CONSTITUTIONAL: NAD  HEENT: NC/AT,  RESPIRATORY: CTA B/L  CARDIOVASCULAR: RRR, S1/S2+, no m  ABDOMEN: Nontender to palpation, normoactive bowel sounds,  MUSCULOSKELETAL: No edema, cyanosis or deformities.  NEUROLOGY: awake, alert, oriented x 2-3. GUILLEN x 4  SKIN: No rashes; no palpable lesions  VASC: Distal pulses palpable        I&O's Detail    07 Sep 2021 07:01  -  08 Sep 2021 07:00  --------------------------------------------------------  IN:    Oral Fluid: 960 mL  Total IN: 960 mL    OUT:    Voided (mL): 2250 mL  Total OUT: 2250 mL    Total NET: -1290 mL                                    14.8   8.41  )-----------( 850      ( 07 Sep 2021 07:14 )             43.8     07 Sep 2021 07:14    135    |  101    |  19.2   ----------------------------<  92     4.2     |  19.0   |  0.82     Ca    9.6        07 Sep 2021 07:14  Mg     2.2       07 Sep 2021 07:14    TPro  7.9    /  Alb  3.1    /  TBili  0.6    /  DBili  x      /  AST  25     /  ALT  27     /  AlkPhos  75     07 Sep 2021 07:14      CAPILLARY BLOOD GLUCOSE      POCT Blood Glucose.: 83 mg/dL (07 Sep 2021 12:05)    LIVER FUNCTIONS - ( 07 Sep 2021 07:14 )  Alb: 3.1 g/dL / Pro: 7.9 g/dL / ALK PHOS: 75 U/L / ALT: 27 U/L / AST: 25 U/L / GGT: x               MEDICATIONS  (STANDING):  aspirin  chewable 81 milliGRAM(s) Oral daily  diVALproex  milliGRAM(s) Oral <User Schedule>  diVALproex  milliGRAM(s) Oral <User Schedule>  doxazosin 2 milliGRAM(s) Oral at bedtime  heparin   Injectable 5000 Unit(s) SubCutaneous every 8 hours  melatonin 5 milliGRAM(s) Oral at bedtime  thyroid 30 milliGRAM(s) Oral daily    MEDICATIONS  (PRN):  ALBUTerol    90 MICROgram(s) HFA Inhaler 2 Puff(s) Inhalation every 6 hours PRN Shortness of Breath  LORazepam   Injectable 1 milliGRAM(s) IV Push every 3 hours PRN Agitation      RADIOLOGY & ADDITIONAL TESTS:    EXAM:  ECHO TRANSTHORACIC;F U OR LTD      PROCEDURE DATE:  Sep  3 2021   .      INTERPRETATION:  TRANSTHORACIC ECHOCARDIOGRAM REPORT        Patient Name:   ILDEFONSO MIRZA Patient Location: Inpatient  Medical Rec #:  [id number]        Accession #:  [id number]  Account #:                      Height:           70.9 in 180.0 cm  YOB: 1975       Weight:           205.0 lb 93.00 kg  Patient Age:    46 years        BSA:              2.13 m²  Patient Gender: M               BP:       99/68 mmHg      Date of Exam:        9/3/2021 1:05:39 PM  Sonographer:         Larry Guillen  Referring Physician: Ildefonso Romero MD    Procedure:     2D Echo/Doppler/Color Doppler Complete.  Indications:   Sepsis, unspecified organism - A41.9  Diagnosis:     Sepsis, unspecified organism - A41.9  Study Details: Study quality was adversely affected due to the patient being                 uncooperative and tachycardia.        2D AND M-MODE MEASUREMENTS (normal ranges within parentheses):  Left Ventricle:                  Normal   Aorta/Left Atrium:          Normal  IVSd (2D):              1.04 cm (0.7-1.1) Left Atrium (2D):  3.63 cm (1.9-4.0)  LVPWd (2D):             1.02 cm (0.7-1.1)  LVIDd (2D):             4.97 cm (3.4-5.7)  LVIDs (2D):             3.32 cm  LV FS (2D):             33.2 %   (>25%)  Relative Wall Thickness  0.41    (<0.42)    SPECTRAL DOPPLER ANALYSIS (where applicable):  Aortic Valve: AoV Max Iker: 3.11 m/s AoV Peak P.6 mmHg AoV Mean P.3 mmHg    LVOT Vmax: 0.87 m/s LVOT VTI: 0.149 m LVOT Diameter: 2.24 cm    AoV Area, Vmax: 1.10 cm² AoV Area, VTI: 1.25 cm² AoV Area, Vmn: 1.08 cm²  Ao VTI: 0.470  Aortic Insufficiency:  AI Half-time:  147 msec  AI Decel Rate: 5.87 m/s²      PHYSICIAN INTERPRETATION:  Left Ventricle: The left ventricular internal cavity size is normal.  Global LV systolic function was normal. Left ventricular ejection fraction, by visual estimation, is 55 to 60%. Spectral Doppler shows impaired relaxation pattern of left ventricular myocardial filling (Grade I diastolic dysfunction).  Right Ventricle: Normal right ventricular size and function.  Left Atrium: The left atrium is normal in size.  Right Atrium: The right atrium is normal in size.  Pericardium: There is no evidence ofpericardial effusion.  Mitral Valve: The mitral valve is normal in structure. There is mild mitral annular calcification.  Tricuspid Valve: The tricuspid valve is not well visualized.  Aortic Valve: The aortic valve was not well visualized. Peak transaortic gradient equals 38.6 mmHg, mean transaortic gradient equals 24.3 mmHg, the calculated aortic valve area equals 1.25 cm² by the continuity equation consistent with moderate aortic stenosis. Trivial aortic valve regurgitation is seen. Mobile echogenic foci noted on aortic valve. Suggest PETE for closer evaluation.  Venous: The inferior vena cava is normal.  In comparison to the previous echocardiogram(s): Prior examinations are available and were reviewed for comparison purposes. The left ventricular function has improved. Compared to a prior study from 21, there is significant improvement in LV and RV function.      Summary:   1. Left ventricular ejection fraction, by visual estimation, is 55 to 60%.   2. Normal global left ventricular systolic function.   3. Spectral Doppler shows impaired relaxation pattern of left ventricular myocardial filling (Grade I diastolic dysfunction).   4. There is mild concentric left ventricular hypertrophy.   5. Normal right ventricular size and function.   6. Mild mitral annular calcification.   7. Mobile echogenic foci noted on the ventricular surface of the aortic valve. PETE is recommended for further evaluation.   8. Peak transaortic gradient equals 38.6 mmHg, mean transaortic gradient equals 24.3 mmHg, the calculated aortic valve area equals 1.25 cm² by the continuity equation consistent with moderate aortic stenosis.   9. There is no evidence of pericardial effusion.  10. Compared to a prior study from 21, there is significant improvement in LV and RV function.    MD John Paul Electronically signed on 9/3/2021 at 4:01:21 PM

## 2021-09-08 NOTE — BH CONSULTATION LIAISON ASSESSMENT NOTE - NSBHCHARTREVIEWLAB_PSY_A_CORE FT
14.8   8.41  )-----------( 850      ( 07 Sep 2021 07:14 )             43.8     09-07    135  |  101  |  19.2  ----------------------------<  92  4.2   |  19.0<L>  |  0.82    Ca    9.6      07 Sep 2021 07:14  Mg     2.2     09-07    TPro  7.9  /  Alb  3.1<L>  /  TBili  0.6  /  DBili  x   /  AST  25  /  ALT  27  /  AlkPhos  75  09-07    LIVER FUNCTIONS - ( 07 Sep 2021 07:14 )  Alb: 3.1 g/dL / Pro: 7.9 g/dL / ALK PHOS: 75 U/L / ALT: 27 U/L / AST: 25 U/L / GGT: x

## 2021-09-08 NOTE — BH CONSULTATION LIAISON ASSESSMENT NOTE - OTHER PAST PSYCHIATRIC HISTORY (INCLUDE DETAILS REGARDING ONSET, COURSE OF ILLNESS, INPATIENT/OUTPATIENT TREATMENT)
H/o anger management.  No known prior psychiatric hospitalizations, no known prior suicide attempts.

## 2021-09-08 NOTE — BH CONSULTATION LIAISON ASSESSMENT NOTE - CURRENT MEDICATION
MEDICATIONS  (STANDING):  aspirin  chewable 81 milliGRAM(s) Oral daily  diVALproex  milliGRAM(s) Oral <User Schedule>  diVALproex  milliGRAM(s) Oral <User Schedule>  doxazosin 2 milliGRAM(s) Oral at bedtime  heparin   Injectable 5000 Unit(s) SubCutaneous every 8 hours  melatonin 5 milliGRAM(s) Oral at bedtime  thyroid 30 milliGRAM(s) Oral daily    MEDICATIONS  (PRN):  ALBUTerol    90 MICROgram(s) HFA Inhaler 2 Puff(s) Inhalation every 6 hours PRN Shortness of Breath

## 2021-09-08 NOTE — BH CONSULTATION LIAISON ASSESSMENT NOTE - SUMMARY
Patient is a 46 year old, male; lives alone;  ; with 18 y/o daughter who lives with mother; with reported  past psychiatric history of anxiety and depressive sx's, unclear if patient has h/o formal treatment, no known psychiatric  hospitalizations; no known suicide attempts; w h/o shelter time for selling drugs in the past, h/o crack/cocaine abuse and abuse of "pills";  PMH of DM2, HTN, Hyothyroid, Asthma, ADHD who presented after being found unresponsive. Asked to evaluate for depressive sx's and possible suicidal ideation.    Patient overdosed on benzodiazepines and cocaine and treated for anoxic brain injury.  Currently continued to exhibit symptoms of delirium.  Scored a 13/30 in MMSE.  Patient has no recollection of events that led to hospitalization and has significant memory deficits.  Patient was reportedly depressed prior to overdose secondary to break up.  He does have a 18 y/o daughter whom he is close to.  As per sister, patient would never actively intend to end his life and believes overdose was accidental.  On interview patient denies any S/H I/I/P. He has not been aggressive or agitated.  Unable to assess for underlying psychiatric condition at this time secondary to his delirium.

## 2021-09-08 NOTE — BH CONSULTATION LIAISON ASSESSMENT NOTE - NSICDXPASTSURGICALHX_GEN_ALL_CORE_FT
PAST SURGICAL HISTORY:  S/P elbow joint replacement right elbow surgery.    S/P knee surgery right thigh surgery.
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
## 2021-09-08 NOTE — BH CONSULTATION LIAISON ASSESSMENT NOTE - NSBHCHARTREVIEWINVESTIGATE_PSY_A_CORE FT
< from: 12 Lead ECG (08.22.21 @ 12:50) >    Ventricular Rate 105 BPM    Atrial Rate 105 BPM    P-R Interval 140 ms    QRS Duration 88 ms    Q-T Interval 334 ms    QTC Calculation(Bazett) 441 ms    P Axis 59 degrees    R Axis 44 degrees    T Axis 45 degrees    Diagnosis Line Sinus tachycardia      < end of copied text >

## 2021-09-09 LAB
ANION GAP SERPL CALC-SCNC: 13 MMOL/L — SIGNIFICANT CHANGE UP (ref 5–17)
BUN SERPL-MCNC: 22.4 MG/DL — HIGH (ref 8–20)
CALCIUM SERPL-MCNC: 9.1 MG/DL — SIGNIFICANT CHANGE UP (ref 8.6–10.2)
CHLORIDE SERPL-SCNC: 100 MMOL/L — SIGNIFICANT CHANGE UP (ref 98–107)
CO2 SERPL-SCNC: 21 MMOL/L — LOW (ref 22–29)
CREAT SERPL-MCNC: 0.87 MG/DL — SIGNIFICANT CHANGE UP (ref 0.5–1.3)
GLUCOSE SERPL-MCNC: 94 MG/DL — SIGNIFICANT CHANGE UP (ref 70–99)
HCT VFR BLD CALC: 40.6 % — SIGNIFICANT CHANGE UP (ref 39–50)
HGB BLD-MCNC: 14 G/DL — SIGNIFICANT CHANGE UP (ref 13–17)
MAGNESIUM SERPL-MCNC: 2.2 MG/DL — SIGNIFICANT CHANGE UP (ref 1.6–2.6)
MCHC RBC-ENTMCNC: 30.4 PG — SIGNIFICANT CHANGE UP (ref 27–34)
MCHC RBC-ENTMCNC: 34.5 GM/DL — SIGNIFICANT CHANGE UP (ref 32–36)
MCV RBC AUTO: 88.3 FL — SIGNIFICANT CHANGE UP (ref 80–100)
PHOSPHATE SERPL-MCNC: 3.1 MG/DL — SIGNIFICANT CHANGE UP (ref 2.4–4.7)
PLATELET # BLD AUTO: 783 K/UL — HIGH (ref 150–400)
POTASSIUM SERPL-MCNC: 3.8 MMOL/L — SIGNIFICANT CHANGE UP (ref 3.5–5.3)
POTASSIUM SERPL-SCNC: 3.8 MMOL/L — SIGNIFICANT CHANGE UP (ref 3.5–5.3)
RBC # BLD: 4.6 M/UL — SIGNIFICANT CHANGE UP (ref 4.2–5.8)
RBC # FLD: 12.6 % — SIGNIFICANT CHANGE UP (ref 10.3–14.5)
SARS-COV-2 RNA SPEC QL NAA+PROBE: SIGNIFICANT CHANGE UP
SODIUM SERPL-SCNC: 134 MMOL/L — LOW (ref 135–145)
WBC # BLD: 6.94 K/UL — SIGNIFICANT CHANGE UP (ref 3.8–10.5)
WBC # FLD AUTO: 6.94 K/UL — SIGNIFICANT CHANGE UP (ref 3.8–10.5)

## 2021-09-09 PROCEDURE — 99232 SBSQ HOSP IP/OBS MODERATE 35: CPT

## 2021-09-09 RX ADMIN — DIVALPROEX SODIUM 500 MILLIGRAM(S): 500 TABLET, DELAYED RELEASE ORAL at 23:14

## 2021-09-09 RX ADMIN — HEPARIN SODIUM 5000 UNIT(S): 5000 INJECTION INTRAVENOUS; SUBCUTANEOUS at 12:15

## 2021-09-09 RX ADMIN — DIVALPROEX SODIUM 250 MILLIGRAM(S): 500 TABLET, DELAYED RELEASE ORAL at 09:13

## 2021-09-09 RX ADMIN — Medication 30 MILLIGRAM(S): at 07:05

## 2021-09-09 RX ADMIN — HEPARIN SODIUM 5000 UNIT(S): 5000 INJECTION INTRAVENOUS; SUBCUTANEOUS at 23:14

## 2021-09-09 RX ADMIN — Medication 5 MILLIGRAM(S): at 23:14

## 2021-09-09 RX ADMIN — Medication 81 MILLIGRAM(S): at 12:14

## 2021-09-09 RX ADMIN — DIVALPROEX SODIUM 250 MILLIGRAM(S): 500 TABLET, DELAYED RELEASE ORAL at 12:14

## 2021-09-09 RX ADMIN — HEPARIN SODIUM 5000 UNIT(S): 5000 INJECTION INTRAVENOUS; SUBCUTANEOUS at 07:05

## 2021-09-09 NOTE — PROGRESS NOTE ADULT - ASSESSMENT
Patient is a 47 yo F w/ PMH of DM2, HTN, Hyothyroid, Asthma, ADHD who presented after being found unresponsive. Admitted to MICU for possible benzo/cocaine overdose and aspiration PNA. Patient was intubated and started on vasopressors. Patient with anoxic brain injury, being followed by Neurology. Patient on IV antibiotics for presumed aspiration PNA. ECHO was done and significant for LVEF of <20. Per Cardiology, patient to have ischemic evaluation if meaningful neurological recovery. Patient also noted to have rhabdo/GARRETT which has now normalized. Trach was tentatively scheduled for 9/2. Patient with improvement in mental status and was extubated on 8/31. Patient was transitioned from HFNC. Patient downgraded to medicine. Patient puleld out ng tube and restraints placed. on 9/3 patient found on floor and was unwitnessed . CT head and c spine were negative and placed on 1:1. Patient slowly improving and ng tube removed on 9.5 and started on a diet     #Anoxic brain injury 2/2 benzodiazapine overdose  - Utox positive for benzos and cocaine  - MRI brain: significant hypoxic/anoxic injury and strokes   - EEG: Moderate to severe nonspecific diffuse or multifocal cerebral dysfunction. No indication for anti-epileptic medication according to Epilepsy.  - Neuro following  - Dysphagia due to anoxic brain injury.  - Psych consult for depression, substance abuse  PMR following,  on  melatonin and depakote  OT consult placed, may need eventual AR    #episode of hypotension/presyncope  - agree to hold cardura     #fall - ct head and ct c spine negative 9/3    #Acute hypoxemic respiratory failure 2/2 aspiration PNA  Sepsis 2/2 PNA  -speech recommendations appreciated  - S/p Vancomycin. s/p merem   Repeat ECHO shows mobile echodensity-  spole to Cardio, patient is refusing PETE  - repeat blood culture sent on 9/8  - family will need to give consent    #Elevated troponin, new onset systolic CHF  - ECHO reviewed, LVEF of < 20%. severe AS  - C/w ASA  - Statin held because of transaminitis,  - Ischemic evaluation once meaningful neurological recovery  - Repeat tte shows improvement in ef, however mobile echodensity noted, Cardiology recalled, will need PETE    #GARRETT w/ rhabdomyolysis  - Seen by Nephrology  - Renal function normalized  - Monitor BMP, correct electrolytes as needed    #Hypothyroid  - C/w [place]    #ADHD  - C/w Adderall    #Acute urinary rentention resolved  - parikh discontinued and urinating well. Continue condom catheter. Avoid constipation    Dispo: unclear, needs pete and eventual AR  Advanced Directives: Full code, Palliative following.

## 2021-09-09 NOTE — OCCUPATIONAL THERAPY INITIAL EVALUATION ADULT - DIAGNOSIS, OT EVAL
sepsis/PN; acute infarcts bilat cerebral and cerebellar hemispheres, left hippocampus, and bilat globus pallidi; Hypoxemia

## 2021-09-09 NOTE — OCCUPATIONAL THERAPY INITIAL EVALUATION ADULT - FINE MOTOR COORDINATION, RIGHT HAND THUMB/FINGER OPPOSITION SKILLS, OT EVAL
[facility] [address]    [address]    [address]    [facility] [address]    Phone:  [phone]    Fax:  [phone]       Thank You for choosing us for your health care visit. We are glad to serve you and happy to provide you with this summary of your visit. Please help us to ensure we have accurate records. If you find anything that needs to be changed, please let our staff know as soon as possible.          Your Demographic Information     Patient Name Sex DEDE Peterson Male 1924       Ethnic Group Patient Race    Not of  or  Origin Black/      Your Visit Details     Date & Time Provider Department    2017 10:00 AM Heath Valencia MD [facility] [address]      Your Upcoming Appointment*(Max 10)     2017 11:00 AM CDT   Follow-up Visit with Pramod العراقي MD   [facility] [place] [facility] ([facility])    [address] [facility] [address]   [phone]            Thursday May 11, 2017 10:45 AM CDT   Follow-up Visit with Heath Valencia MD   [facility] [address] ([facility])    [address]  [address]  [facility] [address]   [phone]            2017 12:00 PM CDT   Pacer check office with Thom Major MD, [facility] CARD 777 DEVICE CHECK   [facility] [address] [facility]    [address]  [address]  [facility] [address]   [phone]              Your Upcoming Home Remote Device Check     2017  2:30 PM CDT   [facility]   [facility] [address] ([facility]    [address]  [address]  [facility] [address]   [phone]              Your To Do List     Follow-Up    Return in  about 3 months (around 5/8/2017).      Your Vitals Were     BP Pulse Resp Height Weight BMI    108/64 (BP Location: [facility], Patient Position: Sitting, Cuff Size: Regular) 60 12 6' 3\" (1.905 m) 181 lb (82.1 kg) 22.62 kg/m2    Smoking Status                   Former Smoker           Medications Prescribed or Re-Ordered Today     None      Your Current Medications Are        Disp Refills Start End    nitroGLYcerin (NITROSTAT) 0.4 MG SL tablet 100 tablet 0 1/9/2017     Sig: PLACE 1 TABLET BY MOUTH UNDER THE TOUNGE EVERY 5 MINUTES AS NEEDED FOR CHEST PAIN. Must be seen for further refills!    Class: Eprescribe    Vitamin D, Ergocalciferol, 65528 UNITS capsule 12 capsule 0 12/12/2016     Sig: TAKE ONE CAPSULE BY MOUTH ONCE A WEEK. Must be seen for further refills!    Class: Eprescribe    glimepiride (AMARYL) 2 MG tablet 90 tablet 1 9/22/2016     Sig - Route: Take 1 tablet by mouth daily (before breakfast). - Oral    Class: Eprescribe    clopidogrel (PLAVIX) 75 MG tablet 90 tablet 1 9/22/2016     Sig - Route: Take 1 tablet by mouth daily. - Oral    Class: Eprescribe    pravastatin (PRAVACHOL) 80 MG tablet 90 tablet 1 9/22/2016     Sig - Route: Take 1 tablet by mouth daily. - Oral    Class: Eprescribe    aspirin 81 MG chewable tablet 30 tablet 0 6/11/2016     Sig - Route: Chew 1 tablet by mouth daily. - Oral    Class: Eprescribe    ergocalciferol (DRISDOL) 37101 UNITS capsule 12 capsule 1 3/17/2016     Sig - Route: Take 1 capsule by mouth once a week. - Oral    Class: Eprescribe      Allergies     No Known Allergies      Immunizations History as of 2/8/2017     Name Date    Influenza 12/11/2012, 12/15/2010, 11/11/2008, 11/28/2007, 11/15/2006, 11/15/2005    Influenza High Dose Pres Free 9/22/2016    Pneumococcal Conjugate 13 Valent 7/8/2015    Pneumococcal Polysaccharide Adult 1/13/2007      Problem List as of 2/8/2017     Arthritis    Essential hypertension, benign    Medtronic Dual Chamber Pacemaker    Coronary  atherosclerosis    Dementia    GERD without esophagitis    Other and unspecified hyperlipidemia    Type II or unspecified type diabetes mellitus with peripheral circulatory disorders, uncontrolled(250.72)    PVD (peripheral vascular disease)    Urinary retention    Chronic fatigue    Generalized weakness    Decreased ambulation status    Elevated PSA    Prostate cancer            Patient Instructions     None       mild impairment

## 2021-09-09 NOTE — PROGRESS NOTE ADULT - SUBJECTIVE AND OBJECTIVE BOX
YESI MIRZA    [id number]    46y      Male    INTERVAL HPI/OVERNIGHT EVENTS: patient being seen for anoxic brain injury. Patient seen at bedside and denies any complaints        REVIEW OF SYSTEMS:    CONSTITUTIONAL: No fever, weight loss, or fatigue  RESPIRATORY: No cough, wheezing, hemoptysis; No shortness of breath  CARDIOVASCULAR: No chest pain, palpitations  GASTROINTESTINAL: No abdominal or epigastric pain. No nausea, vomiting  NEUROLOGICAL: No headaches, memory loss, loss of strength.  MISCELLANEOUS:      Vital Signs Last 24 Hrs  T(C): 36.8 (09 Sep 2021 10:42), Max: 37.1 (08 Sep 2021 20:53)  T(F): 98.3 (09 Sep 2021 10:42), Max: 98.8 (08 Sep 2021 20:53)  HR: 81 (09 Sep 2021 10:42) (81 - 112)  BP: 131/97 (09 Sep 2021 10:42) (101/70 - 131/97)  BP(mean): --  RR: 18 (09 Sep 2021 10:42) (18 - 18)  SpO2: 97% (09 Sep 2021 10:42) (95% - 99%)    PHYSICAL EXAM:  CONSTITUTIONAL: NAD  HEENT: NC/AT,  RESPIRATORY: CTA B/L  CARDIOVASCULAR: RRR, S1/S2+, no m  ABDOMEN: Nontender to palpation, normoactive bowel sounds,  MUSCULOSKELETAL: No edema, cyanosis or deformities.  NEUROLOGY: awake, alert, oriented x 2-3. GUILLEN x 4  SKIN: No rashes; no palpable lesions  VASC: Distal pulses palpable        LABS:                        14.0   6.94  )-----------( 783      ( 09 Sep 2021 07:25 )             40.6     09-09    134<L>  |  100  |  22.4<H>  ----------------------------<  94  3.8   |  21.0<L>  |  0.87    Ca    9.1      09 Sep 2021 07:25  Phos  3.1     09-09  Mg     2.2     09-09        MEDICATIONS  (STANDING):  aspirin  chewable 81 milliGRAM(s) Oral daily  diVALproex  milliGRAM(s) Oral <User Schedule>  diVALproex  milliGRAM(s) Oral <User Schedule>  heparin   Injectable 5000 Unit(s) SubCutaneous every 8 hours  melatonin 5 milliGRAM(s) Oral at bedtime  thyroid 30 milliGRAM(s) Oral daily    MEDICATIONS  (PRN):  ALBUTerol    90 MICROgram(s) HFA Inhaler 2 Puff(s) Inhalation every 6 hours PRN Shortness of Breath      RADIOLOGY & ADDITIONAL TESTS:

## 2021-09-09 NOTE — OCCUPATIONAL THERAPY INITIAL EVALUATION ADULT - PERTINENT HX OF CURRENT PROBLEM, REHAB EVAL
Pt was found unresponsive in his bed by his sister; +benzos, +cocaine; reports of pt being depressed after breakup with his girlfriend

## 2021-09-09 NOTE — PROGRESS NOTE ADULT - SUBJECTIVE AND OBJECTIVE BOX
[facility]  INFECTIOUS DISEASES  at [place]  =======================================================  Jaime Gudino MD  Diplomates American Board of Internal Medicine and Infectious Diseases  Tel  [phone]  Fax [phone]  =======================================================    [id number]  YESI MIRZA  follow up:  fevers - resolved    called back by medical team  for echodensity on TTE    no fevers        I have personally reviewed the labs and data; pertinent labs and data are listed in this note; please see below.   =======================================================  Past Medical & Surgical Hx:  =====================  PAST MEDICAL & SURGICAL HISTORY:  No pertinent past medical history  Asthma  HTN (hypertension)  DM (diabetes mellitus)  S/P elbow joint replacement  right elbow surgery.  S/P knee surgery right thigh surgery.      Problem List:  ==========  HEALTH ISSUES - PROBLEM Dx:  Acute hypoxemic respiratory failure  Drug overdose  HTN (hypertension)  Asthma  DM (diabetes mellitus)  Anoxic brain injury  CHF (congestive heart failure)  Pneumonia, aspiration      Social Hx:  =======  no toxic habits currently    FAMILY HISTORY:  no significant family history of immunosuppressive disorders in mother or father   =======================================================    REVIEW OF SYSTEMS:  CONSTITUTIONAL:  No Fever or chills.  REPORTS FEELING HOT  HEENT:  No diplopia or blurred vision.  No earache, sore throat or runny nose.  CARDIOVASCULAR:  No pressure, squeezing, strangling, tightness, heaviness or aching about the chest, neck, axilla or epigastrium.  RESPIRATORY:  No cough, shortness of breath  GASTROINTESTINAL:  No nausea, vomiting or diarrhea.  GENITOURINARY:  No dysuria, frequency or urgency. No Blood in urine  MUSCULOSKELETAL:  no joint aches, no muscle pain  SKIN:  No change in skin, hair or nails.  NEUROLOGIC:  No Headaches, seizures or weakness.  PSYCHIATRIC:  No disorder of thought or mood.  ENDOCRINE:  No heat or cold intolerance  HEMATOLOGICAL:  No easy bruising or bleeding.    =======================================================  Allergies  Allergy Status Unknown  No Known Allergies     ======================================================  Physical Exam:  ============     General:  No acute distress.  awake  Eye: Pupils are equal, round and reactive to light, Extraocular movements are intact, Normal conjunctiva.  HENT: Normocephalic,    Neck: Supple, No lymphadenopathy.  Respiratory: Lungs with fair right breath sounds  Cardiovascular: Normal rate, Regular rhythm,   Gastrointestinal: Soft,  Non-distended, Normal bowel sounds.  Genitourinary: No costovertebral angle tenderness.  + CONDOM CATHETER with dark urine  Lymphatics: No lymphadenopathy neck,   Musculoskeletal: Normal range of motion, Normal strength.  Integumentary: No rash.  Neurologic: awake, able to answer questions, follow some commands    =======================================================  Vitals:  ============  T(F): 97.5 (09 Sep 2021 05:17), Max: 98.8 (08 Sep 2021 20:53)  HR: 108 (09 Sep 2021 05:17)  BP: 113/72 (09 Sep 2021 05:17)  RR: 18 (09 Sep 2021 05:17)  SpO2: 99% (09 Sep 2021 05:17) (94% - 99%)  temp max in last 48H T(F): , Max: 98.8 (09-08-21 @ 20:53)    =======================================================  Current Antibiotics:    Other medications:  aspirin  chewable 81 milliGRAM(s) Oral daily  diVALproex  milliGRAM(s) Oral <User Schedule>  diVALproex  milliGRAM(s) Oral <User Schedule>  doxazosin 2 milliGRAM(s) Oral at bedtime  heparin   Injectable 5000 Unit(s) SubCutaneous every 8 hours  melatonin 5 milliGRAM(s) Oral at bedtime  thyroid 30 milliGRAM(s) Oral daily      =======================================================  Labs:                        14.0   6.94  )-----------( 783      ( 09 Sep 2021 07:25 )             40.6     09-09    134<L>  |  100  |  22.4<H>  ----------------------------<  94  3.8   |  21.0<L>  |  0.87    Ca    9.1      09 Sep 2021 07:25  Phos  3.1     09-09  Mg     2.2     09-09        Culture - Blood (collected 09-03-21 @ 17:29)  Source: .Blood Blood-Peripheral  Final Report (09-08-21 @ 19:01):    No growth at 5 days.    Culture - Blood (collected 09-03-21 @ 17:28)  Source: .Blood Blood-Peripheral  Final Report (09-08-21 @ 19:00):    No growth at 5 days.    Culture - Sputum (collected 09-01-21 @ 03:59)  Source: .Sputum Sputum  Gram Stain (09-01-21 @ 07:44):    Few polymorphonuclear leukocytes per low power field    Moderate Squamous epithelial cells per low power field    Few Gram positive cocci in pairs seen per oil power field  Final Report (09-03-21 @ 11:26):    Normal Respiratory Le present    Culture - Blood (collected 08-29-21 @ 10:59)  Source: .Blood Blood  Final Report (09-03-21 @ 11:00):    No growth at 5 days.    Culture - Blood (collected 08-28-21 @ 05:42)  Source: .Blood Blood-Peripheral  Final Report (09-02-21 @ 07:00):    No growth at 5 days.    Culture - Sputum (collected 08-27-21 @ 22:07)  Source: .Sputum Sputum  Gram Stain (08-27-21 @ 23:51):    Moderate polymorphonuclear leukocytes per low power field    No Squamous epithelial cells per low power field    Few Gram Positive Rods per oil power field    Rare Gram positive cocci in pairs per oil power field  Final Report (08-29-21 @ 18:08):    Normal Respiratory Le present      Procalcitonin, Serum: 0.14 ng/mL (09-04-21 @ 06:31)  Procalcitonin, Serum: 0.19 ng/mL (09-02-21 @ 08:54)  Procalcitonin, Serum: 0.24 ng/mL (09-01-21 @ 04:38)    SARS-CoV-2: NotDetec (08-21-21 @ 21:01)  Rapid RVP Result: NotDetec (08-21-21 @ 21:01)    COVID-19 PCR: NotDetec (09-03-21 @ 06:58)  COVID-19 PCR: NotDetec (08-28-21 @ 03:51)        < from: CT Head No Cont (09.03.21 @ 12:06) >     EXAM:  CT CERVICAL SPINE                         EXAM:  CT BRAIN                          PROCEDURE DATE:  09/03/2021          INTERPRETATION:  CLINICAL Indications:  fall out of bed    COMPARISON: CT head dated 8/23/2021. MRI brain dated 8/26/2020    TECHNIQUE: Noncontrast CT of the head. Multiplanar reformations are submitted.    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 No acute intracranial hemorrhage.  There is no compelling evidence for an acute transcortical infarction. There is no evidence of mass, mass effect, midline shift or extra-axial fluid collection. The lateral ventricles and cortical sulci are age-appropriate in size and configuration. Right-sided NG tube. Mild inflammatory mucosal changes are seen throughout the various portions of the paranasal sinuses. The orbits and mastoid air cells are unremarkable. The calvarium is intact. Consider follow-up CT or MRI as clinically warranted.          ============================    CLINICAL INDICATIONS: fall out of bed    COMPARISON: None.    TECHNIQUE: Noncontrast CT of the cervical spine. Multiple contiguous axial images through the cervical spine as well as multiplanar reformatted images are submitted for interpretation without the administration of intravenous contrast. 3-D images.    FINDINGS:  There is no evidence for acute fracture. Moderate leftward curvature to the mid cervical spine likely on the basis of patient positioning. A normal lordosis is noted. Craniocervical junction is normal. The cervicovertebral body heights and intervertebral disc spaces are preserved. There is no prevertebral soft tissue abnormality. The odontoid process is intact. The spinal canal and foramen are widely patent. There is no evidence of significant disc herniation. Thyroid gland is markedly. The airway is patent. The upper lung apices are unremarkable.    IMPRESSION:    CT head: . No acute intracranial hemorrhage.    CT C-spine: No acute cervical spine fracture. Consider MRI as clinically warranted.    --- End of Report ---            LUIGI JOHN MD; Attending Radiologist  This document has been electronically signed. Sep  3 2021 12:35PM    < end of copied text >

## 2021-09-09 NOTE — PROGRESS NOTE ADULT - ASSESSMENT
This 47 y/o M (real name: Ildefonso Pierre, : [birth year]) with a h/o HTN, DM, asthma, presents to the ED after being found unresponsive in bed by his sister this evening. Noted to have agonal respirations and emesis coming from mouth. He was found to be hypoxemic in the ED prompting emergent endotracheal intubation and appears to have an extensive right sided pneumonia on CXR. The patient's sister, Adela, reports that he had recently broken up with his girlfriend and has been having a hard time coping with it and has been noticeably depressed. Within the past 5 days he was taken to the ED at [facility] for some unclear intoxication. Adela also reports recently noticing a plethora of newly prescribed medications at his house and was able to identify clonazepam and alprazolam. Tonight he had told her that he was going out with friends and asked if she would come over and watch his dog, which she thought seemed unusual. Urine toxicology (+) for benzos and cocaine. CT head reveals symmetric low attenuation lesions in the bilateral globus pallidus regions of the basal ganglia, which can be seen with hypoxic ischemic encephalopathy or related to carbon monoxide positioning, as well as indeterminate areas of low attenuation in the bilateral cerebellum may reflect infarcts. He is sedated and now with progressive hypotension despite 3L IVF bolus. Started on IV vasopressor therapy. (21 Aug 2021 21:54)     Patient with anoxic brain injury, attributed to benzo / cocaine overdose,, being followed by Neurology. Patient on IV antibiotics for presumed aspiration PNA. ECHO was done and significant for LVEF of <20. Per Cardiology, patient to have ischemic evaluation if meaningful neurological recovery. Patient also noted to have rhabdo/GARRETT which has now normalized. Trach was tentatively scheduled for . Patient with improvement in mental status and was extubated on . Patient was transitioned from HFNC and now on 5L NC. Patient was downgraded to Medicine service on 2021.    he is being treated for Acute hypoxemic respiratory failure 2/2 aspiration PNA, with a course antibiotics:  Vanco , , -   Zosyn  -     last febrile  at 100.8.  This AM, he still has some low grade temp of 100.   ID was consulted for this.   He provides no history, but can vocalize some words.       Impression:  - anoxic brain injury  - drug overdose  - aspiration PNA  - fevers  - WBC elevation        Plan:  - he has been on Zosyn, vanco, then merrem since ; - EXTENEDED a few times  - CXR shows improvement on infiltrates   - stable   now off MERREM on 21     ALL CULTURES NEGATIVE  NO CLEAR SOURCE OF INFECTION     Procalcitonin is coming down    WBC elevation is reactive; NORMALIZED  WBC Count: 9.94 K/uL (21 @ 08:05)  WBC Count: 12.28 K/uL (21 @ 06:33)  WBC Count: 16.93 K/uL (21 @ 17:28)  WBC Count: 12.93 K/uL (21 @ 04:07)  - will follow and trend          CONTINUE to watch.       SUGGEST PETE to workup echodensity.   blood cultures have been done on 9/8/21 x 2; will follow

## 2021-09-09 NOTE — OCCUPATIONAL THERAPY INITIAL EVALUATION ADULT - LEVEL OF INDEPENDENCE: DRESS LOWER BODY, OT EVAL
to don socks seated at the edge of the bed/minimum assist (75% patients effort)/moderate assist (50% patients effort)

## 2021-09-09 NOTE — OCCUPATIONAL THERAPY INITIAL EVALUATION ADULT - PLANNED THERAPY INTERVENTIONS, OT EVAL
ADL retraining/balance training/bed mobility training/cognitive, visual perceptual/motor coordination training/neuromuscular re-education/strengthening/transfer training

## 2021-09-09 NOTE — PROGRESS NOTE ADULT - SUBJECTIVE AND OBJECTIVE BOX
Patient seen this morning. Overnight he was ambulating to the bathroom with the aide when he felt his legs grow weak. He was lowered to the floor.     REVIEW OF SYSTEMS  Constitutional - No fever,  + fatigue  HEENT - No vertigo, No neck pain  Neurological - No headaches, + memory loss, + loss of strength, No numbness, No tremors  Musculoskeletal - No joint pain, No joint swelling, No muscle pain  Psychiatric - No depression, No anxiety  All other systems were reviewed and were negative.    FUNCTIONAL PROGRESS    Bed Mobility  Bed Mobility Training Sit-to-Supine: minimum assist (75% patient effort)  Bed Mobility Training Supine-to-Sit: minimum assist (75% patient effort)    Sit-Stand Transfer Training  Transfer Training Sit-to-Stand Transfer: minimum assist (75% patient effort)  Transfer Training Stand-to-Sit Transfer: minimum assist (75% patient effort)    Gait Training  Gait Trainin'x2 RW Larry   Gait Analysis: unsteadiness c turns requiring assist for safety, decreased mike step length, narrow base of support, verbal cues throughout for sequencing    Therapeutic Exercise  Therapeutic Exercise Detail: LAQ and ankle pumps     VITALS  T(C): 36.4 (21 @ 05:17), Max: 37.1 (21 @ 16:08)  HR: 108 (21 @ 05:17) (104 - 116)  BP: 113/72 (21 @ 05:17) (101/70 - 114/80)  RR: 18 (21 @ 05:17) (18 - 18)  SpO2: 99% (21 @ 05:17) (94% - 99%)  Wt(kg): --    MEDICATIONS   ALBUTerol    90 MICROgram(s) HFA Inhaler 2 Puff(s) every 6 hours PRN  aspirin  chewable 81 milliGRAM(s) daily  diVALproex  milliGRAM(s) <User Schedule>  diVALproex  milliGRAM(s) <User Schedule>  doxazosin 2 milliGRAM(s) at bedtime  heparin   Injectable 5000 Unit(s) every 8 hours  melatonin 5 milliGRAM(s) at bedtime  thyroid 30 milliGRAM(s) daily      RECENT LABS/IMAGING                          14.0   6.94  )-----------( 783      ( 09 Sep 2021 07:25 )             40.6         134<L>  |  100  |  22.4<H>  ----------------------------<  94  3.8   |  21.0<L>  |  0.87    Ca    9.1      09 Sep 2021 07:25  Phos  3.1     -  Mg     2.2     -      CT Head : Symmetric low attenuation lesions in the bilateral globus pallidus regions of the basal ganglia, which can be seen with hypoxic ischemic encephalopathy or related to carbon monoxide positioning.  Indeterminate areas of low attenuation in the bilateral cerebellum may reflect infarcts.  MRI is recommended for further evaluation.    CXR : Bilateral infiltrates right greater than left somewhat improved on the latest film. Tubes in place as above.    CT Head : Slightly increased prominence of bilateral globus pallidus hypodense foci, likely representing hypoxic-ischemic injury. Again noted multiple likely small acute infarcts involving the bilateral cerebral and cerebellar hemispheres. No acute intracranial hemorrhage.    CXR : No interval change.    US Duplex Venous Lower Ext Ltd, Right : No evidence of right lower extremity deep venous thrombosis.    US Duplex Arterial Upper Ext Ltd, Left  : Normal left upper extremity arterial evaluation    MRI Brain : Numerous areas of restricted diffusion in the bilateral cerebral hemispheres, bilateral globus pallidi, left hippocampus and bilateral cerebellar hemispheres suggesting acute infarcts and hypoxic ischemic encephalopathy.    Xray Abdomen : No acute radiographic intra-abdominal findings.    CXR : Residual Perihilar/basilar diffuse airspace disease and/or effusions..    CXR : Bilateral infiltrates somewhat improved. Endotracheal tube removed.    CXR : NG tube at least to stomach.    CXR : NG tube tip in distal stomach. The cardiomediastinal silhouette is within normal limits. Bilateral lung infiltrates are unchanged - right lung more than left. No pleural effusion or pneumothorax.    CT Cervical Spine 9/3: No acute cervical spine fracture.     CT head 9/3: No acute intracranial hemorrhage.    CXR 9/3: Unchanged infiltrates as above.    TTE Echo 9/3:  Summary:   1. Left ventricular ejection fraction, by visual estimation, is 55 to 60%.   2. Normal global left ventricular systolic function.   3. Spectral Doppler shows impaired relaxation pattern of left ventricular myocardial filling (Grade I diastolic dysfunction).   4. There is mild concentric left ventricular hypertrophy.   5. Normal right ventricular size and function.   6. Mild mitral annular calcification.   7. Mobile echogenic foci noted on the ventricular surface of the aortic valve. PETE is recommended for further evaluation.   8. Peak transaortic gradient equals 38.6 mmHg, mean transaortic gradient equals 24.3 mmHg, the calculated aortic valve area equals 1.25 cm² by the continuity equation consistent with moderate aortic stenosis.   9. There is no evidence of pericardial effusion.  10. Compared to a prior study from 21, there is significant improvement in LV and RV function.    ---------------------------------------------------------------------------------------  PHYSICAL EXAM  Constitutional - NAD, Comfortable  HEENT - NCAT  Neck - Supple, No limited ROM  Chest - Breathing comfortably, No wheezing  Cardiovascular - No cyanosis    Abdomen - Soft   Extremities - No C/C/E, No calf tenderness   Neurologic Exam -                    Cognitive - Alert, oriented to person and time.  Difficulty with complex command following. Impaired attention.     Communication - Mild dysarthria      Motor - Grossly 4/5 throughout   Psychiatric - Flat affect, calm  ----------------------------------------------------------------------------------------  ASSESSMENT/PLAN  46yMale with functional deficits after suffering from a hypoxic ischemic brain injury  Hypoxic Brain Injury - Maximize sleep/wake cycles. Limit nighttime interruptions. Avoid sedating medications whenever possible. Maintain hydration and nutrition, particularly protein.  Agitation/restlessness - Depakote 250 mg Q6AM/Q12PM and 500 mg Q8PM started on   Sleep - Melatonin 5mg bedtime  Blood Pressure - Consider discontinuing cardura as the patient's BP runs low and this may have contributed to the event last night  Aortic valve abnormality - Cardiology following. Potential PETE? if family consents   Pain - Tylenol  DVT PPX - SCDs, heparin  Rehab - Patient pending cardiac workup.  Recommend acute inpatient rehabilitation once medically cleared for discharge.    Continue bedside therapy as well as OOB throughout the day with mobilization by staff to maintain cardiopulmonary function and prevention of secondary complications related to debility.        Patient seen this morning, he reports he feels well.      REVIEW OF SYSTEMS  Constitutional - No fever,  + fatigue  HEENT - No vertigo, No neck pain  Neurological - No headaches, + memory loss, + loss of strength, No numbness, No tremors  Musculoskeletal - No joint pain, No joint swelling, No muscle pain  Psychiatric - No depression, No anxiety  All other systems were reviewed and were negative.    FUNCTIONAL PROGRESS    Bed Mobility  Bed Mobility Training Sit-to-Supine: minimum assist (75% patient effort)  Bed Mobility Training Supine-to-Sit: minimum assist (75% patient effort)    Sit-Stand Transfer Training  Transfer Training Sit-to-Stand Transfer: minimum assist (75% patient effort)  Transfer Training Stand-to-Sit Transfer: minimum assist (75% patient effort)    Gait Training  Gait Trainin'x2 RW Larry   Gait Analysis: unsteadiness c turns requiring assist for safety, decreased mike step length, narrow base of support, verbal cues throughout for sequencing    Therapeutic Exercise  Therapeutic Exercise Detail: LAQ and ankle pumps     VITALS  T(C): 36.4 (21 @ 05:17), Max: 37.1 (21 @ 16:08)  HR: 108 (21 @ 05:17) (104 - 116)  BP: 113/72 (21 @ 05:17) (101/70 - 114/80)  RR: 18 (21 @ 05:17) (18 - 18)  SpO2: 99% (21 @ 05:17) (94% - 99%)  Wt(kg): --    MEDICATIONS   ALBUTerol    90 MICROgram(s) HFA Inhaler 2 Puff(s) every 6 hours PRN  aspirin  chewable 81 milliGRAM(s) daily  diVALproex  milliGRAM(s) <User Schedule>  diVALproex  milliGRAM(s) <User Schedule>  doxazosin 2 milliGRAM(s) at bedtime  heparin   Injectable 5000 Unit(s) every 8 hours  melatonin 5 milliGRAM(s) at bedtime  thyroid 30 milliGRAM(s) daily      RECENT LABS/IMAGING                          14.0   6.94  )-----------( 783      ( 09 Sep 2021 07:25 )             40.6         134<L>  |  100  |  22.4<H>  ----------------------------<  94  3.8   |  21.0<L>  |  0.87    Ca    9.1      09 Sep 2021 07:25  Phos  3.1     -  Mg     2.2           CT Head : Symmetric low attenuation lesions in the bilateral globus pallidus regions of the basal ganglia, which can be seen with hypoxic ischemic encephalopathy or related to carbon monoxide positioning.  Indeterminate areas of low attenuation in the bilateral cerebellum may reflect infarcts.  MRI is recommended for further evaluation.    CXR : Bilateral infiltrates right greater than left somewhat improved on the latest film. Tubes in place as above.    CT Head : Slightly increased prominence of bilateral globus pallidus hypodense foci, likely representing hypoxic-ischemic injury. Again noted multiple likely small acute infarcts involving the bilateral cerebral and cerebellar hemispheres. No acute intracranial hemorrhage.    CXR : No interval change.    US Duplex Venous Lower Ext Ltd, Right : No evidence of right lower extremity deep venous thrombosis.    US Duplex Arterial Upper Ext Ltd, Left  : Normal left upper extremity arterial evaluation    MRI Brain : Numerous areas of restricted diffusion in the bilateral cerebral hemispheres, bilateral globus pallidi, left hippocampus and bilateral cerebellar hemispheres suggesting acute infarcts and hypoxic ischemic encephalopathy.    Xray Abdomen : No acute radiographic intra-abdominal findings.    CXR : Residual Perihilar/basilar diffuse airspace disease and/or effusions..    CXR : Bilateral infiltrates somewhat improved. Endotracheal tube removed.    CXR : NG tube at least to stomach.    CXR : NG tube tip in distal stomach. The cardiomediastinal silhouette is within normal limits. Bilateral lung infiltrates are unchanged - right lung more than left. No pleural effusion or pneumothorax.    CT Cervical Spine 9/3: No acute cervical spine fracture.     CT head 9/3: No acute intracranial hemorrhage.    CXR 9/3: Unchanged infiltrates as above.    TTE Echo 9/3:  Summary:   1. Left ventricular ejection fraction, by visual estimation, is 55 to 60%.   2. Normal global left ventricular systolic function.   3. Spectral Doppler shows impaired relaxation pattern of left ventricular myocardial filling (Grade I diastolic dysfunction).   4. There is mild concentric left ventricular hypertrophy.   5. Normal right ventricular size and function.   6. Mild mitral annular calcification.   7. Mobile echogenic foci noted on the ventricular surface of the aortic valve. PETE is recommended for further evaluation.   8. Peak transaortic gradient equals 38.6 mmHg, mean transaortic gradient equals 24.3 mmHg, the calculated aortic valve area equals 1.25 cm² by the continuity equation consistent with moderate aortic stenosis.   9. There is no evidence of pericardial effusion.  10. Compared to a prior study from 21, there is significant improvement in LV and RV function.    ---------------------------------------------------------------------------------------  PHYSICAL EXAM  Constitutional - NAD, Comfortable  HEENT - NCAT  Neck - Supple, No limited ROM  Chest - Breathing comfortably, No wheezing  Cardiovascular - No cyanosis    Abdomen - Soft   Extremities - No C/C/E, No calf tenderness   Neurologic Exam -                    Cognitive - Alert, oriented to person and time.  Difficulty with complex command following. Impaired attention.     Communication - Mild dysarthria      Motor - Grossly 4/5 throughout   Psychiatric - Flat affect, calm  ----------------------------------------------------------------------------------------  ASSESSMENT/PLAN  46yMale with functional deficits after suffering from a hypoxic ischemic brain injury  Hypoxic Brain Injury - Maximize sleep/wake cycles. Limit nighttime interruptions. Avoid sedating medications whenever possible. Maintain hydration and nutrition, particularly protein.  Agitation/restlessness - Depakote 250 mg Q6AM/Q12PM and 500 mg Q8PM started on   Sleep - Melatonin 5mg bedtime  Blood Pressure - Consider discontinuing cardura as the patient's BP runs low and this may have contributed to the event last night, if cardiology agrees   Aortic valve abnormality - Cardiology following. Potential PETE? if family consents   Pain - Tylenol  DVT PPX - SCDs, heparin  Rehab - Patient pending cardiac workup.  Recommend acute inpatient rehabilitation once medically cleared for discharge.    Continue bedside therapy as well as OOB throughout the day with mobilization by staff to maintain cardiopulmonary function and prevention of secondary complications related to debility.

## 2021-09-10 PROCEDURE — 99233 SBSQ HOSP IP/OBS HIGH 50: CPT | Mod: GC

## 2021-09-10 PROCEDURE — 99232 SBSQ HOSP IP/OBS MODERATE 35: CPT

## 2021-09-10 RX ORDER — ATORVASTATIN CALCIUM 80 MG/1
20 TABLET, FILM COATED ORAL AT BEDTIME
Refills: 0 | Status: DISCONTINUED | OUTPATIENT
Start: 2021-09-10 | End: 2021-09-23

## 2021-09-10 RX ORDER — DEXTROAMPHETAMINE SACCHARATE, AMPHETAMINE ASPARTATE, DEXTROAMPHETAMINE SULFATE AND AMPHETAMINE SULFATE 1.875; 1.875; 1.875; 1.875 MG/1; MG/1; MG/1; MG/1
15 TABLET ORAL
Refills: 0 | Status: DISCONTINUED | OUTPATIENT
Start: 2021-09-10 | End: 2021-09-15

## 2021-09-10 RX ADMIN — Medication 81 MILLIGRAM(S): at 13:01

## 2021-09-10 RX ADMIN — DIVALPROEX SODIUM 250 MILLIGRAM(S): 500 TABLET, DELAYED RELEASE ORAL at 13:00

## 2021-09-10 RX ADMIN — ATORVASTATIN CALCIUM 20 MILLIGRAM(S): 80 TABLET, FILM COATED ORAL at 21:51

## 2021-09-10 RX ADMIN — HEPARIN SODIUM 5000 UNIT(S): 5000 INJECTION INTRAVENOUS; SUBCUTANEOUS at 13:01

## 2021-09-10 RX ADMIN — Medication 30 MILLIGRAM(S): at 05:28

## 2021-09-10 RX ADMIN — Medication 5 MILLIGRAM(S): at 21:51

## 2021-09-10 RX ADMIN — DIVALPROEX SODIUM 250 MILLIGRAM(S): 500 TABLET, DELAYED RELEASE ORAL at 10:24

## 2021-09-10 RX ADMIN — DIVALPROEX SODIUM 500 MILLIGRAM(S): 500 TABLET, DELAYED RELEASE ORAL at 21:51

## 2021-09-10 RX ADMIN — HEPARIN SODIUM 5000 UNIT(S): 5000 INJECTION INTRAVENOUS; SUBCUTANEOUS at 05:28

## 2021-09-10 RX ADMIN — HEPARIN SODIUM 5000 UNIT(S): 5000 INJECTION INTRAVENOUS; SUBCUTANEOUS at 21:51

## 2021-09-10 NOTE — PROGRESS NOTE ADULT - SUBJECTIVE AND OBJECTIVE BOX
YESI MIRZA    [id number]    46y      Male    INTERVAL HPI/OVERNIGHT EVENTS:    off service note  Patient is a 47 yo M w/ PMH of DM2, HTN, Hyothyroid, Asthma, ADHD who presented after being found unresponsive. Admitted to MICU for possible benzo/cocaine overdose and aspiration PNA. Patient was intubated and started on vasopressors. Patient with anoxic brain injury, being followed by Neurology. Patient on IV antibiotics for presumed aspiration PNA. ECHO was done and significant for LVEF of <20. Per Cardiology, patient to have ischemic evaluation if meaningful neurological recovery. Patient also noted to have rhabdo/GARRETT which has now normalized. Trach was tentatively scheduled for 9/2. Patient showed improvement in mental status and was extubated on 8/31. Patient was transitioned from HFNC to NC. Patient downgraded to Medicine and seen by ID in consult. Patients medical course complicated by being found on the floor by a step fown nurse and had a ct head and ct c spine which were negative.     Patient placed on 1:1 and restraints and pulled his ng tube multiple times. Patient showed slow mental improvement and had a tte: on 9/3      Summary:   1. Left ventricular ejection fraction, by visual estimation, is 55 to 60%.   2. Normal global left ventricular systolic function.   3. Spectral Doppler shows impaired relaxation pattern of left ventricular myocardial filling (Grade I diastolic dysfunction).   4. There is mild concentric left ventricular hypertrophy.   5. Normal right ventricular size and function.   6. Mild mitral annular calcification.   7. Mobile echogenic foci noted on the ventricular surface of the aortic valve. PETE is recommended for further evaluation.   8. Peak transaortic gradient equals 38.6 mmHg, mean transaortic gradient equals 24.3 mmHg, the calculated aortic valve area equals 1.25 cm² by the continuity equation consistent with moderate aortic stenosis.   9. There is no evidence of pericardial effusion.  10. Compared to a prior study from 8/22/21, there is significant improvement in LV and RV function.    Patient then seen by speech and started on a diet and ng tube removed. Patient also seen by psych.   Patient seen by pmr and recommending AR and cardio recalled for pete eval.     Patient seen at bedside and complains of fatigue      REVIEW OF SYSTEMS:    CONSTITUTIONAL:  fatigue  RESPIRATORY: No cough, wheezing, hemoptysis; No shortness of breath  CARDIOVASCULAR: No chest pain, palpitations  GASTROINTESTINAL: No abdominal or epigastric pain. No nausea, vomiting  NEUROLOGICAL: No headaches, memory loss, loss of strength.  MISCELLANEOUS:      Vital Signs Last 24 Hrs  T(C): 36.7 (10 Sep 2021 10:00), Max: 36.9 (09 Sep 2021 17:50)  T(F): 98 (10 Sep 2021 10:00), Max: 98.5 (09 Sep 2021 17:50)  HR: 102 (10 Sep 2021 10:00) (98 - 107)  BP: 106/75 (10 Sep 2021 10:00) (106/75 - 135/92)  BP(mean): --  RR: 18 (10 Sep 2021 10:00) (18 - 18)  SpO2: 96% (10 Sep 2021 10:00) (95% - 98%)    PHYSICAL EXAM:    CONSTITUTIONAL: NAD, slow to respond but answers all questions   HEENT: NC/AT,  RESPIRATORY: CTA B/L  CARDIOVASCULAR: RRR, S1/S2+, no m  ABDOMEN: Nontender to palpation, normoactive bowel sounds,  MUSCULOSKELETAL: No edema, cyanosis or deformities.  NEUROLOGY: awake, alert, oriented x 2-3. GUILLEN x 4  SKIN: No rashes; no palpable lesions  VASC: Distal pulses palpable    LABS:                        14.0   6.94  )-----------( 783      ( 09 Sep 2021 07:25 )             40.6     09-09    134<L>  |  100  |  22.4<H>  ----------------------------<  94  3.8   |  21.0<L>  |  0.87    Ca    9.1      09 Sep 2021 07:25  Phos  3.1     09-09  Mg     2.2     09-09        MEDICATIONS  (STANDING):  aspirin  chewable 81 milliGRAM(s) Oral daily  diVALproex  milliGRAM(s) Oral <User Schedule>  diVALproex  milliGRAM(s) Oral <User Schedule>  heparin   Injectable 5000 Unit(s) SubCutaneous every 8 hours  melatonin 5 milliGRAM(s) Oral at bedtime  thyroid 30 milliGRAM(s) Oral daily    MEDICATIONS  (PRN):  ALBUTerol    90 MICROgram(s) HFA Inhaler 2 Puff(s) Inhalation every 6 hours PRN Shortness of Breath      RADIOLOGY & ADDITIONAL TESTS:

## 2021-09-10 NOTE — CHART NOTE - NSCHARTNOTEFT_GEN_A_CORE
Source: Patient [ ]  Family [ ]   other [ x]EMR    Current Diet: Mech soft with honey thick liquids      PO intake:  < 50% [ ]   50-75%  [ ]   %  [ ]  other :  60%    Source for PO intake [ ] Patient [ ] family [x ] chart [ ] staff [ ] other    Enteral /Parenteral Nutrition:     Current Weight: 8/22 205.2#, 9/8 182.7#  (9/7) 185.2 lbs   (9/2) 141 lbs  (9/1) 209.6 lbs  (8/30) 233.4 lbs  (8/22) 205.2 lbs  ? accuracy of weights, no edema as per flowsheets, continue to trend and maintain strict Is&Os        % Weight Change     Pertinent Medications: MEDICATIONS  (STANDING):  aspirin  chewable 81 milliGRAM(s) Oral daily  diVALproex  milliGRAM(s) Oral <User Schedule>  diVALproex  milliGRAM(s) Oral <User Schedule>  heparin   Injectable 5000 Unit(s) SubCutaneous every 8 hours  melatonin 5 milliGRAM(s) Oral at bedtime  thyroid 30 milliGRAM(s) Oral daily    MEDICATIONS  (PRN):  ALBUTerol    90 MICROgram(s) HFA Inhaler 2 Puff(s) Inhalation every 6 hours PRN Shortness of Breath    Pertinent Labs: CBC Full  -  ( 09 Sep 2021 07:25 )  WBC Count : 6.94 K/uL  RBC Count : 4.60 M/uL  Hemoglobin : 14.0 g/dL  Hematocrit : 40.6 %  Platelet Count - Automated : 783 K/uL  Mean Cell Volume : 88.3 fl  Mean Cell Hemoglobin : 30.4 pg  Mean Cell Hemoglobin Concentration : 34.5 gm/dL  Auto Neutrophil # : x  Auto Lymphocyte # : x  Auto Monocyte # : x  Auto Eosinophil # : x  Auto Basophil # : x  Auto Neutrophil % : x  Auto Lymphocyte % : x  Auto Monocyte % : x  Auto Eosinophil % : x  Auto Basophil % : x      09-09 Na134 mmol/L<L> Glu 94 mg/dL K+ 3.8 mmol/L Cr  0.87 mg/dL BUN 22.4 mg/dL<H> Phos 3.1 mg/dL Alb n/a   PAB n/a           Skin: right heel DTI    Nutrition focused physical exam conducted - found signs of malnutrition [ ]absent [ x]present    Subcutaneous fat loss: [ x] Orbital fat pads region, [x ]Buccal fat region, [ ]Triceps region,  [ ]Ribs region    Muscle wasting: [x ]Temples region, [x]Clavicle region, [x ]Shoulder region, [ ]Scapula region, [ ]Interosseous region,  [ ]thigh region, [ ]Calf region    Estimated Needs:   [x ] no change since previous assessment  [ ] recalculated:     Current Nutrition Diagnosis: Pt remains at high nutrition risk secondary to Moderate (acute) protein calorie malnutrition related to inability to meet sufficient protein energy requirements in setting of possible benzo/cocaine overdose and aspiration PNA, Anoxic brain injury; recent extubation and dysphagia as evidenced by meeting less then 75% estimated energy requirements > 7 days, and physical signs of mild muscle mass and fat loss. Per nursing staff; pt requiring total assistance with meals, consuming 50-60% at meals noted.    Recommendations:   1. RX: MVI and Vit. C daily (500mg).   2. Check weight daily to monitor trends   3. Total assistance with meals.   4. Ensure Enlive TID (Honey thick TID)   5. Diet consistency per SLP Recommendations        Monitoring and Evaluation:   [x ] PO intake [ x] Tolerance to diet prescription [X] Weights  [X] Follow up per protocol [X] Labs:

## 2021-09-10 NOTE — PROGRESS NOTE ADULT - ASSESSMENT
#Anoxic brain injury 2/2 benzodiazapine overdose  - Utox positive for benzos and cocaine  - MRI brain: significant hypoxic/anoxic injury and strokes   - EEG: Moderate to severe nonspecific diffuse or multifocal cerebral dysfunction. No indication for anti-epileptic medication according to Epilepsy.  - Neuro following  - Dysphagia due to anoxic brain injury.  - Psych consult for depression, substance abuse- conflicting reports about intentional vs unintentional overdose  PMR following,  on  melatonin and depakote  eventual AR after PETE eval     #episode of hypotension/presyncope  - cardura stopped    #fall - ct head and ct c spine negative 9/3    #Acute hypoxemic respiratory failure 2/2 aspiration PNA  Sepsis 2/2 PNA  -speech recommendations appreciated  - S/p Vancomycin. s/p merem   Repeat ECHO shows mobile echodensity-  spoke to Cardio, patient is refusing PETE, will need to get consent from next of kin  - patient never appointed an offical HCP as per aunt  - repeat blood culture sent on 9/8  - family will need to give consent   - sister in [place] - [phone]    #Elevated troponin, new onset systolic CHF  - ECHO reviewed, LVEF of < 20%. severe AS  - C/w ASA  - Statin resumed today   - Ischemic evaluation once meaningful neurological recovery  - Repeat tte shows improvement in ef, however mobile echodensity noted, Cardiology recalled, will need PETE    #GARRETT w/ rhabdomyolysis  - Seen by Nephrology  - Renal function normalized  - Monitor BMP, correct electrolytes as needed    #Hypothyroid  - C/w Yermo    #ADHD  - C/w Adderall  fell off the last few days  - resume     #Acute urinary rentention resolved  - parikh discontinued and urinating well.    Dispo: unclear, needs pete and eventual AR  Advanced Directives: Full code, Palliative following.  likely next week  spoke to aunt Mrs Ang and updated her

## 2021-09-10 NOTE — PROGRESS NOTE ADULT - ASSESSMENT
This 45 y/o M (real name: Ildefonso Pierre, : [birth year]) with a h/o HTN, DM, asthma, presents to the ED after being found unresponsive in bed by his sister this evening. Noted to have agonal respirations and emesis coming from mouth. He was found to be hypoxemic in the ED prompting emergent endotracheal intubation and appears to have an extensive right sided pneumonia on CXR. The patient's sister, Adela, reports that he had recently broken up with his girlfriend and has been having a hard time coping with it and has been noticeably depressed. Within the past 5 days he was taken to the ED at [facility] for some unclear intoxication. Adela also reports recently noticing a plethora of newly prescribed medications at his house and was able to identify clonazepam and alprazolam. Tonight he had told her that he was going out with friends and asked if she would come over and watch his dog, which she thought seemed unusual. Urine toxicology (+) for benzos and cocaine. CT head reveals symmetric low attenuation lesions in the bilateral globus pallidus regions of the basal ganglia, which can be seen with hypoxic ischemic encephalopathy or related to carbon monoxide positioning, as well as indeterminate areas of low attenuation in the bilateral cerebellum may reflect infarcts. He is sedated and now with progressive hypotension despite 3L IVF bolus. Started on IV vasopressor therapy. (21 Aug 2021 21:54)     Patient with anoxic brain injury, attributed to benzo / cocaine overdose,, being followed by Neurology. Patient on IV antibiotics for presumed aspiration PNA. ECHO was done and significant for LVEF of <20. Per Cardiology, patient to have ischemic evaluation if meaningful neurological recovery. Patient also noted to have rhabdo/GARRETT which has now normalized. Trach was tentatively scheduled for . Patient with improvement in mental status and was extubated on . Patient was transitioned from HFNC and now on 5L NC. Patient was downgraded to Medicine service on 2021.    he is being treated for Acute hypoxemic respiratory failure 2/2 aspiration PNA, with a course antibiotics:  Vanco , , -   Zosyn  -     last febrile  at 100.8.  This AM, he still has some low grade temp of 100.   ID was consulted for this.   He provides no history, but can vocalize some words.       Impression:  - anoxic brain injury  - drug overdose  - aspiration PNA  - fevers  - WBC elevation        Plan:  - he has been on Zosyn, vanco, then merrem since ; - EXTENEDED a few times  - CXR shows improvement on infiltrates   - off MERREM on 21   REMAINS STABLE      ALL CULTURES NEGATIVE  NO CLEAR SOURCE OF INFECTION  repeat cultures sent on 21 due to PETE with unspecified echo density.    PENDING PETE to workup echodensity.       WBC elevation is reactive; REMAINS NORMALIZED  WBC Count: 6.94 K/uL (21 @ 07:25)  WBC Count: 8.41 K/uL (21 @ 07:14)  CONTINUE to watch.         PLEASE CALL ME BACK WHEN PETE HAS BEEN DONE WITH RESULTS.     WILL SIGN OFF FOR NOW.

## 2021-09-10 NOTE — PROGRESS NOTE ADULT - SUBJECTIVE AND OBJECTIVE BOX
[facility]  INFECTIOUS DISEASES  at [place]  =======================================================  Jaime Gudino MD  Diplomates American Board of Internal Medicine and Infectious Diseases  Tel  [phone]  Fax [phone]  =======================================================    [facility]-[id number]  YESI MIRZA  follow up:  fevers - resolved    called back by medical team on 9/9/21 for echodensity on TTE     PETE is pending  no new events  cultures x 2 from blood sent from 9.8.21        I have personally reviewed the labs and data; pertinent labs and data are listed in this note; please see below.   =======================================================  Past Medical & Surgical Hx:  =====================  PAST MEDICAL & SURGICAL HISTORY:  No pertinent past medical history  Asthma  HTN (hypertension)  DM (diabetes mellitus)  S/P elbow joint replacement  right elbow surgery.  S/P knee surgery right thigh surgery.      Problem List:  ==========  HEALTH ISSUES - PROBLEM Dx:  Acute hypoxemic respiratory failure  Drug overdose  HTN (hypertension)  Asthma  DM (diabetes mellitus)  Anoxic brain injury  CHF (congestive heart failure)  Pneumonia, aspiration      Social Hx:  =======  no toxic habits currently    FAMILY HISTORY:  no significant family history of immunosuppressive disorders in mother or father   =======================================================    REVIEW OF SYSTEMS:  Limited due to medical condition      =======================================================  Allergies  Allergy Status Unknown  No Known Allergies     ======================================================  Physical Exam:  ============     General:  No acute distress.  awake  Eye: Pupils are equal, round and reactive to light, Extraocular movements are intact, Normal conjunctiva.  HENT: Normocephalic,    Neck: Supple, No lymphadenopathy.  Respiratory: Lungs with fair right breath sounds  Cardiovascular: Normal rate, Regular rhythm,   Gastrointestinal: Soft,  Non-distended, Normal bowel sounds.  Genitourinary: No costovertebral angle tenderness.  + CONDOM CATHETER   Lymphatics: No lymphadenopathy neck,   Musculoskeletal: Normal range of motion, Normal strength.  Integumentary: No rash.  Neurologic: awake, able to answer questions, follow some commands    =======================================================  Vitals:  ============  T(F): 98 (10 Sep 2021 10:00), Max: 98.5 (09 Sep 2021 17:50)  HR: 102 (10 Sep 2021 10:00)  BP: 106/75 (10 Sep 2021 10:00)  RR: 18 (10 Sep 2021 10:00)  SpO2: 96% (10 Sep 2021 10:00) (95% - 98%)  temp max in last 48H T(F): , Max: 98.8 (09-08-21 @ 20:53)    =======================================================  Current Antibiotics:    Other medications:  amphetamine/dextroamphetamine XR 15 milliGRAM(s) Oral with breakfast  aspirin  chewable 81 milliGRAM(s) Oral daily  atorvastatin 20 milliGRAM(s) Oral at bedtime  diVALproex  milliGRAM(s) Oral <User Schedule>  diVALproex  milliGRAM(s) Oral <User Schedule>  heparin   Injectable 5000 Unit(s) SubCutaneous every 8 hours  melatonin 5 milliGRAM(s) Oral at bedtime  thyroid 30 milliGRAM(s) Oral daily      =======================================================  Labs:                        14.0   6.94  )-----------( 783      ( 09 Sep 2021 07:25 )             40.6     09-09    134<L>  |  100  |  22.4<H>  ----------------------------<  94  3.8   |  21.0<L>  |  0.87    Ca    9.1      09 Sep 2021 07:25  Phos  3.1     09-09  Mg     2.2     09-09        Culture - Blood (collected 09-03-21 @ 17:29)  Source: .Blood Blood-Peripheral  Final Report (09-08-21 @ 19:01):    No growth at 5 days.    Culture - Blood (collected 09-03-21 @ 17:28)  Source: .Blood Blood-Peripheral  Final Report (09-08-21 @ 19:00):    No growth at 5 days.    Culture - Sputum (collected 09-01-21 @ 03:59)  Source: .Sputum Sputum  Gram Stain (09-01-21 @ 07:44):    Few polymorphonuclear leukocytes per low power field    Moderate Squamous epithelial cells per low power field    Few Gram positive cocci in pairs seen per oil power field  Final Report (09-03-21 @ 11:26):    Normal Respiratory Le present    Culture - Blood (collected 08-29-21 @ 10:59)  Source: .Blood Blood  Final Report (09-03-21 @ 11:00):    No growth at 5 days.    Culture - Blood (collected 08-28-21 @ 05:42)  Source: .Blood Blood-Peripheral  Final Report (09-02-21 @ 07:00):    No growth at 5 days.    Culture - Sputum (collected 08-27-21 @ 22:07)  Source: .Sputum Sputum  Gram Stain (08-27-21 @ 23:51):    Moderate polymorphonuclear leukocytes per low power field    No Squamous epithelial cells per low power field    Few Gram Positive Rods per oil power field    Rare Gram positive cocci in pairs per oil power field  Final Report (08-29-21 @ 18:08):    Normal Respiratory Le present         Procalcitonin, Serum: 0.14 ng/mL (09-04-21 @ 06:31)  Procalcitonin, Serum: 0.19 ng/mL (09-02-21 @ 08:54)  Procalcitonin, Serum: 0.24 ng/mL (09-01-21 @ 04:38)    SARS-CoV-2: NotDetec (08-21-21 @ 21:01)  Rapid RVP Result: NotDetec (08-21-21 @ 21:01)    COVID-19 PCR: NotDetec (09-09-21 @ 14:07)  COVID-19 PCR: NotDetec (09-03-21 @ 06:58)  COVID-19 PCR: NotDetec (08-28-21 @ 03:51)        < from: CT Head No Cont (09.03.21 @ 12:06) >     EXAM:  CT CERVICAL SPINE                         EXAM:  CT BRAIN                          PROCEDURE DATE:  09/03/2021          INTERPRETATION:  CLINICAL Indications:  fall out of bed    COMPARISON: CT head dated 8/23/2021. MRI brain dated 8/26/2020    TECHNIQUE: Noncontrast CT of the head. Multiplanar reformations are submitted.    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 No acute intracranial hemorrhage.  There is no compelling evidence for an acute transcortical infarction. There is no evidence of mass, mass effect, midline shift or extra-axial fluid collection. The lateral ventricles and cortical sulci are age-appropriate in size and configuration. Right-sided NG tube. Mild inflammatory mucosal changes are seen throughout the various portions of the paranasal sinuses. The orbits and mastoid air cells are unremarkable. The calvarium is intact. Consider follow-up CT or MRI as clinically warranted.          ============================    CLINICAL INDICATIONS: fall out of bed    COMPARISON: None.    TECHNIQUE: Noncontrast CT of the cervical spine. Multiple contiguous axial images through the cervical spine as well as multiplanar reformatted images are submitted for interpretation without the administration of intravenous contrast. 3-D images.    FINDINGS:  There is no evidence for acute fracture. Moderate leftward curvature to the mid cervical spine likely on the basis of patient positioning. A normal lordosis is noted. Craniocervical junction is normal. The cervicovertebral body heights and intervertebral disc spaces are preserved. There is no prevertebral soft tissue abnormality. The odontoid process is intact. The spinal canal and foramen are widely patent. There is no evidence of significant disc herniation. Thyroid gland is markedly. The airway is patent. The upper lung apices are unremarkable.    IMPRESSION:    CT head: . No acute intracranial hemorrhage.    CT C-spine: No acute cervical spine fracture. Consider MRI as clinically warranted.    --- End of Report ---            LUIGI JOHN MD; Attending Radiologist  This document has been electronically signed. Sep  3 2021 12:35PM    < end of copied text >

## 2021-09-11 LAB
ALBUMIN SERPL ELPH-MCNC: 3.4 G/DL — SIGNIFICANT CHANGE UP (ref 3.3–5.2)
ALP SERPL-CCNC: 74 U/L — SIGNIFICANT CHANGE UP (ref 40–120)
ALT FLD-CCNC: 22 U/L — SIGNIFICANT CHANGE UP
ANION GAP SERPL CALC-SCNC: 16 MMOL/L — SIGNIFICANT CHANGE UP (ref 5–17)
AST SERPL-CCNC: 23 U/L — SIGNIFICANT CHANGE UP
BILIRUB SERPL-MCNC: 0.6 MG/DL — SIGNIFICANT CHANGE UP (ref 0.4–2)
BUN SERPL-MCNC: 16.7 MG/DL — SIGNIFICANT CHANGE UP (ref 8–20)
CALCIUM SERPL-MCNC: 9 MG/DL — SIGNIFICANT CHANGE UP (ref 8.6–10.2)
CHLORIDE SERPL-SCNC: 98 MMOL/L — SIGNIFICANT CHANGE UP (ref 98–107)
CO2 SERPL-SCNC: 22 MMOL/L — SIGNIFICANT CHANGE UP (ref 22–29)
CREAT SERPL-MCNC: 0.9 MG/DL — SIGNIFICANT CHANGE UP (ref 0.5–1.3)
GLUCOSE SERPL-MCNC: 88 MG/DL — SIGNIFICANT CHANGE UP (ref 70–99)
MAGNESIUM SERPL-MCNC: 2 MG/DL — SIGNIFICANT CHANGE UP (ref 1.6–2.6)
POTASSIUM SERPL-MCNC: 4 MMOL/L — SIGNIFICANT CHANGE UP (ref 3.5–5.3)
POTASSIUM SERPL-SCNC: 4 MMOL/L — SIGNIFICANT CHANGE UP (ref 3.5–5.3)
PROT SERPL-MCNC: 8 G/DL — SIGNIFICANT CHANGE UP (ref 6.6–8.7)
SARS-COV-2 RNA SPEC QL NAA+PROBE: SIGNIFICANT CHANGE UP
SODIUM SERPL-SCNC: 136 MMOL/L — SIGNIFICANT CHANGE UP (ref 135–145)

## 2021-09-11 PROCEDURE — 99233 SBSQ HOSP IP/OBS HIGH 50: CPT

## 2021-09-11 RX ADMIN — DIVALPROEX SODIUM 250 MILLIGRAM(S): 500 TABLET, DELAYED RELEASE ORAL at 10:27

## 2021-09-11 RX ADMIN — HEPARIN SODIUM 5000 UNIT(S): 5000 INJECTION INTRAVENOUS; SUBCUTANEOUS at 21:43

## 2021-09-11 RX ADMIN — Medication 81 MILLIGRAM(S): at 12:52

## 2021-09-11 RX ADMIN — HEPARIN SODIUM 5000 UNIT(S): 5000 INJECTION INTRAVENOUS; SUBCUTANEOUS at 05:58

## 2021-09-11 RX ADMIN — Medication 30 MILLIGRAM(S): at 05:58

## 2021-09-11 RX ADMIN — HEPARIN SODIUM 5000 UNIT(S): 5000 INJECTION INTRAVENOUS; SUBCUTANEOUS at 15:18

## 2021-09-11 RX ADMIN — DIVALPROEX SODIUM 500 MILLIGRAM(S): 500 TABLET, DELAYED RELEASE ORAL at 21:43

## 2021-09-11 RX ADMIN — Medication 5 MILLIGRAM(S): at 21:43

## 2021-09-11 RX ADMIN — ATORVASTATIN CALCIUM 20 MILLIGRAM(S): 80 TABLET, FILM COATED ORAL at 21:43

## 2021-09-11 RX ADMIN — DIVALPROEX SODIUM 250 MILLIGRAM(S): 500 TABLET, DELAYED RELEASE ORAL at 15:18

## 2021-09-11 NOTE — PROGRESS NOTE ADULT - SUBJECTIVE AND OBJECTIVE BOX
YESI MIRZA  ----------------------------------------  The patient was seen at bedside. Patient with anoxic brain injury and pneumonia. Offers no complaints. Denied dyspnea, cough, or chest pain.    Vital Signs Last 24 Hrs  T(C): 36.9 (11 Sep 2021 08:38), Max: 37.1 (10 Sep 2021 16:10)  T(F): 98.5 (11 Sep 2021 08:38), Max: 98.8 (10 Sep 2021 16:10)  HR: 99 (11 Sep 2021 08:38) (99 - 102)  BP: 107/75 (11 Sep 2021 08:38) (106/75 - 135/78)  BP(mean): --  RR: 18 (11 Sep 2021 08:38) (18 - 18)  SpO2: 98% (11 Sep 2021 08:38) (95% - 99%)    PHYSICAL EXAMINATION:  ----------------------------------------  General appearance: No acute distress, Awake, Alert  HEENT: Normocephalic, Atraumatic, Conjunctiva clear, EOMI  Neck: Supple, No JVD, No tenderness  Lungs: Breath sound equal bilaterally, No wheezes, No rales  Cardiovascular: S1S2, Regular rhythm  Abdomen: Soft, Nontender, Nondistended, No guarding/rebound, Positive bowel sounds  Extremities: No clubbing, No cyanosis, No edema, No calf tenderness  Neuro: Strength equal bilaterally, No tremors  Psychiatric: Appears withdrawn, Slow to answer questions    LABORATORY STUDIES:  ----------------------------------------  Culture - Blood (collected 08 Sep 2021 15:34)  Source: .Blood Blood  Preliminary Report (10 Sep 2021 17:00):    No growth at 48 hours    Culture - Blood (collected 08 Sep 2021 15:33)  Source: .Blood Blood  Preliminary Report (10 Sep 2021 17:00):    No growth at 48 hours    MEDICATIONS  (STANDING):  amphetamine/dextroamphetamine XR 15 milliGRAM(s) Oral with breakfast  aspirin  chewable 81 milliGRAM(s) Oral daily  atorvastatin 20 milliGRAM(s) Oral at bedtime  diVALproex  milliGRAM(s) Oral <User Schedule>  diVALproex  milliGRAM(s) Oral <User Schedule>  heparin   Injectable 5000 Unit(s) SubCutaneous every 8 hours  melatonin 5 milliGRAM(s) Oral at bedtime  thyroid 30 milliGRAM(s) Oral daily    MEDICATIONS  (PRN):  ALBUTerol    90 MICROgram(s) HFA Inhaler 2 Puff(s) Inhalation every 6 hours PRN Shortness of Breath      ASSESSMENT / PLAN:  ----------------------------------------  Patient is a 47 yo M w/ PMH of DM2, HTN, Hyothyroid, Asthma, ADHD who presented after being found unresponsive. Admitted to MICU for possible benzo/cocaine overdose and aspiration PNA. Patient was intubated and started on vasopressors. Patient with anoxic brain injury, being followed by Neurology. Patient on IV antibiotics for presumed aspiration PNA. ECHO was done and significant for LVEF of <20. Per Cardiology, patient to have ischemic evaluation if meaningful neurological recovery. Patient also noted to have rhabdo/GARRETT which has now normalized. Trach was tentatively scheduled for 9/2. Patient showed improvement in mental status and was extubated on 8/31. Patient was transitioned from [facility] to NC. Patient downgraded to Medicine and seen by ID in consult. Patients medical course complicated by being found on the floor by a step fown nurse and had a ct head and ct c spine which were negative.   Patient placed on 1:1 and restraints and pulled his ng tube multiple times. Patient showed slow mental improvement and had a tte: on 9/3    Anoxic brain injury - Urine toxicolgy was positive for benzodiazepine and cocaine. Supportive care. MRI of the brain noted numerous areas of restricted diffusion in the bilateral cerebral hemispheres, bilateral globus pallidi, left hippocampus and bilateral cerebellar hemispheres suggesting acute infarcts and hypoxic ischemic encephalopathy. EEG was without seizures. Aspiration precautions.    Pneumonia / Respiratory failure - No hypoxia or dyspnea on examination. Status post vancomycin and meropenem. Blood cultures were without growth. Afebrile    Echodensity noted on echocardiogram - Awaiting transesophageal echocardiogram. Blood cultures were without growth.    Acute systolic heart failure - Appears euvolemic. For possible ischemic evaluation is there is meaningful neurologic recovery. On aspirin and atorvastatin.    Acute kidney injury / Rhabdomyolysis - CPK levels improved on repeat studies and renal function improved. Urinary retention resolved and Arguello catheter was previously removed.    Hypothyroidism - On [place] thyroid.    ADHD - On Adderall.

## 2021-09-12 LAB
ANION GAP SERPL CALC-SCNC: 14 MMOL/L — SIGNIFICANT CHANGE UP (ref 5–17)
BUN SERPL-MCNC: 17.7 MG/DL — SIGNIFICANT CHANGE UP (ref 8–20)
CALCIUM SERPL-MCNC: 8.8 MG/DL — SIGNIFICANT CHANGE UP (ref 8.6–10.2)
CHLORIDE SERPL-SCNC: 102 MMOL/L — SIGNIFICANT CHANGE UP (ref 98–107)
CK SERPL-CCNC: 82 U/L — SIGNIFICANT CHANGE UP (ref 30–200)
CO2 SERPL-SCNC: 22 MMOL/L — SIGNIFICANT CHANGE UP (ref 22–29)
CREAT SERPL-MCNC: 0.88 MG/DL — SIGNIFICANT CHANGE UP (ref 0.5–1.3)
GLUCOSE SERPL-MCNC: 94 MG/DL — SIGNIFICANT CHANGE UP (ref 70–99)
HCT VFR BLD CALC: 42.3 % — SIGNIFICANT CHANGE UP (ref 39–50)
HGB BLD-MCNC: 14.5 G/DL — SIGNIFICANT CHANGE UP (ref 13–17)
MCHC RBC-ENTMCNC: 30.9 PG — SIGNIFICANT CHANGE UP (ref 27–34)
MCHC RBC-ENTMCNC: 34.3 GM/DL — SIGNIFICANT CHANGE UP (ref 32–36)
MCV RBC AUTO: 90.2 FL — SIGNIFICANT CHANGE UP (ref 80–100)
PLATELET # BLD AUTO: 621 K/UL — HIGH (ref 150–400)
POTASSIUM SERPL-MCNC: 4.4 MMOL/L — SIGNIFICANT CHANGE UP (ref 3.5–5.3)
POTASSIUM SERPL-SCNC: 4.4 MMOL/L — SIGNIFICANT CHANGE UP (ref 3.5–5.3)
RBC # BLD: 4.69 M/UL — SIGNIFICANT CHANGE UP (ref 4.2–5.8)
RBC # FLD: 12.8 % — SIGNIFICANT CHANGE UP (ref 10.3–14.5)
SODIUM SERPL-SCNC: 138 MMOL/L — SIGNIFICANT CHANGE UP (ref 135–145)
WBC # BLD: 7.75 K/UL — SIGNIFICANT CHANGE UP (ref 3.8–10.5)
WBC # FLD AUTO: 7.75 K/UL — SIGNIFICANT CHANGE UP (ref 3.8–10.5)

## 2021-09-12 PROCEDURE — 99233 SBSQ HOSP IP/OBS HIGH 50: CPT

## 2021-09-12 RX ADMIN — HEPARIN SODIUM 5000 UNIT(S): 5000 INJECTION INTRAVENOUS; SUBCUTANEOUS at 13:14

## 2021-09-12 RX ADMIN — DIVALPROEX SODIUM 500 MILLIGRAM(S): 500 TABLET, DELAYED RELEASE ORAL at 20:18

## 2021-09-12 RX ADMIN — DIVALPROEX SODIUM 250 MILLIGRAM(S): 500 TABLET, DELAYED RELEASE ORAL at 08:35

## 2021-09-12 RX ADMIN — HEPARIN SODIUM 5000 UNIT(S): 5000 INJECTION INTRAVENOUS; SUBCUTANEOUS at 22:19

## 2021-09-12 RX ADMIN — ATORVASTATIN CALCIUM 20 MILLIGRAM(S): 80 TABLET, FILM COATED ORAL at 22:19

## 2021-09-12 RX ADMIN — Medication 81 MILLIGRAM(S): at 13:14

## 2021-09-12 RX ADMIN — DIVALPROEX SODIUM 250 MILLIGRAM(S): 500 TABLET, DELAYED RELEASE ORAL at 13:14

## 2021-09-12 RX ADMIN — Medication 30 MILLIGRAM(S): at 06:19

## 2021-09-12 RX ADMIN — HEPARIN SODIUM 5000 UNIT(S): 5000 INJECTION INTRAVENOUS; SUBCUTANEOUS at 06:18

## 2021-09-12 RX ADMIN — Medication 5 MILLIGRAM(S): at 22:18

## 2021-09-12 NOTE — PROGRESS NOTE ADULT - SUBJECTIVE AND OBJECTIVE BOX
[facility]                                                               Office: [address] [address]                                                              Telephone: [phone]. Fax:[phone]                                                                             PROGRESS NOTE  Reason for follow up: Aortic stenosis, valve lesion  Overnight: No new events.   Update: reconsult for PETE, patient is calm and cooperative, AAOx2-3 (missed date though 2023), denies any discomfort.       Review of symptoms:   Cardiac:  No chest pain. No dyspnea. No palpitations.  Respiratory: No cough. No dyspnea  Gastrointestinal: No diarrhea. No abdominal pain. No bleeding.     Past medical history: No updates.   	  Vital Signs Last 24 Hrs  T(C): 36.8 (09-12-21 @ 16:18), Max: 36.8 (09-12-21 @ 04:58)  T(F): 98.3 (09-12-21 @ 16:18), Max: 98.3 (09-12-21 @ 16:18)  HR: 106 (09-12-21 @ 16:18) (91 - 106)  BP: 108/76 (09-12-21 @ 16:18) (108/76 - 150/86)  BP(mean): --  RR: 18 (09-12-21 @ 16:18) (18 - 20)  SpO2: 96% (09-12-21 @ 16:18) (93% - 99%)  I&O's Summary        PHYSICAL EXAM:  Appearance: Comfortable. No acute distress  HEENT:  Head and neck: Atraumatic. Normocephalic.  Normal oral mucosa, PERRL, Neck is supple. No JVD, No carotid bruit.   Neurologic: A&Ox 2-3  Lymphatic: No cervical lymphadenopathy  Cardiovascular: Normal S1 S2, soft 2/6 systolic murmur, rubs/gallops. No JVD, No edema  Respiratory: Lungs clear to auscultation  Gastrointestinal:  Soft, Non-tender, + BS  Lower Extremities: No edema  Psychiatry: Patient is calm. No agitation. Mood & affect appropriate  Skin: No rashes/ecchymoses/cyanosis/ulcers visualized on the face, hands or feet.      CURRENT MEDICATIONS:  MEDICATIONS  (STANDING):  amphetamine/dextroamphetamine XR 15 milliGRAM(s) Oral with breakfast  aspirin  chewable 81 milliGRAM(s) Oral daily  atorvastatin 20 milliGRAM(s) Oral at bedtime  diVALproex  milliGRAM(s) Oral <User Schedule>  diVALproex  milliGRAM(s) Oral <User Schedule>  heparin   Injectable 5000 Unit(s) SubCutaneous every 8 hours  melatonin 5 milliGRAM(s) Oral at bedtime  thyroid 30 milliGRAM(s) Oral daily    MEDICATIONS  (PRN):  ALBUTerol    90 MICROgram(s) HFA Inhaler 2 Puff(s) Inhalation every 6 hours PRN Shortness of Breath      DIAGNOSTIC TESTING:  [ ] Echocardiogram:   < from: TTE Echo Limited or F/U (09.03.21 @ 13:05) >    Summary:   1. Left ventricular ejection fraction, by visual estimation, is 55 to 60%.   2. Normal global left ventricular systolic function.   3. Spectral Doppler shows impaired relaxation pattern of left ventricular myocardial filling (Grade I diastolic dysfunction).   4. There is mild concentric left ventricular hypertrophy.   5. Normal right ventricular size and function.   6. Mild mitral annular calcification.   7. Mobile echogenic foci noted on the ventricular surface of the aortic valve. PETE is recommended for further evaluation.   8. Peak transaortic gradient equals 38.6 mmHg, mean transaortic gradient equals 24.3 mmHg, the calculated aortic valve area equals 1.25 cm² by the continuity equation consistent with moderate aortic stenosis.   9. There is no evidence of pericardial effusion.  10. Compared to a prior study from 8/22/21, there is significant improvement in LV and RV function.    < end of copied text >    [ ]  Catheterization:  [ ] Stress Test:      Labs:                        14.5   7.75  )-----------( 621      ( 12 Sep 2021 08:31 )             42.3     09-12    138  |  102  |  17.7  ----------------------------<  94  4.4   |  22.0  |  0.88    Ca    8.8      12 Sep 2021 08:31  Mg     2.0     09-11    TPro  8.0  /  Alb  3.4  /  TBili  0.6  /  DBili  x   /  AST  23  /  ALT  22  /  AlkPhos  74  09-11     12 Sep 2021 08:31 Troponin x     / Creatine Kinase 82 U/L /  CKMB x     / CPK Mass Assay % x       24 Aug 2021 03:51 Troponin x     / Creatine Kinase 3449 U/L /  CKMB 8.1 ng/mL / CPK Mass Assay % x       23 Aug 2021 07:52 Troponin x     / Creatine Kinase 6504 U/L /  CKMB 28.9 ng/mL / CPK Mass Assay % x          Serum Pro-Brain Natriuretic Peptide: 5142 pg/mL (08-22-21 @ 12:22)  Serum Pro-Brain Natriuretic Peptide: 1967 pg/mL (08-21-21 @ 20:02)    Cholesterol --; Direct LDL --; HDL Cholesterol, Serum --; HDL/ Total Cholesterol Ratio Measurement --; Total Cholesterol/ HDL Ratio Measurement --; Triglycerides, Serum 339 mg/dL, Cholesterol 87 mg/dL; Direct LDL --; HDL Cholesterol, Serum 41 mg/dL; HDL/ Total Cholesterol Ratio Measurement --; Total Cholesterol/ HDL Ratio Measurement --; Triglycerides, Serum 177 mg/dL  A1C with Estimated Average Glucose Result: 5.3 % (08-22-21 @ 04:55)      TELEMETRY: not on monitor

## 2021-09-12 NOTE — PROGRESS NOTE ADULT - ASSESSMENT
43M h/o HTN, DM2, Asthma who presents with a chief complaint of Acute hypoxemic respiratory failure, AMS  from suspected overdose of benzo and cocaine.  Patient found  by sister unresponsive.  Arrived in the ER with agonal breath breathing and was intubated. Cxr c/w extensive right infiltrate likely aspiration pneumonia.  CT head reveals symmetric low attenuation lesions in the bilateral globus pallidus regions of the basal ganglia, which can be seen with hypoxic ischemic encephalopathy or related to carbon monoxide positioning, as well as indeterminate areas of low attenuation in the bilateral cerebellum may reflect infarcts.  Patient developed shock symptoms of hypotension requiring pressors to maintain b/p. Resuscitated with fluids and receiving maintenance fluids. We were asked to see patient for Elevated trops from 0.71 on admission to 1.25.  Ekg on admit without ischemic changes and repeated now and sinus tach with no ischemic changes.    9/8: Pt denies chest pain, palpitations, SOB. Discussed PETE in great detail risks vs. benefits. Pt currently refusing procedure. Decision Capacity questionable,  evaluation states that patient is delusional. Attempted to speak to HCP sister Beverly powell on phone, no answer left VM.   9/12: patient calm and cooperative, agreeable for PETE procedure, discussed with patient's aunt (Barbara) aware and agreeable too, reports both she and her brother also has bicuspid AV.     ELEVATED TROP/CPK   CK/CKMB trended down  EKG without acute changes  Cont. ASA    R/O Endocarditis  -Repeat Echo-EF 55-60%, grade I diastolic dysfx., mild LVH, normal RV size and fx., mild mitral annular calcification, mobile echogenic foci noted on ventricular surface of aortic valve, moderate AS  -likely with underlying bicuspid AV with early AS, for PETE tomorrow to assess AV lesion, culture been negative off antibiotic       Suspect stress induced cardiomyopathy (LVEF <20% then normalized)  -no need GDMT for now          Duc Christiansen DO, [facility]  Faculty Non-Invasive Cardiologist  [phone]

## 2021-09-12 NOTE — PROGRESS NOTE ADULT - SUBJECTIVE AND OBJECTIVE BOX
YESI MIRZA  ----------------------------------------  The patient was seen at bedside. Patient with anoxic brain injury. Offers no complaints.    Vital Signs Last 24 Hrs  T(C): 36.8 (12 Sep 2021 09:36), Max: 36.9 (11 Sep 2021 15:01)  T(F): 98.2 (12 Sep 2021 09:36), Max: 98.4 (11 Sep 2021 15:01)  HR: 97 (12 Sep 2021 09:36) (91 - 103)  BP: 111/82 (12 Sep 2021 09:36) (109/78 - 150/86)  BP(mean): --  RR: 18 (12 Sep 2021 09:36) (18 - 20)  SpO2: 99% (12 Sep 2021 09:36) (93% - 100%)    PHYSICAL EXAMINATION:  ----------------------------------------  General appearance: No acute distress, Awake, Alert  HEENT: Normocephalic, Atraumatic, Conjunctiva clear, EOMI  Neck: Supple, No JVD, No tenderness  Lungs: Breath sound equal bilaterally, No wheezes, No rales  Cardiovascular: S1S2, Regular rhythm  Abdomen: Soft, Nontender, Nondistended, No guarding/rebound, Positive bowel sounds  Extremities: No clubbing, No cyanosis, No edema, No calf tenderness  Neuro: Strength equal bilaterally, No tremors  Psychiatric: Appears withdrawn, Slow to answer questions    LABORATORY STUDIES:  ----------------------------------------             14.5   7.75  )-----------( 621      ( 12 Sep 2021 08:31 )             42.3     09-12    138  |  102  |  17.7  ----------------------------<  94  4.4   |  22.0  |  0.88    Ca    8.8      12 Sep 2021 08:31  Mg     2.0     09-11    TPro  8.0  /  Alb  3.4  /  TBili  0.6  /  DBili  x   /  AST  23  /  ALT  22  /  AlkPhos  74  09-11    LIVER FUNCTIONS - ( 11 Sep 2021 10:57 )  Alb: 3.4 g/dL / Pro: 8.0 g/dL / ALK PHOS: 74 U/L / ALT: 22 U/L / AST: 23 U/L / GGT: x           CARDIAC MARKERS ( 12 Sep 2021 08:31 )  x     / x     / 82 U/L / x     / x        MEDICATIONS  (STANDING):  amphetamine/dextroamphetamine XR 15 milliGRAM(s) Oral with breakfast  aspirin  chewable 81 milliGRAM(s) Oral daily  atorvastatin 20 milliGRAM(s) Oral at bedtime  diVALproex  milliGRAM(s) Oral <User Schedule>  diVALproex  milliGRAM(s) Oral <User Schedule>  heparin   Injectable 5000 Unit(s) SubCutaneous every 8 hours  melatonin 5 milliGRAM(s) Oral at bedtime  thyroid 30 milliGRAM(s) Oral daily    MEDICATIONS  (PRN):  ALBUTerol    90 MICROgram(s) HFA Inhaler 2 Puff(s) Inhalation every 6 hours PRN Shortness of Breath      ASSESSMENT / PLAN:  ----------------------------------------  Patient is a 47 yo M w/ PMH of DM2, HTN, Hyothyroid, Asthma, ADHD who presented after being found unresponsive. Admitted to MICU for possible benzo/cocaine overdose and aspiration PNA. Patient was intubated and started on vasopressors. Patient with anoxic brain injury, being followed by Neurology. Patient on IV antibiotics for presumed aspiration PNA. ECHO was done and significant for LVEF of <20. Per Cardiology, patient to have ischemic evaluation if meaningful neurological recovery. Patient also noted to have rhabdo/GARRETT which has now normalized. Trach was tentatively scheduled for 9/2. Patient showed improvement in mental status and was extubated on 8/31. Patient was transitioned [facility] to [place]. Patient downgraded to Medicine and seen by ID in consult. Patients medical course complicated by being found on the floor by a step fown nurse and had a ct head and ct c spine which were negative.   Patient placed on 1:1 and restraints and pulled his ng tube multiple times. Patient showed slow mental improvement and had a tte: on 9/3    Anoxic brain injury - Initial urine toxicology was positive for benzodiazepine and cocaine. MRI of the brain noted numerous areas of restricted diffusion in the bilateral cerebral hemispheres, bilateral globus pallidi, left hippocampus and bilateral cerebellar hemispheres suggesting acute infarcts and hypoxic ischemic encephalopathy. EEG was without seizures. Aspiration precautions with diet.    Pneumonia / Respiratory failure - No hypoxia or dyspnea on examination. Status post treatment with vancomycin and meropenem. Blood cultures were without growth. Afebrile    Echodensity noted on echocardiogram - Awaiting transesophageal echocardiogram. Blood cultures were without growth.    Acute systolic heart failure - Appears euvolemic. For possible ischemic evaluation is there is meaningful neurologic recovery. On aspirin and atorvastatin.    Acute kidney injury / Rhabdomyolysis - CPK levels improved on repeat studies and renal function improved. Urinary retention resolved and Arguello catheter was previously removed.    Hypothyroidism - On [place] thyroid.    ADHD - On Adderall. YESI MIRZA  ----------------------------------------  The patient was seen at bedside. Patient with anoxic brain injury. Offers no complaints.    Vital Signs Last 24 Hrs  T(C): 36.8 (12 Sep 2021 09:36), Max: 36.9 (11 Sep 2021 15:01)  T(F): 98.2 (12 Sep 2021 09:36), Max: 98.4 (11 Sep 2021 15:01)  HR: 97 (12 Sep 2021 09:36) (91 - 103)  BP: 111/82 (12 Sep 2021 09:36) (109/78 - 150/86)  BP(mean): --  RR: 18 (12 Sep 2021 09:36) (18 - 20)  SpO2: 99% (12 Sep 2021 09:36) (93% - 100%)    PHYSICAL EXAMINATION:  ----------------------------------------  General appearance: No acute distress, Awake, Alert  HEENT: Normocephalic, Atraumatic, Conjunctiva clear, EOMI  Neck: Supple, No JVD, No tenderness  Lungs: Breath sound equal bilaterally, No wheezes, No rales  Cardiovascular: S1S2, Regular rhythm  Abdomen: Soft, Nontender, Nondistended, No guarding/rebound, Positive bowel sounds  Extremities: No clubbing, No cyanosis, No edema, No calf tenderness  Neuro: Strength equal bilaterally, No tremors  Psychiatric: Appears withdrawn, Slow to answer questions    LABORATORY STUDIES:  ----------------------------------------             14.5   7.75  )-----------( 621      ( 12 Sep 2021 08:31 )             42.3     09-12    138  |  102  |  17.7  ----------------------------<  94  4.4   |  22.0  |  0.88    Ca    8.8      12 Sep 2021 08:31  Mg     2.0     09-11    TPro  8.0  /  Alb  3.4  /  TBili  0.6  /  DBili  x   /  AST  23  /  ALT  22  /  AlkPhos  74  09-11    LIVER FUNCTIONS - ( 11 Sep 2021 10:57 )  Alb: 3.4 g/dL / Pro: 8.0 g/dL / ALK PHOS: 74 U/L / ALT: 22 U/L / AST: 23 U/L / GGT: x           CARDIAC MARKERS ( 12 Sep 2021 08:31 )  x     / x     / 82 U/L / x     / x        MEDICATIONS  (STANDING):  amphetamine/dextroamphetamine XR 15 milliGRAM(s) Oral with breakfast  aspirin  chewable 81 milliGRAM(s) Oral daily  atorvastatin 20 milliGRAM(s) Oral at bedtime  diVALproex  milliGRAM(s) Oral <User Schedule>  diVALproex  milliGRAM(s) Oral <User Schedule>  heparin   Injectable 5000 Unit(s) SubCutaneous every 8 hours  melatonin 5 milliGRAM(s) Oral at bedtime  thyroid 30 milliGRAM(s) Oral daily    MEDICATIONS  (PRN):  ALBUTerol    90 MICROgram(s) HFA Inhaler 2 Puff(s) Inhalation every 6 hours PRN Shortness of Breath      ASSESSMENT / PLAN:  ----------------------------------------  Patient is a 45 yo M w/ PMH of DM2, HTN, Hyothyroid, Asthma, ADHD who presented after being found unresponsive. Admitted to MICU for possible benzo/cocaine overdose and aspiration PNA. Patient was intubated and started on vasopressors. Patient with anoxic brain injury, being followed by Neurology. Patient on IV antibiotics for presumed aspiration PNA. ECHO was done and significant for LVEF of <20. Per Cardiology, patient to have ischemic evaluation if meaningful neurological recovery. Patient also noted to have rhabdo/GARRETT which has now normalized. Trach was tentatively scheduled for 9/2. Patient showed improvement in mental status and was extubated on 8/31. Patient was transitioned from [facility] to [place]. Patient downgraded to Medicine and seen by ID in consult. Patients medical course complicated by being found on the floor by a step fown nurse and had a ct head and ct c spine which were negative.   Patient placed on 1:1 and restraints and pulled his ng tube multiple times. Patient showed slow mental improvement and had a tte: on 9/3    Anoxic brain injury - Initial urine toxicology was positive for benzodiazepine and cocaine. MRI of the brain noted numerous areas of restricted diffusion in the bilateral cerebral hemispheres, bilateral globus pallidi, left hippocampus and bilateral cerebellar hemispheres suggesting acute infarcts and hypoxic ischemic encephalopathy. EEG was without seizures. Aspiration precautions with diet.    Pneumonia / Respiratory failure - No hypoxia or dyspnea on examination. Status post treatment with vancomycin and meropenem. Blood cultures were without growth. Afebrile    Echodensity noted on echocardiogram - Awaiting transesophageal echocardiogram. Blood cultures were without growth.    Acute systolic heart failure - Appears euvolemic. On aspirin and atorvastatin. Repeat echocardiogram noted improvement in the ejection fraction.    Acute kidney injury / Rhabdomyolysis - CPK levels improved on repeat studies and renal function improved. Urinary retention resolved and Arguello catheter was previously removed.    Hypothyroidism - On [employer] thyroid.    ADHD - On Adderall. YESI MIRZA  ----------------------------------------  The patient was seen at bedside. Patient with anoxic brain injury. Offers no complaints.    Vital Signs Last 24 Hrs  T(C): 36.8 (12 Sep 2021 09:36), Max: 36.9 (11 Sep 2021 15:01)  T(F): 98.2 (12 Sep 2021 09:36), Max: 98.4 (11 Sep 2021 15:01)  HR: 97 (12 Sep 2021 09:36) (91 - 103)  BP: 111/82 (12 Sep 2021 09:36) (109/78 - 150/86)  BP(mean): --  RR: 18 (12 Sep 2021 09:36) (18 - 20)  SpO2: 99% (12 Sep 2021 09:36) (93% - 100%)    PHYSICAL EXAMINATION:  ----------------------------------------  General appearance: No acute distress, Awake, Alert  HEENT: Normocephalic, Atraumatic, Conjunctiva clear, EOMI  Neck: Supple, No JVD, No tenderness  Lungs: Breath sound equal bilaterally, No wheezes, No rales  Cardiovascular: S1S2, Regular rhythm  Abdomen: Soft, Nontender, Nondistended, No guarding/rebound, Positive bowel sounds  Extremities: No clubbing, No cyanosis, No edema, No calf tenderness  Neuro: Strength equal bilaterally, No tremors  Psychiatric: Appears withdrawn, Slow to answer questions    LABORATORY STUDIES:  ----------------------------------------             14.5   7.75  )-----------( 621      ( 12 Sep 2021 08:31 )             42.3     09-12    138  |  102  |  17.7  ----------------------------<  94  4.4   |  22.0  |  0.88    Ca    8.8      12 Sep 2021 08:31  Mg     2.0     09-11    TPro  8.0  /  Alb  3.4  /  TBili  0.6  /  DBili  x   /  AST  23  /  ALT  22  /  AlkPhos  74  09-11    LIVER FUNCTIONS - ( 11 Sep 2021 10:57 )  Alb: 3.4 g/dL / Pro: 8.0 g/dL / ALK PHOS: 74 U/L / ALT: 22 U/L / AST: 23 U/L / GGT: x           CARDIAC MARKERS ( 12 Sep 2021 08:31 )  x     / x     / 82 U/L / x     / x        MEDICATIONS  (STANDING):  amphetamine/dextroamphetamine XR 15 milliGRAM(s) Oral with breakfast  aspirin  chewable 81 milliGRAM(s) Oral daily  atorvastatin 20 milliGRAM(s) Oral at bedtime  diVALproex  milliGRAM(s) Oral <User Schedule>  diVALproex  milliGRAM(s) Oral <User Schedule>  heparin   Injectable 5000 Unit(s) SubCutaneous every 8 hours  melatonin 5 milliGRAM(s) Oral at bedtime  thyroid 30 milliGRAM(s) Oral daily    MEDICATIONS  (PRN):  ALBUTerol    90 MICROgram(s) HFA Inhaler 2 Puff(s) Inhalation every 6 hours PRN Shortness of Breath      ASSESSMENT / PLAN:  ----------------------------------------  Patient is a 47 yo M w/ PMH of DM2, HTN, Hyothyroid, Asthma, ADHD who presented after being found unresponsive. Admitted to MICU for possible benzo/cocaine overdose and aspiration PNA. Patient was intubated and started on vasopressors. Patient with anoxic brain injury, being followed by Neurology. Patient on IV antibiotics for presumed aspiration PNA. ECHO was done and significant for LVEF of <20. Per Cardiology, patient to have ischemic evaluation if meaningful neurological recovery. Patient also noted to have rhabdo/GARRETT which has now normalized. Trach was tentatively scheduled for 9/2. Patient showed improvement in mental status and was extubated on 8/31. Patient was transitioned from [facility] to [place]. Patient downgraded to Medicine and seen by ID in consult. Patients medical course complicated by being found on the floor by a step fown nurse and had a ct head and ct c spine which were negative.   Patient placed on 1:1 and restraints and pulled his ng tube multiple times. Patient showed slow mental improvement and had a tte: on 9/3    Anoxic brain injury - Initial urine toxicology was positive for benzodiazepine and cocaine. MRI of the brain noted numerous areas of restricted diffusion in the bilateral cerebral hemispheres, bilateral globus pallidi, left hippocampus and bilateral cerebellar hemispheres suggesting acute infarcts and hypoxic ischemic encephalopathy. EEG was without seizures. Aspiration precautions with diet.    Pneumonia / Respiratory failure - No hypoxia or dyspnea on examination. Status post treatment with vancomycin and meropenem. Blood cultures were without growth. Afebrile    Echodensity noted on echocardiogram - Blood cultures were without growth. Recommendation was for transesophageal echocardiogram. Discussed with the patient who agreeable but did not appear to fully comprehend. To attempt to reach his sister for discussion.    Acute systolic heart failure - Appears euvolemic. On aspirin and atorvastatin. Repeat echocardiogram noted improvement in the ejection fraction.    Acute kidney injury / Rhabdomyolysis - CPK levels improved on repeat studies and renal function improved. Urinary retention resolved and Arguello catheter was previously removed.    Hypothyroidism - On [place] thyroid.    ADHD - On Adderall.

## 2021-09-13 PROCEDURE — 76376 3D RENDER W/INTRP POSTPROCES: CPT | Mod: 26

## 2021-09-13 PROCEDURE — 99233 SBSQ HOSP IP/OBS HIGH 50: CPT

## 2021-09-13 PROCEDURE — 93320 DOPPLER ECHO COMPLETE: CPT | Mod: 26

## 2021-09-13 PROCEDURE — 93325 DOPPLER ECHO COLOR FLOW MAPG: CPT | Mod: 26

## 2021-09-13 PROCEDURE — 99232 SBSQ HOSP IP/OBS MODERATE 35: CPT

## 2021-09-13 PROCEDURE — 93312 ECHO TRANSESOPHAGEAL: CPT | Mod: 26

## 2021-09-13 RX ORDER — DIVALPROEX SODIUM 500 MG/1
250 TABLET, DELAYED RELEASE ORAL
Refills: 0 | Status: DISCONTINUED | OUTPATIENT
Start: 2021-09-13 | End: 2021-09-15

## 2021-09-13 RX ADMIN — DIVALPROEX SODIUM 500 MILLIGRAM(S): 500 TABLET, DELAYED RELEASE ORAL at 20:32

## 2021-09-13 RX ADMIN — HEPARIN SODIUM 5000 UNIT(S): 5000 INJECTION INTRAVENOUS; SUBCUTANEOUS at 13:23

## 2021-09-13 RX ADMIN — HEPARIN SODIUM 5000 UNIT(S): 5000 INJECTION INTRAVENOUS; SUBCUTANEOUS at 20:32

## 2021-09-13 RX ADMIN — ATORVASTATIN CALCIUM 20 MILLIGRAM(S): 80 TABLET, FILM COATED ORAL at 20:32

## 2021-09-13 RX ADMIN — DIVALPROEX SODIUM 250 MILLIGRAM(S): 500 TABLET, DELAYED RELEASE ORAL at 18:32

## 2021-09-13 RX ADMIN — Medication 30 MILLIGRAM(S): at 07:00

## 2021-09-13 RX ADMIN — Medication 81 MILLIGRAM(S): at 13:23

## 2021-09-13 RX ADMIN — Medication 5 MILLIGRAM(S): at 20:32

## 2021-09-13 NOTE — PROGRESS NOTE ADULT - SUBJECTIVE AND OBJECTIVE BOX
OVERNIGHT EVENTS:    Present Symptoms:     Dyspnea: Mild Moderate Severe  Nausea/Vomiting: Yes No  Anxiety:  Yes No  Depression: Yes No  Fatigue: Yes No  Loss of appetite: Yes No  Constipation:     Pain:             Character-            Duration-            Effect-            Factors-            Frequency-            Location-            Severity-    Pain AD Score:  [email or web address]/[email or web address] (press ctrl + left click to view)    Review of Systems: Reviewed                     Negative:                     Positive:  Unable to obtain due to poor mentation   All others negative    MEDICATIONS  (STANDING):  amphetamine/dextroamphetamine XR 15 milliGRAM(s) Oral with breakfast  aspirin  chewable 81 milliGRAM(s) Oral daily  atorvastatin 20 milliGRAM(s) Oral at bedtime  diVALproex  milliGRAM(s) Oral <User Schedule>  diVALproex  milliGRAM(s) Oral <User Schedule>  heparin   Injectable 5000 Unit(s) SubCutaneous every 8 hours  melatonin 5 milliGRAM(s) Oral at bedtime  thyroid 30 milliGRAM(s) Oral daily    MEDICATIONS  (PRN):  ALBUTerol    90 MICROgram(s) HFA Inhaler 2 Puff(s) Inhalation every 6 hours PRN Shortness of Breath      PHYSICAL EXAM:    Vital Signs Last 24 Hrs  T(C): 37 (13 Sep 2021 04:19), Max: 37 (13 Sep 2021 04:19)  T(F): 98.6 (13 Sep 2021 04:19), Max: 98.6 (13 Sep 2021 04:19)  HR: 96 (13 Sep 2021 04:19) (96 - 106)  BP: 131/86 (13 Sep 2021 04:19) (108/76 - 131/86)  BP(mean): --  RR: 18 (13 Sep 2021 04:19) (18 - 18)  SpO2: 98% (13 Sep 2021 04:19) (96% - 98%)    General: alert  oriented x ____ lethargic agitated                  cachexia  nonverbal  coma    Karnofsky:  %    HEENT: normal  dry mouth  ET tube/trach    Lungs: comfortable tachypnea/labored breathing  excessive secretions    CV: normal  tachycardia    GI: normal  distended  tender  no BS               PEG/NG/OG tube  constipation  last BM:     : normal  incontinent  oliguria/anuria  parikh    MSK: normal  weakness  edema             ambulatory  bedbound/wheelchair bound    Skin: normal  pressure ulcers- Stage_____  no rash    LABS:                          14.5   7.75  )-----------( 621      ( 12 Sep 2021 08:31 )             42.3     09-12    138  |  102  |  17.7  ----------------------------<  94  4.4   |  22.0  |  0.88    Ca    8.8      12 Sep 2021 08:31          I&O's Summary      RADIOLOGY & ADDITIONAL STUDIES:    ADVANCE DIRECTIVES/TREATMENT PREFERENCES:  DNR YES NO  Completed on:                     MOLST  YES NO   Completed on:  Living Will  YES NO   Completed on: OVERNIGHT EVENTS: awaiting PETE     Present Symptoms:     Dyspnea: Mild  Nausea/Vomiting: No  Anxiety:  No  Depression: No  Fatigue: Yes   Loss of appetite: No  Constipation: none     Pain: none currently             Character-            Duration-            Effect-            Factors-            Frequency-            Location-            Severity-    Pain AD Score:  [email or web address]/[email or web address].pdf (press ctrl + left click to view)    Review of Systems: Reviewed                     Negative: no chest pain                   All others negative    MEDICATIONS  (STANDING):  amphetamine/dextroamphetamine XR 15 milliGRAM(s) Oral with breakfast  aspirin  chewable 81 milliGRAM(s) Oral daily  atorvastatin 20 milliGRAM(s) Oral at bedtime  diVALproex  milliGRAM(s) Oral <User Schedule>  diVALproex  milliGRAM(s) Oral <User Schedule>  heparin   Injectable 5000 Unit(s) SubCutaneous every 8 hours  melatonin 5 milliGRAM(s) Oral at bedtime  thyroid 30 milliGRAM(s) Oral daily    MEDICATIONS  (PRN):  ALBUTerol    90 MICROgram(s) HFA Inhaler 2 Puff(s) Inhalation every 6 hours PRN Shortness of Breath    PHYSICAL EXAM:    Vital Signs Last 24 Hrs  T(C): 37 (13 Sep 2021 04:19), Max: 37 (13 Sep 2021 04:19)  T(F): 98.6 (13 Sep 2021 04:19), Max: 98.6 (13 Sep 2021 04:19)  HR: 96 (13 Sep 2021 04:19) (96 - 106)  BP: 131/86 (13 Sep 2021 04:19) (108/76 - 131/86)  BP(mean): --  RR: 18 (13 Sep 2021 04:19) (18 - 18)  SpO2: 98% (13 Sep 2021 04:19) (96% - 98%)    General: alert, knows shes in the hospital     Karnofsky:  40 %    HEENT: normal      Lungs: comfortable     CV: normal      GI: normal     : normal     MSK: weakness     Skin: no rash    LABS:                      14.5   7.75  )-----------( 621      ( 12 Sep 2021 08:31 )             42.3     09-12    138  |  102  |  17.7  ----------------------------<  94  4.4   |  22.0  |  0.88    Ca    8.8      12 Sep 2021 08:31    I&O's Summary    RADIOLOGY & ADDITIONAL STUDIES:    ADVANCE DIRECTIVES/TREATMENT PREFERENCES:    Full code

## 2021-09-13 NOTE — PROGRESS NOTE ADULT - TIME BILLING
chart review, lab review, discussions with ICU team and family.
chart review, lab review, imaging review. Discussion with bedside nursing and ICU team.
chart review, lab review, discussions with ICU team and family.
coordinating care with MICU PA/resident, MICU RN,  counseling family
discussion with Dr. Ildefonso Romero. Chart review, lab review, discussion with speech therapy team.
discussion with ICU tem, Dr. Ildefonso Romero. Chart review, lab review.
chart review, lab review, imaging review. Discussion with bedside nursing and ICU team.
42 y/o M w/ PMHx HTN, DM, asthma, with acute hypoxemic respiratory failure, aspiration pneumonia, AMS, suspected benzodiazepine overdose, distributive shock, GARRETT, rhabdomyolysis. and transaminitis s/p pressor; IVF with improved cr and LFts, s/p correction of K this AM, involuntary movements with fever s/p LP, CTH neg, c/w Zosyn for now; cEEG started as well   Grim prognosis
chart review, lab review, imaging review. Discussion with ICU team.
discussion with Dr. Ildefonso Romero. Chart review, lab review, discussion with speech therapy team.
bicuspid AV, stress induced cardiomyopathy
chart review, lab review, consultant notes review.
chart review, lab review, discussions with ICU team and family.

## 2021-09-13 NOTE — PROGRESS NOTE ADULT - ASSESSMENT
46M with HTN, DM, asthma, here with vent dependent respiratory failure, unresponsiveness, GARRETT, NSTEMI, acute systolic CHF, s/p shock (off pressors), possible aspiration related event, urine toxicology + for benzodiazepines and cocaine, extubated 8/31, dysphagia, now on diet, awaiting PETE.     #1 Ventilator dependence - extubated 8/31.   #2 Anoxic brain injury - post possible overdose. MRI showing anoxic injury. patient more awake now, neurology following.    #3 Dysphagia -resolved  #4 Acute systolic CHF - repeat TTE with improvement in EF to 60%.  awaiting PETE for possible echodensity.   #5 Encounter for palliative care -  mental status improved, awaiting PETE for possible echodensity, no acute issues, will sign off, if decompensates or change in condition, please reconsult our service.

## 2021-09-13 NOTE — PROGRESS NOTE ADULT - SUBJECTIVE AND OBJECTIVE BOX
Patient reports no pain.  Unclear of motor exam given flat affect limited participation.     REVIEW OF SYSTEMS  Constitutional - No fever,  +fatigue  Neurological - No headaches, +memory loss, +loss of strength, No numbness, No tremors  Musculoskeletal - No joint pain, No joint swelling, No muscle pain  Psychiatric - No depression, No anxiety    FUNCTIONAL PROGRESS   SLP  Progress Summary: Chart reviewed. Follow up to check pt's tolerance of diet upgrade to regular solids/thin liquids. Pt d/w ISAMAR Sanon, who notes pt tolerating diet well. A speech/langauge evaluation was also recommended, however MD order required for completion. If speech eval is warranted, please placed order. Will continue to follow to complete evaluation pending receipt of MD order. ISAMAR Sanon aware.      OT  Bathing Training:     · Level of [place]	moderate assist (50% patients effort); seated  · Physical Assist/Nonphysical Assist	1 person assist    Upper Body Dressing Training:     · Level of [place]	minimum assist (75% patients effort); to don gown  · Physical Assist/Nonphysical Assist	1 person assist    Lower Body Dressing Training:     · Level of [place]	minimum assist (75% patients effort); moderate assist (50% patients effort); to don socks seated at the edge of the bed  · Physical Assist/Nonphysical Assist	1 person assist    Toilet Hygiene Training:     · Level of [place]	to be assessed    Grooming Training:     · Level of [place]	minimum assist (75% patients effort)  · Physical Assist/Nonphysical Assist	1 person assist    Eating/Self-Feeding Training:     · Level of [place]	minimum assist (75% patients effort)  · Physical Assist/Nonphysical Assist	1 person assist     PT  Bed Mobility  Bed Mobility Training Sit-to-Supine: minimum assist (75% patient effort)  Bed Mobility Training Supine-to-Sit: minimum assist (75% patient effort)    Sit-Stand Transfer Training  Transfer Training Sit-to-Stand Transfer: minimum assist (75% patient effort)  Transfer Training Stand-to-Sit Transfer: minimum assist (75% patient effort)    Gait Training  Gait Trainin'x2 RW Larry   Gait Analysis: unsteadiness c turns requiring assist for safety, decreased mike step length, narrow base of support, verbal cues throughout for sequencing    VITALS  T(C): 37 (21 @ 04:19), Max: 37 (21 @ 04:19)  HR: 96 (21 @ 04:19) (96 - 106)  BP: 131/86 (21 @ 04:19) (108/76 - 131/86)  RR: 18 (21 @ 04:19) (18 - 18)  SpO2: 98% (21 @ 04:19) (96% - 98%)  Wt(kg): --    MEDICATIONS   ALBUTerol    90 MICROgram(s) HFA Inhaler 2 Puff(s) every 6 hours PRN  amphetamine/dextroamphetamine XR 15 milliGRAM(s) with breakfast  aspirin  chewable 81 milliGRAM(s) daily  atorvastatin 20 milliGRAM(s) at bedtime  diVALproex  milliGRAM(s) <User Schedule>  diVALproex  milliGRAM(s) <User Schedule>  heparin   Injectable 5000 Unit(s) every 8 hours  melatonin 5 milliGRAM(s) at bedtime  thyroid 30 milliGRAM(s) daily      RECENT LABS/IMAGING                          14.5   7.75  )-----------( 621      ( 12 Sep 2021 08:31 )             42.3     09    138  |  102  |  17.7  ----------------------------<  94  4.4   |  22.0  |  0.88    Ca    8.8      12 Sep 2021 08:31  Mg     2.0         TPro  8.0  /  Alb  3.4  /  TBili  0.6  /  DBili  x   /  AST  23  /  ALT  22  /  AlkPhos  74  -                CT Head : Symmetric low attenuation lesions in the bilateral globus pallidus regions of the basal ganglia, which can be seen with hypoxic ischemic encephalopathy or related to carbon monoxide positioning.  Indeterminate areas of low attenuation in the bilateral cerebellum may reflect infarcts.  MRI is recommended for further evaluation.    CXR : Bilateral infiltrates right greater than left somewhat improved on the latest film. Tubes in place as above.    CT Head : Slightly increased prominence of bilateral globus pallidus hypodense foci, likely representing hypoxic-ischemic injury. Again noted multiple likely small acute infarcts involving the bilateral cerebral and cerebellar hemispheres. No acute intracranial hemorrhage.    CXR : No interval change.    US Duplex Venous Lower Ext Ltd, Right : No evidence of right lower extremity deep venous thrombosis.    US Duplex Arterial Upper Ext Ltd, Left  : Normal left upper extremity arterial evaluation    MRI Brain : Numerous areas of restricted diffusion in the bilateral cerebral hemispheres, bilateral globus pallidi, left hippocampus and bilateral cerebellar hemispheres suggesting acute infarcts and hypoxic ischemic encephalopathy.    Xray Abdomen : No acute radiographic intra-abdominal findings.    CXR : Residual Perihilar/basilar diffuse airspace disease and/or effusions..    CXR : Bilateral infiltrates somewhat improved. Endotracheal tube removed.    CXR : NG tube at least to stomach.    CXR : NG tube tip in distal stomach. The cardiomediastinal silhouette is within normal limits. Bilateral lung infiltrates are unchanged - right lung more than left. No pleural effusion or pneumothorax.    CT Cervical Spine 9/3: No acute cervical spine fracture.     CT head 9/3: No acute intracranial hemorrhage.    CXR 9/3: Unchanged infiltrates as above.    TTE Echo 9/3: Summary:   1. Left ventricular ejection fraction, by visual estimation, is 55 to 60%.   2. Normal global left ventricular systolic function.   3. Spectral Doppler shows impaired relaxation pattern of left ventricular myocardial filling (Grade I diastolic dysfunction).   4. There is mild concentric left ventricular hypertrophy.   5. Normal right ventricular size and function.   6. Mild mitral annular calcification.   7. Mobile echogenic foci noted on the ventricular surface of the aortic valve. PETE is recommended for further evaluation.   8. Peak transaortic gradient equals 38.6 mmHg, mean transaortic gradient equals 24.3 mmHg, the calculated aortic valve area equals 1.25 cm² by the continuity equation consistent with moderate aortic stenosis.   9. There is no evidence of pericardial effusion.  10. Compared to a prior study from 21, there is significant improvement in LV and RV function.    ---------------------------------------------------------------------------------------  PHYSICAL EXAM  Constitutional - NAD, Comfortable  Extremities - No C/C/E, No calf tenderness   Neurologic Exam -                    Cognitive - AAOx self - limited by affect/participation/effort     Communication - Mild dysarthria, Slowed processing     Motor -  Poor effort                    LEFT    UE - ShAB 5/5, EF 5/5, EE 5/5, WE 5/5,  5/5                    RIGHT UE - ShAB 5/5, EF 5/5, EE 5/5, WE 5/5,  5/5                    LEFT    LE - HF 4/5, KE 5/5, DF 1/5* (unclear of effort)                     RIGHT LE - HF 4/5, KE 5/5, DF 5/5   Psychiatric - Flat, Calm, Withdrawn  ----------------------------------------------------------------------------------------  ASSESSMENT/PLAN  46yMale with functional deficits 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 Will continue to follow for ongoing rehab needs and recommendations. Patient will be able to tolerate 3 hours a day.    Continue bedside therapy as well as OOB throughout the day with mobilization throughout the day with staff to maintain cardiopulmonary function and prevention of secondary complications related to debility.     Discussed with rehab clinical team.

## 2021-09-13 NOTE — PROGRESS NOTE ADULT - SUBJECTIVE AND OBJECTIVE BOX
YESI YUKI  ----------------------------------------  The patient was seen at bedside. Patient with anoxic encephalopathy. Somnolent but awakens with voice. Offered no complaints.    Vital Signs Last 24 Hrs  T(C): 37 (13 Sep 2021 04:19), Max: 37 (13 Sep 2021 04:19)  T(F): 98.6 (13 Sep 2021 04:19), Max: 98.6 (13 Sep 2021 04:19)  HR: 96 (13 Sep 2021 04:19) (96 - 106)  BP: 131/86 (13 Sep 2021 04:19) (108/76 - 131/86)  BP(mean): --  RR: 18 (13 Sep 2021 04:19) (18 - 18)  SpO2: 98% (13 Sep 2021 04:19) (96% - 98%)    PHYSICAL EXAMINATION:  ----------------------------------------  General appearance: No acute distress, Awake, Alert  HEENT: Normocephalic, Atraumatic, Conjunctiva clear, EOMI  Neck: Supple, No JVD, No tenderness  Lungs: Breath sound equal bilaterally, No wheezes, No rales  Cardiovascular: S1S2, Regular rhythm  Abdomen: Soft, Nontender, Nondistended, No guarding/rebound, Positive bowel sounds  Extremities: No clubbing, No cyanosis, No edema, No calf tenderness  Neuro: Strength equal bilaterally, No tremors  Psychiatric: Flat affect    LABORATORY STUDIES:  ----------------------------------------             14.5   7.75  )-----------( 621      ( 12 Sep 2021 08:31 )             42.3     09-12    138  |  102  |  17.7  ----------------------------<  94  4.4   |  22.0  |  0.88    Ca    8.8      12 Sep 2021 08:31  Mg     2.0     09-11    TPro  8.0  /  Alb  3.4  /  TBili  0.6  /  DBili  x   /  AST  23  /  ALT  22  /  AlkPhos  74  09-11    LIVER FUNCTIONS - ( 11 Sep 2021 10:57 )  Alb: 3.4 g/dL / Pro: 8.0 g/dL / ALK PHOS: 74 U/L / ALT: 22 U/L / AST: 23 U/L / GGT: x           CARDIAC MARKERS ( 12 Sep 2021 08:31 )  x     / x     / 82 U/L / x     / x        MEDICATIONS  (STANDING):  amphetamine/dextroamphetamine XR 15 milliGRAM(s) Oral with breakfast  aspirin  chewable 81 milliGRAM(s) Oral daily  atorvastatin 20 milliGRAM(s) Oral at bedtime  diVALproex  milliGRAM(s) Oral <User Schedule>  diVALproex  milliGRAM(s) Oral <User Schedule>  heparin   Injectable 5000 Unit(s) SubCutaneous every 8 hours  melatonin 5 milliGRAM(s) Oral at bedtime  thyroid 30 milliGRAM(s) Oral daily    MEDICATIONS  (PRN):  ALBUTerol    90 MICROgram(s) HFA Inhaler 2 Puff(s) Inhalation every 6 hours PRN Shortness of Breath      ASSESSMENT / PLAN:  ----------------------------------------  Patient is a 45 yo M w/ PMH of DM2, HTN, Hyothyroid, Asthma, ADHD who presented after being found unresponsive. Admitted to MICU for possible benzo/cocaine overdose and aspiration PNA. Patient was intubated and started on vasopressors. Patient with anoxic brain injury, being followed by Neurology. Patient on IV antibiotics for presumed aspiration PNA. ECHO was done and significant for LVEF of <20. Per Cardiology, patient to have ischemic evaluation if meaningful neurological recovery. Patient also noted to have rhabdo/GARRETT which has now normalized. Trach was tentatively scheduled for 9/2. Patient showed improvement in mental status and was extubated on 8/31. Patient was transitioned [facility] to [facility]. Patient downgraded to Medicine and seen by ID in consult. Patients medical course complicated by being found on the floor by a step fown nurse and had a ct head and ct c spine which were negative.   Patient placed on 1:1 and restraints and pulled his ng tube multiple times. Patient showed slow mental improvement and had a tte: on 9/3    Anoxic brain injury - Initial urine toxicology was positive for benzodiazepine and cocaine. MRI of the brain noted numerous areas of restricted diffusion in the bilateral cerebral hemispheres, bilateral globus pallidi, left hippocampus and bilateral cerebellar hemispheres suggesting acute infarcts and hypoxic ischemic encephalopathy. EEG was without seizures. Aspiration precautions with diet. Thought to benefit from further treatment at a rehabilitation facility.    Pneumonia / Respiratory failure - No hypoxia or dyspnea on examination. Status post treatment with vancomycin and meropenem. Blood cultures were without growth. Afebrile today. Aspiration precautions.    Echodensity noted on echocardiogram - Blood cultures were without growth. Recommendation was for transesophageal echocardiogram. The patient was agreeable to the procedure but did not appear to fully comprehend. Also discussed with his sister yesterday who consented to the procedure. Planned for transesophageal echocardiogram today.    Acute systolic heart failure - Appears euvolemic. On aspirin and atorvastatin. Repeat echocardiogram noted improvement in the ejection fraction.    Acute kidney injury / Rhabdomyolysis - CPK levels improved on repeat studies and renal function improved. Urinary retention resolved and Arguello catheter was previously removed.    Hypothyroidism - On [place] thyroid.    ADHD - On Adderall.    Anticipate discharge in 24-48 hours pending results from the transesophageal echocardiogram and transfer to a rehabilitation facility.

## 2021-09-13 NOTE — PROGRESS NOTE ADULT - SUBJECTIVE AND OBJECTIVE BOX
Department of Cardiology                                                                  [facility]/[place] [address] [address] [facility]                                                            Telephone: [phone]. Fax:[phone]                                                                                     Pre-PETE Note        Narrative:  43M h/o HTN, DM2, Asthma who presented acute hypoxemic respiratory failure and AMS from suspected overdose of benzo and cocaine.  Patient found  by sister unresponsive.  Arrived in the ER with agonal breath breathing and was intubated. Cxr c/w extensive right infiltrate likely aspiration pneumonia.  CT head reveals symmetric low attenuation lesions in the bilateral globus pallidus regions of the basal ganglia, which can be seen with hypoxic ischemic encephalopathy or related to carbon monoxide positioning, as well as indeterminate areas of low attenuation in the bilateral cerebellum may reflect infarcts.  Patient developed shock symptoms of hypotension requiring pressors to maintain b/p. Resuscitated with fluids and receiving maintenance fluids. Subsequently extubated and weaned off pressors, TTE with mobile echogenic foci noted on the ventricular surface of the aortic valve, now presents to CCL for PETE to better assess AV lesion.           ASA and Mallampati: Per Anesthesia    	  MEDICATIONS:  ALBUTerol    90 MICROgram(s) HFA Inhaler 2 Puff(s) Inhalation every 6 hours PRN  amphetamine/dextroamphetamine XR 15 milliGRAM(s) Oral with breakfast  diVALproex  milliGRAM(s) Oral <User Schedule>  diVALproex  milliGRAM(s) Oral <User Schedule>  melatonin 5 milliGRAM(s) Oral at bedtime  atorvastatin 20 milliGRAM(s) Oral at bedtime  thyroid 30 milliGRAM(s) Oral daily  aspirin  chewable 81 milliGRAM(s) Oral daily  heparin   Injectable 5000 Unit(s) SubCutaneous every 8 hours        PHYSICAL EXAM:    T(C): 36.8 (21 @ 11:15), Max: 37 (21 @ 04:19)  HR: 89 (21 @ 11:15) (89 - 106)  BP: 137/82 (21 @ 11:15) (108/76 - 137/82)  RR: 18 (21 @ 11:15) (18 - 18)  SpO2: 97% (21 @ 11:15) (96% - 98%)  Wt(kg): --    Daily Weight in k.3 (13 Sep 2021 06:00)    HEENT:   Normal oral mucosa, PERRL, EOMI	  Cardiovascular: Normal S1 S2, No JVD, No murmurs, No edema  Respiratory: Lungs clear to auscultation	  Gastrointestinal:  Soft, Non-tender, + BS	  Skin: No rashes, No ecchymoses, No cyanosis  Neurologic: Non-focal  Extremities: Normal range of motion, No clubbing, cyanosis or edema  Vascular: Peripheral pulses palpable 2+ bilaterally    TELEMETRY: SR/ST 's 	        TTE 9/3/21  Summary:   1. Left ventricular ejection fraction, by visual estimation, is 55 to 60%.   2. Normal global left ventricular systolic function.   3. Spectral Doppler shows impaired relaxation pattern of left ventricular myocardial filling (Grade I diastolic dysfunction).   4. There is mild concentric left ventricular hypertrophy.   5. Normal right ventricular size and function.   6. Mild mitral annular calcification.   7. Mobile echogenic foci noted on the ventricular surface of the aortic valve. PETE is recommended for further evaluation.   8. Peak transaortic gradient equals 38.6 mmHg, mean transaortic gradient equals 24.3 mmHg, the calculated aortic valve area equals 1.25 cm² by the continuity equation consistent with moderate aortic stenosis.   9. There is no evidence of pericardial effusion.  10. Compared to a prior study from 21, there is significant improvement in LV and RV function.      LABS:	 	                          14.5   7.75  )-----------( 621      ( 12 Sep 2021 08:31 )             42.3     09-12    138  |  102  |  17.7  ----------------------------<  94  4.4   |  22.0  |  0.88    Ca    8.8      12 Sep 2021 08:31      ASSESSMENT: 45yo male who presented with respiratory failure, AMS and shock state, TTE with echodensity noted on AV, now for PETE to better asses AV lesion.     -PETE as ordered  -Labs and ECG reviewed  -Procedure discussed with patient; risks and benefits explained; questions answered  -Consent obtained by Echocardiographer and anesthesiologist

## 2021-09-14 PROCEDURE — 99233 SBSQ HOSP IP/OBS HIGH 50: CPT

## 2021-09-14 PROCEDURE — 99232 SBSQ HOSP IP/OBS MODERATE 35: CPT

## 2021-09-14 RX ADMIN — HEPARIN SODIUM 5000 UNIT(S): 5000 INJECTION INTRAVENOUS; SUBCUTANEOUS at 21:50

## 2021-09-14 RX ADMIN — Medication 30 MILLIGRAM(S): at 06:43

## 2021-09-14 RX ADMIN — ATORVASTATIN CALCIUM 20 MILLIGRAM(S): 80 TABLET, FILM COATED ORAL at 21:50

## 2021-09-14 RX ADMIN — HEPARIN SODIUM 5000 UNIT(S): 5000 INJECTION INTRAVENOUS; SUBCUTANEOUS at 06:43

## 2021-09-14 RX ADMIN — DIVALPROEX SODIUM 500 MILLIGRAM(S): 500 TABLET, DELAYED RELEASE ORAL at 21:50

## 2021-09-14 RX ADMIN — DIVALPROEX SODIUM 250 MILLIGRAM(S): 500 TABLET, DELAYED RELEASE ORAL at 06:43

## 2021-09-14 RX ADMIN — Medication 81 MILLIGRAM(S): at 14:28

## 2021-09-14 RX ADMIN — HEPARIN SODIUM 5000 UNIT(S): 5000 INJECTION INTRAVENOUS; SUBCUTANEOUS at 14:28

## 2021-09-14 RX ADMIN — Medication 5 MILLIGRAM(S): at 21:50

## 2021-09-14 NOTE — PROGRESS NOTE ADULT - SUBJECTIVE AND OBJECTIVE BOX
YESIALEXA MIRZA  ----------------------------------------  The patient was seen at bedside. Patient with anoxic brain injury. Offers no complaints.    Vital Signs Last 24 Hrs  T(C): 36.9 (14 Sep 2021 04:00), Max: 36.9 (13 Sep 2021 20:18)  T(F): 98.4 (14 Sep 2021 04:00), Max: 98.4 (13 Sep 2021 20:18)  HR: 94 (14 Sep 2021 04:00) (85 - 94)  BP: 106/77 (14 Sep 2021 04:00) (106/77 - 137/82)  BP(mean): --  RR: 18 (14 Sep 2021 04:00) (18 - 18)  SpO2: 97% (14 Sep 2021 04:00) (97% - 98%)    PHYSICAL EXAMINATION:  ----------------------------------------  General appearance: No acute distress, Awake, Alert  HEENT: Normocephalic, Atraumatic, Conjunctiva clear, EOMI  Neck: Supple, No JVD, No tenderness  Lungs: Breath sound equal bilaterally, No wheezes, No rales  Cardiovascular: S1S2, Regular rhythm  Abdomen: Soft, Nontender, Nondistended, No guarding/rebound, Positive bowel sounds  Extremities: No clubbing, No cyanosis, No edema, No calf tenderness  Neuro: Strength equal bilaterally, No tremors  Psychiatric: Flat affect    MEDICATIONS  (STANDING):  amphetamine/dextroamphetamine XR 15 milliGRAM(s) Oral with breakfast  aspirin  chewable 81 milliGRAM(s) Oral daily  atorvastatin 20 milliGRAM(s) Oral at bedtime  diVALproex  milliGRAM(s) Oral <User Schedule>  diVALproex  milliGRAM(s) Oral <User Schedule>  heparin   Injectable 5000 Unit(s) SubCutaneous every 8 hours  melatonin 5 milliGRAM(s) Oral at bedtime  thyroid 30 milliGRAM(s) Oral daily    MEDICATIONS  (PRN):  ALBUTerol    90 MICROgram(s) HFA Inhaler 2 Puff(s) Inhalation every 6 hours PRN Shortness of Breath      ASSESSMENT / PLAN:  ----------------------------------------  Patient is a 47 yo M w/ PMH of DM2, HTN, Hyothyroid, Asthma, ADHD who presented after being found unresponsive. Admitted to MICU for possible benzo/cocaine overdose and aspiration PNA. Patient was intubated and started on vasopressors. Patient with anoxic brain injury, being followed by Neurology. Patient on IV antibiotics for presumed aspiration PNA. ECHO was done and significant for LVEF of <20. Per Cardiology, patient to have ischemic evaluation if meaningful neurological recovery. Patient also noted to have rhabdo/GARRETT which has now normalized. Trach was tentatively scheduled for 9/2. Patient showed improvement in mental status and was extubated on 8/31. Patient was transitioned from [facility] to [facility]. Patient downgraded to Medicine and seen by ID in consult. Patients medical course complicated by being found on the floor by a step fown nurse and had a ct head and ct c spine which were negative.   Patient placed on 1:1 and restraints and pulled his ng tube multiple times. Patient showed slow mental improvement and had a tte: on 9/3    Anoxic brain injury - Initial urine toxicology was positive for benzodiazepine and cocaine. MRI of the brain noted numerous areas of restricted diffusion in the bilateral cerebral hemispheres, bilateral globus pallidi, left hippocampus and bilateral cerebellar hemispheres suggesting acute infarcts and hypoxic ischemic encephalopathy. EEG was without seizures. Aspiration precautions with diet. Awaiting transfer to a rehabilitation facility for further treatment.    Pneumonia / Respiratory failure - Status post treatment with vancomycin and meropenem. Blood cultures were without growth. Afebrile today. Aspiration precautions. No hypoxia or dyspnea on examination.    Echodensity noted on echocardiogram - Blood cultures were without growth. Transesophageal echocardiogram was without vegetation and noted bicuspid aortic valve.    Acute systolic heart failure - Appears euvolemic. On aspirin and atorvastatin. Ejection fraction improved on repeat echocardiogram.    Acute kidney injury / Rhabdomyolysis - CPK levels improved on repeat studies and renal function improved. Urinary retention resolved and Arguello catheter was previously removed.    Hypothyroidism - On [place] thyroid.    ADHD - On Adderall.    Anticipate discharge in 24-48 hours pending transfer to a rehabilitation facility.

## 2021-09-14 NOTE — PROGRESS NOTE ADULT - SUBJECTIVE AND OBJECTIVE BOX
Patient calm, but fatigued.  Unclear if Adderall is helping patient - has not change clinically.   Reports no pain.     REVIEW OF SYSTEMS  Constitutional - No fever,  +fatigue  Neurological - No headaches, +memory loss, +loss of strength, No numbness, No tremors  Musculoskeletal - No joint pain, No joint swelling, No muscle pain  Psychiatric - No depression, No anxiety    FUNCTIONAL PROGRESS   PT  Bed Mobility  Bed Mobility Training Sit-to-Supine: supervision  Bed Mobility Training Supine-to-Sit: supervision    Sit-Stand Transfer Training  Transfer Training Sit-to-Stand Transfer: minimum assist (75% patient effort)  Transfer Training Stand-to-Sit Transfer: minimum assist (75% patient effort)    Gait Training  Gait Trainin feet no device Larry   Gait Analysis: unsteadiness throughout, decreased gait velocity and activity tolerance, decreased mike step length       VITALS  T(C): 36.9 (21 @ 04:00), Max: 36.9 (21 @ 20:18)  HR: 94 (21 @ 04:00) (85 - 94)  BP: 106/77 (21 @ 04:00) (106/77 - 137/82)  RR: 18 (21 @ 04:00) (18 - 18)  SpO2: 97% (21 @ 04:00) (97% - 98%)  Wt(kg): --    MEDICATIONS   ALBUTerol    90 MICROgram(s) HFA Inhaler 2 Puff(s) every 6 hours PRN  amphetamine/dextroamphetamine XR 15 milliGRAM(s) with breakfast  aspirin  chewable 81 milliGRAM(s) daily  atorvastatin 20 milliGRAM(s) at bedtime  diVALproex  milliGRAM(s) <User Schedule>  diVALproex  milliGRAM(s) <User Schedule>  heparin   Injectable 5000 Unit(s) every 8 hours  melatonin 5 milliGRAM(s) at bedtime  thyroid 30 milliGRAM(s) daily      RECENT LABS/IMAGING                          CT Head : Symmetric low attenuation lesions in the bilateral globus pallidus regions of the basal ganglia, which can be seen with hypoxic ischemic encephalopathy or related to carbon monoxide positioning.  Indeterminate areas of low attenuation in the bilateral cerebellum may reflect infarcts.  MRI is recommended for further evaluation.    CXR : Bilateral infiltrates right greater than left somewhat improved on the latest film. Tubes in place as above.    CT Head : Slightly increased prominence of bilateral globus pallidus hypodense foci, likely representing hypoxic-ischemic injury. Again noted multiple likely small acute infarcts involving the bilateral cerebral and cerebellar hemispheres. No acute intracranial hemorrhage.    CXR : No interval change.    US Duplex Venous Lower Ext Ltd, Right : No evidence of right lower extremity deep venous thrombosis.    US Duplex Arterial Upper Ext Ltd, Left  : Normal left upper extremity arterial evaluation    MRI Brain : Numerous areas of restricted diffusion in the bilateral cerebral hemispheres, bilateral globus pallidi, left hippocampus and bilateral cerebellar hemispheres suggesting acute infarcts and hypoxic ischemic encephalopathy.    Xray Abdomen : No acute radiographic intra-abdominal findings.    CXR : Residual Perihilar/basilar diffuse airspace disease and/or effusions..    CXR : Bilateral infiltrates somewhat improved. Endotracheal tube removed.    CXR : NG tube at least to stomach.    CXR : NG tube tip in distal stomach. The cardiomediastinal silhouette is within normal limits. Bilateral lung infiltrates are unchanged - right lung more than left. No pleural effusion or pneumothorax.    CT Cervical Spine 9/3: No acute cervical spine fracture.     CT head 9/3: No acute intracranial hemorrhage.    CXR 9/3: Unchanged infiltrates as above.    TTE Echo 9/3: Summary:   1. Left ventricular ejection fraction, by visual estimation, is 55 to 60%.   2. Normal global left ventricular systolic function.   3. Spectral Doppler shows impaired relaxation pattern of left ventricular myocardial filling (Grade I diastolic dysfunction).   4. There is mild concentric left ventricular hypertrophy.   5. Normal right ventricular size and function.   6. Mild mitral annular calcification.   7. Mobile echogenic foci noted on the ventricular surface of the aortic valve. PETE is recommended for further evaluation.   8. Peak transaortic gradient equals 38.6 mmHg, mean transaortic gradient equals 24.3 mmHg, the calculated aortic valve area equals 1.25 cm² by the continuity equation consistent with moderate aortic stenosis.   9. There is no evidence of pericardial effusion.  10. Compared to a prior study from 21, there is significant improvement in LV and RV function.    PETE  - Summary:   1. Left ventricular ejection fraction, by visual estimation, is 60 to 65%.   2. Normal global left ventricular systolic function.   3. There is mild concentric left ventricular hypertrophy.   4. Normal right ventricular size and function.   5. Structurally normal mitral valve, with normal leaflet excursion, no evidence of vegetation.   6. Trace mitral valve regurgitation.   7. Structurally normal tricuspid valve, with normal leaflet excursion, no evidence of vegetation.   8. Structurally normal pulmonic valve, with normal leaflet excursion, no evidence of vegetation.   9. Bicuspid aortic valve with fused raphe of the right and non-coronary cusps, mild-moderate calcificaitons with degenerative materials, no evidence of vegetation. Mild-to-moderate aortic valve stenosis, JUAN DAVID by 2D-planimetry 1.6 cm2, peak velocity 2.6 m/s, mean gradient 16 mmHg. Mild eccentric aortic regurgitation noted. Ascending aorta 3.8 cm.  10. Color flow doppler and intravenous injection of agitated saline demonstrates the presence of an intact intra atrial septum.  11. No left atrial appendage thrombus and normal left atrial appendage velocities.  12. There is no evidence of pericardial effusion. No evidence of vegetation or intracardiac abscess.  ---------------------------------------------------------------------------------------  PHYSICAL EXAM  Constitutional - NAD, Comfortable  Extremities - No C/C/E, No calf tenderness   Neurologic Exam -                    Cognitive - AAOx self - limited by affect/participation/effort     Communication - Mild dysarthria, Slowed processing     Motor -  Poor effort                    LEFT    UE - ShAB 5/5, EF 5/5, EE 5/5, WE 5/5,  5/5                    RIGHT UE - ShAB 5/5, EF 5/5, EE 5/5, WE 5/5,  5/5                    LEFT    LE - HF 4/5, KE 5/5, DF 1-3/5* (unclear of effort)                     RIGHT LE - HF 4/5, KE 5/5, DF 5/5   Psychiatric - Flat, Calm, Withdrawn  ----------------------------------------------------------------------------------------  ASSESSMENT/PLAN  46yMale with functional deficits after a hypoxic ischemic brain injury  Agitation/restlessness - Depakote 250mg Q6AM & 500mg Q8PM (decreased from 1g/day )  Aortic valve abnormality & CAD/MI - ASA, Lipitor, Outpatient follow up  ADHD - Recommend DC Adderall XL - has not improved wakefulness, Recommend trial Amantadine 100mg Q6AM/12PM  Sleep - Melatonin 5mg HS  DVT PPX - SCDs, Heparin  Rehab - Continue to recommend ACUTE inpatient rehabilitation for the functional deficits consisting of 3 hours of therapy/day & 24 hour RN/daily PMR physician for comorbid medical management. Will continue to follow for ongoing rehab needs and recommendations. Patient will be able to tolerate 3 hours a day.    Continue bedside therapy as well as OOB throughout the day with mobilization throughout the day with staff to maintain cardiopulmonary function and prevention of secondary complications related to debility.     Discussed with rehab clinical team.

## 2021-09-15 LAB — SARS-COV-2 RNA SPEC QL NAA+PROBE: SIGNIFICANT CHANGE UP

## 2021-09-15 PROCEDURE — 99233 SBSQ HOSP IP/OBS HIGH 50: CPT

## 2021-09-15 PROCEDURE — 99232 SBSQ HOSP IP/OBS MODERATE 35: CPT

## 2021-09-15 RX ORDER — AMANTADINE HCL 100 MG
100 CAPSULE ORAL
Refills: 0 | Status: DISCONTINUED | OUTPATIENT
Start: 2021-09-15 | End: 2021-09-23

## 2021-09-15 RX ADMIN — Medication 100 MILLIGRAM(S): at 11:44

## 2021-09-15 RX ADMIN — HEPARIN SODIUM 5000 UNIT(S): 5000 INJECTION INTRAVENOUS; SUBCUTANEOUS at 06:06

## 2021-09-15 RX ADMIN — Medication 30 MILLIGRAM(S): at 06:06

## 2021-09-15 RX ADMIN — ATORVASTATIN CALCIUM 20 MILLIGRAM(S): 80 TABLET, FILM COATED ORAL at 21:04

## 2021-09-15 RX ADMIN — DIVALPROEX SODIUM 500 MILLIGRAM(S): 500 TABLET, DELAYED RELEASE ORAL at 21:04

## 2021-09-15 RX ADMIN — HEPARIN SODIUM 5000 UNIT(S): 5000 INJECTION INTRAVENOUS; SUBCUTANEOUS at 13:21

## 2021-09-15 RX ADMIN — DIVALPROEX SODIUM 250 MILLIGRAM(S): 500 TABLET, DELAYED RELEASE ORAL at 06:06

## 2021-09-15 RX ADMIN — Medication 5 MILLIGRAM(S): at 21:04

## 2021-09-15 RX ADMIN — Medication 81 MILLIGRAM(S): at 11:43

## 2021-09-15 NOTE — CHART NOTE - NSCHARTNOTEFT_GEN_A_CORE
Source: Patient [ ]  Family [ ]   other [x ]EMR, rounds    Current Diet: Dash    PO intake:  < 50% [ ]   50-75%  [ x]   %  [ ]  other :    Source for PO intake [ ] Patient [ ] family [x ] chart [ ] staff [ ] other    Enteral /Parenteral Nutrition:     Current Weight:  9/13 196.8#   9/8 182.7#  (9/7) 185.2 lbs   (9/2) 141 lbs  (9/1) 209.6 lbs  (8/30) 233.4 lbs  (8/22) 205.2 lbs  ? accuracy of weights, no edema as per flowsheets, continue to trend and maintain strict Is&Os              % Weight Change     Pertinent Medications: MEDICATIONS  (STANDING):  amantadine 100 milliGRAM(s) Oral <User Schedule>  aspirin  chewable 81 milliGRAM(s) Oral daily  atorvastatin 20 milliGRAM(s) Oral at bedtime  diVALproex  milliGRAM(s) Oral <User Schedule>  heparin   Injectable 5000 Unit(s) SubCutaneous every 8 hours  melatonin 5 milliGRAM(s) Oral at bedtime  thyroid 30 milliGRAM(s) Oral daily    MEDICATIONS  (PRN):  ALBUTerol    90 MICROgram(s) HFA Inhaler 2 Puff(s) Inhalation every 6 hours PRN Shortness of Breath    Pertinent Labs: monitor glucose. Pt with hx DM. Glucose wnl.         Skin: left heel DTI    Nutrition focused physical exam conducted - found signs of malnutrition [ ]absent [x ]present    Subcutaneous fat loss: [x ] Orbital fat pads region, [x ]Buccal fat region, [ ]Triceps region,  [ ]Ribs region    Muscle wasting: [x ]Temples region, [x ]Clavicle region, [x ]Shoulder region, [ ]Scapula region, [ ]Interosseous region,  [ ]thigh region, [ ]Calf region    Estimated Needs:   [x ] no change since previous assessment  [ ] recalculated:      Current Nutrition Diagnosis:  Pt remains at high nutrition risk secondary to Moderate (acute) protein calorie malnutrition related to inability to meet sufficient protein energy requirements in setting of possible benzo/cocaine overdose and aspiration PNA, Anoxic brain injury; recent extubation and dysphagia as evidenced by meeting less then 75% estimated energy requirements > 7 days, and physical signs of mild muscle mass and fat loss. Per nursing staff; pt requiring total assistance with meals, consuming 50-60% at meals noted. Awaiting Auth for ISA.     Recommendations:   1. RX: MVI and Vit. C daily (500mg).   2. Check weight daily to monitor trends   3. Total assistance with meals.   4. Ensure Enlive TID (Honey thick TID)   5. Diet consistency per SLP Recommendations      Monitoring and Evaluation:   [ x] PO intake [ x] Tolerance to diet prescription [X] Weights  [X] Follow up per protocol [X] Labs:

## 2021-09-15 NOTE — PROGRESS NOTE ADULT - SUBJECTIVE AND OBJECTIVE BOX
Patient fatigued.  Reports he did not sleep well.  Limited participation.     REVIEW OF SYSTEMS  Constitutional - No fever,  +fatigue  HEENT - No vertigo, No neck pain  Neurological - No headaches, +loss of strength   Musculoskeletal - No joint pain, No joint swelling, No muscle pain  Psychiatric - No depression, No anxiety    FUNCTIONAL PROGRESS   OT  Bed Mobility  Bed Mobility Training Sit-to-Supine: minimum assist (75% patient effort);  1 person assist  Bed Mobility Training Supine-to-Sit: minimum assist (75% patient effort);  1 person assist  Bed Mobility Training Limitations: impaired coordination;  impaired postural control    Sit-Stand Transfer Training  Transfer Training Sit-to-Stand Transfer: minimum assist (75% patient effort);  1 person assist  Transfer Training Stand-to-Sit Transfer: minimum assist (75% patient effort);  1 person assist  Sit-to-Stand Transfer Training Transfer Safety Analysis: impaired motor control;  impaired coordination    Toilet Transfer Training  Transfer Training Toilet Transfer: minimum assist (75% patient effort);  1 person assist;  on low toilet height  Toilet Transfer Training Transfer Safety Analysis: impaired motor control;  impaired coordination    Functional Endurance  Functional Endurance Detail: Pt ambulated with RW in room, bathroom and hallway with minimal assist and min verbal cues for safety secondary to pt is impulsive at times. Pt tolerated all well. Much improvement noted in mentation and alertness.     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      PT  Bed Mobility  Bed Mobility Training Sit-to-Supine: supervision  Bed Mobility Training Supine-to-Sit: supervision    Sit-Stand Transfer Training  Transfer Training Sit-to-Stand Transfer: minimum assist (75% patient effort)  Transfer Training Stand-to-Sit Transfer: minimum assist (75% patient effort)    Gait Training  Gait Trainin feet no device Larry   Gait Analysis: unsteadiness throughout, decreased gait velocity and activity tolerance, decreased mike step length       VITALS  T(C): 36.9 (09-15-21 @ 08:55), Max: 37.4 (21 @ 16:08)  HR: 90 (09-15-21 @ 08:55) (88 - 103)  BP: 116/84 (09-15-21 @ 08:55) (106/74 - 129/88)  RR: 18 (09-15-21 @ 08:55) (18 - 18)  SpO2: 98% (09-15-21 @ 08:55) (95% - 100%)  Wt(kg): --    MEDICATIONS   ALBUTerol    90 MICROgram(s) HFA Inhaler 2 Puff(s) every 6 hours PRN  amphetamine/dextroamphetamine XR 15 milliGRAM(s) with breakfast  aspirin  chewable 81 milliGRAM(s) daily  atorvastatin 20 milliGRAM(s) at bedtime  diVALproex  milliGRAM(s) <User Schedule>  diVALproex  milliGRAM(s) <User Schedule>  heparin   Injectable 5000 Unit(s) every 8 hours  melatonin 5 milliGRAM(s) at bedtime  thyroid 30 milliGRAM(s) daily      RECENT LABS/IMAGING         14.5   7.75  )-----------( 621      ( 12 Sep 2021 08:31 )             42.3         138  |  102  |  17.7  ----------------------------<  94  4.4   |  22.0  |  0.88    Ca    8.8      12 Sep 2021 08:31  Mg     2.0         TPro  8.0  /  Alb  3.4  /  TBili  0.6  /  DBili  x   /  AST  23  /  ALT  22  /  AlkPhos  74  09-11        CT Head : Symmetric low attenuation lesions in the bilateral globus pallidus regions of the basal ganglia, which can be seen with hypoxic ischemic encephalopathy or related to carbon monoxide positioning.  Indeterminate areas of low attenuation in the bilateral cerebellum may reflect infarcts.  MRI is recommended for further evaluation.    CXR : Bilateral infiltrates right greater than left somewhat improved on the latest film. Tubes in place as above.    CT Head : Slightly increased prominence of bilateral globus pallidus hypodense foci, likely representing hypoxic-ischemic injury. Again noted multiple likely small acute infarcts involving the bilateral cerebral and cerebellar hemispheres. No acute intracranial hemorrhage.    CXR : No interval change.    US Duplex Venous Lower Ext Ltd, Right : No evidence of right lower extremity deep venous thrombosis.    US Duplex Arterial Upper Ext Ltd, Left  : Normal left upper extremity arterial evaluation    MRI Brain : Numerous areas of restricted diffusion in the bilateral cerebral hemispheres, bilateral globus pallidi, left hippocampus and bilateral cerebellar hemispheres suggesting acute infarcts and hypoxic ischemic encephalopathy.    Xray Abdomen : No acute radiographic intra-abdominal findings.    CXR : Residual Perihilar/basilar diffuse airspace disease and/or effusions..    CXR : Bilateral infiltrates somewhat improved. Endotracheal tube removed.    CXR : NG tube at least to stomach.    CXR : NG tube tip in distal stomach. The cardiomediastinal silhouette is within normal limits. Bilateral lung infiltrates are unchanged - right lung more than left. No pleural effusion or pneumothorax.    CT Cervical Spine 9/3: No acute cervical spine fracture.     CT head 9/3: No acute intracranial hemorrhage.    CXR 9/3: Unchanged infiltrates as above.    TTE Echo 9/3: Summary:   1. Left ventricular ejection fraction, by visual estimation, is 55 to 60%.   2. Normal global left ventricular systolic function.   3. Spectral Doppler shows impaired relaxation pattern of left ventricular myocardial filling (Grade I diastolic dysfunction).   4. There is mild concentric left ventricular hypertrophy.   5. Normal right ventricular size and function.   6. Mild mitral annular calcification.   7. Mobile echogenic foci noted on the ventricular surface of the aortic valve. PETE is recommended for further evaluation.   8. Peak transaortic gradient equals 38.6 mmHg, mean transaortic gradient equals 24.3 mmHg, the calculated aortic valve area equals 1.25 cm² by the continuity equation consistent with moderate aortic stenosis.   9. There is no evidence of pericardial effusion.  10. Compared to a prior study from 21, there is significant improvement in LV and RV function.    PETE  - Summary:   1. Left ventricular ejection fraction, by visual estimation, is 60 to 65%.   2. Normal global left ventricular systolic function.   3. There is mild concentric left ventricular hypertrophy.   4. Normal right ventricular size and function.   5. Structurally normal mitral valve, with normal leaflet excursion, no evidence of vegetation.   6. Trace mitral valve regurgitation.   7. Structurally normal tricuspid valve, with normal leaflet excursion, no evidence of vegetation.   8. Structurally normal pulmonic valve, with normal leaflet excursion, no evidence of vegetation.   9. Bicuspid aortic valve with fused raphe of the right and non-coronary cusps, mild-moderate calcificaitons with degenerative materials, no evidence of vegetation. Mild-to-moderate aortic valve stenosis, JUAN DAVID by 2D-planimetry 1.6 cm2, peak velocity 2.6 m/s, mean gradient 16 mmHg. Mild eccentric aortic regurgitation noted. Ascending aorta 3.8 cm.  10. Color flow doppler and intravenous injection of agitated saline demonstrates the presence of an intact intra atrial septum.  11. No left atrial appendage thrombus and normal left atrial appendage velocities.  12. There is no evidence of pericardial effusion. No evidence of vegetation or intracardiac abscess.    ---------------------------------------------------------------------------------------  PHYSICAL EXAM  Constitutional - NAD, Comfortable  Extremities - No C/C/E, No calf tenderness   Neurologic Exam -                    Cognitive - AAOx self - limited by affect/participation/effort     Communication - Mild dysarthria, Slowed processing     Motor -  Poor effort                    LEFT    UE - ShAB 5/5, EF 5/5, EE 5/5, WE 5/5,  5/5                    RIGHT UE - ShAB 5/5, EF 5/5, EE 5/5, WE 5/5,  5/5                    LEFT    LE - HF 4/5, KE 5/5, DF 1-3/5* (unclear of effort)                     RIGHT LE - HF 4/5, KE 5/5, DF 5/5   Psychiatric - Flat, Calm, Withdrawn  ----------------------------------------------------------------------------------------  ASSESSMENT/PLAN  46yMale with functional deficits after a hypoxic ischemic brain injury  Agitation/restlessness - Depakote 250mg 500mg Q8PM (decreased from 750mg/day 9/15)  Aortic valve abnormality & CAD/MI - ASA, Lipitor, Outpatient follow up  ADHD - Amantadine 100mg Q6AM/12PM (9/15), DC Adderall XL (not getting it as per pharmacy)  Sleep - Melatonin 5mg HS  DVT PPX - SCDs, Heparin  Rehab - Continue to recommend ACUTE inpatient rehabilitation for the functional deficits consisting of 3 hours of therapy/day & 24 hour RN/daily PMR physician for comorbid medical management. Will continue to follow for ongoing rehab needs and recommendations. Patient will be able to tolerate 3 hours a day.    Continue bedside therapy as well as OOB throughout the day with mobilization throughout the day with staff to maintain cardiopulmonary function and prevention of secondary complications related to debility.     Discussed with rehab clinical team.

## 2021-09-15 NOTE — PROGRESS NOTE ADULT - SUBJECTIVE AND OBJECTIVE BOX
YESI MIRZA  ----------------------------------------  The patient was seen at bedside. Patient with anoxic encephalopathy. Reports poor sleep overnight.    Vital Signs Last 24 Hrs  T(C): 36.9 (15 Sep 2021 08:55), Max: 37.4 (14 Sep 2021 16:08)  T(F): 98.4 (15 Sep 2021 08:55), Max: 99.3 (14 Sep 2021 16:08)  HR: 90 (15 Sep 2021 08:55) (88 - 103)  BP: 116/84 (15 Sep 2021 08:55) (106/74 - 129/88)  BP(mean): --  RR: 18 (15 Sep 2021 08:55) (18 - 18)  SpO2: 98% (15 Sep 2021 08:55) (95% - 100%)    PHYSICAL EXAMINATION:  ----------------------------------------  General appearance: No acute distress, Awake, Alert  HEENT: Normocephalic, Atraumatic, Conjunctiva clear, EOMI  Neck: Supple, No JVD, No tenderness  Lungs: Breath sound equal bilaterally, No wheezes, No rales  Cardiovascular: S1S2, Regular rhythm  Abdomen: Soft, Nontender, Nondistended, No guarding/rebound, Positive bowel sounds  Extremities: No clubbing, No cyanosis, No edema, No calf tenderness  Neuro: Strength equal bilaterally, No tremors  Psychiatric: Flat affect, Withdrawn    MEDICATIONS  (STANDING):  amphetamine/dextroamphetamine XR 15 milliGRAM(s) Oral with breakfast  aspirin  chewable 81 milliGRAM(s) Oral daily  atorvastatin 20 milliGRAM(s) Oral at bedtime  diVALproex  milliGRAM(s) Oral <User Schedule>  diVALproex  milliGRAM(s) Oral <User Schedule>  heparin   Injectable 5000 Unit(s) SubCutaneous every 8 hours  melatonin 5 milliGRAM(s) Oral at bedtime  thyroid 30 milliGRAM(s) Oral daily    MEDICATIONS  (PRN):  ALBUTerol    90 MICROgram(s) HFA Inhaler 2 Puff(s) Inhalation every 6 hours PRN Shortness of Breath      ASSESSMENT / PLAN:  ----------------------------------------  Patient is a 47 yo M w/ PMH of DM2, HTN, Hyothyroid, Asthma, ADHD who presented after being found unresponsive. Admitted to MICU for possible benzo/cocaine overdose and aspiration PNA. Patient was intubated and started on vasopressors. Patient with anoxic brain injury, being followed by Neurology. Patient on IV antibiotics for presumed aspiration PNA. ECHO was done and significant for LVEF of <20. Per Cardiology, patient to have ischemic evaluation if meaningful neurological recovery. Patient also noted to have rhabdo/GARRETT which has now normalized. Trach was tentatively scheduled for 9/2. Patient showed improvement in mental status and was extubated on 8/31. Patient was transitioned from [facility] to [facility]. Patient downgraded to Medicine and seen by ID in consult. Patients medical course complicated by being found on the floor by a step fown nurse and had a ct head and ct c spine which were negative.   Patient placed on 1:1 and restraints and pulled his ng tube multiple times. Patient showed slow mental improvement and had a tte: on 9/3    Anoxic brain injury - Initial urine toxicology was positive for benzodiazepine and cocaine. MRI of the brain noted numerous areas of restricted diffusion in the bilateral cerebral hemispheres, bilateral globus pallidi, left hippocampus and bilateral cerebellar hemispheres suggesting acute infarcts and hypoxic ischemic encephalopathy. EEG was without seizures. Aspiration precautions with diet.    Pneumonia / Respiratory failure - Completed the course of vancomycin and meropenem. Blood cultures were without growth. Aspiration precautions. No hypoxia or dyspnea on examination. Remains afebrile.    Echodensity noted on echocardiogram - Blood cultures were without growth. Transesophageal echocardiogram was without vegetation and noted bicuspid aortic valve.    Acute systolic heart failure - On aspirin and atorvastatin. Ejection fraction noted improvement on repeat echocardiogram. No edema or orthopnea.    Acute kidney injury / Rhabdomyolysis - CPK levels improved on repeat studies and renal function improved. Urinary retention resolved and Arguello catheter was previously removed.    Hypothyroidism - On [place] thyroid.    ADHD - On Adderall.    Anticipate discharge in 24-48 hours pending transfer to a rehabilitation facility. No accepting acute rehabilitation facility available, now awaiting subacute rehabilitation facility.

## 2021-09-15 NOTE — PROGRESS NOTE ADULT - SUBJECTIVE AND OBJECTIVE BOX
[facility]                                                        Office: [address] [address]                                                       Telephone: [phone]. Fax:[phone]                                                                             PROGRESS NOTE    Subjective:  Patient is alert and awake, no complaints. Patient is oriented to place, time and person. No chest pain.     Review of symptoms:   Cardiac:  No chest pain. No dyspnea. No palpitations.  Respiratory:no cough. No dyspnea  Gastrointestinal: No diarrhea. No abdominal pain. No bleeding.   Neuro: No focal neuro complaints.      	  Vitals:  T(C): 36.9 (09-15-21 @ 08:55), Max: 37.4 (09-14-21 @ 16:08)  HR: 90 (09-15-21 @ 08:55) (88 - 103)  BP: 116/84 (09-15-21 @ 08:55) (106/74 - 129/88)  RR: 18 (09-15-21 @ 08:55) (18 - 18)  SpO2: 98% (09-15-21 @ 08:55) (95% - 100%)  Wt(kg): --  I&O's Summary        PHYSICAL EXAM:  Appearance: Comfortable. No acute distress  HEENT:  Head and neck: Atraumatic. Normocephalic. , Neck is supple. No JVD,   Neurologic: Alert and awake, Grossly nonfocal.   Lymphatic: No cervical lymphadenopathy  Cardiovascular: Normal S1 S2, No murmurs. No JVD,   Respiratory: Lungs clear to auscultation  Gastrointestinal:  Soft, Non-tender, + BS  Lower Extremities: No edema  Psychiatry: Patient is calm. No agitation.  Skin: No rashes.    CURRENT MEDICATIONS:    MEDICATIONS  (STANDING):  amantadine 100 milliGRAM(s) Oral <User Schedule>  aspirin  chewable 81 milliGRAM(s) Oral daily  atorvastatin 20 milliGRAM(s) Oral at bedtime  diVALproex  milliGRAM(s) Oral <User Schedule>  heparin   Injectable 5000 Unit(s) SubCutaneous every 8 hours  melatonin 5 milliGRAM(s) Oral at bedtime  thyroid 30 milliGRAM(s) Oral daily      LABS:	 	            proBNP:   Lipid Profile:           TELEMETRY: Reviewed  - No significant arrhythmias    ECG:

## 2021-09-15 NOTE — PROGRESS NOTE ADULT - ASSESSMENT
Patient is a 47 yo M w/ PMH of DM2, HTN, Hyothyroid, Asthma, ADHD who presented after being found unresponsive. Admitted to MICU for possible benzo/cocaine overdose and aspiration PNA. Patient was intubated and started on vasopressors. Patient with anoxic brain injury, being followed by Neurology. Patient on IV antibiotics for presumed aspiration PNA. ECHO was done and significant for LVEF of <20. Per Cardiology, patient to have ischemic evaluation if meaningful neurological recovery. Patient also noted to have rhabdo/GARRETT which has now normalized. Trach was tentatively scheduled for 9/2. Patient showed improvement in mental status and was extubated on 8/31. Patient was transitioned from [facility] to [facility]. Patient downgraded to Medicine and seen by ID in consult. Patients medical course complicated by being found on the floor by a step fown nurse and had a ct head and ct c spine which were negative.   Patient placed on 1:1 and restraints and pulled his ng tube multiple times. Patient showed slow mental improvement and had a tte: on 9/3    Anoxic brain injury - Initial urine toxicology was positive for benzodiazepine and cocaine. MRI of the brain noted numerous areas of restricted diffusion in the bilateral cerebral hemispheres, bilateral globus pallidi, left hippocampus and bilateral cerebellar hemispheres suggesting acute infarcts and hypoxic ischemic encephalopathy. EEG was without seizures. Aspiration precautions with diet.    Pneumonia / Respiratory failure - Completed the course of vancomycin and meropenem. Blood cultures were without growth. Aspiration precautions. No hypoxia or dyspnea on examination. Remains afebrile.      Acute kidney injury / Rhabdomyolysis - CPK levels improved on repeat studies and renal function improved. Urinary retention resolved and Arguello catheter was previously removed.    Hypothyroidism - On [place] thyroid.    ADHD - On Adderall.    Acute Systolic Heart Failure- Patients LVEF is normalized now. Patient has Bicuspid Aortic Valve    Recommendations:   Patient had severely depressed EF when he came into the hospital with multiple medical issues, now EF is normal. Patient has multiple CAD risk factors, I suggested nuclear stress test to evaluate for coronary ischemia, patient is agreeable.    1. Nuclear Stress Test in AM  2. Patient is advised to f/u in the office after discharge for monitoring of his Bicuspid Aortic Vavle.    Will follow nuclear stress test. If it is abnormal I will come back and see the patient otherwise  I Will sign off    Discussed with Dr Apollo Brown

## 2021-09-16 LAB
ANION GAP SERPL CALC-SCNC: 12 MMOL/L — SIGNIFICANT CHANGE UP (ref 5–17)
BUN SERPL-MCNC: 24.7 MG/DL — HIGH (ref 8–20)
CALCIUM SERPL-MCNC: 9.1 MG/DL — SIGNIFICANT CHANGE UP (ref 8.6–10.2)
CHLORIDE SERPL-SCNC: 104 MMOL/L — SIGNIFICANT CHANGE UP (ref 98–107)
CO2 SERPL-SCNC: 23 MMOL/L — SIGNIFICANT CHANGE UP (ref 22–29)
CREAT SERPL-MCNC: 1.01 MG/DL — SIGNIFICANT CHANGE UP (ref 0.5–1.3)
GLUCOSE SERPL-MCNC: 98 MG/DL — SIGNIFICANT CHANGE UP (ref 70–99)
HCT VFR BLD CALC: 41.6 % — SIGNIFICANT CHANGE UP (ref 39–50)
HGB BLD-MCNC: 14.4 G/DL — SIGNIFICANT CHANGE UP (ref 13–17)
MCHC RBC-ENTMCNC: 30.9 PG — SIGNIFICANT CHANGE UP (ref 27–34)
MCHC RBC-ENTMCNC: 34.6 GM/DL — SIGNIFICANT CHANGE UP (ref 32–36)
MCV RBC AUTO: 89.3 FL — SIGNIFICANT CHANGE UP (ref 80–100)
PLATELET # BLD AUTO: 491 K/UL — HIGH (ref 150–400)
POTASSIUM SERPL-MCNC: 4.2 MMOL/L — SIGNIFICANT CHANGE UP (ref 3.5–5.3)
POTASSIUM SERPL-SCNC: 4.2 MMOL/L — SIGNIFICANT CHANGE UP (ref 3.5–5.3)
RBC # BLD: 4.66 M/UL — SIGNIFICANT CHANGE UP (ref 4.2–5.8)
RBC # FLD: 13.1 % — SIGNIFICANT CHANGE UP (ref 10.3–14.5)
SODIUM SERPL-SCNC: 139 MMOL/L — SIGNIFICANT CHANGE UP (ref 135–145)
WBC # BLD: 7.68 K/UL — SIGNIFICANT CHANGE UP (ref 3.8–10.5)
WBC # FLD AUTO: 7.68 K/UL — SIGNIFICANT CHANGE UP (ref 3.8–10.5)

## 2021-09-16 PROCEDURE — 99232 SBSQ HOSP IP/OBS MODERATE 35: CPT

## 2021-09-16 PROCEDURE — 99233 SBSQ HOSP IP/OBS HIGH 50: CPT | Mod: GC

## 2021-09-16 RX ORDER — FLUOXETINE HCL 10 MG
10 CAPSULE ORAL
Refills: 0 | Status: DISCONTINUED | OUTPATIENT
Start: 2021-09-17 | End: 2021-09-17

## 2021-09-16 RX ADMIN — Medication 5 MILLIGRAM(S): at 22:15

## 2021-09-16 RX ADMIN — Medication 100 MILLIGRAM(S): at 11:14

## 2021-09-16 RX ADMIN — Medication 81 MILLIGRAM(S): at 11:14

## 2021-09-16 RX ADMIN — HEPARIN SODIUM 5000 UNIT(S): 5000 INJECTION INTRAVENOUS; SUBCUTANEOUS at 14:14

## 2021-09-16 RX ADMIN — Medication 100 MILLIGRAM(S): at 06:07

## 2021-09-16 RX ADMIN — HEPARIN SODIUM 5000 UNIT(S): 5000 INJECTION INTRAVENOUS; SUBCUTANEOUS at 22:15

## 2021-09-16 RX ADMIN — Medication 30 MILLIGRAM(S): at 06:07

## 2021-09-16 RX ADMIN — DIVALPROEX SODIUM 500 MILLIGRAM(S): 500 TABLET, DELAYED RELEASE ORAL at 22:15

## 2021-09-16 RX ADMIN — ATORVASTATIN CALCIUM 20 MILLIGRAM(S): 80 TABLET, FILM COATED ORAL at 22:15

## 2021-09-16 NOTE — PROGRESS NOTE ADULT - SUBJECTIVE AND OBJECTIVE BOX
YESI YUKI  ----------------------------------------  The patient was seen at bedside. Patient with anoxic brain injury. Offered no complaints.    Vital Signs Last 24 Hrs  T(C): 37.2 (16 Sep 2021 07:30), Max: 37.2 (16 Sep 2021 07:30)  T(F): 98.9 (16 Sep 2021 07:30), Max: 98.9 (16 Sep 2021 07:30)  HR: 86 (16 Sep 2021 07:30) (86 - 108)  BP: 121/90 (16 Sep 2021 07:30) (111/79 - 121/90)  BP(mean): --  RR: 18 (16 Sep 2021 07:30) (17 - 18)  SpO2: 100% (16 Sep 2021 07:30) (93% - 100%)    PHYSICAL EXAMINATION:  ----------------------------------------  General appearance: No acute distress, Awake, Alert  HEENT: Normocephalic, Atraumatic, Conjunctiva clear, EOMI  Neck: Supple, No JVD, No tenderness  Lungs: Breath sound equal bilaterally, No wheezes, No rales  Cardiovascular: S1S2, Regular rhythm  Abdomen: Soft, Nontender, Nondistended, No guarding/rebound, Positive bowel sounds  Extremities: No clubbing, No cyanosis, No edema, No calf tenderness  Neuro: Strength equal bilaterally, No tremors  Psychiatric: Flat affect, Withdrawn    LABORATORY STUDIES:  ----------------------------------------             14.4   7.68  )-----------( 491      ( 16 Sep 2021 06:25 )             41.6     09-16    139  |  104  |  24.7<H>  ----------------------------<  98  4.2   |  23.0  |  1.01    Ca    9.1      16 Sep 2021 06:25    MEDICATIONS  (STANDING):  amantadine 100 milliGRAM(s) Oral <User Schedule>  aspirin  chewable 81 milliGRAM(s) Oral daily  atorvastatin 20 milliGRAM(s) Oral at bedtime  diVALproex  milliGRAM(s) Oral <User Schedule>  heparin   Injectable 5000 Unit(s) SubCutaneous every 8 hours  melatonin 5 milliGRAM(s) Oral at bedtime  thyroid 30 milliGRAM(s) Oral daily    MEDICATIONS  (PRN):  ALBUTerol    90 MICROgram(s) HFA Inhaler 2 Puff(s) Inhalation every 6 hours PRN Shortness of Breath      ASSESSMENT / PLAN:  ----------------------------------------  Patient is a 45 yo M w/ PMH of DM2, HTN, Hyothyroid, Asthma, ADHD who presented after being found unresponsive. Admitted to MICU for possible benzo/cocaine overdose and aspiration PNA. Patient was intubated and started on vasopressors. Patient with anoxic brain injury, being followed by Neurology. Patient on IV antibiotics for presumed aspiration PNA. ECHO was done and significant for LVEF of <20. Per Cardiology, patient to have ischemic evaluation if meaningful neurological recovery. Patient also noted to have rhabdo/GARRETT which has now normalized. Trach was tentatively scheduled for 9/2. Patient showed improvement in mental status and was extubated on 8/31. Patient was transitioned from [facility] to [facility]. Patient downgraded to Medicine and seen by ID in consult. Patients medical course complicated by being found on the floor by a step fown nurse and had a ct head and ct c spine which were negative.   Patient placed on 1:1 and restraints and pulled his ng tube multiple times. Patient showed slow mental improvement and had a tte: on 9/3    Anoxic brain injury - Initial urine toxicology was positive for benzodiazepine and cocaine. MRI of the brain noted numerous areas of restricted diffusion in the bilateral cerebral hemispheres, bilateral globus pallidi, left hippocampus and bilateral cerebellar hemispheres suggesting acute infarcts and hypoxic ischemic encephalopathy. EEG was without seizures. Aspiration precautions with diet.    Pneumonia / Respiratory failure - Continue with aspiration precautions. Afebrile today. Previously completed the course of vancomycin and meropenem. Blood cultures were without growth.    Echodensity noted on echocardiogram - Blood cultures were without growth. Transesophageal echocardiogram was without vegetation and noted bicuspid aortic valve.    Acute systolic heart failure - On aspirin and atorvastatin. Ejection fraction noted improvement on repeat echocardiogram. Cardiology follow up noted. For stress test to further evaluate.    Acute kidney injury / Rhabdomyolysis - CPK levels improved on repeat studies and renal function improved. Urinary retention resolved and Arguello catheter was previously removed.    Hypothyroidism - On [place] thyroid.

## 2021-09-16 NOTE — PROGRESS NOTE ADULT - SUBJECTIVE AND OBJECTIVE BOX
Patient seen this afternoon. No new complaints. Ambulated with patient on the unit with CG assistance and cues as he would bump into objects due to impaired attention.     REVIEW OF SYSTEMS  Constitutional - No fever,  No fatigue  HEENT - No vertigo, No neck pain  Neurological - No headaches, + memory loss, No loss of strength, No numbness, No tremors  Musculoskeletal - No joint pain, No joint swelling, No muscle pain  Psychiatric - No depression, No anxiety  All other systems were reviewed and were negative.    FUNCTIONAL PROGRESS  9/15  Bed Mobility  Bed Mobility Training Supine-to-Sit: supervsion;  supervision  Bed Mobility Training Limitations: decreased ability to use legs for bridging/pushing;  decreased ability to use arms for pushing/pulling;  decreased strength;  impaired balance    Sit-Stand Transfer Training  Transfer Training Sit-to-Stand Transfer: minimum assist (75% patient effort);  1 person assist;  full weight-bearing   no device  Transfer Training Stand-to-Sit Transfer: minimum assist (75% patient effort);  1 person assist;  full weight-bearing   no device  Sit-to-Stand Transfer Training Transfer Safety Analysis: decreased balance;  decreased strength;  impaired balance    Gait Training  Gait Training: minimum assist (75% patient effort);  1 person assist;  full weight-bearing   no device;  50 feet  Gait Analysis: 2-point gait   uneven naman and step lengths,drifts left and right;  impaired balance;  impaired coordination;  50 feet;  no device    Stair Training  Physical Assist/Nonphysical Assist: 1 person assist;  Min/Mod A  Weight-Bearing Restrictions: full weight-bearing  Assistive Device: right rail up;  right rail down;  step-to pattern,descends backwards  Number of Stairs: 4     VITALS  T(C): 37.2 (09-16-21 @ 07:30), Max: 37.2 (09-16-21 @ 07:30)  HR: 86 (09-16-21 @ 07:30) (86 - 108)  BP: 121/90 (09-16-21 @ 07:30) (111/79 - 121/90)  RR: 18 (09-16-21 @ 07:30) (17 - 18)  SpO2: 100% (09-16-21 @ 07:30) (93% - 100%)  Wt(kg): --    MEDICATIONS   ALBUTerol    90 MICROgram(s) HFA Inhaler 2 Puff(s) every 6 hours PRN  amantadine 100 milliGRAM(s) <User Schedule>  aspirin  chewable 81 milliGRAM(s) daily  atorvastatin 20 milliGRAM(s) at bedtime  diVALproex  milliGRAM(s) <User Schedule>  heparin   Injectable 5000 Unit(s) every 8 hours  melatonin 5 milliGRAM(s) at bedtime  thyroid 30 milliGRAM(s) daily      RECENT LABS/IMAGING                          14.4   7.68  )-----------( 491      ( 16 Sep 2021 06:25 )             41.6     09-16    139  |  104  |  24.7<H>  ----------------------------<  98  4.2   |  23.0  |  1.01    Ca    9.1      16 Sep 2021 06:25      CT Head 8/21: Symmetric low attenuation lesions in the bilateral globus pallidus regions of the basal ganglia, which can be seen with hypoxic ischemic encephalopathy or related to carbon monoxide positioning.  Indeterminate areas of low attenuation in the bilateral cerebellum may reflect infarcts.  MRI is recommended for further evaluation.    CXR 8/22: Bilateral infiltrates right greater than left somewhat improved on the latest film. Tubes in place as above.    CT Head 8/23: Slightly increased prominence of bilateral globus pallidus hypodense foci, likely representing hypoxic-ischemic injury. Again noted multiple likely small acute infarcts involving the bilateral cerebral and cerebellar hemispheres. No acute intracranial hemorrhage.    CXR 8/24: No interval change.    US Duplex Venous Lower Ext Ltd, Right 8/24: No evidence of right lower extremity deep venous thrombosis.    US Duplex Arterial Upper Ext Ltd, Left  8/25: Normal left upper extremity arterial evaluation    MRI Brain 8/26: Numerous areas of restricted diffusion in the bilateral cerebral hemispheres, bilateral globus pallidi, left hippocampus and bilateral cerebellar hemispheres suggesting acute infarcts and hypoxic ischemic encephalopathy.    Xray Abdomen 8/29: No acute radiographic intra-abdominal findings.    CXR 8/31: Residual Perihilar/basilar diffuse airspace disease and/or effusions..    CXR 9/1: Bilateral infiltrates somewhat improved. Endotracheal tube removed.    CXR 9/1: NG tube at least to stomach.    CXR 9/2: NG tube tip in distal stomach. The cardiomediastinal silhouette is within normal limits. Bilateral lung infiltrates are unchanged - right lung more than left. No pleural effusion or pneumothorax.    CT Cervical Spine 9/3: No acute cervical spine fracture.     CT head 9/3: No acute intracranial hemorrhage.    CXR 9/3: Unchanged infiltrates as above.    TTE Echo 9/3: Summary:   1. Left ventricular ejection fraction, by visual estimation, is 55 to 60%.   2. Normal global left ventricular systolic function.   3. Spectral Doppler shows impaired relaxation pattern of left ventricular myocardial filling (Grade I diastolic dysfunction).   4. There is mild concentric left ventricular hypertrophy.   5. Normal right ventricular size and function.   6. Mild mitral annular calcification.   7. Mobile echogenic foci noted on the ventricular surface of the aortic valve. PETE is recommended for further evaluation.   8. Peak transaortic gradient equals 38.6 mmHg, mean transaortic gradient equals 24.3 mmHg, the calculated aortic valve area equals 1.25 cm² by the continuity equation consistent with moderate aortic stenosis.   9. There is no evidence of pericardial effusion.  10. Compared to a prior study from 8/22/21, there is significant improvement in LV and RV function.    PETE 9/13 - Summary:   1. Left ventricular ejection fraction, by visual estimation, is 60 to 65%.   2. Normal global left ventricular systolic function.   3. There is mild concentric left ventricular hypertrophy.   4. Normal right ventricular size and function.   5. Structurally normal mitral valve, with normal leaflet excursion, no evidence of vegetation.   6. Trace mitral valve regurgitation.   7. Structurally normal tricuspid valve, with normal leaflet excursion, no evidence of vegetation.   8. Structurally normal pulmonic valve, with normal leaflet excursion, no evidence of vegetation.   9. Bicuspid aortic valve with fused raphe of the right and non-coronary cusps, mild-moderate calcificaitons with degenerative materials, no evidence of vegetation. Mild-to-moderate aortic valve stenosis, JUAN DAVID by 2D-planimetry 1.6 cm2, peak velocity 2.6 m/s, mean gradient 16 mmHg. Mild eccentric aortic regurgitation noted. Ascending aorta 3.8 cm.  10. Color flow doppler and intravenous injection of agitated saline demonstrates the presence of an intact intra atrial septum.  11. No left atrial appendage thrombus and normal left atrial appendage velocities.  12. There is no evidence of pericardial effusion. No evidence of vegetation or intracardiac abscess.    ---------------------------------------------------------------------------------------  PHYSICAL EXAM  Constitutional - NAD, Comfortable  Extremities - No C/C/E, No calf tenderness   Neurologic Exam -                    Cognitive - AAOx self - limited by affect/participation/effort     Communication - Mild dysarthria, Slowed processing     Motor -  Poor effort                    LEFT    UE - ShAB 5/5, EF 5/5, EE 5/5, WE 5/5,  5/5                    RIGHT UE - ShAB 5/5, EF 5/5, EE 5/5, WE 5/5,  5/5                    LEFT    LE - HF 4/5, KE 5/5, DF 1-3/5* (unclear of effort)                     RIGHT LE - HF 4/5, KE 5/5, DF 5/5   Gait - Ambulating with contact guard, requires cues for safety due to poor attention  Psychiatric - Flat, Calm, Withdrawn  ----------------------------------------------------------------------------------------  ASSESSMENT/PLAN  46yMale with functional deficits 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   Aortic valve abnormality & CAD/MI - ASA, Lipitor, Outpatient follow up  Mood - Starting Prozac 10 mg qAM 9/17 for mood and increased initiation. Plan to increase next week based on tolerance.  ADHD - Amantadine 100mg Q6AM/12PM (9/15), DC Adderall XL (not getting it as per pharmacy)  Sleep - Melatonin 5mg HS  DVT PPX - SCDs, Heparin  Rehab - Continue to recommend ACUTE inpatient rehabilitation for the functional deficits consisting of 3 hours of therapy/day & 24 hour RN/daily PMR physician for comorbid medical management. Will continue to follow for ongoing rehab needs and recommendations. Patient will be able to tolerate 3 hours a day.    Continue bedside therapy as well as OOB throughout the day with mobilization throughout the day with staff to maintain cardiopulmonary function and prevention of secondary complications related to debility.     Discussed with rehab clinical team.

## 2021-09-17 PROCEDURE — 99232 SBSQ HOSP IP/OBS MODERATE 35: CPT

## 2021-09-17 PROCEDURE — 99233 SBSQ HOSP IP/OBS HIGH 50: CPT | Mod: GC

## 2021-09-17 RX ORDER — FLUOXETINE HCL 10 MG
20 CAPSULE ORAL DAILY
Refills: 0 | Status: DISCONTINUED | OUTPATIENT
Start: 2021-09-17 | End: 2021-09-23

## 2021-09-17 RX ORDER — MIRTAZAPINE 45 MG/1
7.5 TABLET, ORALLY DISINTEGRATING ORAL AT BEDTIME
Refills: 0 | Status: DISCONTINUED | OUTPATIENT
Start: 2021-09-17 | End: 2021-09-22

## 2021-09-17 RX ADMIN — DIVALPROEX SODIUM 500 MILLIGRAM(S): 500 TABLET, DELAYED RELEASE ORAL at 20:27

## 2021-09-17 RX ADMIN — MIRTAZAPINE 7.5 MILLIGRAM(S): 45 TABLET, ORALLY DISINTEGRATING ORAL at 23:14

## 2021-09-17 RX ADMIN — Medication 5 MILLIGRAM(S): at 23:14

## 2021-09-17 RX ADMIN — Medication 81 MILLIGRAM(S): at 11:18

## 2021-09-17 RX ADMIN — Medication 100 MILLIGRAM(S): at 11:18

## 2021-09-17 RX ADMIN — ATORVASTATIN CALCIUM 20 MILLIGRAM(S): 80 TABLET, FILM COATED ORAL at 23:14

## 2021-09-17 RX ADMIN — HEPARIN SODIUM 5000 UNIT(S): 5000 INJECTION INTRAVENOUS; SUBCUTANEOUS at 14:37

## 2021-09-17 RX ADMIN — HEPARIN SODIUM 5000 UNIT(S): 5000 INJECTION INTRAVENOUS; SUBCUTANEOUS at 23:14

## 2021-09-17 RX ADMIN — Medication 10 MILLIGRAM(S): at 08:39

## 2021-09-17 NOTE — PROGRESS NOTE ADULT - SUBJECTIVE AND OBJECTIVE BOX
YESI YUKI  ----------------------------------------  The patient was seen at bedside. Patient with anoxic brain injury. Offered no complaints. Denied chest pain, dyspnea, or abdominal pain.    Vital Signs Last 24 Hrs  T(C): 37 (17 Sep 2021 04:48), Max: 37.2 (16 Sep 2021 17:32)  T(F): 98.6 (17 Sep 2021 04:48), Max: 98.9 (16 Sep 2021 17:32)  HR: 90 (17 Sep 2021 04:48) (90 - 107)  BP: 130/75 (17 Sep 2021 04:48) (108/77 - 130/75)  BP(mean): --  RR: 20 (16 Sep 2021 22:13) (18 - 20)  SpO2: 97% (16 Sep 2021 22:13) (95% - 97%)    PHYSICAL EXAMINATION:  ----------------------------------------  General appearance: No acute distress, Awake, Alert  HEENT: Normocephalic, Atraumatic, Conjunctiva clear, EOMI  Neck: Supple, No JVD, No tenderness  Lungs: Breath sound equal bilaterally, No wheezes, No rales  Cardiovascular: S1S2, Regular rhythm  Abdomen: Soft, Nontender, Nondistended, No guarding/rebound, Positive bowel sounds  Extremities: No clubbing, No cyanosis, No edema, No calf tenderness  Neuro: Strength equal bilaterally, No tremors  Psychiatric: Flat affect, Withdrawn    LABORATORY STUDIES:  ----------------------------------------             14.4   7.68  )-----------( 491      ( 16 Sep 2021 06:25 )             41.6     09-16    139  |  104  |  24.7<H>  ----------------------------<  98  4.2   |  23.0  |  1.01    Ca    9.1      16 Sep 2021 06:25    MEDICATIONS  (STANDING):  amantadine 100 milliGRAM(s) Oral <User Schedule>  aspirin  chewable 81 milliGRAM(s) Oral daily  atorvastatin 20 milliGRAM(s) Oral at bedtime  diVALproex  milliGRAM(s) Oral <User Schedule>  FLUoxetine 10 milliGRAM(s) Oral <User Schedule>  heparin   Injectable 5000 Unit(s) SubCutaneous every 8 hours  melatonin 5 milliGRAM(s) Oral at bedtime  thyroid 30 milliGRAM(s) Oral daily    MEDICATIONS  (PRN):  ALBUTerol    90 MICROgram(s) HFA Inhaler 2 Puff(s) Inhalation every 6 hours PRN Shortness of Breath      ASSESSMENT / PLAN:  ----------------------------------------  Patient is a 45 yo M w/ PMH of DM2, HTN, Hyothyroid, Asthma, ADHD who presented after being found unresponsive. Admitted to MICU for possible benzo/cocaine overdose and aspiration PNA. Patient was intubated and started on vasopressors. Patient with anoxic brain injury, being followed by Neurology. Patient on IV antibiotics for presumed aspiration PNA. ECHO was done and significant for LVEF of <20. Per Cardiology, patient to have ischemic evaluation if meaningful neurological recovery. Patient also noted to have rhabdo/GARRETT which has now normalized. Trach was tentatively scheduled for 9/2. Patient showed improvement in mental status and was extubated on 8/31. Patient was transitioned from [facility] to [facility]. Patient downgraded to Medicine and seen by ID in consult. Patients medical course complicated by being found on the floor by a step fown nurse and had a ct head and ct c spine which were negative. Patient placed on 1:1 and restraints and pulled his ng tube multiple times. Patient showed slow mental improvement and had a tte: on 9/3 with mobile echogenic foci noted on the ventricular surface of the aortic valve. Patient then seen by speech and started on a diet and ng tube removed. Patient also seen by psych.    Anoxic brain injury - MRI of the brain noted numerous areas of restricted diffusion in the bilateral cerebral hemispheres, bilateral globus pallidi, left hippocampus and bilateral cerebellar hemispheres suggesting acute infarcts and hypoxic ischemic encephalopathy. EEG was without seizures. Aspiration precautions with diet. On amantadine, melatonin, and divalproex.    Pneumonia / Respiratory failure - Continue with aspiration precautions. Afebrile today. Previously completed the course of vancomycin and meropenem. Blood cultures were without growth.    Echodensity noted on echocardiogram - Blood cultures were without growth. Transesophageal echocardiogram was without vegetation and noted bicuspid aortic valve.    Acute systolic heart failure - On aspirin and atorvastatin. Ejection fraction noted improvement on repeat echocardiogram. Cardiology follow up noted. Stress test was without ischemia.    Acute kidney injury / Rhabdomyolysis - CPK levels improved on repeat studies and renal function improved. Urinary retention resolved and Arguello catheter was previously removed.    Hypothyroidism - On [place] thyroid.

## 2021-09-17 NOTE — BH CONSULTATION LIAISON PROGRESS NOTE - NSBHCHARTREVIEWVS_PSY_A_CORE FT
Vital Signs Last 24 Hrs  T(C): 37 (17 Sep 2021 11:15), Max: 37.2 (16 Sep 2021 17:32)  T(F): 98.6 (17 Sep 2021 11:15), Max: 98.9 (16 Sep 2021 17:32)  HR: 101 (17 Sep 2021 11:15) (90 - 107)  BP: 114/78 (17 Sep 2021 11:15) (108/77 - 130/75)  BP(mean): --  RR: 20 (17 Sep 2021 11:15) (18 - 20)  SpO2: 93% (17 Sep 2021 11:15) (93% - 97%)

## 2021-09-17 NOTE — PROGRESS NOTE ADULT - SUBJECTIVE AND OBJECTIVE BOX
Patient seen this morning. No events overnight. Patient reports that he feels fine. He recalls ambulating on the unit with our brain injury team yesterday.    REVIEW OF SYSTEMS  Constitutional - No fever,  No fatigue  HEENT - No vertigo, No neck pain  Neurological - No headaches, + memory loss, No loss of strength, No numbness, No tremors  Musculoskeletal - No joint pain, No joint swelling, No muscle pain  Psychiatric - No depression, No anxiety  All other systems were reviewed and were negative.    FUNCTIONAL PROGRESS  9/15  Bed Mobility  Bed Mobility Training Supine-to-Sit: supervsion;  supervision  Bed Mobility Training Limitations: decreased ability to use legs for bridging/pushing;  decreased ability to use arms for pushing/pulling;  decreased strength;  impaired balance    Sit-Stand Transfer Training  Transfer Training Sit-to-Stand Transfer: minimum assist (75% patient effort);  1 person assist;  full weight-bearing   no device  Transfer Training Stand-to-Sit Transfer: minimum assist (75% patient effort);  1 person assist;  full weight-bearing   no device  Sit-to-Stand Transfer Training Transfer Safety Analysis: decreased balance;  decreased strength;  impaired balance    Gait Training  Gait Training: minimum assist (75% patient effort);  1 person assist;  full weight-bearing   no device;  50 feet  Gait Analysis: 2-point gait   uneven naman and step lengths,drifts left and right;  impaired balance;  impaired coordination;  50 feet;  no device    Stair Training  Physical Assist/Nonphysical Assist: 1 person assist;  Min/Mod A  Weight-Bearing Restrictions: full weight-bearing  Assistive Device: right rail up;  right rail down;  step-to pattern,descends backwards  Number of Stairs: 4     VITALS  T(C): 37 (09-17-21 @ 04:48), Max: 37.2 (09-16-21 @ 17:32)  HR: 90 (09-17-21 @ 04:48) (90 - 107)  BP: 130/75 (09-17-21 @ 04:48) (108/77 - 130/75)  RR: 20 (09-16-21 @ 22:13) (18 - 20)  SpO2: 97% (09-16-21 @ 22:13) (95% - 97%)  Wt(kg): --    MEDICATIONS   ALBUTerol    90 MICROgram(s) HFA Inhaler 2 Puff(s) every 6 hours PRN  amantadine 100 milliGRAM(s) <User Schedule>  aspirin  chewable 81 milliGRAM(s) daily  atorvastatin 20 milliGRAM(s) at bedtime  diVALproex  milliGRAM(s) <User Schedule>  FLUoxetine 10 milliGRAM(s) <User Schedule>  heparin   Injectable 5000 Unit(s) every 8 hours  melatonin 5 milliGRAM(s) at bedtime  thyroid 30 milliGRAM(s) daily      RECENT LABS/IMAGING                          14.4   7.68  )-----------( 491      ( 16 Sep 2021 06:25 )             41.6     09-16    139  |  104  |  24.7<H>  ----------------------------<  98  4.2   |  23.0  |  1.01    Ca    9.1      16 Sep 2021 06:25      CT Head 8/21: Symmetric low attenuation lesions in the bilateral globus pallidus regions of the basal ganglia, which can be seen with hypoxic ischemic encephalopathy or related to carbon monoxide positioning.  Indeterminate areas of low attenuation in the bilateral cerebellum may reflect infarcts.  MRI is recommended for further evaluation.    CXR 8/22: Bilateral infiltrates right greater than left somewhat improved on the latest film. Tubes in place as above.    CT Head 8/23: Slightly increased prominence of bilateral globus pallidus hypodense foci, likely representing hypoxic-ischemic injury. Again noted multiple likely small acute infarcts involving the bilateral cerebral and cerebellar hemispheres. No acute intracranial hemorrhage.    CXR 8/24: No interval change.    US Duplex Venous Lower Ext Ltd, Right 8/24: No evidence of right lower extremity deep venous thrombosis.    US Duplex Arterial Upper Ext Ltd, Left  8/25: Normal left upper extremity arterial evaluation    MRI Brain 8/26: Numerous areas of restricted diffusion in the bilateral cerebral hemispheres, bilateral globus pallidi, left hippocampus and bilateral cerebellar hemispheres suggesting acute infarcts and hypoxic ischemic encephalopathy.    Xray Abdomen 8/29: No acute radiographic intra-abdominal findings.    CXR 8/31: Residual Perihilar/basilar diffuse airspace disease and/or effusions..    CXR 9/1: Bilateral infiltrates somewhat improved. Endotracheal tube removed.    CXR 9/1: NG tube at least to stomach.    CXR 9/2: NG tube tip in distal stomach. The cardiomediastinal silhouette is within normal limits. Bilateral lung infiltrates are unchanged - right lung more than left. No pleural effusion or pneumothorax.    CT Cervical Spine 9/3: No acute cervical spine fracture.     CT head 9/3: No acute intracranial hemorrhage.    CXR 9/3: Unchanged infiltrates as above.    TTE Echo 9/3: Summary:   1. Left ventricular ejection fraction, by visual estimation, is 55 to 60%.   2. Normal global left ventricular systolic function.   3. Spectral Doppler shows impaired relaxation pattern of left ventricular myocardial filling (Grade I diastolic dysfunction).   4. There is mild concentric left ventricular hypertrophy.   5. Normal right ventricular size and function.   6. Mild mitral annular calcification.   7. Mobile echogenic foci noted on the ventricular surface of the aortic valve. PEET is recommended for further evaluation.   8. Peak transaortic gradient equals 38.6 mmHg, mean transaortic gradient equals 24.3 mmHg, the calculated aortic valve area equals 1.25 cm² by the continuity equation consistent with moderate aortic stenosis.   9. There is no evidence of pericardial effusion.  10. Compared to a prior study from 8/22/21, there is significant improvement in LV and RV function.    PETE 9/13 - Summary:   1. Left ventricular ejection fraction, by visual estimation, is 60 to 65%.   2. Normal global left ventricular systolic function.   3. There is mild concentric left ventricular hypertrophy.   4. Normal right ventricular size and function.   5. Structurally normal mitral valve, with normal leaflet excursion, no evidence of vegetation.   6. Trace mitral valve regurgitation.   7. Structurally normal tricuspid valve, with normal leaflet excursion, no evidence of vegetation.   8. Structurally normal pulmonic valve, with normal leaflet excursion, no evidence of vegetation.   9. Bicuspid aortic valve with fused raphe of the right and non-coronary cusps, mild-moderate calcificaitons with degenerative materials, no evidence of vegetation. Mild-to-moderate aortic valve stenosis, JUAN DAVID by 2D-planimetry 1.6 cm2, peak velocity 2.6 m/s, mean gradient 16 mmHg. Mild eccentric aortic regurgitation noted. Ascending aorta 3.8 cm.  10. Color flow doppler and intravenous injection of agitated saline demonstrates the presence of an intact intra atrial septum.  11. No left atrial appendage thrombus and normal left atrial appendage velocities.  12. There is no evidence of pericardial effusion. No evidence of vegetation or intracardiac abscess.    ---------------------------------------------------------------------------------------  PHYSICAL EXAM  Constitutional - NAD, Comfortable  Extremities - No C/C/E, No calf tenderness   Neurologic Exam -                    Cognitive - AAOx self - limited by affect/participation/effort     Communication - Mild dysarthria, Slowed processing     Motor -  Poor effort                    LEFT    UE - ShAB 5/5, EF 5/5, EE 5/5, WE 5/5,  5/5                    RIGHT UE - ShAB 5/5, EF 5/5, EE 5/5, WE 5/5,  5/5                    LEFT    LE - HF 4/5, KE 5/5, DF 1-3/5* (unclear of effort)                     RIGHT LE - HF 4/5, KE 5/5, DF 5/5   Gait - Ambulating with contact guard, requires cues for safety due to poor attention  Psychiatric - Flat, Calm, Withdrawn  ----------------------------------------------------------------------------------------  ASSESSMENT/PLAN  46yMale with functional deficits 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   Aortic valve abnormality & CAD/MI - ASA, Lipitor, Outpatient follow up  Mood/Initiation - Prozac 10 mg qAM 9/17. Plan to increase next week based on tolerance. In the meantime, continue routine monitoring of chemistries.  ADHD - Amantadine 100mg Q6AM/12PM (9/15), DC Adderall XL (not getting it as per pharmacy)  Sleep - Melatonin 5mg HS  DVT PPX - SCDs, Heparin  Rehab - Continue to recommend ACUTE inpatient rehabilitation for the functional deficits consisting of 3 hours of therapy/day & 24 hour RN/daily PMR physician for comorbid medical management. Will continue to follow for ongoing rehab needs and recommendations. Patient will be able to tolerate 3 hours a day.    Continue bedside therapy as well as OOB throughout the day with mobilization throughout the day with staff to maintain cardiopulmonary function and prevention of secondary complications related to debility.     Discussed with rehab clinical team.            Patient seen this morning. No events overnight. Patient reports that he feels fine. He recalls ambulating on the unit with our brain injury team yesterday.    REVIEW OF SYSTEMS  Constitutional - No fever,  No fatigue  HEENT - No vertigo, No neck pain  Neurological - No headaches, + memory loss, No loss of strength, No numbness, No tremors  Musculoskeletal - No joint pain, No joint swelling, No muscle pain  Psychiatric - No depression, No anxiety  All other systems were reviewed and were negative.    FUNCTIONAL PROGRESS  9/15  Bed Mobility  Bed Mobility Training Supine-to-Sit: supervsion;  supervision  Bed Mobility Training Limitations: decreased ability to use legs for bridging/pushing;  decreased ability to use arms for pushing/pulling;  decreased strength;  impaired balance    Sit-Stand Transfer Training  Transfer Training Sit-to-Stand Transfer: minimum assist (75% patient effort);  1 person assist;  full weight-bearing   no device  Transfer Training Stand-to-Sit Transfer: minimum assist (75% patient effort);  1 person assist;  full weight-bearing   no device  Sit-to-Stand Transfer Training Transfer Safety Analysis: decreased balance;  decreased strength;  impaired balance    Gait Training  Gait Training: minimum assist (75% patient effort);  1 person assist;  full weight-bearing   no device;  50 feet  Gait Analysis: 2-point gait   uneven naman and step lengths,drifts left and right;  impaired balance;  impaired coordination;  50 feet;  no device    Stair Training  Physical Assist/Nonphysical Assist: 1 person assist;  Min/Mod A  Weight-Bearing Restrictions: full weight-bearing  Assistive Device: right rail up;  right rail down;  step-to pattern,descends backwards  Number of Stairs: 4     VITALS  T(C): 37 (09-17-21 @ 04:48), Max: 37.2 (09-16-21 @ 17:32)  HR: 90 (09-17-21 @ 04:48) (90 - 107)  BP: 130/75 (09-17-21 @ 04:48) (108/77 - 130/75)  RR: 20 (09-16-21 @ 22:13) (18 - 20)  SpO2: 97% (09-16-21 @ 22:13) (95% - 97%)  Wt(kg): --    MEDICATIONS   ALBUTerol    90 MICROgram(s) HFA Inhaler 2 Puff(s) every 6 hours PRN  amantadine 100 milliGRAM(s) <User Schedule>  aspirin  chewable 81 milliGRAM(s) daily  atorvastatin 20 milliGRAM(s) at bedtime  diVALproex  milliGRAM(s) <User Schedule>  FLUoxetine 10 milliGRAM(s) <User Schedule>  heparin   Injectable 5000 Unit(s) every 8 hours  melatonin 5 milliGRAM(s) at bedtime  thyroid 30 milliGRAM(s) daily      RECENT LABS/IMAGING                          14.4   7.68  )-----------( 491      ( 16 Sep 2021 06:25 )             41.6     09-16    139  |  104  |  24.7<H>  ----------------------------<  98  4.2   |  23.0  |  1.01    Ca    9.1      16 Sep 2021 06:25      CT Head 8/21: Symmetric low attenuation lesions in the bilateral globus pallidus regions of the basal ganglia, which can be seen with hypoxic ischemic encephalopathy or related to carbon monoxide positioning.  Indeterminate areas of low attenuation in the bilateral cerebellum may reflect infarcts.  MRI is recommended for further evaluation.    CXR 8/22: Bilateral infiltrates right greater than left somewhat improved on the latest film. Tubes in place as above.    CT Head 8/23: Slightly increased prominence of bilateral globus pallidus hypodense foci, likely representing hypoxic-ischemic injury. Again noted multiple likely small acute infarcts involving the bilateral cerebral and cerebellar hemispheres. No acute intracranial hemorrhage.    CXR 8/24: No interval change.    US Duplex Venous Lower Ext Ltd, Right 8/24: No evidence of right lower extremity deep venous thrombosis.    US Duplex Arterial Upper Ext Ltd, Left  8/25: Normal left upper extremity arterial evaluation    MRI Brain 8/26: Numerous areas of restricted diffusion in the bilateral cerebral hemispheres, bilateral globus pallidi, left hippocampus and bilateral cerebellar hemispheres suggesting acute infarcts and hypoxic ischemic encephalopathy.    Xray Abdomen 8/29: No acute radiographic intra-abdominal findings.    CXR 8/31: Residual Perihilar/basilar diffuse airspace disease and/or effusions..    CXR 9/1: Bilateral infiltrates somewhat improved. Endotracheal tube removed.    CXR 9/1: NG tube at least to stomach.    CXR 9/2: NG tube tip in distal stomach. The cardiomediastinal silhouette is within normal limits. Bilateral lung infiltrates are unchanged - right lung more than left. No pleural effusion or pneumothorax.    CT Cervical Spine 9/3: No acute cervical spine fracture.     CT head 9/3: No acute intracranial hemorrhage.    CXR 9/3: Unchanged infiltrates as above.    TTE Echo 9/3: Summary:   1. Left ventricular ejection fraction, by visual estimation, is 55 to 60%.   2. Normal global left ventricular systolic function.   3. Spectral Doppler shows impaired relaxation pattern of left ventricular myocardial filling (Grade I diastolic dysfunction).   4. There is mild concentric left ventricular hypertrophy.   5. Normal right ventricular size and function.   6. Mild mitral annular calcification.   7. Mobile echogenic foci noted on the ventricular surface of the aortic valve. PETE is recommended for further evaluation.   8. Peak transaortic gradient equals 38.6 mmHg, mean transaortic gradient equals 24.3 mmHg, the calculated aortic valve area equals 1.25 cm² by the continuity equation consistent with moderate aortic stenosis.   9. There is no evidence of pericardial effusion.  10. Compared to a prior study from 8/22/21, there is significant improvement in LV and RV function.    PETE 9/13 - Summary:   1. Left ventricular ejection fraction, by visual estimation, is 60 to 65%.   2. Normal global left ventricular systolic function.   3. There is mild concentric left ventricular hypertrophy.   4. Normal right ventricular size and function.   5. Structurally normal mitral valve, with normal leaflet excursion, no evidence of vegetation.   6. Trace mitral valve regurgitation.   7. Structurally normal tricuspid valve, with normal leaflet excursion, no evidence of vegetation.   8. Structurally normal pulmonic valve, with normal leaflet excursion, no evidence of vegetation.   9. Bicuspid aortic valve with fused raphe of the right and non-coronary cusps, mild-moderate calcificaitons with degenerative materials, no evidence of vegetation. Mild-to-moderate aortic valve stenosis, JUAN DAVID by 2D-planimetry 1.6 cm2, peak velocity 2.6 m/s, mean gradient 16 mmHg. Mild eccentric aortic regurgitation noted. Ascending aorta 3.8 cm.  10. Color flow doppler and intravenous injection of agitated saline demonstrates the presence of an intact intra atrial septum.  11. No left atrial appendage thrombus and normal left atrial appendage velocities.  12. There is no evidence of pericardial effusion. No evidence of vegetation or intracardiac abscess.    ---------------------------------------------------------------------------------------  PHYSICAL EXAM  Constitutional - NAD, Comfortable  Extremities - No C/C/E, No calf tenderness   Neurologic Exam -                    Cognitive - AAOx self - limited by affect/participation/effort     Communication - Mild dysarthria, Slowed processing     Motor -  Poor effort                    LEFT    UE - ShAB 5/5, EF 5/5, EE 5/5, WE 5/5,  5/5                    RIGHT UE - ShAB 5/5, EF 5/5, EE 5/5, WE 5/5,  5/5                    LEFT    LE - HF 4/5, KE 5/5, DF 1-3/5* (unclear of effort)                     RIGHT LE - HF 4/5, KE 5/5, DF 5/5   Gait - Ambulating with contact guard, requires cues for safety due to poor attention  Psychiatric - Flat, Calm, Withdrawn  ----------------------------------------------------------------------------------------  ASSESSMENT/PLAN  46yMale with functional deficits 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   Aortic valve abnormality & CAD/MI - ASA, Lipitor, Outpatient follow up  Mood/Initiation - Prozac 10 mg qAM 9/17. Plan to increase next week based on tolerance. In the meantime, continue routine monitoring of chemistries.  ADHD - Amantadine 100mg Q6AM/12PM (9/15), DC Adderall XL (not getting it as per pharmacy)  Sleep - Melatonin 5mg HS  DVT PPX - SCDs, Heparin  Rehab - See below

## 2021-09-17 NOTE — BH CONSULTATION LIAISON PROGRESS NOTE - NSBHASSESSMENTFT_PSY_ALL_CORE
Chart reviewed: Per record, this is a 45 yo M w/ PMH of DM2, HTN, Hyothyroid, Asthma, ADHD who presented after being found unresponsive. Admitted to MICU for possible benzo/cocaine overdose and aspiration PNA. Per record, patient was intubated and started on vasopressors. Patient with anoxic brain injury, being followed by Neurology. Patient was seen today 9/17 per psychiatric consult request for possible depression.  Chart reviewed: Per record, this is a 47 yo M w/ PMH of DM2, HTN, Hyothyroid, Asthma, ADHD who presented after being found unresponsive. Admitted to MICU for possible benzo/cocaine overdose and aspiration PNA. Per record, patient was intubated and started on vasopressors. Patient with anoxic brain injury, being followed by Neurology. Patient was seen today 9/17 per psychiatric consult request for possible depression.     Patient is alert and oriented X3; endorsing current depressed mood and appearing anhedonic. His affect is depressed congruent with his depressed mood. However, he denies current plans or intent to die, citing his aunt as protective factor. He denies history of hearing voices or seeing things and denies history of special power or special abilities. He reports poor sleep pattern and poor appetite. He states he lives alone and is unemployed. He is currently receiving unemployment benefits. 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 He is not able to recall what lead to his hospitalization. He states his aunt Belkys is supportive and gave verbal consent to talk to her aunt if needed at [phone]. He denies current plans or intent to die, and denies current symptoms of psychosis, floresita and none noted. He is receptive to increasing his medication to address his poor sleep pattern and his depressed mood.    IMPRESSION: Patient is calm and notably anhedonic; however, he denies acute psychiatric symptoms ot warrant inpatient psychiatric admission at this time. He will benefit from medication adjustment to address his symptoms.    PLAN:  1. Will /c Fluoxetine 10 PO daily; will start Fluoxetine 20 mg PO daily.  2. Will start Remeron 7.5 mg PO HS for sleep.  Benefits and risks of medication regimen discussed with patient.   3. Psychiatry will follow-up.

## 2021-09-17 NOTE — BH CONSULTATION LIAISON PROGRESS NOTE - NSBHCONSULTPRIMARYDISCUSSYES_PSY_A_CORE FT
Remeron 7.5 mg PO HS for sleep.  D/C fluoxetine 10 mg; start Fluoxetine 20mg PO daily for depression.  Benefits and risks discussed with patient.

## 2021-09-18 LAB
ANION GAP SERPL CALC-SCNC: 10 MMOL/L — SIGNIFICANT CHANGE UP (ref 5–17)
BUN SERPL-MCNC: 15.4 MG/DL — SIGNIFICANT CHANGE UP (ref 8–20)
CALCIUM SERPL-MCNC: 9.5 MG/DL — SIGNIFICANT CHANGE UP (ref 8.6–10.2)
CHLORIDE SERPL-SCNC: 103 MMOL/L — SIGNIFICANT CHANGE UP (ref 98–107)
CO2 SERPL-SCNC: 28 MMOL/L — SIGNIFICANT CHANGE UP (ref 22–29)
CREAT SERPL-MCNC: 1.22 MG/DL — SIGNIFICANT CHANGE UP (ref 0.5–1.3)
GLUCOSE SERPL-MCNC: 84 MG/DL — SIGNIFICANT CHANGE UP (ref 70–99)
HCT VFR BLD CALC: 45.4 % — SIGNIFICANT CHANGE UP (ref 39–50)
HGB BLD-MCNC: 15.1 G/DL — SIGNIFICANT CHANGE UP (ref 13–17)
MCHC RBC-ENTMCNC: 31.1 PG — SIGNIFICANT CHANGE UP (ref 27–34)
MCHC RBC-ENTMCNC: 33.3 GM/DL — SIGNIFICANT CHANGE UP (ref 32–36)
MCV RBC AUTO: 93.6 FL — SIGNIFICANT CHANGE UP (ref 80–100)
PLATELET # BLD AUTO: 425 K/UL — HIGH (ref 150–400)
POTASSIUM SERPL-MCNC: 4.5 MMOL/L — SIGNIFICANT CHANGE UP (ref 3.5–5.3)
POTASSIUM SERPL-SCNC: 4.5 MMOL/L — SIGNIFICANT CHANGE UP (ref 3.5–5.3)
RBC # BLD: 4.85 M/UL — SIGNIFICANT CHANGE UP (ref 4.2–5.8)
RBC # FLD: 13.2 % — SIGNIFICANT CHANGE UP (ref 10.3–14.5)
SODIUM SERPL-SCNC: 141 MMOL/L — SIGNIFICANT CHANGE UP (ref 135–145)
WBC # BLD: 6.23 K/UL — SIGNIFICANT CHANGE UP (ref 3.8–10.5)
WBC # FLD AUTO: 6.23 K/UL — SIGNIFICANT CHANGE UP (ref 3.8–10.5)

## 2021-09-18 PROCEDURE — 99232 SBSQ HOSP IP/OBS MODERATE 35: CPT

## 2021-09-18 RX ORDER — KETOROLAC TROMETHAMINE 30 MG/ML
15 SYRINGE (ML) INJECTION ONCE
Refills: 0 | Status: DISCONTINUED | OUTPATIENT
Start: 2021-09-18 | End: 2021-09-18

## 2021-09-18 RX ORDER — INFLUENZA VIRUS VACCINE 15; 15; 15; 15 UG/.5ML; UG/.5ML; UG/.5ML; UG/.5ML
0.5 SUSPENSION INTRAMUSCULAR ONCE
Refills: 0 | Status: DISCONTINUED | OUTPATIENT
Start: 2021-09-18 | End: 2021-09-23

## 2021-09-18 RX ADMIN — Medication 100 MILLIGRAM(S): at 12:47

## 2021-09-18 RX ADMIN — ATORVASTATIN CALCIUM 20 MILLIGRAM(S): 80 TABLET, FILM COATED ORAL at 21:08

## 2021-09-18 RX ADMIN — Medication 81 MILLIGRAM(S): at 12:47

## 2021-09-18 RX ADMIN — HEPARIN SODIUM 5000 UNIT(S): 5000 INJECTION INTRAVENOUS; SUBCUTANEOUS at 21:08

## 2021-09-18 RX ADMIN — Medication 20 MILLIGRAM(S): at 12:47

## 2021-09-18 RX ADMIN — DIVALPROEX SODIUM 500 MILLIGRAM(S): 500 TABLET, DELAYED RELEASE ORAL at 21:07

## 2021-09-18 RX ADMIN — Medication 100 MILLIGRAM(S): at 05:34

## 2021-09-18 RX ADMIN — HEPARIN SODIUM 5000 UNIT(S): 5000 INJECTION INTRAVENOUS; SUBCUTANEOUS at 12:51

## 2021-09-18 RX ADMIN — HEPARIN SODIUM 5000 UNIT(S): 5000 INJECTION INTRAVENOUS; SUBCUTANEOUS at 05:34

## 2021-09-18 RX ADMIN — Medication 30 MILLIGRAM(S): at 05:34

## 2021-09-18 RX ADMIN — Medication 5 MILLIGRAM(S): at 21:07

## 2021-09-18 RX ADMIN — Medication 15 MILLIGRAM(S): at 16:04

## 2021-09-18 RX ADMIN — MIRTAZAPINE 7.5 MILLIGRAM(S): 45 TABLET, ORALLY DISINTEGRATING ORAL at 21:07

## 2021-09-18 NOTE — PROGRESS NOTE ADULT - ASSESSMENT
47 y/o M w/ PMH of DM2, HTN, Hyothyroid, Asthma, ADHD was BIBA after being found unresponsive suspected to be 2/2 benzo/cocaine OD and subsequent aspiration PNA.  Admitted to MICU for acute hypoxic respiratory failure requiring intubation and pressor support.  Pt found to have anoxic brain injury and being followed by Neurology.  On IV antibiotics for presumed aspiration PNA. ECHO was done and significant for LVEF of <20%.  Per Cardiology, patient to have ischemic evaluation if meaningful neurological recovery.  Pt also noted to have rhabdo/GARRETT on admission which has now normalized.  Trach was tentatively scheduled for 9/2.  Patient showed improvement in mental status and was extubated on 8/31.  Patient was transitioned from HFNC to NC.  Off pressors and downgraded to Medicine.  Seen by ID.  Hospital course complicated by him being found on the floor by a step down nurse; subsequent CTH and CT c-spine performed and negative.  Pt placed on 1:1 and restraints but since d/c'd.  Patient showed slow mental improvement and had a tte: on 9/3 with mobile echogenic foci noted on the ventricular surface of the AV but PETE only reporting bicuspic AV.  Patient then seen by speech and started on a diet and ng tube removed. Patient also seen by psych.      Acute metabolic encephalopathy w/ anoxic brain injury s/p benzo/cocoaine OD   - Slow mental improvement since inciting event  - MRI of the brain noted numerous areas of restricted diffusion in the bilateral cerebral hemispheres, bilateral globus pallidi, left hippocampus and bilateral cerebellar hemispheres suggesting acute infarcts and hypoxic ischemic encephalopathy  - EEG report was without seizures  - Fall precautions   - Aspiration precautions with diet   - On amantadine, melatonin, and divalproex  -  consult noted      Aspiration Pneumonia / Respiratory failure   - Respiratory failure resolved   - Aspiration PNA likely 2/2 above  - Continue with aspiration precautions   - Completed course of vancomycin and meropenem  - Bcx NTD and pt remains afebrile   - Monitor CBC and temperature curve      Echodensity noted on TTE  - Blood cultures are without growth  - PETE without vegetation but noted bicuspid AV      Decompensated HFrEF POA  - Clinically euvolemic at this time  - On aspirin and atorvastatin  - TTE w/ EF <20% initialy however EF improved on repeat echo   - Stress test was without ischemia  - Monitor daily weights and strict I/O's  - Maintain K>4 and Mg>2  - Cardiology follow up noted      Acute kidney injury / Rhabdomyolysis   - Likely due to overdose    - CPK levels improved on repeat studies and renal fxn improved   - Urinary retention resolved and Arguello catheter successfully removed  - Monitor renal function and I/O's      Hypothyroidism   - On [place] thyroid        VTE ppx: heparin sq     Dispo: awaiting ISA placement

## 2021-09-18 NOTE — PROGRESS NOTE ADULT - SUBJECTIVE AND OBJECTIVE BOX
Chief Complaint:  respiratory failure     SUBJECTIVE / OVERNIGHT EVENTS: no acute events reported overnight.  Pt offers no complaints at this time.  Patient denies chest pain, SOB, abd pain, N/V, fever, chills, dysuria or any other complaints. All remainder ROS negative.       I&O's Summary        PHYSICAL EXAM:  Vital Signs Last 24 Hrs  T(C): 36.9 (18 Sep 2021 13:24), Max: 37.1 (17 Sep 2021 20:29)  T(F): 98.5 (18 Sep 2021 13:24), Max: 98.7 (17 Sep 2021 20:29)  HR: 98 (18 Sep 2021 13:24) (85 - 102)  BP: 114/85 (18 Sep 2021 13:24) (109/85 - 126/90)  BP(mean): --  RR: 18 (18 Sep 2021 13:24) (16 - 18)  SpO2: 97% (18 Sep 2021 13:24) (96% - 99%)      GENERAL: pt examined bedside, laying comfortably in bed in NAD  HEENT: NC/AT, moist oral mucosa, clear conjunctiva, sclera nonicteric  RESPIRATORY: Normal respiratory effort; CTA b/l, no wheezing, rhonchi, rales  CARDIOVASCULAR: RRR, normal S1 and S2, no murmur/rub/gallop  ABDOMEN: soft, NT/ND, normoactive bowel sounds, no rebound/guarding  MUSCLOSKELETAL:  no joint swelling or tenderness to palpation  EXTREMITIES: No cynaosis, no clubbing, no lower extremity edema; Peripheral pulses are 2+ bilaterally  PSYCH: flat affect but cooperative  NEUROLOGY: A+O to self and place, follows basic commands, no focal neurologic deficits appreciated   SKIN: No rashes or no palpable lesions        LABS:                        15.1   6.23  )-----------( 425      ( 18 Sep 2021 10:21 )             45.4     09-18    141  |  103  |  15.4  ----------------------------<  84  4.5   |  28.0  |  1.22    Ca    9.5      18 Sep 2021 10:21                CAPILLARY BLOOD GLUCOSE            RADIOLOGY & ADDITIONAL TESTS:          MEDICATIONS  (STANDING):  amantadine 100 milliGRAM(s) Oral <User Schedule>  aspirin  chewable 81 milliGRAM(s) Oral daily  atorvastatin 20 milliGRAM(s) Oral at bedtime  diVALproex  milliGRAM(s) Oral <User Schedule>  FLUoxetine 20 milliGRAM(s) Oral daily  heparin   Injectable 5000 Unit(s) SubCutaneous every 8 hours  melatonin 5 milliGRAM(s) Oral at bedtime  mirtazapine 7.5 milliGRAM(s) Oral at bedtime  thyroid 30 milliGRAM(s) Oral daily    MEDICATIONS  (PRN):  ALBUTerol    90 MICROgram(s) HFA Inhaler 2 Puff(s) Inhalation every 6 hours PRN Shortness of Breath

## 2021-09-19 LAB
ANION GAP SERPL CALC-SCNC: 11 MMOL/L — SIGNIFICANT CHANGE UP (ref 5–17)
BUN SERPL-MCNC: 28.1 MG/DL — HIGH (ref 8–20)
CALCIUM SERPL-MCNC: 9.4 MG/DL — SIGNIFICANT CHANGE UP (ref 8.6–10.2)
CHLORIDE SERPL-SCNC: 105 MMOL/L — SIGNIFICANT CHANGE UP (ref 98–107)
CO2 SERPL-SCNC: 25 MMOL/L — SIGNIFICANT CHANGE UP (ref 22–29)
CREAT SERPL-MCNC: 1.19 MG/DL — SIGNIFICANT CHANGE UP (ref 0.5–1.3)
GLUCOSE SERPL-MCNC: 77 MG/DL — SIGNIFICANT CHANGE UP (ref 70–99)
HCT VFR BLD CALC: 43.7 % — SIGNIFICANT CHANGE UP (ref 39–50)
HGB BLD-MCNC: 14.3 G/DL — SIGNIFICANT CHANGE UP (ref 13–17)
MAGNESIUM SERPL-MCNC: 2 MG/DL — SIGNIFICANT CHANGE UP (ref 1.6–2.6)
MCHC RBC-ENTMCNC: 30.8 PG — SIGNIFICANT CHANGE UP (ref 27–34)
MCHC RBC-ENTMCNC: 32.7 GM/DL — SIGNIFICANT CHANGE UP (ref 32–36)
MCV RBC AUTO: 94.2 FL — SIGNIFICANT CHANGE UP (ref 80–100)
PLATELET # BLD AUTO: 369 K/UL — SIGNIFICANT CHANGE UP (ref 150–400)
POTASSIUM SERPL-MCNC: 4.3 MMOL/L — SIGNIFICANT CHANGE UP (ref 3.5–5.3)
POTASSIUM SERPL-SCNC: 4.3 MMOL/L — SIGNIFICANT CHANGE UP (ref 3.5–5.3)
RBC # BLD: 4.64 M/UL — SIGNIFICANT CHANGE UP (ref 4.2–5.8)
RBC # FLD: 13.3 % — SIGNIFICANT CHANGE UP (ref 10.3–14.5)
SODIUM SERPL-SCNC: 141 MMOL/L — SIGNIFICANT CHANGE UP (ref 135–145)
WBC # BLD: 6.56 K/UL — SIGNIFICANT CHANGE UP (ref 3.8–10.5)
WBC # FLD AUTO: 6.56 K/UL — SIGNIFICANT CHANGE UP (ref 3.8–10.5)

## 2021-09-19 PROCEDURE — 99232 SBSQ HOSP IP/OBS MODERATE 35: CPT

## 2021-09-19 RX ADMIN — HEPARIN SODIUM 5000 UNIT(S): 5000 INJECTION INTRAVENOUS; SUBCUTANEOUS at 12:44

## 2021-09-19 RX ADMIN — MIRTAZAPINE 7.5 MILLIGRAM(S): 45 TABLET, ORALLY DISINTEGRATING ORAL at 21:40

## 2021-09-19 RX ADMIN — ATORVASTATIN CALCIUM 20 MILLIGRAM(S): 80 TABLET, FILM COATED ORAL at 21:41

## 2021-09-19 RX ADMIN — HEPARIN SODIUM 5000 UNIT(S): 5000 INJECTION INTRAVENOUS; SUBCUTANEOUS at 05:08

## 2021-09-19 RX ADMIN — Medication 30 MILLIGRAM(S): at 05:07

## 2021-09-19 RX ADMIN — Medication 100 MILLIGRAM(S): at 12:44

## 2021-09-19 RX ADMIN — Medication 81 MILLIGRAM(S): at 12:44

## 2021-09-19 RX ADMIN — DIVALPROEX SODIUM 500 MILLIGRAM(S): 500 TABLET, DELAYED RELEASE ORAL at 21:40

## 2021-09-19 RX ADMIN — Medication 20 MILLIGRAM(S): at 12:44

## 2021-09-19 RX ADMIN — Medication 5 MILLIGRAM(S): at 21:40

## 2021-09-19 RX ADMIN — HEPARIN SODIUM 5000 UNIT(S): 5000 INJECTION INTRAVENOUS; SUBCUTANEOUS at 21:40

## 2021-09-19 RX ADMIN — Medication 100 MILLIGRAM(S): at 05:08

## 2021-09-19 NOTE — PROGRESS NOTE ADULT - SUBJECTIVE AND OBJECTIVE BOX
Chief Complaint:  respiratory failure     SUBJECTIVE / OVERNIGHT EVENTS: no acute events reported overnight.  Pt offers no complaints at this time.  Patient denies chest pain, SOB, abd pain, N/V, fever, chills, dysuria or any other complaints. All remainder ROS negative.       I&O's Summary        PHYSICAL EXAM:  Vital Signs Last 24 Hrs  T(C): 36.8 (19 Sep 2021 08:35), Max: 37.1 (19 Sep 2021 04:38)  T(F): 98.3 (19 Sep 2021 08:35), Max: 98.7 (19 Sep 2021 04:38)  HR: 69 (19 Sep 2021 08:35) (69 - 105)  BP: 135/90 (19 Sep 2021 08:35) (118/83 - 135/90)  BP(mean): --  RR: 18 (19 Sep 2021 08:35) (18 - 18)  SpO2: 99% (19 Sep 2021 08:35) (96% - 99%)      GENERAL: pt examined bedside, laying comfortably in bed in NAD  HEENT: NC/AT, moist oral mucosa, clear conjunctiva, sclera nonicteric  RESPIRATORY: Normal respiratory effort; CTA b/l, no wheezing, rhonchi, rales  CARDIOVASCULAR: RRR, normal S1 and S2, no murmur/rub/gallop  ABDOMEN: soft, NT/ND, normoactive bowel sounds, no rebound/guarding  MUSCLOSKELETAL:  no joint swelling or tenderness to palpation  EXTREMITIES: No cynaosis, no clubbing, no lower extremity edema; Peripheral pulses are 2+ bilaterally  PSYCH: flat affect but cooperative  NEUROLOGY: A+O to self and place, follows basic commands, no focal neurologic deficits appreciated   SKIN: No rashes or no palpable lesions        LABS:                                      14.3   6.56  )-----------( 369      ( 19 Sep 2021 09:17 )             43.7       09-19    141  |  105  |  28.1<H>  ----------------------------<  77  4.3   |  25.0  |  1.19    Ca    9.4      19 Sep 2021 09:14  Mg     2.0     09-19          CAPILLARY BLOOD GLUCOSE            RADIOLOGY & ADDITIONAL TESTS:          MEDICATIONS  (STANDING):  amantadine 100 milliGRAM(s) Oral <User Schedule>  aspirin  chewable 81 milliGRAM(s) Oral daily  atorvastatin 20 milliGRAM(s) Oral at bedtime  diVALproex  milliGRAM(s) Oral <User Schedule>  FLUoxetine 20 milliGRAM(s) Oral daily  heparin   Injectable 5000 Unit(s) SubCutaneous every 8 hours  melatonin 5 milliGRAM(s) Oral at bedtime  mirtazapine 7.5 milliGRAM(s) Oral at bedtime  thyroid 30 milliGRAM(s) Oral daily    MEDICATIONS  (PRN):  ALBUTerol    90 MICROgram(s) HFA Inhaler 2 Puff(s) Inhalation every 6 hours PRN Shortness of Breath

## 2021-09-19 NOTE — PROGRESS NOTE ADULT - ASSESSMENT
45 y/o M w/ PMH of DM2, HTN, Hyothyroid, Asthma, ADHD was BIBA after being found unresponsive suspected to be 2/2 benzo/cocaine OD and subsequent aspiration PNA.  Admitted to MICU for acute hypoxic respiratory failure requiring intubation and pressor support.  Pt found to have anoxic brain injury and being followed by Neurology.  On IV antibiotics for presumed aspiration PNA. ECHO was done and significant for LVEF of <20%.  Per Cardiology, patient to have ischemic evaluation if meaningful neurological recovery.  Pt also noted to have rhabdo/GARRETT on admission which has now normalized.  Trach was tentatively scheduled for 9/2.  Patient showed improvement in mental status and was extubated on 8/31.  Patient was transitioned from HFNC to NC.  Off pressors and downgraded to Medicine.  Seen by ID.  Hospital course complicated by him being found on the floor by a step down nurse; subsequent CTH and CT c-spine performed and negative.  Pt placed on 1:1 and restraints but since d/c'd.  Patient showed slow mental improvement and had a tte: on 9/3 with mobile echogenic foci noted on the ventricular surface of the AV but PETE only reporting bicuspic AV.  Patient then seen by speech and started on a diet and ng tube removed. Patient also seen by psych.      Acute metabolic encephalopathy w/ anoxic brain injury s/p benzo/cocoaine OD   - Slow mental improvement since inciting event  - MRI of the brain noted numerous areas of restricted diffusion in the bilateral cerebral hemispheres, bilateral globus pallidi, left hippocampus and bilateral cerebellar hemispheres suggesting acute infarcts and hypoxic ischemic encephalopathy  - EEG report was without seizures  - Fall precautions   - Aspiration precautions with diet   - On amantadine, melatonin, and divalproex  -  consult noted      Aspiration Pneumonia / Respiratory failure   - Respiratory failure resolved   - Aspiration PNA likely 2/2 above  - Continue with aspiration precautions   - Completed course of vancomycin and meropenem  - Bcx NTD and pt remains afebrile   - Monitor CBC and temperature curve      Echodensity noted on TTE  - Blood cultures are without growth  - PETE without vegetation but noted bicuspid AV      Decompensated HFrEF POA  - Clinically euvolemic at this time  - On aspirin and atorvastatin  - TTE w/ EF <20% initialy however EF improved on repeat echo   - Stress test was without ischemia  - Monitor daily weights and strict I/O's  - Maintain K>4 and Mg>2  - Cardiology follow up noted      Acute kidney injury / Rhabdomyolysis   - Likely due to overdose    - CPK levels improved on repeat studies and renal fxn improved   - Urinary retention resolved and Arguello catheter successfully removed  - Monitor renal function and I/O's      Hypothyroidism   - On [place] thyroid        VTE ppx: heparin sq     Dispo: awaiting ISA placement

## 2021-09-20 ENCOUNTER — TRANSCRIPTION ENCOUNTER (OUTPATIENT)
Age: 46
End: 2021-09-20

## 2021-09-20 LAB
ANION GAP SERPL CALC-SCNC: 12 MMOL/L — SIGNIFICANT CHANGE UP (ref 5–17)
BUN SERPL-MCNC: 18.7 MG/DL — SIGNIFICANT CHANGE UP (ref 8–20)
CALCIUM SERPL-MCNC: 9.1 MG/DL — SIGNIFICANT CHANGE UP (ref 8.6–10.2)
CHLORIDE SERPL-SCNC: 105 MMOL/L — SIGNIFICANT CHANGE UP (ref 98–107)
CO2 SERPL-SCNC: 26 MMOL/L — SIGNIFICANT CHANGE UP (ref 22–29)
CREAT SERPL-MCNC: 1.23 MG/DL — SIGNIFICANT CHANGE UP (ref 0.5–1.3)
GLUCOSE SERPL-MCNC: 84 MG/DL — SIGNIFICANT CHANGE UP (ref 70–99)
HCT VFR BLD CALC: 42.3 % — SIGNIFICANT CHANGE UP (ref 39–50)
HGB BLD-MCNC: 14.5 G/DL — SIGNIFICANT CHANGE UP (ref 13–17)
MCHC RBC-ENTMCNC: 31.5 PG — SIGNIFICANT CHANGE UP (ref 27–34)
MCHC RBC-ENTMCNC: 34.3 GM/DL — SIGNIFICANT CHANGE UP (ref 32–36)
MCV RBC AUTO: 92 FL — SIGNIFICANT CHANGE UP (ref 80–100)
PLATELET # BLD AUTO: 351 K/UL — SIGNIFICANT CHANGE UP (ref 150–400)
POTASSIUM SERPL-MCNC: 4.1 MMOL/L — SIGNIFICANT CHANGE UP (ref 3.5–5.3)
POTASSIUM SERPL-SCNC: 4.1 MMOL/L — SIGNIFICANT CHANGE UP (ref 3.5–5.3)
RBC # BLD: 4.6 M/UL — SIGNIFICANT CHANGE UP (ref 4.2–5.8)
RBC # FLD: 13.2 % — SIGNIFICANT CHANGE UP (ref 10.3–14.5)
SARS-COV-2 RNA SPEC QL NAA+PROBE: SIGNIFICANT CHANGE UP
SODIUM SERPL-SCNC: 143 MMOL/L — SIGNIFICANT CHANGE UP (ref 135–145)
WBC # BLD: 6.92 K/UL — SIGNIFICANT CHANGE UP (ref 3.8–10.5)
WBC # FLD AUTO: 6.92 K/UL — SIGNIFICANT CHANGE UP (ref 3.8–10.5)

## 2021-09-20 PROCEDURE — 99233 SBSQ HOSP IP/OBS HIGH 50: CPT | Mod: GC

## 2021-09-20 PROCEDURE — 99232 SBSQ HOSP IP/OBS MODERATE 35: CPT

## 2021-09-20 RX ADMIN — ATORVASTATIN CALCIUM 20 MILLIGRAM(S): 80 TABLET, FILM COATED ORAL at 21:14

## 2021-09-20 RX ADMIN — Medication 30 MILLIGRAM(S): at 06:12

## 2021-09-20 RX ADMIN — HEPARIN SODIUM 5000 UNIT(S): 5000 INJECTION INTRAVENOUS; SUBCUTANEOUS at 21:14

## 2021-09-20 RX ADMIN — MIRTAZAPINE 7.5 MILLIGRAM(S): 45 TABLET, ORALLY DISINTEGRATING ORAL at 21:15

## 2021-09-20 RX ADMIN — Medication 100 MILLIGRAM(S): at 13:19

## 2021-09-20 RX ADMIN — HEPARIN SODIUM 5000 UNIT(S): 5000 INJECTION INTRAVENOUS; SUBCUTANEOUS at 06:11

## 2021-09-20 RX ADMIN — HEPARIN SODIUM 5000 UNIT(S): 5000 INJECTION INTRAVENOUS; SUBCUTANEOUS at 13:19

## 2021-09-20 RX ADMIN — DIVALPROEX SODIUM 500 MILLIGRAM(S): 500 TABLET, DELAYED RELEASE ORAL at 21:14

## 2021-09-20 RX ADMIN — Medication 81 MILLIGRAM(S): at 13:18

## 2021-09-20 RX ADMIN — Medication 100 MILLIGRAM(S): at 06:12

## 2021-09-20 RX ADMIN — Medication 20 MILLIGRAM(S): at 13:19

## 2021-09-20 RX ADMIN — Medication 5 MILLIGRAM(S): at 21:15

## 2021-09-20 NOTE — DISCHARGE NOTE PROVIDER - NSDCMRMEDTOKEN_GEN_ALL_CORE_FT
Albuterol (Eqv-ProAir HFA) 90 mcg/inh inhalation aerosol: 2 puff(s) inhaled every 6 hours, As Needed  Ambien 10 mg oral tablet: 1 tab(s) orally once a day (at bedtime), As Needed confirmed on iSTOP filled 8/5/21  [place] Thyroid 30 mg oral tablet: 1 tab(s) orally once a day  BuSpar 5 mg oral tablet: 1 tab(s) orally 3 times a day  clonazePAM 1 mg oral tablet: 1 tab(s) orally 3 times a day, As Needed  confirmed on iSTOP filled 7/8/21  dextroamphetamine-amphetamine 15 mg oral tablet: 1 tab(s) orally once a day  confirmed on iSTOP filled 8/5/21  testosterone cypionate 200 mg/mL intramuscular solution: 1 milliliter(s) intramuscular every 7 days  confirmed on iSTOP filled 8/7/21   Albuterol (Eqv-ProAir HFA) 90 mcg/inh inhalation aerosol: 2 puff(s) inhaled every 6 hours, As Needed  amantadine 100 mg oral capsule: 1 cap(s) orally once a day   [place] Thyroid 30 mg oral tablet: 1 tab(s) orally once a day  aspirin 81 mg oral tablet, chewable: 1 tab(s) orally once a day  atorvastatin 20 mg oral tablet: 1 tab(s) orally once a day (at bedtime)  FLUoxetine 20 mg oral capsule: 1 cap(s) orally once a day  melatonin 5 mg oral tablet: 1 tab(s) orally once a day (at bedtime)  mirtazapine 15 mg oral tablet: 1 tab(s) orally once a day (at bedtime)  PT/OT: ICD 10 R26  Vestibular Therapy: ICD 10 R26   Albuterol (Eqv-ProAir HFA) 90 mcg/inh inhalation aerosol: 2 puff(s) inhaled every 6 hours, As Needed  amantadine 100 mg oral capsule: 1 cap(s) orally once a day   amantadine 100 mg oral capsule: 1 cap(s) orally 2 times a day   [place] Thyroid 30 mg oral tablet: 1 tab(s) orally once a day  aspirin 81 mg oral tablet, chewable: 1 tab(s) orally once a day  aspirin 81 mg oral tablet, chewable: 1 tab(s) orally once a day  atorvastatin 20 mg oral tablet: 1 tab(s) orally once a day (at bedtime)  atorvastatin 20 mg oral tablet: 1 tab(s) orally once a day (at bedtime)  dextroamphetamine-amphetamine 15 mg oral tablet: 1 tab(s) orally once a day  confirmed on iSTOP filled 8/5/21 MDD:MDD 14  FLUoxetine 20 mg oral capsule: 1 cap(s) orally once a day  FLUoxetine 20 mg oral capsule: 1 cap(s) orally once a day  melatonin 5 mg oral tablet: 1 tab(s) orally once a day (at bedtime)  mirtazapine 15 mg oral tablet: 1 tab(s) orally once a day (at bedtime)  mirtazapine 15 mg oral tablet: 1 tab(s) orally once a day (at bedtime)  PT/OT: ICD 10 R26  Vestibular Therapy: ICD 10 R26

## 2021-09-20 NOTE — PROGRESS NOTE ADULT - SUBJECTIVE AND OBJECTIVE BOX
Chief Complaint:  respiratory failure     SUBJECTIVE / OVERNIGHT EVENTS: no acute events reported overnight.  Pt offers no complaints at this time.  Patient denies chest pain, SOB, abd pain, N/V, fever, chills, dysuria or any other complaints. All remainder ROS negative.       I&O's Summary        PHYSICAL EXAM:  Vital Signs Last 24 Hrs  T(C): 36.6 (20 Sep 2021 04:00), Max: 36.9 (19 Sep 2021 17:24)  T(F): 97.9 (20 Sep 2021 04:00), Max: 98.4 (19 Sep 2021 17:24)  HR: 92 (20 Sep 2021 04:00) (92 - 105)  BP: 119/80 (20 Sep 2021 04:00) (119/80 - 129/91)  BP(mean): --  RR: 20 (20 Sep 2021 04:00) (18 - 20)  SpO2: 95% (20 Sep 2021 04:00) (95% - 97%)      GENERAL: pt examined bedside, laying comfortably in bed in NAD  HEENT: NC/AT, moist oral mucosa, clear conjunctiva, sclera nonicteric  RESPIRATORY: Normal respiratory effort; CTA b/l, no wheezing, rhonchi, rales  CARDIOVASCULAR: RRR, normal S1 and S2, no murmur/rub/gallop  ABDOMEN: soft, NT/ND, normoactive bowel sounds, no rebound/guarding  MUSCLOSKELETAL:  no joint swelling or tenderness to palpation  EXTREMITIES: No cynaosis, no clubbing, no lower extremity edema; Peripheral pulses are 2+ bilaterally  PSYCH: flat affect but cooperative  NEUROLOGY: A+O to self and place, follows basic commands, no focal neurologic deficits appreciated   SKIN: No rashes or no palpable lesions        LABS:                                       14.5   6.92  )-----------( 351      ( 20 Sep 2021 08:23 )             42.3       09-20    143  |  105  |  18.7  ----------------------------<  84  4.1   |  26.0  |  1.23    Ca    9.1      20 Sep 2021 08:23  Mg     2.0     09-19          CAPILLARY BLOOD GLUCOSE            RADIOLOGY & ADDITIONAL TESTS:          MEDICATIONS  (STANDING):  amantadine 100 milliGRAM(s) Oral <User Schedule>  aspirin  chewable 81 milliGRAM(s) Oral daily  atorvastatin 20 milliGRAM(s) Oral at bedtime  diVALproex  milliGRAM(s) Oral <User Schedule>  FLUoxetine 20 milliGRAM(s) Oral daily  heparin   Injectable 5000 Unit(s) SubCutaneous every 8 hours  melatonin 5 milliGRAM(s) Oral at bedtime  mirtazapine 7.5 milliGRAM(s) Oral at bedtime  thyroid 30 milliGRAM(s) Oral daily    MEDICATIONS  (PRN):  ALBUTerol    90 MICROgram(s) HFA Inhaler 2 Puff(s) Inhalation every 6 hours PRN Shortness of Breath

## 2021-09-20 NOTE — DISCHARGE NOTE PROVIDER - NSDCCPCAREPLAN_GEN_ALL_CORE_FT
PRINCIPAL DISCHARGE DIAGNOSIS  Diagnosis: Drug overdose  Assessment and Plan of Treatment: benzo/cocaine OD, showed improvement in mental status s/p extubation. Counciled on cessation      SECONDARY DISCHARGE DIAGNOSES  Diagnosis: Anoxic brain injury  Assessment and Plan of Treatment: Seen by neurology, MRI of the brain noted numerous areas of restricted diffusion in the bilateral cerebral hemispheres, bilateral globus pallidi, left hippocampus and bilateral cerebellar hemispheres suggesting acute infarcts and hypoxic ischemic encephalopathy. Had a tte on 9/3 with mobile echogenic foci noted on the ventricular surface of the AV but PETE only reporting bicuspic AV. EEG report was without seizures.    Diagnosis: Pneumonia, aspiration  Assessment and Plan of Treatment: Completed course of vancomycin and meropenem for aspiration Pneumonia / Respiratory failure. Respiratory failure resolved    Diagnosis: HTN (hypertension)  Assessment and Plan of Treatment: Continue medications as prescribed. Please follow up with your primary care physician and cardiologist within 1 week.    Diagnosis: DM (diabetes mellitus)  Assessment and Plan of Treatment: Continue medications as prescribed. Please follow up with your primary care physician within 1 week.    Diagnosis: CHF (congestive heart failure)  Assessment and Plan of Treatment: Continue medications as prescribed. Please follow up with your primary care physician and cardiologist within 1 week.    Diagnosis: Acute respiratory failure with hypoxia  Assessment and Plan of Treatment: Resolved    Diagnosis: Acute renal failure due to rhabdomyolysis  Assessment and Plan of Treatment: Noted to have rhabdo/GARRETT on admission which has now normalized, urinary retention resolved and Pt is now voiding spontaneously.    Diagnosis: Depression  Assessment and Plan of Treatment: Continue medications as prescribed. Please follow up with your primary care physician and psychiatry within 1 week.

## 2021-09-20 NOTE — PROGRESS NOTE ADULT - ATTENDING COMMENTS
46yMale with functional deficits after suffering from a hypoxic ischemic brain injury  Plan for acute rehabilitation once medically stable.  Plan of care as above.
Patient making significant progress. Now family open to AR and able to take patient home.   CM to resend case to [facility] for possible reconsideration.     Patient seen and examined, discussed patient with Dr. Bergeron and agree with recommendations.
Covering for Dr. Motta,  -PETE without vegetation, noted bicuspid AV with mild AS  -stress induced cardiomyopathy with normalized LV systolic function  -discussed with patient's sister Carmita about the findings  -cardiology will sign off, patient should follow up with cardiology as outpatient          Duc Christiansen DO, [facility]  Faculty Non-Invasive Cardiologist  [phone]
Patient seen and examined by me.  I have discussed my recommendation with the PA which are outlined above.  Patient with Severe LV dysfunction/AS, Has multiple other problems, possible sepsis, Currently he is off pressors, he is being treated for sepsis and undergoing neuro work up.  I have discussed with Dr Khanh Hillman, to call us when he condition improves to arrange for Cardiac Cath.  I agree with Hydralazine and low dose BB if BP permits    Please call us when his condition improves  Will follow PRN
Patient seen and examined, discussed patient with Dr. Bergeron and agree with recommendations.
Patient seen and examined, discussed patient with Dr. Bergeron and agree with recommendations.    Have also requested  involvement to assist with flat affect. Patient reports he is not depressed just "out of it". Trial Prozac to assist.     Patient is making significant progress functionally and would continue to benefit from AR. However, now this is a dispo issue that if family is unable to take patient home, then he is no longer a candidate for AR. Family is pursuing ISA and having difficulty in placement due to insurance issues and also that he is ambulating with CG around the unit.

## 2021-09-20 NOTE — DISCHARGE NOTE PROVIDER - CARE PROVIDERS DIRECT ADDRESSES
[email or web address]

## 2021-09-20 NOTE — DISCHARGE NOTE PROVIDER - DETAILS OF MALNUTRITION DIAGNOSIS/DIAGNOSES
This patient has been assessed with a concern for Malnutrition and was treated during this hospitalization for the following Nutrition diagnosis/diagnoses:     -  09/07/2021: Moderate protein-calorie malnutrition

## 2021-09-20 NOTE — PROGRESS NOTE ADULT - ASSESSMENT
47 y/o M w/ PMH of DM2, HTN, Hyothyroid, Asthma, ADHD was BIBA after being found unresponsive suspected to be 2/2 benzo/cocaine OD and subsequent aspiration PNA.  Admitted to MICU for acute hypoxic respiratory failure requiring intubation and pressor support.  Pt found to have anoxic brain injury and being followed by Neurology.  On IV antibiotics for presumed aspiration PNA. ECHO was done and significant for LVEF of <20%.  Per Cardiology, patient to have ischemic evaluation if meaningful neurological recovery.  Pt also noted to have rhabdo/GARRETT on admission which has now normalized.  Trach was tentatively scheduled for 9/2.  Patient showed improvement in mental status and was extubated on 8/31.  Patient was transitioned from HFNC to NC.  Off pressors and downgraded to Medicine.  Seen by ID.  Hospital course complicated by him being found on the floor by a step down nurse; subsequent CTH and CT c-spine performed and negative.  Pt placed on 1:1 and restraints but since d/c'd.  Patient showed slow mental improvement and had a tte: on 9/3 with mobile echogenic foci noted on the ventricular surface of the AV but PETE only reporting bicuspic AV.  Patient then seen by speech and started on a diet and ng tube removed. Patient also seen by psych.      Acute metabolic encephalopathy w/ anoxic brain injury s/p benzo/cocoaine OD   - Slow mental improvement since inciting event  - MRI of the brain noted numerous areas of restricted diffusion in the bilateral cerebral hemispheres, bilateral globus pallidi, left hippocampus and bilateral cerebellar hemispheres suggesting acute infarcts and hypoxic ischemic encephalopathy  - EEG report was without seizures  - Fall precautions   - Aspiration precautions with diet   - On amantadine, melatonin, and divalproex  -  consult noted  - Acute rehab consult noted and recommend acute inpatient rehabilitation when medically cleared      Aspiration Pneumonia / Respiratory failure   - Respiratory failure resolved   - Aspiration PNA likely 2/2 above  - Continue with aspiration precautions   - Completed course of vancomycin and meropenem  - Bcx NTD and pt remains afebrile   - Monitor CBC and temperature curve      Echodensity noted on TTE  - Blood cultures are without growth  - PETE without vegetation but noted bicuspid AV      Decompensated HFrEF POA  - Clinically euvolemic at this time  - On aspirin and atorvastatin  - TTE w/ EF <20% initialy however EF improved on repeat echo   - Stress test was without ischemia  - Monitor daily weights and strict I/O's  - Maintain K>4 and Mg>2  - Cardiology follow up noted      Acute kidney injury / Rhabdomyolysis   - Likely due to overdose    - CPK levels improved on repeat studies and renal fxn improved   - Urinary retention resolved and Raguello catheter successfully removed  - Monitor renal function and I/O's      Hypothyroidism   - On [place] thyroid        VTE ppx: heparin sq     Dispo: awaiting placement

## 2021-09-20 NOTE — PROGRESS NOTE ADULT - SUBJECTIVE AND OBJECTIVE BOX
Patient seen this morning. No events overnight. He complains of some left hand weakness with finger extension.    REVIEW OF SYSTEMS  Constitutional - No fever,  No fatigue  HEENT - No vertigo, No neck pain  Neurological - No headaches, + memory loss, + loss of strength, No numbness, No tremors  Musculoskeletal - No joint pain, No joint swelling, No muscle pain  Psychiatric - No depression, No anxiety  All other systems were reviewed and were negative.    FUNCTIONAL PROGRESS  9/20  PT  Bed Mobility  Bed Mobility Training Sit-to-Supine: supervsion  Bed Mobility Training Supine-to-Sit: supervsion  Bed Mobility Training Limitations: cognitive, decreased safety awareness;  decreased strength;  impaired motor control    Sit-Stand Transfer Training  Transfer Training Sit-to-Stand Transfer: minimum assist (75% patient effort);  1 person assist;  full weight-bearing  Transfer Training Stand-to-Sit Transfer: minimum assist (75% patient effort);  1 person assist;  full weight-bearing  Sit-to-Stand Transfer Training Transfer Safety Analysis: decreased cognition;  decreased sequencing ability;  decreased strength;  impaired balance;  impaired motor control    Gait Training  Gait Training: minimum assist (75% patient effort);  1 person assist;  full weight-bearing   50 feet  Gait Analysis: swing-through gait   decreased step length;  decreased stride length;  decreased toe clearance;  decreased arm swing;  decreased strength;  impaired balance;  impaired motor control;  cognitive, decreased safety awareness    Therapeutic Exercise  Therapeutic Exercise Detail: Ther-ex consisting of: ankle pumps, knee extension, quad sets, hip flexion. Pt instructed to perform as able throughout day. Pt verbalized understanding of therapeutic exercises.         VITALS  T(C): 36.6 (09-20-21 @ 04:00), Max: 36.9 (09-19-21 @ 17:24)  HR: 92 (09-20-21 @ 04:00) (92 - 105)  BP: 119/80 (09-20-21 @ 04:00) (119/80 - 129/91)  RR: 20 (09-20-21 @ 04:00) (18 - 20)  SpO2: 95% (09-20-21 @ 04:00) (95% - 97%)  Wt(kg): --    MEDICATIONS   ALBUTerol    90 MICROgram(s) HFA Inhaler 2 Puff(s) every 6 hours PRN  amantadine 100 milliGRAM(s) <User Schedule>  aspirin  chewable 81 milliGRAM(s) daily  atorvastatin 20 milliGRAM(s) at bedtime  diVALproex  milliGRAM(s) <User Schedule>  FLUoxetine 20 milliGRAM(s) daily  heparin   Injectable 5000 Unit(s) every 8 hours  influenza   Vaccine 0.5 milliLiter(s) once  melatonin 5 milliGRAM(s) at bedtime  mirtazapine 7.5 milliGRAM(s) at bedtime  thyroid 30 milliGRAM(s) daily      RECENT LABS/IMAGING                          14.5   6.92  )-----------( 351      ( 20 Sep 2021 08:23 )             42.3     09-20    143  |  105  |  18.7  ----------------------------<  84  4.1   |  26.0  |  1.23    Ca    9.1      20 Sep 2021 08:23  Mg     2.0     09-19      CT Head 8/21: Symmetric low attenuation lesions in the bilateral globus pallidus regions of the basal ganglia, which can be seen with hypoxic ischemic encephalopathy or related to carbon monoxide positioning.  Indeterminate areas of low attenuation in the bilateral cerebellum may reflect infarcts.  MRI is recommended for further evaluation.    CXR 8/22: Bilateral infiltrates right greater than left somewhat improved on the latest film. Tubes in place as above.    CT Head 8/23: Slightly increased prominence of bilateral globus pallidus hypodense foci, likely representing hypoxic-ischemic injury. Again noted multiple likely small acute infarcts involving the bilateral cerebral and cerebellar hemispheres. No acute intracranial hemorrhage.    CXR 8/24: No interval change.    US Duplex Venous Lower Ext Ltd, Right 8/24: No evidence of right lower extremity deep venous thrombosis.    US Duplex Arterial Upper Ext Ltd, Left  8/25: Normal left upper extremity arterial evaluation    MRI Brain 8/26: Numerous areas of restricted diffusion in the bilateral cerebral hemispheres, bilateral globus pallidi, left hippocampus and bilateral cerebellar hemispheres suggesting acute infarcts and hypoxic ischemic encephalopathy.    Xray Abdomen 8/29: No acute radiographic intra-abdominal findings.    CXR 8/31: Residual Perihilar/basilar diffuse airspace disease and/or effusions..    CXR 9/1: Bilateral infiltrates somewhat improved. Endotracheal tube removed.    CXR 9/1: NG tube at least to stomach.    CXR 9/2: NG tube tip in distal stomach. The cardiomediastinal silhouette is within normal limits. Bilateral lung infiltrates are unchanged - right lung more than left. No pleural effusion or pneumothorax.    CT Cervical Spine 9/3: No acute cervical spine fracture.     CT head 9/3: No acute intracranial hemorrhage.    CXR 9/3: Unchanged infiltrates as above.    TTE Echo 9/3: Summary:   1. Left ventricular ejection fraction, by visual estimation, is 55 to 60%.   2. Normal global left ventricular systolic function.   3. Spectral Doppler shows impaired relaxation pattern of left ventricular myocardial filling (Grade I diastolic dysfunction).   4. There is mild concentric left ventricular hypertrophy.   5. Normal right ventricular size and function.   6. Mild mitral annular calcification.   7. Mobile echogenic foci noted on the ventricular surface of the aortic valve. PETE is recommended for further evaluation.   8. Peak transaortic gradient equals 38.6 mmHg, mean transaortic gradient equals 24.3 mmHg, the calculated aortic valve area equals 1.25 cm² by the continuity equation consistent with moderate aortic stenosis.   9. There is no evidence of pericardial effusion.  10. Compared to a prior study from 8/22/21, there is significant improvement in LV and RV function.    PETE 9/13 - Summary:   1. Left ventricular ejection fraction, by visual estimation, is 60 to 65%.   2. Normal global left ventricular systolic function.   3. There is mild concentric left ventricular hypertrophy.   4. Normal right ventricular size and function.   5. Structurally normal mitral valve, with normal leaflet excursion, no evidence of vegetation.   6. Trace mitral valve regurgitation.   7. Structurally normal tricuspid valve, with normal leaflet excursion, no evidence of vegetation.   8. Structurally normal pulmonic valve, with normal leaflet excursion, no evidence of vegetation.   9. Bicuspid aortic valve with fused raphe of the right and non-coronary cusps, mild-moderate calcificaitons with degenerative materials, no evidence of vegetation. Mild-to-moderate aortic valve stenosis, JUAN DAVID by 2D-planimetry 1.6 cm2, peak velocity 2.6 m/s, mean gradient 16 mmHg. Mild eccentric aortic regurgitation noted. Ascending aorta 3.8 cm.  10. Color flow doppler and intravenous injection of agitated saline demonstrates the presence of an intact intra atrial septum.  11. No left atrial appendage thrombus and normal left atrial appendage velocities.  12. There is no evidence of pericardial effusion. No evidence of vegetation or intracardiac abscess.    ---------------------------------------------------------------------------------------  PHYSICAL EXAM  Constitutional - NAD, Comfortable  Extremities - No C/C/E, No calf tenderness   Neurologic Exam -                    Cognitive - AAOx self, time - limited by affect/participation/effort     Communication - Mild dysarthria, Slowed processing     Motor -  Poor effort                    LEFT    UE - ShAB 5/5, EF 5/5, EE 5/5, WE 5/5,  5/5, delayed relaxation of /finger extensor weakness                    RIGHT UE - ShAB 5/5, EF 5/5, EE 5/5, WE 5/5,  5/5                    LEFT    LE - HF 4/5, KE 5/5, DF 1-3/5* (unclear of effort)                     RIGHT LE - HF 4/5, KE 5/5, DF 5/5   Psychiatric - Flat, Calm, Withdrawn  ----------------------------------------------------------------------------------------  ASSESSMENT/PLAN  46yMale with functional deficits 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   Aortic valve abnormality & CAD/MI - ASA, Lipitor, Outpatient follow up  Mood/Initiation - Prozac. Seen by psych.  ADHD - Amantadine 100mg Q6AM/12PM (9/15), DC Adderall XL (not getting it as per pharmacy)  Sleep - Melatonin, Remeron  DVT PPX - SCDs, Heparin  Rehab - Recommend acute inpatient rehabilitation when medically cleared. Patient will be able to tolerate 3 hours of therapy per day. In the meantime, continue mobilization and OOB by staff to prevent complications associated with debility.

## 2021-09-20 NOTE — DISCHARGE NOTE PROVIDER - PROVIDER TOKENS
PROVIDER:[TOKEN:[[id number]],FOLLOWUP:[1 week]],FREE:[LAST:[[facility]],PHONE:[(   )    -],FAX:[(   )    -],ADDRESS:[[address] [place] [address]  Medical: [phone]],FOLLOWUP:[1 week]],PROVIDER:[TOKEN:[[id number]],FOLLOWUP:[1 week]]

## 2021-09-20 NOTE — DISCHARGE NOTE PROVIDER - CARE PROVIDER_API CALL
Andrey Fountain)  Psychiatry  [address] [place] [address]  Phone: [phone]  Fax: [phone]  Follow Up Time: 1 week    [facility],   [address] [place] [address]  Medical: [phone]  Phone: (   )    -  Fax: (   )    -  Follow Up Time: 1 week    Cindy Motta)  Cardiology; Cardiovascular Disease; Internal Medicine  [address] [place] [address]  Phone: [phone]  Fax: [phone]  Follow Up Time: 1 week

## 2021-09-21 LAB
ANION GAP SERPL CALC-SCNC: 11 MMOL/L — SIGNIFICANT CHANGE UP (ref 5–17)
BUN SERPL-MCNC: 16.3 MG/DL — SIGNIFICANT CHANGE UP (ref 8–20)
CALCIUM SERPL-MCNC: 9.4 MG/DL — SIGNIFICANT CHANGE UP (ref 8.6–10.2)
CHLORIDE SERPL-SCNC: 103 MMOL/L — SIGNIFICANT CHANGE UP (ref 98–107)
CO2 SERPL-SCNC: 27 MMOL/L — SIGNIFICANT CHANGE UP (ref 22–29)
CREAT SERPL-MCNC: 1.23 MG/DL — SIGNIFICANT CHANGE UP (ref 0.5–1.3)
GLUCOSE SERPL-MCNC: 89 MG/DL — SIGNIFICANT CHANGE UP (ref 70–99)
HCT VFR BLD CALC: 44 % — SIGNIFICANT CHANGE UP (ref 39–50)
HGB BLD-MCNC: 14.9 G/DL — SIGNIFICANT CHANGE UP (ref 13–17)
MCHC RBC-ENTMCNC: 30.8 PG — SIGNIFICANT CHANGE UP (ref 27–34)
MCHC RBC-ENTMCNC: 33.9 GM/DL — SIGNIFICANT CHANGE UP (ref 32–36)
MCV RBC AUTO: 91.1 FL — SIGNIFICANT CHANGE UP (ref 80–100)
PLATELET # BLD AUTO: 364 K/UL — SIGNIFICANT CHANGE UP (ref 150–400)
POTASSIUM SERPL-MCNC: 4.2 MMOL/L — SIGNIFICANT CHANGE UP (ref 3.5–5.3)
POTASSIUM SERPL-SCNC: 4.2 MMOL/L — SIGNIFICANT CHANGE UP (ref 3.5–5.3)
RBC # BLD: 4.83 M/UL — SIGNIFICANT CHANGE UP (ref 4.2–5.8)
RBC # FLD: 13.2 % — SIGNIFICANT CHANGE UP (ref 10.3–14.5)
SODIUM SERPL-SCNC: 141 MMOL/L — SIGNIFICANT CHANGE UP (ref 135–145)
WBC # BLD: 6.86 K/UL — SIGNIFICANT CHANGE UP (ref 3.8–10.5)
WBC # FLD AUTO: 6.86 K/UL — SIGNIFICANT CHANGE UP (ref 3.8–10.5)

## 2021-09-21 PROCEDURE — 99233 SBSQ HOSP IP/OBS HIGH 50: CPT

## 2021-09-21 PROCEDURE — 99232 SBSQ HOSP IP/OBS MODERATE 35: CPT

## 2021-09-21 RX ADMIN — HEPARIN SODIUM 5000 UNIT(S): 5000 INJECTION INTRAVENOUS; SUBCUTANEOUS at 05:18

## 2021-09-21 RX ADMIN — Medication 100 MILLIGRAM(S): at 11:30

## 2021-09-21 RX ADMIN — HEPARIN SODIUM 5000 UNIT(S): 5000 INJECTION INTRAVENOUS; SUBCUTANEOUS at 21:52

## 2021-09-21 RX ADMIN — Medication 30 MILLIGRAM(S): at 05:18

## 2021-09-21 RX ADMIN — Medication 100 MILLIGRAM(S): at 05:18

## 2021-09-21 RX ADMIN — MIRTAZAPINE 7.5 MILLIGRAM(S): 45 TABLET, ORALLY DISINTEGRATING ORAL at 21:52

## 2021-09-21 RX ADMIN — Medication 81 MILLIGRAM(S): at 11:29

## 2021-09-21 RX ADMIN — ATORVASTATIN CALCIUM 20 MILLIGRAM(S): 80 TABLET, FILM COATED ORAL at 21:52

## 2021-09-21 RX ADMIN — Medication 20 MILLIGRAM(S): at 11:29

## 2021-09-21 RX ADMIN — Medication 5 MILLIGRAM(S): at 21:52

## 2021-09-21 RX ADMIN — HEPARIN SODIUM 5000 UNIT(S): 5000 INJECTION INTRAVENOUS; SUBCUTANEOUS at 15:03

## 2021-09-21 NOTE — PROVIDER CONTACT NOTE (OTHER) - ASSESSMENT
Pt A&Ox4, not on any IV medications or fluid. Only access is midline to RUE, refusing other access at this time. Pt educated by RN to not touch Midline for infection risk. Site has slight redness, no discharge, swelling or tenderness. Pt continues to remove dressings. dressing changed again.

## 2021-09-21 NOTE — PROVIDER CONTACT NOTE (OTHER) - SITUATION
Patient had 1 episode of vomitus, patient vomited yellow sx.
Patient just had an episode of syncope ambulating from bathroom to the bed, vitals are stable, but patient is visibly clammy and diaphoretic, patient was safely dropped to the floor by RN
Pt has Midline to RUE, pt keeps taking off dressing and touching midline.

## 2021-09-21 NOTE — PROGRESS NOTE ADULT - SUBJECTIVE AND OBJECTIVE BOX
Chief Complaint:  respiratory failure     SUBJECTIVE / OVERNIGHT EVENTS: no acute events reported overnight.  Pt offers no complaints at this time.  Patient denies chest pain, SOB, abd pain, N/V, fever, chills, dysuria or any other complaints. All remainder ROS negative.       I&O's Summary        PHYSICAL EXAM:  Vital Signs Last 24 Hrs  T(C): 36.7 (21 Sep 2021 10:11), Max: 37.1 (20 Sep 2021 18:15)  T(F): 98.1 (21 Sep 2021 10:11), Max: 98.8 (20 Sep 2021 18:15)  HR: 88 (21 Sep 2021 10:11) (86 - 114)  BP: 125/91 (21 Sep 2021 10:11) (123/99 - 133/97)  BP(mean): --  RR: 18 (21 Sep 2021 10:11) (18 - 20)  SpO2: 97% (21 Sep 2021 10:11) (95% - 97%)    GENERAL: pt examined bedside, laying comfortably in bed in NAD  HEENT: NC/AT, moist oral mucosa, clear conjunctiva, sclera nonicteric  RESPIRATORY: Normal respiratory effort; CTA b/l, no wheezing, rhonchi, rales  CARDIOVASCULAR: RRR, normal S1 and S2, no murmur/rub/gallop  ABDOMEN: soft, NT/ND, normoactive bowel sounds, no rebound/guarding  MUSCLOSKELETAL:  no joint swelling or tenderness to palpation  EXTREMITIES: No cynaosis, no clubbing, no lower extremity edema; Peripheral pulses are 2+ bilaterally  PSYCH: flat affect but cooperative  NEUROLOGY: A+O to self and place, follows basic commands, no focal neurologic deficits appreciated   SKIN: No rashes or no palpable lesions        LABS:                                                           14.9   6.86  )-----------( 364      ( 21 Sep 2021 07:24 )             44.0       09-21    141  |  103  |  16.3  ----------------------------<  89  4.2   |  27.0  |  1.23    Ca    9.4      21 Sep 2021 07:24            CAPILLARY BLOOD GLUCOSE            RADIOLOGY & ADDITIONAL TESTS:          MEDICATIONS  (STANDING):  amantadine 100 milliGRAM(s) Oral <User Schedule>  aspirin  chewable 81 milliGRAM(s) Oral daily  atorvastatin 20 milliGRAM(s) Oral at bedtime  diVALproex  milliGRAM(s) Oral <User Schedule>  FLUoxetine 20 milliGRAM(s) Oral daily  heparin   Injectable 5000 Unit(s) SubCutaneous every 8 hours  melatonin 5 milliGRAM(s) Oral at bedtime  mirtazapine 7.5 milliGRAM(s) Oral at bedtime  thyroid 30 milliGRAM(s) Oral daily    MEDICATIONS  (PRN):  ALBUTerol    90 MICROgram(s) HFA Inhaler 2 Puff(s) Inhalation every 6 hours PRN Shortness of Breath

## 2021-09-21 NOTE — CHART NOTE - NSCHARTNOTESELECT_GEN_ALL_CORE
Event Note
Event Note
Nutrition Services
Nutrition Services
Palliative Attending/Event Note
Palliative care/Event Note
Event Note
Nutrition Services
[employer]/Event Note
Transfer Note

## 2021-09-21 NOTE — PROVIDER CONTACT NOTE (OTHER) - ACTION/TREATMENT ORDERED:
MD ordered zofran, zofran administered.
No intervention needed at this time, continue to monitor vitals and encourage rest as per Medicine PA
RO Tapia aware, ordered to keep midline in place for emergency access and wrap in ace wrap for protection. Will notify PA of any changes.

## 2021-09-21 NOTE — PROVIDER CONTACT NOTE (OTHER) - BACKGROUND
Pt admitted with OD, sepsis and Anoxic brain injury.
Patient is A&O x1-2, admitted for unresponsiveness, hx of opioid abuse, cocaine and benzos, intubation

## 2021-09-21 NOTE — PROGRESS NOTE ADULT - SUBJECTIVE AND OBJECTIVE BOX
Patient appears more awake.  Reports he is tired and not sleeping well.  Reports numbness and pain in 4th/5th right hand.     REVIEW OF SYSTEMS  Constitutional - No fever,  +fatigue  Neurological - No headaches, +memory loss, No loss of strength, +numbness, No tremors  Musculoskeletal - No joint pain, No joint swelling, +muscle pain  Psychiatric - +depression, No anxiety    FUNCTIONAL PROGRESS  9/20 PT  Bed Mobility  Bed Mobility Training Sit-to-Supine: supervsion  Bed Mobility Training Supine-to-Sit: supervsion  Bed Mobility Training Limitations: cognitive, decreased safety awareness;  decreased strength;  impaired motor control    Sit-Stand Transfer Training  Transfer Training Sit-to-Stand Transfer: minimum assist (75% patient effort);  1 person assist;  full weight-bearing  Transfer Training Stand-to-Sit Transfer: minimum assist (75% patient effort);  1 person assist;  full weight-bearing  Sit-to-Stand Transfer Training Transfer Safety Analysis: decreased cognition;  decreased sequencing ability;  decreased strength;  impaired balance;  impaired motor control    Gait Training  Gait Training: minimum assist (75% patient effort);  1 person assist;  full weight-bearing   50 feet  Gait Analysis: swing-through gait   decreased step length;  decreased stride length;  decreased toe clearance;  decreased arm swing;  decreased strength;  impaired balance;  impaired motor control;  cognitive, decreased safety awareness    9/20 OT  Bed Mobility  Bed Mobility Training Supine-to-Sit: supervision;  verbal cues  Bed Mobility Training Limitations: cognitive, decreased safety awareness;  impaired coordination    Sit-Stand Transfer Training  Transfer Training Sit-to-Stand Transfer: minimum assist (75% patient effort);  1 person assist  Transfer Training Stand-to-Sit Transfer: minimum assist (75% patient effort);  1 person assist  Sit-to-Stand Transfer Training Transfer Safety Analysis: impaired motor control;  impaired balance;  cognitive, decreased safety awareness    Toilet Transfer Training  Transfer Training Toilet Transfer: minimum assist (75% patient effort);  1 person assist;  on low toilet height  Toilet Transfer Training Transfer Safety Analysis: cognitive, decreased safety awareness;  impaired coordination;  impaired balance    Functional Endurance  Functional Endurance Detail: Pt ambulated without adaptive device in room and bathroom with minimal assist and verbal cues for safety and when going around obstacles and turning.     Lower Body Dressing Training  Lower Body Dressing Training Assistance: supervision;  to don socks seated at the edge of the bed;  cognitive, decreased safety awareness    VITALS  T(C): 36.8 (09-21-21 @ 04:30), Max: 37.1 (09-20-21 @ 18:15)  HR: 86 (09-21-21 @ 04:30) (86 - 114)  BP: 127/93 (09-21-21 @ 04:30) (123/99 - 133/97)  RR: 18 (09-21-21 @ 04:30) (18 - 20)  SpO2: 96% (09-21-21 @ 04:30) (95% - 96%)  Wt(kg): --    MEDICATIONS   ALBUTerol    90 MICROgram(s) HFA Inhaler 2 Puff(s) every 6 hours PRN  amantadine 100 milliGRAM(s) <User Schedule>  aspirin  chewable 81 milliGRAM(s) daily  atorvastatin 20 milliGRAM(s) at bedtime  diVALproex  milliGRAM(s) <User Schedule>  FLUoxetine 20 milliGRAM(s) daily  heparin   Injectable 5000 Unit(s) every 8 hours  influenza   Vaccine 0.5 milliLiter(s) once  melatonin 5 milliGRAM(s) at bedtime  mirtazapine 7.5 milliGRAM(s) at bedtime  thyroid 30 milliGRAM(s) daily      RECENT LABS/IMAGING                          14.9   6.86  )-----------( 364      ( 21 Sep 2021 07:24 )             44.0     09-21    141  |  103  |  16.3  ----------------------------<  89  4.2   |  27.0  |  1.23    Ca    9.4      21 Sep 2021 07:24  Mg     2.0     09-19                  CT Head 8/21: Symmetric low attenuation lesions in the bilateral globus pallidus regions of the basal ganglia, which can be seen with hypoxic ischemic encephalopathy or related to carbon monoxide positioning.  Indeterminate areas of low attenuation in the bilateral cerebellum may reflect infarcts.  MRI is recommended for further evaluation.    CXR 8/22: Bilateral infiltrates right greater than left somewhat improved on the latest film. Tubes in place as above.    CT Head 8/23: Slightly increased prominence of bilateral globus pallidus hypodense foci, likely representing hypoxic-ischemic injury. Again noted multiple likely small acute infarcts involving the bilateral cerebral and cerebellar hemispheres. No acute intracranial hemorrhage.    CXR 8/24: No interval change.    US Duplex Venous Lower Ext Ltd, Right 8/24: No evidence of right lower extremity deep venous thrombosis.    US Duplex Arterial Upper Ext Ltd, Left  8/25: Normal left upper extremity arterial evaluation    MRI Brain 8/26: Numerous areas of restricted diffusion in the bilateral cerebral hemispheres, bilateral globus pallidi, left hippocampus and bilateral cerebellar hemispheres suggesting acute infarcts and hypoxic ischemic encephalopathy.    Xray Abdomen 8/29: No acute radiographic intra-abdominal findings.    CXR 8/31: Residual Perihilar/basilar diffuse airspace disease and/or effusions..    CXR 9/1: Bilateral infiltrates somewhat improved. Endotracheal tube removed.    CXR 9/1: NG tube at least to stomach.    CXR 9/2: NG tube tip in distal stomach. The cardiomediastinal silhouette is within normal limits. Bilateral lung infiltrates are unchanged - right lung more than left. No pleural effusion or pneumothorax.    CT Cervical Spine 9/3: No acute cervical spine fracture.     CT head 9/3: No acute intracranial hemorrhage.    CXR 9/3: Unchanged infiltrates as above.    TTE Echo 9/3: Summary:   1. Left ventricular ejection fraction, by visual estimation, is 55 to 60%.   2. Normal global left ventricular systolic function.   3. Spectral Doppler shows impaired relaxation pattern of left ventricular myocardial filling (Grade I diastolic dysfunction).   4. There is mild concentric left ventricular hypertrophy.   5. Normal right ventricular size and function.   6. Mild mitral annular calcification.   7. Mobile echogenic foci noted on the ventricular surface of the aortic valve. PETE is recommended for further evaluation.   8. Peak transaortic gradient equals 38.6 mmHg, mean transaortic gradient equals 24.3 mmHg, the calculated aortic valve area equals 1.25 cm² by the continuity equation consistent with moderate aortic stenosis.   9. There is no evidence of pericardial effusion.  10. Compared to a prior study from 8/22/21, there is significant improvement in LV and RV function.    PETE 9/13 - Summary:   1. Left ventricular ejection fraction, by visual estimation, is 60 to 65%.   2. Normal global left ventricular systolic function.   3. There is mild concentric left ventricular hypertrophy.   4. Normal right ventricular size and function.   5. Structurally normal mitral valve, with normal leaflet excursion, no evidence of vegetation.   6. Trace mitral valve regurgitation.   7. Structurally normal tricuspid valve, with normal leaflet excursion, no evidence of vegetation.   8. Structurally normal pulmonic valve, with normal leaflet excursion, no evidence of vegetation.   9. Bicuspid aortic valve with fused raphe of the right and non-coronary cusps, mild-moderate calcificaitons with degenerative materials, no evidence of vegetation. Mild-to-moderate aortic valve stenosis, JUAN DAVID by 2D-planimetry 1.6 cm2, peak velocity 2.6 m/s, mean gradient 16 mmHg. Mild eccentric aortic regurgitation noted. Ascending aorta 3.8 cm.  10. Color flow doppler and intravenous injection of agitated saline demonstrates the presence of an intact intra atrial septum.  11. No left atrial appendage thrombus and normal left atrial appendage velocities.  12. There is no evidence of pericardial effusion. No evidence of vegetation or intracardiac abscess.    ---------------------------------------------------------------------------------------  PHYSICAL EXAM  Constitutional - NAD, Comfortable  Extremities - No C/C/E, No calf tenderness   Neurologic Exam -                    Cognitive - AAOx self, year, hospital, NOT situation      Communication - Delayed processing     Motor -  Effort dependent                    LEFT    UE - ShAB 5/5, EF 5/5, EE 5/5, WE 5/5,  5/5, delayed relaxation of /finger extensor weakness                    RIGHT UE - ShAB 5/5, EF 5/5, EE 5/5, WE 5/5,  5/5                    LEFT    LE - HF 4/5, KE 5/5, DF 1-3/5* (unclear of effort)                     RIGHT LE - HF 4/5, KE 5/5, DF 5/5   Psychiatric - Flat, Calm  ----------------------------------------------------------------------------------------  ASSESSMENT/PLAN  46yMale with functional deficits 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 Recommend ongoing mobilization by staff to maintain cardiopulmonary function and prevention of secondary complications related to debility. Discussed with rehab team.

## 2021-09-21 NOTE — CHART NOTE - NSCHARTNOTEFT_GEN_A_CORE
Notified for suspected fall in shower. RN states that Pt was in the shower with assistance of an aide, Fela. Aide briefly left Pt unattended standing in the shower to obtain scissors to cut the bag surrounding RUE midline off. Upon return, the Aide witnessed Pt ambulating out of the bathroom where he stated he fell but fall was unwitnessed by RN, Aide or staff. Pt seen and examined at bedside. Pt states that he was standing in the shower and slipped. He fell onto his right forearm onto the floor but is not in pain and denies trauma. He then states that 2 aids had come into the bathroom to help him stand up. No loss of consciousness and did not hit his head. No sudden change in posturing that elicited symptoms. No discernable prodrome or provoking factor. No bladder or bowel incontinence. No tongue biting. Patient is a poor historian.     VSS  PHYSICAL EXAM:  GENERAL: Pt sitting in bed comfortably in NAD  HEAD:  Atraumatic  EYES: EOMI, PERRL, conjunctiva and sclera clear  ENT: Moist mucous membranes, no tongue lacerations  NECK: No JVD  CHEST/LUNG: Equal chest rise B/L. Unlabored respirations  CARDIAC: S1, S2  ABDOMEN: Soft, Nontender, Nondistended. No guarding or rigidity    EXTREMITIES:  2+ Peripheral Pulses, brisk capillary refill. No clubbing, cyanosis, or edema  NERVOUS SYSTEM:  Alert & Oriented X3, speech clear. Answers questions appropriately. Equal strength B/L. No deficits   MSK: FROM x 4 extremities, FROM all joints B/L, no tenderness to palpation. 1cm x 2cm superficial scratch along right medial forearm, no ecchymosis   SKIN: No rashes or lesions    A/P: 47 y/o M admitted for acute metabolic encephalopathy w/ anoxic brain injury s/p benzo/cocoaine OD, now mental status improved. now with a suspected fall  -Pt is a poor historian  -fall precautions  -enhanced/camera supervision  -oob with assistance   -Pt educated to ask for assistance prior to moving oob  -no imaging warranted at this time since atraumatic event   -d/w MD, in agreement with plan  -CT head if change in mental status  -continue to monitor Notified for suspected fall in shower. RN states that Pt was in the shower with assistance of an aide, Fela. Aide briefly left Pt unattended standing in the shower to obtain scissors to cut the bag surrounding RUE midline off. Upon return, the Aide witnessed Pt ambulating out of the bathroom where he stated he fell but fall was unwitnessed by RN, Aide or staff. Pt seen and examined at bedside. Pt states that he was standing in the shower and slipped. He fell onto his right forearm onto the floor but is not in pain and denies trauma. He then states that 2 aids had come into the bathroom to help him stand up. No loss of consciousness and did not hit his head. No sudden change in posturing that elicited symptoms. No discernable prodrome or provoking factor. No bladder or bowel incontinence. No tongue biting. Patient is a poor historian.     Vital Signs Last 24 Hrs  T(C): 36.4 (21 Sep 2021 17:44), Max: 37.1 (20 Sep 2021 18:15)  T(F): 97.5 (21 Sep 2021 17:44), Max: 98.8 (20 Sep 2021 18:15)  HR: 109 (21 Sep 2021 17:44) (86 - 114)  BP: 127/90 (21 Sep 2021 17:44) (123/99 - 133/97)  BP(mean): --  RR: 18 (21 Sep 2021 17:44) (18 - 20)  SpO2: 95% (21 Sep 2021 17:44) (95% - 97%)  PHYSICAL EXAM:  GENERAL: Pt sitting in bed comfortably in NAD  HEAD:  Atraumatic  EYES: EOMI, PERRL, conjunctiva and sclera clear  ENT: Moist mucous membranes, no tongue lacerations  NECK: FROM, nontender to palpation, No JVD  CHEST/LUNG: Equal chest rise B/L. Unlabored respirations  CARDIAC: S1, S2  ABDOMEN: Soft, Nontender, Nondistended. No guarding or rigidity    EXTREMITIES:  2+ Peripheral Pulses, brisk capillary refill. No clubbing, cyanosis, or edema  NERVOUS SYSTEM:  Alert & Oriented X3, speech clear. Answers questions appropriately. Equal strength B/L. No deficits   MSK: FROM x 4 extremities, FROM all joints B/L, no tenderness to palpation. 1cm x 2cm superficial scratch along right medial forearm, no ecchymosis   SKIN: No rashes or lesions    A/P: 45 y/o M admitted for acute metabolic encephalopathy w/ anoxic brain injury s/p benzo/cocoaine OD, now mental status improved. now with a suspected fall  -Pt is a poor historian  -fall precautions  -enhanced/camera supervision  -oob with assistance   -Pt educated to ask for assistance prior to moving oob  -no imaging warranted at this time since atraumatic event   -d/w MD, in agreement with plan  -CT head if change in mental status  -continue to monitor

## 2021-09-21 NOTE — PROGRESS NOTE ADULT - ASSESSMENT
45 y/o M w/ PMH of DM2, HTN, Hyothyroid, Asthma, ADHD was BIBA after being found unresponsive suspected to be 2/2 benzo/cocaine OD and subsequent aspiration PNA.  Admitted to MICU for acute hypoxic respiratory failure requiring intubation and pressor support.  Pt found to have anoxic brain injury and being followed by Neurology.  On IV antibiotics for presumed aspiration PNA. ECHO was done and significant for LVEF of <20%.  Per Cardiology, patient to have ischemic evaluation if meaningful neurological recovery.  Pt also noted to have rhabdo/GARRETT on admission which has now normalized.  Trach was tentatively scheduled for 9/2.  Patient showed improvement in mental status and was extubated on 8/31.  Patient was transitioned from HFNC to NC.  Off pressors and downgraded to Medicine.  Seen by ID.  Hospital course complicated by him being found on the floor by a step down nurse; subsequent CTH and CT c-spine performed and negative.  Pt placed on 1:1 and restraints but since d/c'd.  Patient showed slow mental improvement and had a tte: on 9/3 with mobile echogenic foci noted on the ventricular surface of the AV but PETE only reporting bicuspic AV.  Patient then seen by speech and started on a diet and ng tube removed. Patient also seen by psych.      Acute metabolic encephalopathy w/ anoxic brain injury s/p benzo/cocoaine OD   - Slow mental improvement since inciting event  - MRI of the brain noted numerous areas of restricted diffusion in the bilateral cerebral hemispheres, bilateral globus pallidi, left hippocampus and bilateral cerebellar hemispheres suggesting acute infarcts and hypoxic ischemic encephalopathy  - EEG report was without seizures  - Fall precautions   - Aspiration precautions with diet   - On amantadine, melatonin, and divalproex  -  consult noted  - Acute rehab consult noted and recommend acute inpatient rehab; placement pending      Aspiration Pneumonia / Respiratory failure   - Respiratory failure resolved   - Aspiration PNA likely 2/2 above  - Continue with aspiration precautions   - Completed course of vancomycin and meropenem  - Bcx NTD and pt remains afebrile   - Monitor CBC and temperature curve      Echodensity noted on TTE  - Blood cultures are without growth  - PETE without vegetation but noted bicuspid AV      Decompensated HFrEF POA  - Clinically euvolemic at this time  - On aspirin and atorvastatin  - TTE w/ EF <20% initialy however EF improved on repeat echo   - Stress test was without ischemia  - Monitor daily weights and strict I/O's  - Maintain K>4 and Mg>2  - Cardiology follow up noted      Acute kidney injury / Rhabdomyolysis   - Likely due to overdose    - CPK levels improved on repeat studies and renal fxn improved   - Urinary retention resolved and Arguello catheter successfully removed  - Monitor renal function and I/O's      Hypothyroidism   - On [place] thyroid        VTE ppx: heparin sq     Dispo: awaiting placement

## 2021-09-22 LAB
ANION GAP SERPL CALC-SCNC: 11 MMOL/L — SIGNIFICANT CHANGE UP (ref 5–17)
BUN SERPL-MCNC: 15.4 MG/DL — SIGNIFICANT CHANGE UP (ref 8–20)
CALCIUM SERPL-MCNC: 9.7 MG/DL — SIGNIFICANT CHANGE UP (ref 8.6–10.2)
CHLORIDE SERPL-SCNC: 103 MMOL/L — SIGNIFICANT CHANGE UP (ref 98–107)
CO2 SERPL-SCNC: 26 MMOL/L — SIGNIFICANT CHANGE UP (ref 22–29)
CREAT SERPL-MCNC: 1.25 MG/DL — SIGNIFICANT CHANGE UP (ref 0.5–1.3)
CULTURE RESULTS: SIGNIFICANT CHANGE UP
GLUCOSE SERPL-MCNC: 86 MG/DL — SIGNIFICANT CHANGE UP (ref 70–99)
HCT VFR BLD CALC: 45 % — SIGNIFICANT CHANGE UP (ref 39–50)
HGB BLD-MCNC: 15.4 G/DL — SIGNIFICANT CHANGE UP (ref 13–17)
MCHC RBC-ENTMCNC: 31.6 PG — SIGNIFICANT CHANGE UP (ref 27–34)
MCHC RBC-ENTMCNC: 34.2 GM/DL — SIGNIFICANT CHANGE UP (ref 32–36)
MCV RBC AUTO: 92.2 FL — SIGNIFICANT CHANGE UP (ref 80–100)
PLATELET # BLD AUTO: 330 K/UL — SIGNIFICANT CHANGE UP (ref 150–400)
POTASSIUM SERPL-MCNC: 3.9 MMOL/L — SIGNIFICANT CHANGE UP (ref 3.5–5.3)
POTASSIUM SERPL-SCNC: 3.9 MMOL/L — SIGNIFICANT CHANGE UP (ref 3.5–5.3)
RBC # BLD: 4.88 M/UL — SIGNIFICANT CHANGE UP (ref 4.2–5.8)
RBC # FLD: 13.3 % — SIGNIFICANT CHANGE UP (ref 10.3–14.5)
SARS-COV-2 RNA SPEC QL NAA+PROBE: SIGNIFICANT CHANGE UP
SODIUM SERPL-SCNC: 140 MMOL/L — SIGNIFICANT CHANGE UP (ref 135–145)
SPECIMEN SOURCE: SIGNIFICANT CHANGE UP
WBC # BLD: 6.77 K/UL — SIGNIFICANT CHANGE UP (ref 3.8–10.5)
WBC # FLD AUTO: 6.77 K/UL — SIGNIFICANT CHANGE UP (ref 3.8–10.5)

## 2021-09-22 PROCEDURE — 99232 SBSQ HOSP IP/OBS MODERATE 35: CPT

## 2021-09-22 PROCEDURE — 99233 SBSQ HOSP IP/OBS HIGH 50: CPT

## 2021-09-22 RX ORDER — MIRTAZAPINE 45 MG/1
15 TABLET, ORALLY DISINTEGRATING ORAL AT BEDTIME
Refills: 0 | Status: DISCONTINUED | OUTPATIENT
Start: 2021-09-22 | End: 2021-09-23

## 2021-09-22 RX ADMIN — HEPARIN SODIUM 5000 UNIT(S): 5000 INJECTION INTRAVENOUS; SUBCUTANEOUS at 22:18

## 2021-09-22 RX ADMIN — Medication 100 MILLIGRAM(S): at 05:20

## 2021-09-22 RX ADMIN — ATORVASTATIN CALCIUM 20 MILLIGRAM(S): 80 TABLET, FILM COATED ORAL at 22:19

## 2021-09-22 RX ADMIN — Medication 100 MILLIGRAM(S): at 11:56

## 2021-09-22 RX ADMIN — Medication 30 MILLIGRAM(S): at 05:20

## 2021-09-22 RX ADMIN — HEPARIN SODIUM 5000 UNIT(S): 5000 INJECTION INTRAVENOUS; SUBCUTANEOUS at 05:21

## 2021-09-22 RX ADMIN — HEPARIN SODIUM 5000 UNIT(S): 5000 INJECTION INTRAVENOUS; SUBCUTANEOUS at 13:48

## 2021-09-22 RX ADMIN — MIRTAZAPINE 15 MILLIGRAM(S): 45 TABLET, ORALLY DISINTEGRATING ORAL at 22:19

## 2021-09-22 RX ADMIN — Medication 81 MILLIGRAM(S): at 11:56

## 2021-09-22 RX ADMIN — Medication 5 MILLIGRAM(S): at 22:19

## 2021-09-22 RX ADMIN — Medication 20 MILLIGRAM(S): at 11:56

## 2021-09-22 NOTE — PROGRESS NOTE ADULT - SUBJECTIVE AND OBJECTIVE BOX
Chief Complaint:  respiratory failure     SUBJECTIVE / OVERNIGHT EVENTS: no acute events reported overnight.  Pt offers no complaints at this time.  Patient denies chest pain, SOB, abd pain, N/V, fever, chills, dysuria or any other complaints. All remainder ROS negative.       I&O's Summary        PHYSICAL EXAM:  Vital Signs Last 24 Hrs  T(C): 36.7 (22 Sep 2021 08:58), Max: 36.9 (22 Sep 2021 04:49)  T(F): 98 (22 Sep 2021 08:58), Max: 98.4 (22 Sep 2021 04:49)  HR: 90 (22 Sep 2021 08:58) (90 - 110)  BP: 125/72 (22 Sep 2021 08:58) (123/86 - 130/96)  BP(mean): --  RR: 18 (22 Sep 2021 08:58) (18 - 18)  SpO2: 97% (22 Sep 2021 08:58) (93% - 97%)    GENERAL: pt examined bedside, laying comfortably in bed in NAD  HEENT: NC/AT, moist oral mucosa, clear conjunctiva, sclera nonicteric  RESPIRATORY: Normal respiratory effort; CTA b/l, no wheezing, rhonchi, rales  CARDIOVASCULAR: RRR, normal S1 and S2, no murmur/rub/gallop  ABDOMEN: soft, NT/ND, normoactive bowel sounds, no rebound/guarding  MUSCLOSKELETAL:  no joint swelling or tenderness to palpation  EXTREMITIES: No cynaosis, no clubbing, no lower extremity edema; Peripheral pulses are 2+ bilaterally  PSYCH: flat affect but cooperative  NEUROLOGY: A+O to self and place, follows basic commands, no focal neurologic deficits appreciated   SKIN: No rashes or no palpable lesions        LABS:                                           15.4   6.77  )-----------( 330      ( 22 Sep 2021 08:16 )             45.0     09-22    140  |  103  |  15.4  ----------------------------<  86  3.9   |  26.0  |  1.25    Ca    9.7      22 Sep 2021 08:16              CAPILLARY BLOOD GLUCOSE            RADIOLOGY & ADDITIONAL TESTS:          MEDICATIONS  (STANDING):  amantadine 100 milliGRAM(s) Oral <User Schedule>  aspirin  chewable 81 milliGRAM(s) Oral daily  atorvastatin 20 milliGRAM(s) Oral at bedtime  diVALproex  milliGRAM(s) Oral <User Schedule>  FLUoxetine 20 milliGRAM(s) Oral daily  heparin   Injectable 5000 Unit(s) SubCutaneous every 8 hours  melatonin 5 milliGRAM(s) Oral at bedtime  mirtazapine 7.5 milliGRAM(s) Oral at bedtime  thyroid 30 milliGRAM(s) Oral daily    MEDICATIONS  (PRN):  ALBUTerol    90 MICROgram(s) HFA Inhaler 2 Puff(s) Inhalation every 6 hours PRN Shortness of Breath

## 2021-09-22 NOTE — PROGRESS NOTE ADULT - ASSESSMENT
47 y/o M w/ PMH of DM2, HTN, Hyothyroid, Asthma, ADHD was BIBA after being found unresponsive suspected to be 2/2 benzo/cocaine OD and subsequent aspiration PNA.  Admitted to MICU for acute hypoxic respiratory failure requiring intubation and pressor support.  Pt found to have anoxic brain injury and being followed by Neurology.  On IV antibiotics for presumed aspiration PNA. ECHO was done and significant for LVEF of <20%.  Per Cardiology, patient to have ischemic evaluation if meaningful neurological recovery.  Pt also noted to have rhabdo/GARRETT on admission which has now normalized.  Trach was tentatively scheduled for 9/2.  Patient showed improvement in mental status and was extubated on 8/31.  Patient was transitioned from HFNC to NC.  Off pressors and downgraded to Medicine.  Seen by ID.  Hospital course complicated by him being found on the floor by a step down nurse; subsequent CTH and CT c-spine performed and negative.  Pt placed on 1:1 and restraints but since d/c'd.  Patient showed slow mental improvement and had a tte: on 9/3 with mobile echogenic foci noted on the ventricular surface of the AV but PETE only reporting bicuspic AV.  Patient then seen by speech and started on a diet and ng tube removed. Patient also seen by psych.      Acute metabolic encephalopathy w/ anoxic brain injury s/p benzo/cocoaine OD   - Slow mental improvement since inciting event  - MRI of the brain noted numerous areas of restricted diffusion in the bilateral cerebral hemispheres, bilateral globus pallidi, left hippocampus and bilateral cerebellar hemispheres suggesting acute infarcts and hypoxic ischemic encephalopathy  - EEG report was without seizures  - Fall precautions   - Aspiration precautions with diet   - On amantadine, melatonin, and divalproex  -  consult noted  - Acute rehab consult noted and recommend acute inpatient rehab; placement pending      Aspiration Pneumonia / Respiratory failure   - Respiratory failure resolved   - Aspiration PNA likely 2/2 above  - Continue with aspiration precautions   - Completed course of vancomycin and meropenem  - Bcx NTD and pt remains afebrile   - Monitor CBC and temperature curve      Echodensity noted on TTE  - Blood cultures are without growth  - PETE without vegetation but noted bicuspid AV      Decompensated HFrEF POA  - Clinically euvolemic at this time  - On aspirin and atorvastatin  - TTE w/ EF <20% initialy however EF improved on repeat echo   - Stress test was without ischemia  - Monitor daily weights and strict I/O's  - Maintain K>4 and Mg>2  - Cardiology follow up noted      Acute kidney injury / Rhabdomyolysis   - Likely due to overdose    - CPK levels improved on repeat studies and renal fxn improved   - Urinary retention resolved and Arguello catheter successfully removed  - Monitor renal function and I/O's      Hypothyroidism   - On [place] thyroid        VTE ppx: heparin sq     Dispo: awaiting placement

## 2021-09-22 NOTE — PROGRESS NOTE ADULT - SUBJECTIVE AND OBJECTIVE BOX
Patient reports he did not sleep at all.  Reports no pain this AM.     REVIEW OF SYSTEMS  Constitutional - No fever,  +fatigue  HEENT - No vertigo, No neck pain  Neurological - No headaches, +memory loss, +loss of strength, No numbness, No tremors  Musculoskeletal - No joint pain, No joint swelling, No muscle pain  Psychiatric - No depression, No anxiety    FUNCTIONAL PROGRESS  9/20 PT  Bed Mobility  Bed Mobility Training Sit-to-Supine: supervsion  Bed Mobility Training Supine-to-Sit: supervsion  Bed Mobility Training Limitations: cognitive, decreased safety awareness;  decreased strength;  impaired motor control    Sit-Stand Transfer Training  Transfer Training Sit-to-Stand Transfer: minimum assist (75% patient effort);  1 person assist;  full weight-bearing  Transfer Training Stand-to-Sit Transfer: minimum assist (75% patient effort);  1 person assist;  full weight-bearing  Sit-to-Stand Transfer Training Transfer Safety Analysis: decreased cognition;  decreased sequencing ability;  decreased strength;  impaired balance;  impaired motor control    Gait Training  Gait Training: minimum assist (75% patient effort);  1 person assist;  full weight-bearing   50 feet  Gait Analysis: swing-through gait   decreased step length;  decreased stride length;  decreased toe clearance;  decreased arm swing;  decreased strength;  impaired balance;  impaired motor control;  cognitive, decreased safety awareness    9/20 OT  Bed Mobility  Bed Mobility Training Supine-to-Sit: supervision;  verbal cues  Bed Mobility Training Limitations: cognitive, decreased safety awareness;  impaired coordination    Sit-Stand Transfer Training  Transfer Training Sit-to-Stand Transfer: minimum assist (75% patient effort);  1 person assist  Transfer Training Stand-to-Sit Transfer: minimum assist (75% patient effort);  1 person assist  Sit-to-Stand Transfer Training Transfer Safety Analysis: impaired motor control;  impaired balance;  cognitive, decreased safety awareness    Toilet Transfer Training  Transfer Training Toilet Transfer: minimum assist (75% patient effort);  1 person assist;  on low toilet height  Toilet Transfer Training Transfer Safety Analysis: cognitive, decreased safety awareness;  impaired coordination;  impaired balance    Functional Endurance  Functional Endurance Detail: Pt ambulated without adaptive device in room and bathroom with minimal assist and verbal cues for safety and when going around obstacles and turning.     Lower Body Dressing Training  Lower Body Dressing Training Assistance: supervision;  to don socks seated at the edge of the bed;  cognitive, decreased safety awareness      VITALS  T(C): 36.7 (09-22-21 @ 08:58), Max: 36.9 (09-22-21 @ 04:49)  HR: 90 (09-22-21 @ 08:58) (90 - 110)  BP: 125/72 (09-22-21 @ 08:58) (123/86 - 130/96)  RR: 18 (09-22-21 @ 08:58) (18 - 18)  SpO2: 97% (09-22-21 @ 08:58) (93% - 97%)  Wt(kg): --    MEDICATIONS   ALBUTerol    90 MICROgram(s) HFA Inhaler 2 Puff(s) every 6 hours PRN  amantadine 100 milliGRAM(s) <User Schedule>  aspirin  chewable 81 milliGRAM(s) daily  atorvastatin 20 milliGRAM(s) at bedtime  FLUoxetine 20 milliGRAM(s) daily  heparin   Injectable 5000 Unit(s) every 8 hours  influenza   Vaccine 0.5 milliLiter(s) once    melatonin 5 milliGRAM(s) at bedtime  mirtazapine 7.5 milliGRAM(s) at bedtime  thyroid 30 milliGRAM(s) daily      RECENT LABS/IMAGING                          15.4   6.77  )-----------( 330      ( 22 Sep 2021 08:16 )             45.0     09-22    140  |  103  |  15.4  ----------------------------<  86  3.9   |  26.0  |  1.25    Ca    9.7      22 Sep 2021 08:16                CT Head 8/21: Symmetric low attenuation lesions in the bilateral globus pallidus regions of the basal ganglia, which can be seen with hypoxic ischemic encephalopathy or related to carbon monoxide positioning.  Indeterminate areas of low attenuation in the bilateral cerebellum may reflect infarcts.  MRI is recommended for further evaluation.    CXR 8/22: Bilateral infiltrates right greater than left somewhat improved on the latest film. Tubes in place as above.    CT Head 8/23: Slightly increased prominence of bilateral globus pallidus hypodense foci, likely representing hypoxic-ischemic injury. Again noted multiple likely small acute infarcts involving the bilateral cerebral and cerebellar hemispheres. No acute intracranial hemorrhage.    CXR 8/24: No interval change.    US Duplex Venous Lower Ext Ltd, Right 8/24: No evidence of right lower extremity deep venous thrombosis.    US Duplex Arterial Upper Ext Ltd, Left  8/25: Normal left upper extremity arterial evaluation    MRI Brain 8/26: Numerous areas of restricted diffusion in the bilateral cerebral hemispheres, bilateral globus pallidi, left hippocampus and bilateral cerebellar hemispheres suggesting acute infarcts and hypoxic ischemic encephalopathy.    Xray Abdomen 8/29: No acute radiographic intra-abdominal findings.    CXR 8/31: Residual Perihilar/basilar diffuse airspace disease and/or effusions..    CXR 9/1: Bilateral infiltrates somewhat improved. Endotracheal tube removed.    CXR 9/1: NG tube at least to stomach.    CXR 9/2: NG tube tip in distal stomach. The cardiomediastinal silhouette is within normal limits. Bilateral lung infiltrates are unchanged - right lung more than left. No pleural effusion or pneumothorax.    CT Cervical Spine 9/3: No acute cervical spine fracture.     CT head 9/3: No acute intracranial hemorrhage.    CXR 9/3: Unchanged infiltrates as above.    TTE Echo 9/3: Summary:   1. Left ventricular ejection fraction, by visual estimation, is 55 to 60%.   2. Normal global left ventricular systolic function.   3. Spectral Doppler shows impaired relaxation pattern of left ventricular myocardial filling (Grade I diastolic dysfunction).   4. There is mild concentric left ventricular hypertrophy.   5. Normal right ventricular size and function.   6. Mild mitral annular calcification.   7. Mobile echogenic foci noted on the ventricular surface of the aortic valve. PETE is recommended for further evaluation.   8. Peak transaortic gradient equals 38.6 mmHg, mean transaortic gradient equals 24.3 mmHg, the calculated aortic valve area equals 1.25 cm² by the continuity equation consistent with moderate aortic stenosis.   9. There is no evidence of pericardial effusion.  10. Compared to a prior study from 8/22/21, there is significant improvement in LV and RV function.    PETE 9/13 - Summary:   1. Left ventricular ejection fraction, by visual estimation, is 60 to 65%.   2. Normal global left ventricular systolic function.   3. There is mild concentric left ventricular hypertrophy.   4. Normal right ventricular size and function.   5. Structurally normal mitral valve, with normal leaflet excursion, no evidence of vegetation.   6. Trace mitral valve regurgitation.   7. Structurally normal tricuspid valve, with normal leaflet excursion, no evidence of vegetation.   8. Structurally normal pulmonic valve, with normal leaflet excursion, no evidence of vegetation.   9. Bicuspid aortic valve with fused raphe of the right and non-coronary cusps, mild-moderate calcificaitons with degenerative materials, no evidence of vegetation. Mild-to-moderate aortic valve stenosis, JUAN DAVID by 2D-planimetry 1.6 cm2, peak velocity 2.6 m/s, mean gradient 16 mmHg. Mild eccentric aortic regurgitation noted. Ascending aorta 3.8 cm.  10. Color flow doppler and intravenous injection of agitated saline demonstrates the presence of an intact intra atrial septum.  11. No left atrial appendage thrombus and normal left atrial appendage velocities.  12. There is no evidence of pericardial effusion. No evidence of vegetation or intracardiac abscess.    ---------------------------------------------------------------------------------------  PHYSICAL EXAM  Constitutional - NAD, Comfortable  Extremities - No C/C/E, No calf tenderness   Neurologic Exam -                    Cognitive - AAOx self, year, hospital, NOT situation      Communication - Delayed processing     Motor -  Effort dependent                    LEFT    UE - ShAB 5/5, EF 5/5, EE 5/5, WE 5/5,  5/5                     RIGHT UE - ShAB 5/5, EF 5/5, EE 5/5, WE 5/5,  5/5                    LEFT    LE - HF 4/5, KE 5/5, DF 3/5                     RIGHT LE - HF 4/5, KE 5/5, DF 5/5   Psychiatric - Flat, Calm  ----------------------------------------------------------------------------------------  ASSESSMENT/PLAN  46yMale with functional deficits after a hypoxic ischemic brain injury  Agitation/restlessness - DC Depakote (9/21)   Aortic valve abnormality & CAD/MI - ASA, Lipitor, Outpatient follow up  Mood - Prozac 20mg (9/17)  ADHD - Amantadine 100mg Q6AM/12PM (9/15), DC Adderall XL   Sleep - Melatonin, Remeron 15mg HS (increased from 7.5mg 9/22)  DVT PPX - SCDs, Heparin  Rehab - [facility] denied patient. Recommend [facility] for short term stay 3-5 days LOS for dispo to home. **please advise family of the plan***.  Recommend ongoing mobilization by staff to maintain cardiopulmonary function and prevention of secondary complications related to debility. Discussed with rehab team.

## 2021-09-23 ENCOUNTER — TRANSCRIPTION ENCOUNTER (OUTPATIENT)
Age: 46
End: 2021-09-23

## 2021-09-23 VITALS
DIASTOLIC BLOOD PRESSURE: 75 MMHG | SYSTOLIC BLOOD PRESSURE: 112 MMHG | TEMPERATURE: 99 F | OXYGEN SATURATION: 99 % | HEART RATE: 99 BPM | RESPIRATION RATE: 18 BRPM

## 2021-09-23 PROCEDURE — 95714 VEEG EA 12-26 HR UNMNTR: CPT

## 2021-09-23 PROCEDURE — 80307 DRUG TEST PRSMV CHEM ANLYZR: CPT

## 2021-09-23 PROCEDURE — 87086 URINE CULTURE/COLONY COUNT: CPT

## 2021-09-23 PROCEDURE — 89051 BODY FLUID CELL COUNT: CPT

## 2021-09-23 PROCEDURE — 83874 ASSAY OF MYOGLOBIN: CPT

## 2021-09-23 PROCEDURE — 80061 LIPID PANEL: CPT

## 2021-09-23 PROCEDURE — 97530 THERAPEUTIC ACTIVITIES: CPT

## 2021-09-23 PROCEDURE — U0005: CPT

## 2021-09-23 PROCEDURE — 80053 COMPREHEN METABOLIC PANEL: CPT

## 2021-09-23 PROCEDURE — 86592 SYPHILIS TEST NON-TREP QUAL: CPT

## 2021-09-23 PROCEDURE — 0225U NFCT DS DNA&RNA 21 SARSCOV2: CPT

## 2021-09-23 PROCEDURE — 36415 COLL VENOUS BLD VENIPUNCTURE: CPT

## 2021-09-23 PROCEDURE — 82962 GLUCOSE BLOOD TEST: CPT

## 2021-09-23 PROCEDURE — 87641 MR-STAPH DNA AMP PROBE: CPT

## 2021-09-23 PROCEDURE — 94002 VENT MGMT INPAT INIT DAY: CPT

## 2021-09-23 PROCEDURE — 87483 CNS DNA AMP PROBE TYPE 12-25: CPT

## 2021-09-23 PROCEDURE — 36600 WITHDRAWAL OF ARTERIAL BLOOD: CPT

## 2021-09-23 PROCEDURE — 71045 X-RAY EXAM CHEST 1 VIEW: CPT

## 2021-09-23 PROCEDURE — 94640 AIRWAY INHALATION TREATMENT: CPT

## 2021-09-23 PROCEDURE — 87798 DETECT AGENT NOS DNA AMP: CPT

## 2021-09-23 PROCEDURE — 84145 PROCALCITONIN (PCT): CPT

## 2021-09-23 PROCEDURE — 84100 ASSAY OF PHOSPHORUS: CPT

## 2021-09-23 PROCEDURE — 85025 COMPLETE CBC W/AUTO DIFF WBC: CPT

## 2021-09-23 PROCEDURE — 87476 LYME DIS DNA AMP PROBE: CPT

## 2021-09-23 PROCEDURE — A9500: CPT

## 2021-09-23 PROCEDURE — 83036 HEMOGLOBIN GLYCOSYLATED A1C: CPT

## 2021-09-23 PROCEDURE — 84443 ASSAY THYROID STIM HORMONE: CPT

## 2021-09-23 PROCEDURE — 81001 URINALYSIS AUTO W/SCOPE: CPT

## 2021-09-23 PROCEDURE — 82330 ASSAY OF CALCIUM: CPT

## 2021-09-23 PROCEDURE — 82553 CREATINE MB FRACTION: CPT

## 2021-09-23 PROCEDURE — 93971 EXTREMITY STUDY: CPT

## 2021-09-23 PROCEDURE — 99233 SBSQ HOSP IP/OBS HIGH 50: CPT | Mod: GC

## 2021-09-23 PROCEDURE — 72125 CT NECK SPINE W/O DYE: CPT

## 2021-09-23 PROCEDURE — 84295 ASSAY OF SERUM SODIUM: CPT

## 2021-09-23 PROCEDURE — 92526 ORAL FUNCTION THERAPY: CPT

## 2021-09-23 PROCEDURE — 93308 TTE F-UP OR LMTD: CPT

## 2021-09-23 PROCEDURE — 83690 ASSAY OF LIPASE: CPT

## 2021-09-23 PROCEDURE — 83880 ASSAY OF NATRIURETIC PEPTIDE: CPT

## 2021-09-23 PROCEDURE — 84484 ASSAY OF TROPONIN QUANT: CPT

## 2021-09-23 PROCEDURE — C8929: CPT

## 2021-09-23 PROCEDURE — 94003 VENT MGMT INPAT SUBQ DAY: CPT

## 2021-09-23 PROCEDURE — 99239 HOSP IP/OBS DSCHRG MGMT >30: CPT

## 2021-09-23 PROCEDURE — 82570 ASSAY OF URINE CREATININE: CPT

## 2021-09-23 PROCEDURE — 97167 OT EVAL HIGH COMPLEX 60 MIN: CPT

## 2021-09-23 PROCEDURE — 87102 FUNGUS ISOLATION CULTURE: CPT

## 2021-09-23 PROCEDURE — 80048 BASIC METABOLIC PNL TOTAL CA: CPT

## 2021-09-23 PROCEDURE — 82945 GLUCOSE OTHER FLUID: CPT

## 2021-09-23 PROCEDURE — 99285 EMERGENCY DEPT VISIT HI MDM: CPT | Mod: 25

## 2021-09-23 PROCEDURE — 87640 STAPH A DNA AMP PROBE: CPT

## 2021-09-23 PROCEDURE — 84157 ASSAY OF PROTEIN OTHER: CPT

## 2021-09-23 PROCEDURE — 93017 CV STRESS TEST TRACING ONLY: CPT

## 2021-09-23 PROCEDURE — 83735 ASSAY OF MAGNESIUM: CPT

## 2021-09-23 PROCEDURE — 93325 DOPPLER ECHO COLOR FLOW MAPG: CPT

## 2021-09-23 PROCEDURE — 97535 SELF CARE MNGMENT TRAINING: CPT

## 2021-09-23 PROCEDURE — 94760 N-INVAS EAR/PLS OXIMETRY 1: CPT

## 2021-09-23 PROCEDURE — 84132 ASSAY OF SERUM POTASSIUM: CPT

## 2021-09-23 PROCEDURE — U0003: CPT

## 2021-09-23 PROCEDURE — 87040 BLOOD CULTURE FOR BACTERIA: CPT

## 2021-09-23 PROCEDURE — 74018 RADEX ABDOMEN 1 VIEW: CPT

## 2021-09-23 PROCEDURE — 95700 EEG CONT REC W/VID EEG TECH: CPT

## 2021-09-23 PROCEDURE — 92610 EVALUATE SWALLOWING FUNCTION: CPT

## 2021-09-23 PROCEDURE — 82550 ASSAY OF CK (CPK): CPT

## 2021-09-23 PROCEDURE — 82140 ASSAY OF AMMONIA: CPT

## 2021-09-23 PROCEDURE — 85027 COMPLETE CBC AUTOMATED: CPT

## 2021-09-23 PROCEDURE — 93005 ELECTROCARDIOGRAM TRACING: CPT

## 2021-09-23 PROCEDURE — 85014 HEMATOCRIT: CPT

## 2021-09-23 PROCEDURE — 85730 THROMBOPLASTIN TIME PARTIAL: CPT

## 2021-09-23 PROCEDURE — 70450 CT HEAD/BRAIN W/O DYE: CPT | Mod: MA

## 2021-09-23 PROCEDURE — 87070 CULTURE OTHR SPECIMN AEROBIC: CPT

## 2021-09-23 PROCEDURE — 84478 ASSAY OF TRIGLYCERIDES: CPT

## 2021-09-23 PROCEDURE — 84300 ASSAY OF URINE SODIUM: CPT

## 2021-09-23 PROCEDURE — 93320 DOPPLER ECHO COMPLETE: CPT

## 2021-09-23 PROCEDURE — 87205 SMEAR GRAM STAIN: CPT

## 2021-09-23 PROCEDURE — 83605 ASSAY OF LACTIC ACID: CPT

## 2021-09-23 PROCEDURE — 84436 ASSAY OF TOTAL THYROXINE: CPT

## 2021-09-23 PROCEDURE — 85018 HEMOGLOBIN: CPT

## 2021-09-23 PROCEDURE — 86769 SARS-COV-2 COVID-19 ANTIBODY: CPT

## 2021-09-23 PROCEDURE — 80202 ASSAY OF VANCOMYCIN: CPT

## 2021-09-23 PROCEDURE — 85610 PROTHROMBIN TIME: CPT

## 2021-09-23 PROCEDURE — 97163 PT EVAL HIGH COMPLEX 45 MIN: CPT

## 2021-09-23 PROCEDURE — 78452 HT MUSCLE IMAGE SPECT MULT: CPT

## 2021-09-23 PROCEDURE — 95711 VEEG 2-12 HR UNMONITORED: CPT

## 2021-09-23 PROCEDURE — 82803 BLOOD GASES ANY COMBINATION: CPT

## 2021-09-23 PROCEDURE — 84156 ASSAY OF PROTEIN URINE: CPT

## 2021-09-23 PROCEDURE — 97110 THERAPEUTIC EXERCISES: CPT

## 2021-09-23 PROCEDURE — 70551 MRI BRAIN STEM W/O DYE: CPT

## 2021-09-23 PROCEDURE — 87635 SARS-COV-2 COVID-19 AMP PRB: CPT

## 2021-09-23 PROCEDURE — 97116 GAIT TRAINING THERAPY: CPT

## 2021-09-23 PROCEDURE — 82947 ASSAY GLUCOSE BLOOD QUANT: CPT

## 2021-09-23 PROCEDURE — 93312 ECHO TRANSESOPHAGEAL: CPT

## 2021-09-23 PROCEDURE — 83935 ASSAY OF URINE OSMOLALITY: CPT

## 2021-09-23 PROCEDURE — 82435 ASSAY OF BLOOD CHLORIDE: CPT

## 2021-09-23 PROCEDURE — 87449 NOS EACH ORGANISM AG IA: CPT

## 2021-09-23 PROCEDURE — 93931 UPPER EXTREMITY STUDY: CPT

## 2021-09-23 RX ORDER — DEXTROAMPHETAMINE SACCHARATE, AMPHETAMINE ASPARTATE, DEXTROAMPHETAMINE SULFATE AND AMPHETAMINE SULFATE 1.875; 1.875; 1.875; 1.875 MG/1; MG/1; MG/1; MG/1
1 TABLET ORAL
Qty: 0 | Refills: 0 | DISCHARGE

## 2021-09-23 RX ORDER — FLUOXETINE HCL 10 MG
1 CAPSULE ORAL
Qty: 30 | Refills: 0
Start: 2021-09-23

## 2021-09-23 RX ORDER — MIRTAZAPINE 45 MG/1
1 TABLET, ORALLY DISINTEGRATING ORAL
Qty: 30 | Refills: 0
Start: 2021-09-23

## 2021-09-23 RX ORDER — ATORVASTATIN CALCIUM 80 MG/1
1 TABLET, FILM COATED ORAL
Qty: 30 | Refills: 0
Start: 2021-09-23

## 2021-09-23 RX ORDER — CLONAZEPAM 1 MG
1 TABLET ORAL
Qty: 0 | Refills: 0 | DISCHARGE

## 2021-09-23 RX ORDER — ASPIRIN/CALCIUM CARB/MAGNESIUM 324 MG
1 TABLET ORAL
Qty: 30 | Refills: 0
Start: 2021-09-23 | End: 2021-10-22

## 2021-09-23 RX ORDER — DEXTROAMPHETAMINE SACCHARATE, AMPHETAMINE ASPARTATE, DEXTROAMPHETAMINE SULFATE AND AMPHETAMINE SULFATE 1.875; 1.875; 1.875; 1.875 MG/1; MG/1; MG/1; MG/1
1 TABLET ORAL
Qty: 14 | Refills: 0
Start: 2021-09-23 | End: 2021-10-06

## 2021-09-23 RX ORDER — AMANTADINE HCL 100 MG
1 CAPSULE ORAL
Qty: 30 | Refills: 0
Start: 2021-09-23 | End: 2021-10-22

## 2021-09-23 RX ORDER — ZOLPIDEM TARTRATE 10 MG/1
1 TABLET ORAL
Qty: 0 | Refills: 0 | DISCHARGE

## 2021-09-23 RX ORDER — ASPIRIN/CALCIUM CARB/MAGNESIUM 324 MG
1 TABLET ORAL
Qty: 30 | Refills: 0
Start: 2021-09-23

## 2021-09-23 RX ORDER — MIRTAZAPINE 45 MG/1
1 TABLET, ORALLY DISINTEGRATING ORAL
Qty: 30 | Refills: 0
Start: 2021-09-23 | End: 2021-10-22

## 2021-09-23 RX ORDER — LANOLIN ALCOHOL/MO/W.PET/CERES
1 CREAM (GRAM) TOPICAL
Qty: 30 | Refills: 0
Start: 2021-09-23 | End: 2021-10-22

## 2021-09-23 RX ORDER — FLUOXETINE HCL 10 MG
1 CAPSULE ORAL
Qty: 30 | Refills: 0
Start: 2021-09-23 | End: 2021-10-22

## 2021-09-23 RX ORDER — AMANTADINE HCL 100 MG
1 CAPSULE ORAL
Qty: 60 | Refills: 0
Start: 2021-09-23

## 2021-09-23 RX ORDER — ATORVASTATIN CALCIUM 80 MG/1
1 TABLET, FILM COATED ORAL
Qty: 30 | Refills: 0
Start: 2021-09-23 | End: 2021-10-22

## 2021-09-23 RX ORDER — LANOLIN ALCOHOL/MO/W.PET/CERES
1 CREAM (GRAM) TOPICAL
Qty: 30 | Refills: 0
Start: 2021-09-23

## 2021-09-23 RX ADMIN — Medication 100 MILLIGRAM(S): at 13:20

## 2021-09-23 RX ADMIN — Medication 20 MILLIGRAM(S): at 13:20

## 2021-09-23 RX ADMIN — Medication 81 MILLIGRAM(S): at 13:20

## 2021-09-23 NOTE — PROGRESS NOTE ADULT - SUBJECTIVE AND OBJECTIVE BOX
Patient seen this morning. He reports that he slept well overnight. He hopes to go home soon.    REVIEW OF SYSTEMS  Constitutional - No fever,  +fatigue  HEENT - No vertigo, No neck pain  Neurological - No headaches, +memory loss, +loss of strength, No numbness, No tremors  Musculoskeletal - No joint pain, No joint swelling, No muscle pain  Psychiatric - No depression, No anxiety    FUNCTIONAL PROGRESS  9/23 PT  Bed Mobility  Bed Mobility Training Supine-to-Sit: independent    Sit-Stand Transfer Training  Transfer Training Sit-to-Stand Transfer: independent;  full weight-bearing   no device  Transfer Training Stand-to-Sit Transfer: independent;  full weight-bearing   no device    Gait Training  Gait Training: stand-by assist;  close S;  full weight-bearing   no device;  300 feet  Gait Analysis: 2-point gait   uneven naman,lateral drifts;  impaired balance;  300ft;  no device    Stair Training  Physical Assist/Nonphysical Assist: 1 person assist;  Min A  Weight-Bearing Restrictions: full weight-bearing  Assistive Device: bilateral rails  Number of Stairs: 10         VITALS  T(C): 37.1 (09-23-21 @ 11:35), Max: 37.1 (09-23-21 @ 11:35)  HR: 112 (09-23-21 @ 11:35) (98 - 112)  BP: 119/86 (09-23-21 @ 11:35) (118/75 - 126/94)  RR: 20 (09-23-21 @ 11:35) (18 - 20)  SpO2: 97% (09-23-21 @ 11:35) (96% - 99%)  Wt(kg): --    MEDICATIONS   ALBUTerol    90 MICROgram(s) HFA Inhaler 2 Puff(s) every 6 hours PRN  amantadine 100 milliGRAM(s) <User Schedule>  aspirin  chewable 81 milliGRAM(s) daily  atorvastatin 20 milliGRAM(s) at bedtime  FLUoxetine 20 milliGRAM(s) daily  heparin   Injectable 5000 Unit(s) every 8 hours  influenza   Vaccine 0.5 milliLiter(s) once  melatonin 5 milliGRAM(s) at bedtime  mirtazapine 15 milliGRAM(s) at bedtime  thyroid 30 milliGRAM(s) daily      RECENT LABS/IMAGING                          15.4   6.77  )-----------( 330      ( 22 Sep 2021 08:16 )             45.0     09-22    140  |  103  |  15.4  ----------------------------<  86  3.9   |  26.0  |  1.25    Ca    9.7      22 Sep 2021 08:16      CT Head 8/21: Symmetric low attenuation lesions in the bilateral globus pallidus regions of the basal ganglia, which can be seen with hypoxic ischemic encephalopathy or related to carbon monoxide positioning.  Indeterminate areas of low attenuation in the bilateral cerebellum may reflect infarcts.  MRI is recommended for further evaluation.    CXR 8/22: Bilateral infiltrates right greater than left somewhat improved on the latest film. Tubes in place as above.    CT Head 8/23: Slightly increased prominence of bilateral globus pallidus hypodense foci, likely representing hypoxic-ischemic injury. Again noted multiple likely small acute infarcts involving the bilateral cerebral and cerebellar hemispheres. No acute intracranial hemorrhage.    CXR 8/24: No interval change.    US Duplex Venous Lower Ext Ltd, Right 8/24: No evidence of right lower extremity deep venous thrombosis.    US Duplex Arterial Upper Ext Ltd, Left  8/25: Normal left upper extremity arterial evaluation    MRI Brain 8/26: Numerous areas of restricted diffusion in the bilateral cerebral hemispheres, bilateral globus pallidi, left hippocampus and bilateral cerebellar hemispheres suggesting acute infarcts and hypoxic ischemic encephalopathy.    Xray Abdomen 8/29: No acute radiographic intra-abdominal findings.    CXR 8/31: Residual Perihilar/basilar diffuse airspace disease and/or effusions..    CXR 9/1: Bilateral infiltrates somewhat improved. Endotracheal tube removed.    CXR 9/1: NG tube at least to stomach.    CXR 9/2: NG tube tip in distal stomach. The cardiomediastinal silhouette is within normal limits. Bilateral lung infiltrates are unchanged - right lung more than left. No pleural effusion or pneumothorax.    CT Cervical Spine 9/3: No acute cervical spine fracture.     CT head 9/3: No acute intracranial hemorrhage.    CXR 9/3: Unchanged infiltrates as above.    TTE Echo 9/3: Summary:   1. Left ventricular ejection fraction, by visual estimation, is 55 to 60%.   2. Normal global left ventricular systolic function.   3. Spectral Doppler shows impaired relaxation pattern of left ventricular myocardial filling (Grade I diastolic dysfunction).   4. There is mild concentric left ventricular hypertrophy.   5. Normal right ventricular size and function.   6. Mild mitral annular calcification.   7. Mobile echogenic foci noted on the ventricular surface of the aortic valve. PETE is recommended for further evaluation.   8. Peak transaortic gradient equals 38.6 mmHg, mean transaortic gradient equals 24.3 mmHg, the calculated aortic valve area equals 1.25 cm² by the continuity equation consistent with moderate aortic stenosis.   9. There is no evidence of pericardial effusion.  10. Compared to a prior study from 8/22/21, there is significant improvement in LV and RV function.    PETE 9/13 - Summary:   1. Left ventricular ejection fraction, by visual estimation, is 60 to 65%.   2. Normal global left ventricular systolic function.   3. There is mild concentric left ventricular hypertrophy.   4. Normal right ventricular size and function.   5. Structurally normal mitral valve, with normal leaflet excursion, no evidence of vegetation.   6. Trace mitral valve regurgitation.   7. Structurally normal tricuspid valve, with normal leaflet excursion, no evidence of vegetation.   8. Structurally normal pulmonic valve, with normal leaflet excursion, no evidence of vegetation.   9. Bicuspid aortic valve with fused raphe of the right and non-coronary cusps, mild-moderate calcificaitons with degenerative materials, no evidence of vegetation. Mild-to-moderate aortic valve stenosis, JUAN DAVID by 2D-planimetry 1.6 cm2, peak velocity 2.6 m/s, mean gradient 16 mmHg. Mild eccentric aortic regurgitation noted. Ascending aorta 3.8 cm.  10. Color flow doppler and intravenous injection of agitated saline demonstrates the presence of an intact intra atrial septum.  11. No left atrial appendage thrombus and normal left atrial appendage velocities.  12. There is no evidence of pericardial effusion. No evidence of vegetation or intracardiac abscess.      ---------------------------------------------------------------------------------------  PHYSICAL EXAM  Constitutional - NAD, Comfortable  Extremities - No C/C/E, No calf tenderness   Neurologic Exam -                    Cognitive - AAOx self, year, hospital, NOT situation      Communication - Delayed processing     Motor -  Effort dependent                    LEFT    UE - ShAB 5/5, EF 5/5, EE 5/5, WE 5/5,  5/5                     RIGHT UE - ShAB 5/5, EF 5/5, EE 5/5, WE 5/5,  5/5                    LEFT    LE - HF 4/5, KE 5/5, DF 3/5                     RIGHT LE - HF 4/5, KE 5/5, DF 5/5   Psychiatric - Flat, Calm  ----------------------------------------------------------------------------------------  ASSESSMENT/PLAN  46yMale with functional deficits after a hypoxic ischemic brain injury  Agitation/restlessness - DC Depakote (9/21)   Aortic valve abnormality & CAD/MI - ASA, Lipitor, Outpatient follow up  Mood - Prozac 20mg (9/17)  ADHD - Amantadine 100mg Q6AM/12PM (9/15), DC Adderall XL   Sleep - Melatonin, Remeron 15mg HS (increased from 7.5mg 9/22)  DVT PPX - SCDs, Heparin  Rehab - At this time, the patient does not meet criteria for inpatient rehabilitation services. He does not required skilled inpatient therapy. He will require 24/7 supervision due to cognitive impairments when he is discharged home with outpatient therapies.  Recommend ongoing mobilization by staff to maintain cardiopulmonary function and prevention of secondary complications related to debility. Discussed with rehab team.            Patient seen this morning. He reports that he slept well overnight. He hopes to go home soon.    REVIEW OF SYSTEMS  Constitutional - No fever,  +fatigue  HEENT - No vertigo, No neck pain  Neurological - No headaches, +memory loss, +loss of strength, No numbness, No tremors  Musculoskeletal - No joint pain, No joint swelling, No muscle pain  Psychiatric - No depression, No anxiety    FUNCTIONAL PROGRESS  9/23 PT  Bed Mobility  Bed Mobility Training Supine-to-Sit: independent    Sit-Stand Transfer Training  Transfer Training Sit-to-Stand Transfer: independent;  full weight-bearing   no device  Transfer Training Stand-to-Sit Transfer: independent;  full weight-bearing   no device    Gait Training  Gait Training: stand-by assist;  close S;  full weight-bearing   no device;  300 feet  Gait Analysis: 2-point gait   uneven naman,lateral drifts;  impaired balance;  300ft;  no device    Stair Training  Physical Assist/Nonphysical Assist: 1 person assist;  Min A  Weight-Bearing Restrictions: full weight-bearing  Assistive Device: bilateral rails  Number of Stairs: 10         VITALS  T(C): 37.1 (09-23-21 @ 11:35), Max: 37.1 (09-23-21 @ 11:35)  HR: 112 (09-23-21 @ 11:35) (98 - 112)  BP: 119/86 (09-23-21 @ 11:35) (118/75 - 126/94)  RR: 20 (09-23-21 @ 11:35) (18 - 20)  SpO2: 97% (09-23-21 @ 11:35) (96% - 99%)  Wt(kg): --    MEDICATIONS   ALBUTerol    90 MICROgram(s) HFA Inhaler 2 Puff(s) every 6 hours PRN  amantadine 100 milliGRAM(s) <User Schedule>  aspirin  chewable 81 milliGRAM(s) daily  atorvastatin 20 milliGRAM(s) at bedtime  FLUoxetine 20 milliGRAM(s) daily  heparin   Injectable 5000 Unit(s) every 8 hours  influenza   Vaccine 0.5 milliLiter(s) once  melatonin 5 milliGRAM(s) at bedtime  mirtazapine 15 milliGRAM(s) at bedtime  thyroid 30 milliGRAM(s) daily      RECENT LABS/IMAGING                          15.4   6.77  )-----------( 330      ( 22 Sep 2021 08:16 )             45.0     09-22    140  |  103  |  15.4  ----------------------------<  86  3.9   |  26.0  |  1.25    Ca    9.7      22 Sep 2021 08:16      CT Head 8/21: Symmetric low attenuation lesions in the bilateral globus pallidus regions of the basal ganglia, which can be seen with hypoxic ischemic encephalopathy or related to carbon monoxide positioning.  Indeterminate areas of low attenuation in the bilateral cerebellum may reflect infarcts.  MRI is recommended for further evaluation.    CXR 8/22: Bilateral infiltrates right greater than left somewhat improved on the latest film. Tubes in place as above.    CT Head 8/23: Slightly increased prominence of bilateral globus pallidus hypodense foci, likely representing hypoxic-ischemic injury. Again noted multiple likely small acute infarcts involving the bilateral cerebral and cerebellar hemispheres. No acute intracranial hemorrhage.    CXR 8/24: No interval change.    US Duplex Venous Lower Ext Ltd, Right 8/24: No evidence of right lower extremity deep venous thrombosis.    US Duplex Arterial Upper Ext Ltd, Left  8/25: Normal left upper extremity arterial evaluation    MRI Brain 8/26: Numerous areas of restricted diffusion in the bilateral cerebral hemispheres, bilateral globus pallidi, left hippocampus and bilateral cerebellar hemispheres suggesting acute infarcts and hypoxic ischemic encephalopathy.    Xray Abdomen 8/29: No acute radiographic intra-abdominal findings.    CXR 8/31: Residual Perihilar/basilar diffuse airspace disease and/or effusions..    CXR 9/1: Bilateral infiltrates somewhat improved. Endotracheal tube removed.    CXR 9/1: NG tube at least to stomach.    CXR 9/2: NG tube tip in distal stomach. The cardiomediastinal silhouette is within normal limits. Bilateral lung infiltrates are unchanged - right lung more than left. No pleural effusion or pneumothorax.    CT Cervical Spine 9/3: No acute cervical spine fracture.     CT head 9/3: No acute intracranial hemorrhage.    CXR 9/3: Unchanged infiltrates as above.    TTE Echo 9/3: Summary:   1. Left ventricular ejection fraction, by visual estimation, is 55 to 60%.   2. Normal global left ventricular systolic function.   3. Spectral Doppler shows impaired relaxation pattern of left ventricular myocardial filling (Grade I diastolic dysfunction).   4. There is mild concentric left ventricular hypertrophy.   5. Normal right ventricular size and function.   6. Mild mitral annular calcification.   7. Mobile echogenic foci noted on the ventricular surface of the aortic valve. PETE is recommended for further evaluation.   8. Peak transaortic gradient equals 38.6 mmHg, mean transaortic gradient equals 24.3 mmHg, the calculated aortic valve area equals 1.25 cm² by the continuity equation consistent with moderate aortic stenosis.   9. There is no evidence of pericardial effusion.  10. Compared to a prior study from 8/22/21, there is significant improvement in LV and RV function.    PETE 9/13 - Summary:   1. Left ventricular ejection fraction, by visual estimation, is 60 to 65%.   2. Normal global left ventricular systolic function.   3. There is mild concentric left ventricular hypertrophy.   4. Normal right ventricular size and function.   5. Structurally normal mitral valve, with normal leaflet excursion, no evidence of vegetation.   6. Trace mitral valve regurgitation.   7. Structurally normal tricuspid valve, with normal leaflet excursion, no evidence of vegetation.   8. Structurally normal pulmonic valve, with normal leaflet excursion, no evidence of vegetation.   9. Bicuspid aortic valve with fused raphe of the right and non-coronary cusps, mild-moderate calcificaitons with degenerative materials, no evidence of vegetation. Mild-to-moderate aortic valve stenosis, JUAN DAVID by 2D-planimetry 1.6 cm2, peak velocity 2.6 m/s, mean gradient 16 mmHg. Mild eccentric aortic regurgitation noted. Ascending aorta 3.8 cm.  10. Color flow doppler and intravenous injection of agitated saline demonstrates the presence of an intact intra atrial septum.  11. No left atrial appendage thrombus and normal left atrial appendage velocities.  12. There is no evidence of pericardial effusion. No evidence of vegetation or intracardiac abscess.      ---------------------------------------------------------------------------------------  PHYSICAL EXAM  Constitutional - NAD, Comfortable  Extremities - No C/C/E, No calf tenderness   Neurologic Exam -                    Cognitive - AAOx self, year, hospital, NOT situation      Communication - Delayed processing     Motor -  Effort dependent                    LEFT    UE - ShAB 5/5, EF 5/5, EE 5/5, WE 5/5,  5/5                     RIGHT UE - ShAB 5/5, EF 5/5, EE 5/5, WE 5/5,  5/5                    LEFT    LE - HF 4/5, KE 5/5, DF 3/5                     RIGHT LE - HF 4/5, KE 5/5, DF 5/5   Psychiatric - Flat, Calm  ----------------------------------------------------------------------------------------  ASSESSMENT/PLAN  46yMale with functional deficits after a hypoxic ischemic brain injury  Agitation/restlessness - DC Depakote (9/21)   Aortic valve abnormality & CAD/MI - ASA, Lipitor, Outpatient follow up  Mood - Prozac 20mg (9/17)  ADHD - Amantadine 100mg Q6AM/12PM (9/15), DC Adderall XL   Sleep - Melatonin, Remeron 15mg HS (increased from 7.5mg 9/22)  DVT PPX - SCDs, Heparin  Rehab - At this time, the patient does not meet criteria for inpatient rehabilitation services. He does not required skilled inpatient therapy. He will require 24/7 supervision due to cognitive impairments when he is discharged home with outpatient PT, OT, and speech therapies.  Recommend ongoing mobilization by staff to maintain cardiopulmonary function and prevention of secondary complications related to debility. Discussed with rehab team.

## 2021-09-23 NOTE — PROGRESS NOTE ADULT - REASON FOR ADMISSION
Acute hypoxemic respiratory failure, AMS
Acute hypoxemic respiratory failure/AMS
Acute hypoxemic respiratory failure, AMS

## 2021-09-23 NOTE — PROGRESS NOTE ADULT - NUTRITIONAL ASSESSMENT
This patient has been assessed with a concern for Malnutrition and has been determined to have a diagnosis/diagnoses of Moderate protein-calorie malnutrition.    This patient is being managed with:   Diet DASH/TLC-  Sodium & Cholesterol Restricted  Entered: Sep 13 2021  4:36PM    
This patient has been assessed with a concern for Malnutrition and has been determined to have a diagnosis/diagnoses of Moderate protein-calorie malnutrition.    This patient is being managed with:   Diet DASH/TLC-  Sodium & Cholesterol Restricted  Entered: Sep 13 2021  4:36PM    
This patient has been assessed with a concern for Malnutrition and has been determined to have a diagnosis/diagnoses of Moderate protein-calorie malnutrition.    This patient is being managed with:   Diet Dysphagia 2 Mechanical Soft-Honey Consistency Fluid-  Entered: Sep  7 2021 10:12AM    
This patient has been assessed with a concern for Malnutrition and has been determined to have a diagnosis/diagnoses of Moderate protein-calorie malnutrition.    This patient is being managed with:   Diet DASH/TLC-  Sodium & Cholesterol Restricted  Entered: Sep 13 2021  4:36PM    
This patient has been assessed with a concern for Malnutrition and has been determined to have a diagnosis/diagnoses of Moderate protein-calorie malnutrition.    This patient is being managed with:   Diet DASH/TLC-  Sodium & Cholesterol Restricted  Entered: Sep 13 2021  4:36PM    
This patient has been assessed with a concern for Malnutrition and has been determined to have a diagnosis/diagnoses of Moderate protein-calorie malnutrition.    This patient is being managed with:   Diet NPO after Midnight-     NPO Start Date: 12-Sep-2021   NPO Start Time: 23:59  Except Medications  With Ice Chips/Sips of Water  Entered: Sep 12 2021  5:04PM    Diet NPO after Midnight-     NPO Start Date: 12-Sep-2021   NPO Start Time: 23:59  Entered: Sep 12 2021  4:44PM    Diet Regular-  Entered: Sep 10 2021  2:14PM    
This patient has been assessed with a concern for Malnutrition and has been determined to have a diagnosis/diagnoses of Moderate protein-calorie malnutrition.    This patient is being managed with:   Diet DASH/TLC-  Sodium & Cholesterol Restricted  Entered: Sep 13 2021  4:36PM    
This patient has been assessed with a concern for Malnutrition and has been determined to have a diagnosis/diagnoses of Moderate protein-calorie malnutrition.    This patient is being managed with:   Diet Dysphagia 2 Mechanical Soft-Honey Consistency Fluid-  Entered: Sep  7 2021 10:12AM    
This patient has been assessed with a concern for Malnutrition and has been determined to have a diagnosis/diagnoses of Moderate protein-calorie malnutrition.    This patient is being managed with:   Diet Regular-  Entered: Sep 10 2021  2:14PM    
This patient has been assessed with a concern for Malnutrition and has been determined to have a diagnosis/diagnoses of Moderate protein-calorie malnutrition.    This patient is being managed with:   Diet DASH/TLC-  Sodium & Cholesterol Restricted  Entered: Sep 13 2021  4:36PM    
This patient has been assessed with a concern for Malnutrition and has been determined to have a diagnosis/diagnoses of Moderate protein-calorie malnutrition.    This patient is being managed with:   Diet Regular-  Entered: Sep 10 2021  2:14PM    
This patient has been assessed with a concern for Malnutrition and has been determined to have a diagnosis/diagnoses of Moderate protein-calorie malnutrition.    This patient is being managed with:   Diet Regular-  Entered: Sep 10 2021  2:14PM    
This patient has been assessed with a concern for Malnutrition and has been determined to have a diagnosis/diagnoses of Moderate protein-calorie malnutrition.    This patient is being managed with:   Diet DASH/TLC-  Sodium & Cholesterol Restricted  Entered: Sep 13 2021  4:36PM    
This patient has been assessed with a concern for Malnutrition and has been determined to have a diagnosis/diagnoses of Moderate protein-calorie malnutrition.    This patient is being managed with:   Diet DASH/TLC-  Sodium & Cholesterol Restricted  Entered: Sep 13 2021  4:36PM    
This patient has been assessed with a concern for Malnutrition and has been determined to have a diagnosis/diagnoses of Moderate protein-calorie malnutrition.    This patient is being managed with:   Diet Dysphagia 2 Mechanical Soft-Honey Consistency Fluid-  Entered: Sep  7 2021 10:12AM

## 2021-09-23 NOTE — PROGRESS NOTE ADULT - ASSESSMENT
47 y/o M w/ PMH of DM2, HTN, Hyothyroid, Asthma, ADHD was BIBA after being found unresponsive suspected to be 2/2 benzo/cocaine OD and subsequent aspiration PNA.  Admitted to MICU for acute hypoxic respiratory failure requiring intubation and pressor support.  Pt found to have anoxic brain injury and being followed by Neurology.  On IV antibiotics for presumed aspiration PNA. ECHO was done and significant for LVEF of <20%.  Per Cardiology, patient to have ischemic evaluation if meaningful neurological recovery.  Pt also noted to have rhabdo/GARRETT on admission which has now normalized.  Trach was tentatively scheduled for 9/2.  Patient showed improvement in mental status and was extubated on 8/31.  Patient was transitioned from HFNC to NC.  Off pressors and downgraded to Medicine.  Seen by ID.  Hospital course complicated by him being found on the floor by a step down nurse; subsequent CTH and CT c-spine performed and negative.  Pt placed on 1:1 and restraints but since d/c'd.  Patient showed slow mental improvement and had a tte: on 9/3 with mobile echogenic foci noted on the ventricular surface of the AV but PETE only reporting bicuspic AV.  Patient then seen by speech and started on a diet and ng tube removed. Patient also seen by psych.      Acute metabolic encephalopathy w/ anoxic brain injury s/p benzo/cocoaine OD   - Slow mental improvement since inciting event  - MRI of the brain noted numerous areas of restricted diffusion in the bilateral cerebral hemispheres, bilateral globus pallidi, left hippocampus and bilateral cerebellar hemispheres suggesting acute infarcts and hypoxic ischemic encephalopathy  - EEG report was without seizures  - Fall precautions   - Aspiration precautions with diet   - On amantadine, melatonin, and divalproex  -  consult noted  - Acute rehab consult noted and recommend acute inpatient rehab; placement pending      Aspiration Pneumonia / Respiratory failure   - Respiratory failure resolved   - Aspiration PNA likely 2/2 above  - Continue with aspiration precautions   - Completed course of vancomycin and meropenem  - Bcx NTD and pt remains afebrile   - Monitor CBC and temperature curve      Echodensity noted on TTE  - Blood cultures are without growth  - PETE without vegetation but noted bicuspid AV      Decompensated HFrEF POA  - Clinically euvolemic at this time  - On aspirin and atorvastatin  - TTE w/ EF <20% initialy however EF improved on repeat echo   - Stress test was without ischemia  - Monitor daily weights and strict I/O's  - Maintain K>4 and Mg>2  - Cardiology follow up noted      Acute kidney injury / Rhabdomyolysis   - Likely due to overdose    - CPK levels improved on repeat studies and renal fxn improved   - Urinary retention resolved and Arguello catheter successfully removed  - Monitor renal function and I/O's      Hypothyroidism   - On Isabella thyroid        VTE ppx: heparin sq     Dispo: Still awaiting placement

## 2021-09-23 NOTE — DISCHARGE NOTE NURSING/CASE MANAGEMENT/SOCIAL WORK - PATIENT PORTAL LINK FT
You can access the FollowMyHealth Patient Portal offered by [facility] by registering at the following website: [email or web address]. By joining [employer]’s FollowMyHealth portal, you will also be able to view your health information using other applications (apps) compatible with our system.

## 2021-09-23 NOTE — PROGRESS NOTE ADULT - SUBJECTIVE AND OBJECTIVE BOX
Chief Complaint:  respiratory failure     SUBJECTIVE / OVERNIGHT EVENTS: no acute events reported overnight.  Pt offers no complaints at this time.  Patient denies chest pain, SOB, abd pain, N/V, fever, chills, dysuria or any other complaints. All remainder ROS negative.       I&O's Summary        PHYSICAL EXAM:  Vital Signs Last 24 Hrs  T(C): 36.4 (23 Sep 2021 04:55), Max: 36.8 (22 Sep 2021 16:54)  T(F): 97.5 (23 Sep 2021 04:55), Max: 98.3 (22 Sep 2021 16:54)  HR: 98 (23 Sep 2021 04:55) (98 - 112)  BP: 126/94 (23 Sep 2021 04:55) (118/75 - 126/94)  BP(mean): --  RR: 20 (23 Sep 2021 04:55) (18 - 20)  SpO2: 99% (23 Sep 2021 04:55) (96% - 99%)    GENERAL: pt examined bedside, laying comfortably in bed in NAD  HEENT: NC/AT, moist oral mucosa, clear conjunctiva, sclera nonicteric  RESPIRATORY: Normal respiratory effort; CTA b/l, no wheezing, rhonchi, rales  CARDIOVASCULAR: RRR, normal S1 and S2, no murmur/rub/gallop  ABDOMEN: soft, NT/ND, normoactive bowel sounds, no rebound/guarding  MUSCLOSKELETAL:  no joint swelling or tenderness to palpation  EXTREMITIES: No cynaosis, no clubbing, no lower extremity edema; Peripheral pulses are 2+ bilaterally  PSYCH: flat affect but cooperative  NEUROLOGY: A+O to self and place, follows basic commands, no focal neurologic deficits appreciated   SKIN: No rashes or no palpable lesions        LABS:                                             15.4   6.77  )-----------( 330      ( 22 Sep 2021 08:16 )             45.0       09-22    140  |  103  |  15.4  ----------------------------<  86  3.9   |  26.0  |  1.25    Ca    9.7      22 Sep 2021 08:16                CAPILLARY BLOOD GLUCOSE            RADIOLOGY & ADDITIONAL TESTS:          MEDICATIONS  (STANDING):  amantadine 100 milliGRAM(s) Oral <User Schedule>  aspirin  chewable 81 milliGRAM(s) Oral daily  atorvastatin 20 milliGRAM(s) Oral at bedtime  diVALproex  milliGRAM(s) Oral <User Schedule>  FLUoxetine 20 milliGRAM(s) Oral daily  heparin   Injectable 5000 Unit(s) SubCutaneous every 8 hours  melatonin 5 milliGRAM(s) Oral at bedtime  mirtazapine 7.5 milliGRAM(s) Oral at bedtime  thyroid 30 milliGRAM(s) Oral daily    MEDICATIONS  (PRN):  ALBUTerol    90 MICROgram(s) HFA Inhaler 2 Puff(s) Inhalation every 6 hours PRN Shortness of Breath

## 2022-03-21 NOTE — ED ADULT NURSE NOTE - NS ED NURSE REPORT GIVEN DT
Pill pack by Carlie needs conformation on medication. Would like to know if she is suppose to be taking   lasartan hydrochlorothiazide 100-25 mg and hydrochlorothiazide 25.  Please call back to confirm [phone] 21-Aug-2021 23:15

## 2022-06-28 ENCOUNTER — INPATIENT (INPATIENT)
Facility: HOSPITAL | Age: 47
LOS: 2 days | Discharge: HOME | DRG: 816 | End: 2022-07-01
Attending: FAMILY MEDICINE | Admitting: HOSPITALIST
Payer: MEDICAID

## 2022-06-28 VITALS
TEMPERATURE: 98 F | WEIGHT: 184.97 LBS | HEIGHT: 71 IN | SYSTOLIC BLOOD PRESSURE: 117 MMHG | DIASTOLIC BLOOD PRESSURE: 94 MMHG | RESPIRATION RATE: 18 BRPM | HEART RATE: 115 BPM | OXYGEN SATURATION: 95 %

## 2022-06-28 DIAGNOSIS — I24.9 ACUTE ISCHEMIC HEART DISEASE, UNSPECIFIED: ICD-10-CM

## 2022-06-28 DIAGNOSIS — Z96.629 PRESENCE OF UNSPECIFIED ARTIFICIAL ELBOW JOINT: Chronic | ICD-10-CM

## 2022-06-28 DIAGNOSIS — Z98.89 OTHER SPECIFIED POSTPROCEDURAL STATES: Chronic | ICD-10-CM

## 2022-06-28 LAB
ALBUMIN SERPL ELPH-MCNC: 3.6 G/DL — SIGNIFICANT CHANGE UP (ref 3.3–5)
ALP SERPL-CCNC: 57 U/L — SIGNIFICANT CHANGE UP (ref 40–120)
ALT FLD-CCNC: 21 U/L — SIGNIFICANT CHANGE UP (ref 12–78)
ANION GAP SERPL CALC-SCNC: 4 MMOL/L — LOW (ref 5–17)
APPEARANCE UR: CLEAR — SIGNIFICANT CHANGE UP
APTT BLD: 25.8 SEC — LOW (ref 27.5–35.5)
AST SERPL-CCNC: 16 U/L — SIGNIFICANT CHANGE UP (ref 15–37)
BASOPHILS # BLD AUTO: 0.07 K/UL — SIGNIFICANT CHANGE UP (ref 0–0.2)
BASOPHILS NFR BLD AUTO: 0.7 % — SIGNIFICANT CHANGE UP (ref 0–2)
BILIRUB SERPL-MCNC: 0.3 MG/DL — SIGNIFICANT CHANGE UP (ref 0.2–1.2)
BILIRUB UR-MCNC: NEGATIVE — SIGNIFICANT CHANGE UP
BUN SERPL-MCNC: 16 MG/DL — SIGNIFICANT CHANGE UP (ref 7–23)
CALCIUM SERPL-MCNC: 9.1 MG/DL — SIGNIFICANT CHANGE UP (ref 8.5–10.1)
CHLORIDE SERPL-SCNC: 114 MMOL/L — HIGH (ref 96–108)
CK SERPL-CCNC: 99 U/L — SIGNIFICANT CHANGE UP (ref 26–308)
CO2 SERPL-SCNC: 26 MMOL/L — SIGNIFICANT CHANGE UP (ref 22–31)
COLOR SPEC: YELLOW — SIGNIFICANT CHANGE UP
CREAT SERPL-MCNC: 1.19 MG/DL — SIGNIFICANT CHANGE UP (ref 0.5–1.3)
DIFF PNL FLD: ABNORMAL
EGFR: 77 ML/MIN/1.73M2 — SIGNIFICANT CHANGE UP
EOSINOPHIL # BLD AUTO: 0.5 K/UL — SIGNIFICANT CHANGE UP (ref 0–0.5)
EOSINOPHIL NFR BLD AUTO: 4.8 % — SIGNIFICANT CHANGE UP (ref 0–6)
ETHANOL SERPL-MCNC: <10 MG/DL — SIGNIFICANT CHANGE UP (ref 0–10)
FLUAV AG NPH QL: SIGNIFICANT CHANGE UP
FLUBV AG NPH QL: SIGNIFICANT CHANGE UP
GLUCOSE SERPL-MCNC: 122 MG/DL — HIGH (ref 70–99)
GLUCOSE UR QL: NEGATIVE — SIGNIFICANT CHANGE UP
HCT VFR BLD CALC: 49.1 % — SIGNIFICANT CHANGE UP (ref 39–50)
HGB BLD-MCNC: 16.9 G/DL — SIGNIFICANT CHANGE UP (ref 13–17)
IMM GRANULOCYTES NFR BLD AUTO: 0.2 % — SIGNIFICANT CHANGE UP (ref 0–1.5)
INR BLD: 1.03 RATIO — SIGNIFICANT CHANGE UP (ref 0.88–1.16)
KETONES UR-MCNC: ABNORMAL
LEUKOCYTE ESTERASE UR-ACNC: ABNORMAL
LYMPHOCYTES # BLD AUTO: 1.25 K/UL — SIGNIFICANT CHANGE UP (ref 1–3.3)
LYMPHOCYTES # BLD AUTO: 12 % — LOW (ref 13–44)
MCHC RBC-ENTMCNC: 32.1 PG — SIGNIFICANT CHANGE UP (ref 27–34)
MCHC RBC-ENTMCNC: 34.4 GM/DL — SIGNIFICANT CHANGE UP (ref 32–36)
MCV RBC AUTO: 93.3 FL — SIGNIFICANT CHANGE UP (ref 80–100)
MONOCYTES # BLD AUTO: 0.74 K/UL — SIGNIFICANT CHANGE UP (ref 0–0.9)
MONOCYTES NFR BLD AUTO: 7.1 % — SIGNIFICANT CHANGE UP (ref 2–14)
NEUTROPHILS # BLD AUTO: 7.81 K/UL — HIGH (ref 1.8–7.4)
NEUTROPHILS NFR BLD AUTO: 75.2 % — SIGNIFICANT CHANGE UP (ref 43–77)
NITRITE UR-MCNC: NEGATIVE — SIGNIFICANT CHANGE UP
PCP SPEC-MCNC: SIGNIFICANT CHANGE UP
PH UR: 5 — SIGNIFICANT CHANGE UP (ref 5–8)
PLATELET # BLD AUTO: 253 K/UL — SIGNIFICANT CHANGE UP (ref 150–400)
POTASSIUM SERPL-MCNC: 3.9 MMOL/L — SIGNIFICANT CHANGE UP (ref 3.5–5.3)
POTASSIUM SERPL-SCNC: 3.9 MMOL/L — SIGNIFICANT CHANGE UP (ref 3.5–5.3)
PROT SERPL-MCNC: 6.9 GM/DL — SIGNIFICANT CHANGE UP (ref 6–8.3)
PROT UR-MCNC: 100
PROTHROM AB SERPL-ACNC: 12 SEC — SIGNIFICANT CHANGE UP (ref 10.5–13.4)
RBC # BLD: 5.26 M/UL — SIGNIFICANT CHANGE UP (ref 4.2–5.8)
RBC # FLD: 12.9 % — SIGNIFICANT CHANGE UP (ref 10.3–14.5)
RSV RNA NPH QL NAA+NON-PROBE: SIGNIFICANT CHANGE UP
SARS-COV-2 RNA SPEC QL NAA+PROBE: SIGNIFICANT CHANGE UP
SODIUM SERPL-SCNC: 144 MMOL/L — SIGNIFICANT CHANGE UP (ref 135–145)
SP GR SPEC: 1.03 — HIGH (ref 1.01–1.02)
TROPONIN I, HIGH SENSITIVITY RESULT: 101.7 NG/L — HIGH
TROPONIN I, HIGH SENSITIVITY RESULT: 97.97 NG/L — HIGH
UROBILINOGEN FLD QL: NEGATIVE — SIGNIFICANT CHANGE UP
WBC # BLD: 10.39 K/UL — SIGNIFICANT CHANGE UP (ref 3.8–10.5)
WBC # FLD AUTO: 10.39 K/UL — SIGNIFICANT CHANGE UP (ref 3.8–10.5)

## 2022-06-28 PROCEDURE — 85025 COMPLETE CBC W/AUTO DIFF WBC: CPT

## 2022-06-28 PROCEDURE — 84484 ASSAY OF TROPONIN QUANT: CPT

## 2022-06-28 PROCEDURE — 84443 ASSAY THYROID STIM HORMONE: CPT

## 2022-06-28 PROCEDURE — 93306 TTE W/DOPPLER COMPLETE: CPT

## 2022-06-28 PROCEDURE — 71045 X-RAY EXAM CHEST 1 VIEW: CPT | Mod: 26

## 2022-06-28 PROCEDURE — C1887: CPT

## 2022-06-28 PROCEDURE — 99223 1ST HOSP IP/OBS HIGH 75: CPT

## 2022-06-28 PROCEDURE — 85027 COMPLETE CBC AUTOMATED: CPT

## 2022-06-28 PROCEDURE — 84481 FREE ASSAY (FT-3): CPT

## 2022-06-28 PROCEDURE — 82962 GLUCOSE BLOOD TEST: CPT

## 2022-06-28 PROCEDURE — 80061 LIPID PANEL: CPT

## 2022-06-28 PROCEDURE — 80048 BASIC METABOLIC PNL TOTAL CA: CPT

## 2022-06-28 PROCEDURE — 93454 CORONARY ARTERY ANGIO S&I: CPT

## 2022-06-28 PROCEDURE — C1769: CPT

## 2022-06-28 PROCEDURE — 85610 PROTHROMBIN TIME: CPT

## 2022-06-28 PROCEDURE — 85730 THROMBOPLASTIN TIME PARTIAL: CPT

## 2022-06-28 PROCEDURE — 93010 ELECTROCARDIOGRAM REPORT: CPT

## 2022-06-28 PROCEDURE — 99285 EMERGENCY DEPT VISIT HI MDM: CPT

## 2022-06-28 PROCEDURE — 99153 MOD SED SAME PHYS/QHP EA: CPT

## 2022-06-28 PROCEDURE — 84439 ASSAY OF FREE THYROXINE: CPT

## 2022-06-28 PROCEDURE — 99152 MOD SED SAME PHYS/QHP 5/>YRS: CPT

## 2022-06-28 PROCEDURE — 36415 COLL VENOUS BLD VENIPUNCTURE: CPT

## 2022-06-28 PROCEDURE — 93005 ELECTROCARDIOGRAM TRACING: CPT

## 2022-06-28 PROCEDURE — 83036 HEMOGLOBIN GLYCOSYLATED A1C: CPT

## 2022-06-28 RX ORDER — NITROGLYCERIN 6.5 MG
0.4 CAPSULE, EXTENDED RELEASE ORAL ONCE
Refills: 0 | Status: DISCONTINUED | OUTPATIENT
Start: 2022-06-28 | End: 2022-07-01

## 2022-06-28 RX ORDER — ASPIRIN/CALCIUM CARB/MAGNESIUM 324 MG
325 TABLET ORAL ONCE
Refills: 0 | Status: COMPLETED | OUTPATIENT
Start: 2022-06-28 | End: 2022-06-28

## 2022-06-28 RX ORDER — INSULIN LISPRO 100/ML
VIAL (ML) SUBCUTANEOUS
Refills: 0 | Status: DISCONTINUED | OUTPATIENT
Start: 2022-06-28 | End: 2022-06-30

## 2022-06-28 RX ORDER — INSULIN LISPRO 100/ML
VIAL (ML) SUBCUTANEOUS AT BEDTIME
Refills: 0 | Status: DISCONTINUED | OUTPATIENT
Start: 2022-06-28 | End: 2022-06-30

## 2022-06-28 RX ORDER — SODIUM CHLORIDE 9 MG/ML
1000 INJECTION, SOLUTION INTRAVENOUS
Refills: 0 | Status: DISCONTINUED | OUTPATIENT
Start: 2022-06-28 | End: 2022-06-30

## 2022-06-28 RX ORDER — GLUCAGON INJECTION, SOLUTION 0.5 MG/.1ML
1 INJECTION, SOLUTION SUBCUTANEOUS ONCE
Refills: 0 | Status: DISCONTINUED | OUTPATIENT
Start: 2022-06-28 | End: 2022-07-01

## 2022-06-28 RX ORDER — DEXTROSE 50 % IN WATER 50 %
12.5 SYRINGE (ML) INTRAVENOUS ONCE
Refills: 0 | Status: DISCONTINUED | OUTPATIENT
Start: 2022-06-28 | End: 2022-06-30

## 2022-06-28 RX ORDER — ONDANSETRON 8 MG/1
4 TABLET, FILM COATED ORAL EVERY 8 HOURS
Refills: 0 | Status: DISCONTINUED | OUTPATIENT
Start: 2022-06-28 | End: 2022-07-01

## 2022-06-28 RX ORDER — DEXTROSE 50 % IN WATER 50 %
15 SYRINGE (ML) INTRAVENOUS ONCE
Refills: 0 | Status: DISCONTINUED | OUTPATIENT
Start: 2022-06-28 | End: 2022-06-30

## 2022-06-28 RX ORDER — LANOLIN ALCOHOL/MO/W.PET/CERES
3 CREAM (GRAM) TOPICAL AT BEDTIME
Refills: 0 | Status: DISCONTINUED | OUTPATIENT
Start: 2022-06-28 | End: 2022-07-01

## 2022-06-28 RX ORDER — SODIUM CHLORIDE 9 MG/ML
1000 INJECTION INTRAMUSCULAR; INTRAVENOUS; SUBCUTANEOUS ONCE
Refills: 0 | Status: COMPLETED | OUTPATIENT
Start: 2022-06-28 | End: 2022-06-28

## 2022-06-28 RX ORDER — DEXTROSE 50 % IN WATER 50 %
25 SYRINGE (ML) INTRAVENOUS ONCE
Refills: 0 | Status: DISCONTINUED | OUTPATIENT
Start: 2022-06-28 | End: 2022-06-30

## 2022-06-28 RX ORDER — THYROID 120 MG
1 TABLET ORAL
Qty: 0 | Refills: 0 | DISCHARGE

## 2022-06-28 RX ORDER — ALBUTEROL 90 UG/1
2 AEROSOL, METERED ORAL
Qty: 0 | Refills: 0 | DISCHARGE

## 2022-06-28 RX ORDER — ACETAMINOPHEN 500 MG
650 TABLET ORAL EVERY 6 HOURS
Refills: 0 | Status: DISCONTINUED | OUTPATIENT
Start: 2022-06-28 | End: 2022-07-01

## 2022-06-28 RX ORDER — ENOXAPARIN SODIUM 100 MG/ML
40 INJECTION SUBCUTANEOUS EVERY 24 HOURS
Refills: 0 | Status: DISCONTINUED | OUTPATIENT
Start: 2022-06-28 | End: 2022-06-29

## 2022-06-28 RX ADMIN — Medication 325 MILLIGRAM(S): at 16:48

## 2022-06-28 RX ADMIN — SODIUM CHLORIDE 1000 MILLILITER(S): 9 INJECTION INTRAMUSCULAR; INTRAVENOUS; SUBCUTANEOUS at 17:49

## 2022-06-28 RX ADMIN — SODIUM CHLORIDE 1000 MILLILITER(S): 9 INJECTION INTRAMUSCULAR; INTRAVENOUS; SUBCUTANEOUS at 16:49

## 2022-06-28 NOTE — ED PROVIDER NOTE - CLINICAL SUMMARY MEDICAL DECISION MAKING FREE TEXT BOX
46 y/o male [facility] after violation of restraining order here with CP. Pt has significant PMHx and is currently a drug user. Will do labs including troponin, CPK, give ASA, chest XR.

## 2022-06-28 NOTE — ED PROVIDER NOTE - OBJECTIVE STATEMENT
48 y/o male with a PMHx of DM2, HTN, Hyothyroid, Asthma, ADHD presents to the ED [facility] for left sided CP. Pt violated a restraining order today and PD was called. Pt states he developed CP when police put him in the car. No other complaints at this time.

## 2022-06-28 NOTE — H&P ADULT - NSICDXPASTMEDICALHX_GEN_ALL_CORE_FT
PAST MEDICAL HISTORY:  ADHD     Asthma     Benzodiazepine overdose     Bicuspid aortic valve     DM (diabetes mellitus) type 2    History of anoxic brain injury 8/2021 benzo/cocaine overdose with subsequent aspiration pneumonia requiring intubation with subsequent anoxic brain injury and ECHO showing LVEF <20% and MRI with acute infarcts and hypoxic ischemic encephalopathy    HTN (hypertension)     Hypothyroidism     Opiate overdose     Pneumonia, aspiration

## 2022-06-28 NOTE — ED ADULT NURSE REASSESSMENT NOTE - NS ED NURSE REASSESS COMMENT FT1
Pt. accompanied by [employer], currently under arrest, no s/sx of chest pain/resp. distress. Able to make all needs known, hypertensive at this time, awaiting b/p med orders.  Will continue to monitor.

## 2022-06-28 NOTE — H&P ADULT - NSHPPHYSICALEXAM_GEN_ALL_CORE
ICU Vital Signs Last 24 Hrs  T(C): 37.1 (28 Jun 2022 21:30), Max: 37.1 (28 Jun 2022 21:30)  T(F): 98.8 (28 Jun 2022 21:30), Max: 98.8 (28 Jun 2022 21:30)  HR: 87 (28 Jun 2022 21:30) (87 - 115)  BP: 121/90 (28 Jun 2022 21:30) (117/94 - 132/98)  BP(mean): 99 (28 Jun 2022 21:30) (99 - 99)  RR: 19 (28 Jun 2022 21:30) (18 - 19)  SpO2: 98% (28 Jun 2022 21:30) (95% - 99%)    General: Awake and alert, cooperative with exam. No acute distress.   Skin: Warm, dry, and pink.   Eyes: Pupils equal and reactive to light. Extraocular eye movements intact. No conjunctival injection, discharge, or scleral icterus.   HEENT: Atraumatic, normocephalic. Moist mucus membranes.   Cardiology: Normal S1, S2. Systolic ejection murmur. Regular rate and rhythm.   Respiratory: Lungs clear to ascultation bilaterally. Good air exchange. No wheezes, rales, or rhonchi. Normal chest expansion.   Gastrointestinal: Positive bowel sounds. Soft, non-tender, non-distended. No guarding, rigidity, or rebound tenderness. No hepatosplenomegaly.   Musculoskeletal: 5/5 motor strength in all extremities. Normal range of motion.   Extremities: No peripheral edema bilaterally. Dorsalis pedis pulses 2+ bilaterally.   Neurological: A+Ox3 (person, place, and time). Cranial nerves 2-12 intact. Normal speech. No facial droop. No focal neurological deficits.    Psychiatric: Normal affect. Normal mood.

## 2022-06-28 NOTE — H&P ADULT - VTE RISK ASSESSMENT
Written order for extubation verified. Patient identified  by full name and date of birth as per identification band. Present at the procedure were ... WILLIS Mejia
<<--- Click to launch

## 2022-06-28 NOTE — ED ADULT NURSE NOTE - OBJECTIVE STATEMENT
pt presents to ed with [employer] for evaluation of chest pain when he was placed in police car after violating restraining order- pt has cardiac hx, has not taken meds, vitals stable , ekg done

## 2022-06-28 NOTE — ED ADULT NURSE NOTE - CHIEF COMPLAINT QUOTE
pt [facility] and [employer] 6221. pt is under arrest for violating protection order. in back of police car pt began banging head nad c/o CP. as per PD pt took wehip its today. pt denies alcohol or drug use. ASA given PTA. ekg in progress.

## 2022-06-28 NOTE — H&P ADULT - NSHPSOCIALHISTORY_GEN_ALL_CORE
Lives by himself. Independent with ADLs and IADLs. Denies smoking, alcohol. Reports last cocaine use 3 days ago. Last THC use 3 days ago. Last [employer] use today.

## 2022-06-28 NOTE — H&P ADULT - ASSESSMENT
48 y/o M presented with chest pain     1. Chest pain/elevated troponin could be secondary to NSTEMI vs. cocaine induced cardiomyopathy vs. demand ischemia   - Admit to telemetry   - EKG: no ST segment changes or T wave inversions   - Troponin troponin 97.97 -> 101.70, will trend 1 more troponins   - Utox positive for cocaine -> avoid beta blockers   - s/p 325 mg of aspirin in the ER, c/w 81 mg of aspirin daily   - Ordered ECHO, lipid panel   - ECHO (9/13/21): EF 60-65%, LVH, trace M, bicuspid aortic valve with mild to moderate aortic stenosis  - Zofran and Maalox for nausea, Nitroglycerin PRN for chest pain   - If troponins continue to trend upwards, will order heparin and place pt NPO   - Cardiology consult - Dr. Ortiz     2. Hyperglycemia   - Glucose 122, monitor closely   - Pt with prior hx of Type 2 DM?   - Ordered HbA1c   - Diabetic diet   - Low dose ISS; will advance to basal bolus dosing if HbA1c is elevated     3. Asymptomatic bacteruria   - UA: trace leukocyte esterase, trace blood, few bacteria  - Pt asymptomatic likely contaminant     4. Cocaine use and THC use   - Counseled on cessation     5. History of Type 2 DM, HTN, hypothyroidism asthma, ADHD, with admission in 2021 for benzo/cocaine overdose with subsequent aspiration pneumonia requiring intubation with subsequent anoxic brain injury and ECHO showing LVEF <20% and MRI with acute infarcts and hypoxic ischemic encephalopathy   - Pt not currently taking any medications -> states that his emergency contact has a list of medications he was supposed to be on -> unable to reach by phone   - f/u TFTs     DVT ppx: Lovenox 40 mg subcutaneous daily   Code status: Full code (pt agrees to chest compressions and intubation if required).    *Pt's emergency contact if Araceli Michael [phone]. However, I am unable to get into contact with her to verify medications

## 2022-06-28 NOTE — H&P ADULT - NSHPREVIEWOFSYSTEMS_GEN_ALL_CORE
Constitutional: negative for fatigue, negative for fever, negative for chills, negative for decreased appetite, positive for sweats   Skin: negative for rashes, negative for open wounds, negative for jaundice.   Eyes: negative for blurry vision, negative for double vision.   Ears, nose, throat: negative for ear pain, negative for nasal congestion, negative for sore throat, negative for lymph node swelling.   Cardiovascular: positive for chest pain, negative for palpitations, negative for lower extremity swelling.   Respiratory: positive for shortness of breath, negative for wheezing, negative for cough.   Gastrointestinal: negative for abdominal pain, negative for nausea, negative for vomiting, negative for diarrhea, negative for constipation, negative for blood in the stool, negative for black tarry stools.   Genitourinary: negative for burning on urination, negative for urinary urgency or frequency, negative for blood in the urine.   Endocrine: negative for cold intolerance, negative for heat intolerance, negative for increased thirst.   Hematologic: negative for easy bruising or bleeding.   Musculoskeletal: negative for muscle/joint pain, negative for decreased range of motion.   Neurological: negative for dizziness, negative for headaches, negative for loss of consciousness, negative for motor weakness, negative for sensory deficits.   Psychiatric: negative for depression, negative for anxiety.

## 2022-06-28 NOTE — ED ADULT TRIAGE NOTE - CHIEF COMPLAINT QUOTE
pt [facility] and [employer] 8626. pt is under arrest for violating protection order. in back of police car pt began banging head nad c/o CP. as per PD pt took wehip its today. pt denies alcohol or drug use. ASA given PTA. ekg in progress.

## 2022-06-28 NOTE — H&P ADULT - HISTORY OF PRESENT ILLNESS
48 y/o M with PMH Type 2 DM, HTN, hypothyroidism, asthma, ADHD, bicuspid aortic valve, with admission in 2021 for benzo/cocaine overdose with subsequent aspiration pneumonia requiring intubation with subsequent anoxic brain injury and ECHO showing LVEF <20% and MRI with acute infarcts and hypoxic ischemic encephalopathy presents with chest pain. The pt violated a restraining order today and had an altercation with the police. He then started having chest pain in the center of his chest with radiation down the left arm. Pain 7/10 in intensity, described as sharp, tingling sensation in the arm, pain is steady/constant, worse with breathing. Reports SOB, sweats, blurry vision. Denies fevers, chills, abdominal pain, N/V, diarrhea/constipation. He was previously on medications but stopped taking all medications. Has not taken anything since 8/2021. Pt reports last cocaine use 3 days ago. Last THC use 3 days ago. Used [employer] today in front of the police.     Prior admissions:   - 9/23/2021: unresponsive secondary to benzo/cocaine overdose -> aspiration PNA -> intubated and on pressors -> anoxic brain injury, ECHO LVEF < 20% -> pt extubated, PETE with bicuspid aortic valve, MRI brain acute infarcts, hypoxic ischemic encephalopathy     ER course: HR . Labs: troponin 97.97 -> 101.70, glucose 122. UA: trace ketones, trace leukocyte esterase, trace blood, few bacteria. UTox: positive for THC, positive for cocaine. EKG: Sinus tachycardia with  bpm, QTC prolonged 487 ms, LVH, no ST segment changes, no T wave inversions (personally reviewed). CXR: no consolidation, no effusion, no pneumothorax (personally reviewed).     Pt was given 325 mg of aspirin, 1L of NS. He is being admitted to telemetry.

## 2022-06-28 NOTE — ED ADULT NURSE NOTE - NSIMPLEMENTINTERV_GEN_ALL_ED
Implemented All Universal Safety Interventions:  [place] to call system. Call bell, personal items and telephone within reach. Instruct patient to call for assistance. Room bathroom lighting operational. Non-slip footwear when patient is off stretcher. Physically safe environment: no spills, clutter or unnecessary equipment. Stretcher in lowest position, wheels locked, appropriate side rails in place.

## 2022-06-29 DIAGNOSIS — Q23.1 CONGENITAL INSUFFICIENCY OF AORTIC VALVE: ICD-10-CM

## 2022-06-29 DIAGNOSIS — I20.9 ANGINA PECTORIS, UNSPECIFIED: ICD-10-CM

## 2022-06-29 LAB
A1C WITH ESTIMATED AVERAGE GLUCOSE RESULT: 5.6 % — SIGNIFICANT CHANGE UP (ref 4–5.6)
ANION GAP SERPL CALC-SCNC: 4 MMOL/L — LOW (ref 5–17)
APTT BLD: 29 SEC — SIGNIFICANT CHANGE UP (ref 27.5–35.5)
BASOPHILS # BLD AUTO: 0.08 K/UL — SIGNIFICANT CHANGE UP (ref 0–0.2)
BASOPHILS NFR BLD AUTO: 1 % — SIGNIFICANT CHANGE UP (ref 0–2)
BUN SERPL-MCNC: 18 MG/DL — SIGNIFICANT CHANGE UP (ref 7–23)
CALCIUM SERPL-MCNC: 8.9 MG/DL — SIGNIFICANT CHANGE UP (ref 8.5–10.1)
CHLORIDE SERPL-SCNC: 111 MMOL/L — HIGH (ref 96–108)
CHOLEST SERPL-MCNC: 181 MG/DL — SIGNIFICANT CHANGE UP
CO2 SERPL-SCNC: 26 MMOL/L — SIGNIFICANT CHANGE UP (ref 22–31)
CREAT SERPL-MCNC: 1.11 MG/DL — SIGNIFICANT CHANGE UP (ref 0.5–1.3)
EGFR: 83 ML/MIN/1.73M2 — SIGNIFICANT CHANGE UP
EOSINOPHIL # BLD AUTO: 0.75 K/UL — HIGH (ref 0–0.5)
EOSINOPHIL NFR BLD AUTO: 9.8 % — HIGH (ref 0–6)
ESTIMATED AVERAGE GLUCOSE: 114 MG/DL — SIGNIFICANT CHANGE UP (ref 68–114)
GLUCOSE SERPL-MCNC: 96 MG/DL — SIGNIFICANT CHANGE UP (ref 70–99)
HCT VFR BLD CALC: 47 % — SIGNIFICANT CHANGE UP (ref 39–50)
HDLC SERPL-MCNC: 58 MG/DL — SIGNIFICANT CHANGE UP
HGB BLD-MCNC: 16.1 G/DL — SIGNIFICANT CHANGE UP (ref 13–17)
IMM GRANULOCYTES NFR BLD AUTO: 0.3 % — SIGNIFICANT CHANGE UP (ref 0–1.5)
INR BLD: 0.97 RATIO — SIGNIFICANT CHANGE UP (ref 0.88–1.16)
LIPID PNL WITH DIRECT LDL SERPL: 103 MG/DL — HIGH
LYMPHOCYTES # BLD AUTO: 2.14 K/UL — SIGNIFICANT CHANGE UP (ref 1–3.3)
LYMPHOCYTES # BLD AUTO: 28.1 % — SIGNIFICANT CHANGE UP (ref 13–44)
MCHC RBC-ENTMCNC: 32.3 PG — SIGNIFICANT CHANGE UP (ref 27–34)
MCHC RBC-ENTMCNC: 34.3 GM/DL — SIGNIFICANT CHANGE UP (ref 32–36)
MCV RBC AUTO: 94.2 FL — SIGNIFICANT CHANGE UP (ref 80–100)
MONOCYTES # BLD AUTO: 0.73 K/UL — SIGNIFICANT CHANGE UP (ref 0–0.9)
MONOCYTES NFR BLD AUTO: 9.6 % — SIGNIFICANT CHANGE UP (ref 2–14)
NEUTROPHILS # BLD AUTO: 3.9 K/UL — SIGNIFICANT CHANGE UP (ref 1.8–7.4)
NEUTROPHILS NFR BLD AUTO: 51.2 % — SIGNIFICANT CHANGE UP (ref 43–77)
NON HDL CHOLESTEROL: 123 MG/DL — SIGNIFICANT CHANGE UP
PLATELET # BLD AUTO: 245 K/UL — SIGNIFICANT CHANGE UP (ref 150–400)
POTASSIUM SERPL-MCNC: 3.9 MMOL/L — SIGNIFICANT CHANGE UP (ref 3.5–5.3)
POTASSIUM SERPL-SCNC: 3.9 MMOL/L — SIGNIFICANT CHANGE UP (ref 3.5–5.3)
PROTHROM AB SERPL-ACNC: 11.2 SEC — SIGNIFICANT CHANGE UP (ref 10.5–13.4)
RBC # BLD: 4.99 M/UL — SIGNIFICANT CHANGE UP (ref 4.2–5.8)
RBC # FLD: 13.1 % — SIGNIFICANT CHANGE UP (ref 10.3–14.5)
SODIUM SERPL-SCNC: 141 MMOL/L — SIGNIFICANT CHANGE UP (ref 135–145)
T3FREE SERPL-MCNC: 3.3 PG/ML — SIGNIFICANT CHANGE UP (ref 1.8–4.6)
T4 FREE SERPL-MCNC: 0.84 NG/DL — SIGNIFICANT CHANGE UP (ref 0.76–1.46)
TRIGL SERPL-MCNC: 102 MG/DL — SIGNIFICANT CHANGE UP
TROPONIN I, HIGH SENSITIVITY RESULT: 87.21 NG/L — HIGH
TSH SERPL-MCNC: 1.33 UU/ML — SIGNIFICANT CHANGE UP (ref 0.34–4.82)
WBC # BLD: 7.62 K/UL — SIGNIFICANT CHANGE UP (ref 3.8–10.5)
WBC # FLD AUTO: 7.62 K/UL — SIGNIFICANT CHANGE UP (ref 3.8–10.5)

## 2022-06-29 PROCEDURE — 99223 1ST HOSP IP/OBS HIGH 75: CPT

## 2022-06-29 PROCEDURE — 99233 SBSQ HOSP IP/OBS HIGH 50: CPT

## 2022-06-29 PROCEDURE — 93306 TTE W/DOPPLER COMPLETE: CPT | Mod: 26

## 2022-06-29 PROCEDURE — 93010 ELECTROCARDIOGRAM REPORT: CPT

## 2022-06-29 RX ORDER — CLOPIDOGREL BISULFATE 75 MG/1
300 TABLET, FILM COATED ORAL ONCE
Refills: 0 | Status: COMPLETED | OUTPATIENT
Start: 2022-06-29 | End: 2022-06-29

## 2022-06-29 RX ORDER — HEPARIN SODIUM 5000 [USP'U]/ML
INJECTION INTRAVENOUS; SUBCUTANEOUS
Qty: 25000 | Refills: 0 | Status: DISCONTINUED | OUTPATIENT
Start: 2022-06-29 | End: 2022-07-01

## 2022-06-29 RX ORDER — INFLUENZA VIRUS VACCINE 15; 15; 15; 15 UG/.5ML; UG/.5ML; UG/.5ML; UG/.5ML
0.5 SUSPENSION INTRAMUSCULAR ONCE
Refills: 0 | Status: DISCONTINUED | OUTPATIENT
Start: 2022-06-29 | End: 2022-07-01

## 2022-06-29 RX ORDER — HEPARIN SODIUM 5000 [USP'U]/ML
5000 INJECTION INTRAVENOUS; SUBCUTANEOUS ONCE
Refills: 0 | Status: COMPLETED | OUTPATIENT
Start: 2022-06-29 | End: 2022-06-29

## 2022-06-29 RX ORDER — ASPIRIN/CALCIUM CARB/MAGNESIUM 324 MG
81 TABLET ORAL DAILY
Refills: 0 | Status: DISCONTINUED | OUTPATIENT
Start: 2022-06-29 | End: 2022-07-01

## 2022-06-29 RX ORDER — ATORVASTATIN CALCIUM 80 MG/1
40 TABLET, FILM COATED ORAL AT BEDTIME
Refills: 0 | Status: DISCONTINUED | OUTPATIENT
Start: 2022-06-29 | End: 2022-07-01

## 2022-06-29 RX ADMIN — HEPARIN SODIUM 1000 UNIT(S)/HR: 5000 INJECTION INTRAVENOUS; SUBCUTANEOUS at 18:20

## 2022-06-29 RX ADMIN — ENOXAPARIN SODIUM 40 MILLIGRAM(S): 100 INJECTION SUBCUTANEOUS at 06:19

## 2022-06-29 RX ADMIN — HEPARIN SODIUM 1000 UNIT(S)/HR: 5000 INJECTION INTRAVENOUS; SUBCUTANEOUS at 19:49

## 2022-06-29 RX ADMIN — HEPARIN SODIUM 5000 UNIT(S): 5000 INJECTION INTRAVENOUS; SUBCUTANEOUS at 18:24

## 2022-06-29 RX ADMIN — Medication 81 MILLIGRAM(S): at 09:37

## 2022-06-29 RX ADMIN — ATORVASTATIN CALCIUM 40 MILLIGRAM(S): 80 TABLET, FILM COATED ORAL at 21:22

## 2022-06-29 RX ADMIN — CLOPIDOGREL BISULFATE 300 MILLIGRAM(S): 75 TABLET, FILM COATED ORAL at 18:21

## 2022-06-29 NOTE — CONSULT NOTE ADULT - PROBLEM SELECTOR RECOMMENDATION 9
Ambiguous description of chest pain which is initiated with exertion at times. History of possible cardiac cath with unclear follow up. Risks include reduced EF and cocaine use. Suggest cardiac cath. Scheduled for tomorrow

## 2022-06-29 NOTE — PROGRESS NOTE ADULT - SUBJECTIVE AND OBJECTIVE BOX
Reason for Admission: Chest pain  History of Present Illness:   Patient is a 48 y/o/m with past medical history of type 2 DM, HTN, hypothyroidism, asthma, ADHD, bicuspid aortic valve, with admission in  for benzo/cocaine overdose with subsequent aspiration pneumonia requiring intubation with subsequent anoxic brain injury and ECHO showing LVEF <20% and MRI with acute infarcts and hypoxic ischemic encephalopathy presents with chest pain. The pt violated a restraining order today and had an altercation with the police. He then started having chest pain in the center of his chest with radiation down the left arm. Pain 7/10 in intensity, described as sharp, tingling sensation in the arm, pain is steady/constant, worse with breathing. Reports SOB, sweats, blurry vision. Denies fevers, chills, abdominal pain, N/V, diarrhea/constipation. He was previously on medications but stopped taking all medications. Has not taken anything since 2021. Pt reports last cocaine use 3 days ago. Last THC use 3 days ago. Used [employer] today in front of the police.     Prior admissions:   - 2021: unresponsive secondary to benzo/cocaine overdose -> aspiration PNA -> intubated and on pressors -> anoxic brain injury, ECHO LVEF < 20% -> pt extubated, PETE with bicuspid aortic valve, MRI brain acute infarcts, hypoxic ischemic encephalopathy      REVIEW OF SYSTEMS:  - comfortable.     Physical Exam:   GENERAL APPEARANCE:  NAD, hemodynamically stable, comfortable, in chains  T(C): 36.7 (22 @ 08:40), Max: 37.1 (22 @ 21:30)  HR: 99 (22 @ 08:40) (75 - 100)  BP: 127/89 (22 @ 08:40) (119/86 - 132/98)  RR: 17 (22 @ 08:40) (17 - 19)  SpO2: 98% (22 @ 08:40) (97% - 99%)  HEENT: no traumatic head injury  Skin:  Warm and dry without any rash   NECK:  Supple without lymphadenopathy.   HEART:  Regular rate and rhythm. normal S1 and S2, No M/R/G  LUNGS:  Good ins/exp effort, no W/R/R/C  ABDOMEN:  Soft, nontender, nondistended with good bowel sounds heard  EXTREMITIES:  Without cyanosis, clubbing or edema.   NEUROLOGICAL:  Gross nonfocal     Labs:   CBC Full  -  ( 2022 07:19 )  WBC Count : 7.62 K/uL  RBC Count : 4.99 M/uL  Hemoglobin : 16.1 g/dL  Hematocrit : 47.0 %  Platelet Count - Automated : 245 K/uL    PT/INR - ( 2022 16:16 )   PT: 12.0 sec;   INR: 1.03 ratio         PTT - ( 2022 16:16 )  PTT:25.8 sec  Urinalysis Basic - ( 2022 16:16 )    Color: Yellow / Appearance: Clear / S.030 / pH: x  Gluc: x / Ketone: Trace  / Bili: Negative / Urobili: Negative   Blood: x / Protein: 100 / Nitrite: Negative   Leuk Esterase: Trace / RBC: 0-2 /HPF / WBC 0-2   Sq Epi: x / Non Sq Epi: Few / Bacteria: Few      -    141  |  111<H>  |  18  ----------------------------<  96  3.9   |  26  |  1.11    Ca    8.9      2022 07:19    TPro  6.9  /  Alb  3.6  /  TBili  0.3  /  DBili  x   /  AST  16  /  ALT  21  /  AlkPhos  57  06-28      # Chest pain / elevated troponin this is most likely secondary to demand ischemia int he setting of cocaine use /  sinus tachycardia on admission  - tele monitoring  - EKG: no ST segment changes or T wave inversions   - Troponin troponin 97.97 -> 101.70, will trend 1 more troponins   - strongly recommended to avoid cocaine use  - Ordered ECHO, lipid panel   - ECHO (21): EF 60-65%, LVH, trace M, bicuspid aortic valve with mild to moderate aortic stenosis  - pending cardiology eval    # Hyperglycemia   - Glucose 122, monitor closely   - Pt with prior hx of Type 2 DM?   - Ordered HbA1c   - Diabetic diet   - Low dose ISS; will advance to basal bolus dosing if HbA1c is elevated     # Asymptomatic bacteruria   - UA: trace leukocyte esterase, trace blood, few bacteria  - Pt asymptomatic likely contaminant     #  Cocaine use and THC use   - Counseled on cessation     # History of Type 2 DM, HTN, hypothyroidism asthma, ADHD, with admission in  for benzo/cocaine overdose with subsequent aspiration pneumonia requiring intubation with subsequent anoxic brain injury and ECHO showing LVEF <20% and MRI with acute infarcts and hypoxic ischemic encephalopathy   - Pt not currently taking any medications -> states that his emergency contact has a list of medications he was supposed to be on -> unable to reach by phone   - f/u TFTs     Code status: Full code          Reason for Admission: Chest pain  History of Present Illness:   Patient is a 48 y/o/m with past medical history of type 2 DM, HTN, hypothyroidism, asthma, ADHD, bicuspid aortic valve, with admission in  for benzo/cocaine overdose with subsequent aspiration pneumonia requiring intubation with subsequent anoxic brain injury and ECHO showing LVEF <20% and MRI with acute infarcts and hypoxic ischemic encephalopathy presents with chest pain. The pt violated a restraining order today and had an altercation with the police. He then started having chest pain in the center of his chest with radiation down the left arm. Pain 7/10 in intensity, described as sharp, tingling sensation in the arm, pain is steady/constant, worse with breathing. Reports SOB, sweats, blurry vision. Denies fevers, chills, abdominal pain, N/V, diarrhea/constipation. He was previously on medications but stopped taking all medications. Has not taken anything since 2021. Pt reports last cocaine use 3 days ago. Last THC use 3 days ago. Used [employer] today in front of the police.     Prior admissions:   - 2021: unresponsive secondary to benzo/cocaine overdose -> aspiration PNA -> intubated and on pressors -> anoxic brain injury, ECHO LVEF < 20% -> pt extubated, PETE with bicuspid aortic valve, MRI brain acute infarcts, hypoxic ischemic encephalopathy     Care discussed with Cardiology. As patient notes of chest pain -  EKG obtained (no acute ischemic changes). Troponins -  mildly elevated. Discussed cardiology -  cardiac cath in the am.      REVIEW OF SYSTEMS:  - comfortable.     Physical Exam:   GENERAL APPEARANCE:  NAD, hemodynamically stable, comfortable, in chains  T(C): 36.7 (22 @ 08:40), Max: 37.1 (22 @ 21:30)  HR: 99 (22 @ 08:40) (75 - 100)  BP: 127/89 (22 @ 08:40) (119/86 - 132/98)  RR: 17 (22 @ 08:40) (17 - 19)  SpO2: 98% (22 @ 08:40) (97% - 99%)  HEENT: no traumatic head injury  Skin:  Warm and dry without any rash   NECK:  Supple without lymphadenopathy.   HEART:  Regular rate and rhythm. normal S1 and S2, No M/R/G  LUNGS:  Good ins/exp effort, no W/R/R/C  ABDOMEN:  Soft, nontender, nondistended with good bowel sounds heard  EXTREMITIES:  Without cyanosis, clubbing or edema.   NEUROLOGICAL:  Gross nonfocal     Labs:   CBC Full  -  ( 2022 07:19 )  WBC Count : 7.62 K/uL  RBC Count : 4.99 M/uL  Hemoglobin : 16.1 g/dL  Hematocrit : 47.0 %  Platelet Count - Automated : 245 K/uL    PT/INR - ( 2022 16:16 )   PT: 12.0 sec;   INR: 1.03 ratio         PTT - ( 2022 16:16 )  PTT:25.8 sec  Urinalysis Basic - ( 2022 16:16 )    Color: Yellow / Appearance: Clear / S.030 / pH: x  Gluc: x / Ketone: Trace  / Bili: Negative / Urobili: Negative   Blood: x / Protein: 100 / Nitrite: Negative   Leuk Esterase: Trace / RBC: 0-2 /HPF / WBC 0-2   Sq Epi: x / Non Sq Epi: Few / Bacteria: Few      -    141  |  111<H>  |  18  ----------------------------<  96  3.9   |  26  |  1.11    Ca    8.9      2022 07:19    TPro  6.9  /  Alb  3.6  /  TBili  0.3  /  DBili  x   /  AST  16  /  ALT  21  /  AlkPhos  57  06-28      # Chest pain / elevated troponin this is most likely secondary to demand ischemia int he setting of cocaine use /  sinus tachycardia on admission  - tele monitoring  - EKG: no ST segment changes or T wave inversions   - Troponin troponin 97.97 -> 101.70, will trend 1 more troponins   - strongly recommended to avoid cocaine use  - Ordered ECHO, lipid panel   - ECHO (21): EF 60-65%, LVH, trace M, bicuspid aortic valve with mild to moderate aortic stenosis  - pending cardiology eval    # Hyperglycemia   - Glucose 122, monitor closely   - Pt with prior hx of Type 2 DM?   - Ordered HbA1c   - Diabetic diet   - Low dose ISS; will advance to basal bolus dosing if HbA1c is elevated     # Asymptomatic bacteruria   - UA: trace leukocyte esterase, trace blood, few bacteria  - Pt asymptomatic likely contaminant     #  Cocaine use and THC use   - Counseled on cessation     # History of Type 2 DM, HTN, hypothyroidism asthma, ADHD, with admission in  for benzo/cocaine overdose with subsequent aspiration pneumonia requiring intubation with subsequent anoxic brain injury and ECHO showing LVEF <20% and MRI with acute infarcts and hypoxic ischemic encephalopathy   - Pt not currently taking any medications -> states that his emergency contact has a list of medications he was supposed to be on -> unable to reach by phone   - f/u TFTs     Code status: Full code

## 2022-06-29 NOTE — CONSULT NOTE ADULT - PROBLEM SELECTOR RECOMMENDATION 2
History of bicuspid aortic valve. Echo reviewed revealing calcified leaflets difficult to visualize bicuspid anatomy.

## 2022-06-29 NOTE — PATIENT PROFILE ADULT - FALL HARM RISK - HARM RISK INTERVENTIONS
Assistance OOB with selected safe patient handling equipment/Communicate Risk of Fall with Harm to all staff/Monitor gait and stability/Reinforce activity limits and safety measures with patient and family/Tailored Fall Risk Interventions/Use of alarms - bed, chair and/or voice tab/Visual Cue: Yellow wristband and red socks/Bed in lowest position, wheels locked, appropriate side rails in place/Call bell, personal items and telephone in reach/Instruct patient to call for assistance before getting out of bed or chair/Non-slip footwear when patient is out of bed/[place] to call system/Physically safe environment - no spills, clutter or unnecessary equipment/Purposeful Proactive Rounding/Room/bathroom lighting operational, light cord in reach

## 2022-06-29 NOTE — PATIENT PROFILE ADULT - FUNCTIONAL ASSESSMENT - DAILY ACTIVITY 5.
Call regarding: Wife said that patient is in the hospital and would like a call at [phone].  He wants to come home.     Okay to leave detailed voice mail?  No    Pharmacy information verified:  No      4 = No assist / stand by assistance

## 2022-06-30 LAB
APTT BLD: 27.8 SEC — SIGNIFICANT CHANGE UP (ref 27.5–35.5)
APTT BLD: 35.5 SEC — SIGNIFICANT CHANGE UP (ref 27.5–35.5)
HCT VFR BLD CALC: 47.1 % — SIGNIFICANT CHANGE UP (ref 39–50)
HCT VFR BLD CALC: 48.1 % — SIGNIFICANT CHANGE UP (ref 39–50)
HGB BLD-MCNC: 16.1 G/DL — SIGNIFICANT CHANGE UP (ref 13–17)
HGB BLD-MCNC: 16.4 G/DL — SIGNIFICANT CHANGE UP (ref 13–17)
MCHC RBC-ENTMCNC: 31.9 PG — SIGNIFICANT CHANGE UP (ref 27–34)
MCHC RBC-ENTMCNC: 32.5 PG — SIGNIFICANT CHANGE UP (ref 27–34)
MCHC RBC-ENTMCNC: 34.1 GM/DL — SIGNIFICANT CHANGE UP (ref 32–36)
MCHC RBC-ENTMCNC: 34.2 GM/DL — SIGNIFICANT CHANGE UP (ref 32–36)
MCV RBC AUTO: 93.3 FL — SIGNIFICANT CHANGE UP (ref 80–100)
MCV RBC AUTO: 95.4 FL — SIGNIFICANT CHANGE UP (ref 80–100)
PLATELET # BLD AUTO: 226 K/UL — SIGNIFICANT CHANGE UP (ref 150–400)
PLATELET # BLD AUTO: 246 K/UL — SIGNIFICANT CHANGE UP (ref 150–400)
RBC # BLD: 5.04 M/UL — SIGNIFICANT CHANGE UP (ref 4.2–5.8)
RBC # BLD: 5.05 M/UL — SIGNIFICANT CHANGE UP (ref 4.2–5.8)
RBC # FLD: 12.8 % — SIGNIFICANT CHANGE UP (ref 10.3–14.5)
RBC # FLD: 13.1 % — SIGNIFICANT CHANGE UP (ref 10.3–14.5)
WBC # BLD: 7.85 K/UL — SIGNIFICANT CHANGE UP (ref 3.8–10.5)
WBC # BLD: 8.51 K/UL — SIGNIFICANT CHANGE UP (ref 3.8–10.5)
WBC # FLD AUTO: 7.85 K/UL — SIGNIFICANT CHANGE UP (ref 3.8–10.5)
WBC # FLD AUTO: 8.51 K/UL — SIGNIFICANT CHANGE UP (ref 3.8–10.5)

## 2022-06-30 PROCEDURE — 93454 CORONARY ARTERY ANGIO S&I: CPT | Mod: 26

## 2022-06-30 PROCEDURE — 99232 SBSQ HOSP IP/OBS MODERATE 35: CPT

## 2022-06-30 RX ORDER — ALPRAZOLAM 0.25 MG
0.25 TABLET ORAL ONCE
Refills: 0 | Status: DISCONTINUED | OUTPATIENT
Start: 2022-06-30 | End: 2022-06-30

## 2022-06-30 RX ORDER — SODIUM CHLORIDE 9 MG/ML
1000 INJECTION INTRAMUSCULAR; INTRAVENOUS; SUBCUTANEOUS
Refills: 0 | Status: DISCONTINUED | OUTPATIENT
Start: 2022-06-30 | End: 2022-07-01

## 2022-06-30 RX ORDER — SODIUM CHLORIDE 9 MG/ML
250 INJECTION INTRAMUSCULAR; INTRAVENOUS; SUBCUTANEOUS ONCE
Refills: 0 | Status: COMPLETED | OUTPATIENT
Start: 2022-06-30 | End: 2022-06-30

## 2022-06-30 RX ADMIN — HEPARIN SODIUM 1250 UNIT(S)/HR: 5000 INJECTION INTRAVENOUS; SUBCUTANEOUS at 02:10

## 2022-06-30 RX ADMIN — Medication 81 MILLIGRAM(S): at 09:18

## 2022-06-30 RX ADMIN — Medication 0.25 MILLIGRAM(S): at 23:48

## 2022-06-30 RX ADMIN — SODIUM CHLORIDE 500 MILLILITER(S): 9 INJECTION INTRAMUSCULAR; INTRAVENOUS; SUBCUTANEOUS at 16:15

## 2022-06-30 RX ADMIN — Medication 0.5 MILLIGRAM(S): at 09:17

## 2022-06-30 RX ADMIN — ATORVASTATIN CALCIUM 40 MILLIGRAM(S): 80 TABLET, FILM COATED ORAL at 21:35

## 2022-06-30 RX ADMIN — SODIUM CHLORIDE 200 MILLILITER(S): 9 INJECTION INTRAMUSCULAR; INTRAVENOUS; SUBCUTANEOUS at 17:15

## 2022-06-30 NOTE — CHART NOTE - NSCHARTNOTEFT_GEN_A_CORE
[facility] risks, benefits and alternatives discussed with patient. Risk discussed included, but not limited to MI, stroke, mortality, major bleeding, arrythmia, or infection. Consent obtained and signed educational material provided. Pt. verbalizes and understands pre-procedural instructions.  ASA:  Bleeding Risk Score:  Creatinine:  GFR:    Subhash Score [facility] risks, benefits and alternatives discussed with patient. Risk discussed included, but not limited to MI, stroke, mortality, major bleeding, arrythmia, or infection. Consent obtained and signed educational material provided. Pt. verbalizes and understands pre-procedural instructions.  ASA: II  Bleeding Risk Score: 1.3  Creatinine: 1.11  GFR: 83  Subhash Score: 1 point KARLA risk [facility] risks, benefits and alternatives discussed with patient. Risk discussed included, but not limited to MI, stroke, mortality, major bleeding, arrythmia, or infection. Consent obtained and signed educational material provided. Pt. verbalizes and understands pre-procedural instructions.  ASA: II  Bleeding Risk Score: 1.3  Creatinine: 1.11  GFR: 83  Subhash Score: 1 point KARLA risk  Pt. pre-hydrated with 0.9% NS 250cc bolus IV x1

## 2022-06-30 NOTE — CHART NOTE - NSCHARTNOTEFT_GEN_A_CORE
Nurse Practitioner Progress note:     HPI:  46 y/o M with PMH Type 2 DM, HTN, hypothyroidism, asthma, ADHD, bicuspid aortic valve, with admission in 2021 for benzo/cocaine overdose with subsequent aspiration pneumonia requiring intubation with subsequent anoxic brain injury and ECHO showing LVEF <20% and MRI with acute infarcts and hypoxic ischemic encephalopathy presents with chest pain. The pt violated a restraining order today and had an altercation with the police. He then started having chest pain in the center of his chest with radiation down the left arm. Pain 7/10 in intensity, described as sharp, tingling sensation in the arm, pain is steady/constant, worse with breathing. Reports SOB, sweats, blurry vision. Denies fevers, chills, abdominal pain, N/V, diarrhea/constipation. He was previously on medications but stopped taking all medications. Has not taken anything since 8/2021. Pt reports last cocaine use 3 days ago. Last THC use 3 days ago. Used [employer] today in front of the police.     Prior admissions:   - 9/23/2021: unresponsive secondary to benzo/cocaine overdose -> aspiration PNA -> intubated and on pressors -> anoxic brain injury, ECHO LVEF < 20% -> pt extubated, PETE with bicuspid aortic valve, MRI brain acute infarcts, hypoxic ischemic encephalopathy     ER course: HR . Labs: troponin 97.97 -> 101.70, glucose 122. UA: trace ketones, trace leukocyte esterase, trace blood, few bacteria. UTox: positive for THC, positive for cocaine. EKG: Sinus tachycardia with  bpm, QTC prolonged 487 ms, LVH, no ST segment changes, no T wave inversions (personally reviewed). CXR: no consolidation, no effusion, no pneumothorax (personally reviewed).     Pt was given 325 mg of aspirin, 1L of NS. He is being admitted to telemetry.    (28 Jun 2022 22:49)    T(C): 36.7 (06-30-22 @ 16:07), Max: 36.7 (06-29-22 @ 23:32)  HR: 103 (06-30-22 @ 16:07) (92 - 103)  BP: 143/95 (06-30-22 @ 16:07) (113/82 - 143/95)  RR: 18 (06-30-22 @ 16:07) (17 - 18)  SpO2: 100% (06-30-22 @ 16:07) (94% - 100%)  Wt(kg): --    PHYSICAL EXAM:  Neurologic: Non-focal, AxOx3.  No neuro deficits  Vascular: Peripheral pulses palpable 2+ bilaterally  Procedure Site: Rt. radial band in place site benign soft no bleeding no hematoma +1 radial pulse no c/o numbness/tingling, <3sec cap refill, fingers/hand warm to touch       PROCEDURE RESULTS:  S/P C non -obstructive CAD       ASSESSMENT/PLAN: 	  46 y/o M with PMH Type 2 DM, HTN, hypothyroidism, asthma, ADHD, bicuspid aortic valve, with admission in 2021 for benzo/cocaine overdose with subsequent aspiration pneumonia requiring intubation with subsequent anoxic brain injury and ECHO showing LVEF <20% and MRI with acute infarcts and hypoxic ischemic encephalopathy presents with chest pain. The pt violated a restraining order today and had an altercation with the police. He then started having chest pain in the center of his chest with radiation down the left arm. Pain 7/10 in intensity, described as sharp, tingling sensation in the arm, pain is steady/constant, worse with breathing. Reports SOB, sweats, blurry vision. Denies fevers, chills, abdominal pain, N/V, diarrhea/constipation. He was previously on medications but stopped taking all medications. Has not taken anything since 8/2021. Pt reports last cocaine use 3 days ago. Last THC use 3 days ago. Used [employer] today in front of the police.   ER course: HR . Labs: troponin 97.97 -> 101.70, glucose 122. UA: trace ketones, trace leukocyte esterase, trace blood, few bacteria. UTox: positive for THC, positive for cocaine. EKG: Sinus tachycardia with  bpm, QTC prolonged 487 ms, LVH, no ST segment changes, no T wave inversions (personally reviewed). CXR: no consolidation, no effusion, no pneumothorax (personally reviewed).   S/P C     -VS, labs, diet, activity as per post cath orders  -IV hydration 0.9% NS 250cc bolus IV x4 hours ordered   -Encourage PO fluids  -Continue current medications  -Plan of care D/W pt. and MD  -Post cath instructions reviewed with pt., pt. verbalizes and understands instructions  -Follow-up with attending

## 2022-06-30 NOTE — PROGRESS NOTE ADULT - SUBJECTIVE AND OBJECTIVE BOX
Reason for Admission: Chest pain  History of Present Illness:   Patient is a 48 y/o/m with past medical history of type 2 DM, HTN, hypothyroidism, asthma, ADHD, bicuspid aortic valve, with admission in  for benzo/cocaine overdose with subsequent aspiration pneumonia requiring intubation with subsequent anoxic brain injury and ECHO showing LVEF <20% and MRI with acute infarcts and hypoxic ischemic encephalopathy presents with chest pain. The pt violated a restraining order today and had an altercation with the police. He then started having chest pain in the center of his chest with radiation down the left arm. Pain 7/10 in intensity, described as sharp, tingling sensation in the arm, pain is steady/constant, worse with breathing. Reports SOB, sweats, blurry vision. Denies fevers, chills, abdominal pain, N/V, diarrhea/constipation. He was previously on medications but stopped taking all medications. Has not taken anything since 2021. Pt reports last cocaine use 3 days ago. Last THC use 3 days ago. Used [employer] today in front of the police.     Prior admissions:   - 2021: unresponsive secondary to benzo/cocaine overdose -> aspiration PNA -> intubated and on pressors -> anoxic brain injury, ECHO LVEF < 20% -> pt extubated, PETE with bicuspid aortic valve, MRI brain acute infarcts, hypoxic ischemic encephalopathy     Cardiac cath today. care discussed with patient's ex-wife. currently chest pain free     REVIEW OF SYSTEMS:  - comfortable.     Physical Exam:   GENERAL APPEARANCE:  NAD, hemodynamically stable, comfortable, in chains  T(C): 36.7 (22 @ 08:40), Max: 37.1 (22 @ 21:30)  HR: 99 (22 @ 08:40) (75 - 100)  BP: 127/89 (22 @ 08:40) (119/86 - 132/98)  RR: 17 (22 @ 08:40) (17 - 19)  SpO2: 98% (22 @ 08:40) (97% - 99%)  HEENT: no traumatic head injury  Skin:  Warm and dry without any rash   NECK:  Supple without lymphadenopathy.   HEART:  Regular rate and rhythm. normal S1 and S2, No M/R/G  LUNGS:  Good ins/exp effort, no W/R/R/C  ABDOMEN:  Soft, nontender, nondistended with good bowel sounds heard  EXTREMITIES:  Without cyanosis, clubbing or edema.   NEUROLOGICAL:  Gross nonfocal     Labs:   CBC Full  -  ( 2022 07:19 )  WBC Count : 7.62 K/uL  RBC Count : 4.99 M/uL  Hemoglobin : 16.1 g/dL  Hematocrit : 47.0 %  Platelet Count - Automated : 245 K/uL    PT/INR - ( 2022 16:16 )   PT: 12.0 sec;   INR: 1.03 ratio         PTT - ( 2022 16:16 )  PTT:25.8 sec  Urinalysis Basic - ( 2022 16:16 )    Color: Yellow / Appearance: Clear / S.030 / pH: x  Gluc: x / Ketone: Trace  / Bili: Negative / Urobili: Negative   Blood: x / Protein: 100 / Nitrite: Negative   Leuk Esterase: Trace / RBC: 0-2 /HPF / WBC 0-2   Sq Epi: x / Non Sq Epi: Few / Bacteria: Few      -    141  |  111<H>  |  18  ----------------------------<  96  3.9   |  26  |  1.11    Ca    8.9      2022 07:19    TPro  6.9  /  Alb  3.6  /  TBili  0.3  /  DBili  x   /  AST  16  /  ALT  21  /  AlkPhos  57  06-28      # Chest pain / elevated troponin this is most likely secondary to demand ischemia int he setting of cocaine use /  sinus tachycardia on admission  - tele monitoring  - EKG: no ST segment changes or T wave inversions   - Troponin troponin 97.97 -> 101.70, will trend 1 more troponins   - strongly recommended to avoid cocaine use  - Ordered ECHO, lipid panel   - ECHO (21): EF 60-65%, LVH, trace M, bicuspid aortic valve with mild to moderate aortic stenosis  - pending cardiology eval    # Hyperglycemia   - Glucose 122, monitor closely   - Pt with prior hx of Type 2 DM?   - Ordered HbA1c   - Diabetic diet   - Low dose ISS; will advance to basal bolus dosing if HbA1c is elevated     # Asymptomatic bacteruria   - UA: trace leukocyte esterase, trace blood, few bacteria  - Pt asymptomatic likely contaminant     #  Cocaine use and THC use   - Counseled on cessation     # History of Type 2 DM, HTN, hypothyroidism asthma, ADHD, with admission in  for benzo/cocaine overdose with subsequent aspiration pneumonia requiring intubation with subsequent anoxic brain injury and ECHO showing LVEF <20% and MRI with acute infarcts and hypoxic ischemic encephalopathy   - Pt not currently taking any medications -> states that his emergency contact has a list of medications he was supposed to be on -> unable to reach by phone   - f/u TFTs     Code status: Full code          Reason for Admission: Chest pain  History of Present Illness:   Patient is a 48 y/o/m with past medical history of type 2 DM, HTN, hypothyroidism, asthma, ADHD, bicuspid aortic valve, with admission in  for benzo/cocaine overdose with subsequent aspiration pneumonia requiring intubation with subsequent anoxic brain injury and ECHO showing LVEF <20% and MRI with acute infarcts and hypoxic ischemic encephalopathy presents with chest pain. The pt violated a restraining order today and had an altercation with the police. He then started having chest pain in the center of his chest with radiation down the left arm. Pain 7/10 in intensity, described as sharp, tingling sensation in the arm, pain is steady/constant, worse with breathing. Reports SOB, sweats, blurry vision. Denies fevers, chills, abdominal pain, N/V, diarrhea/constipation. He was previously on medications but stopped taking all medications. Has not taken anything since 2021. Pt reports last cocaine use 3 days ago. Last THC use 3 days ago. Used [employer] today in front of the police.     Prior admissions:   - 2021: unresponsive secondary to benzo/cocaine overdose -> aspiration PNA -> intubated and on pressors -> anoxic brain injury, ECHO LVEF < 20% -> pt extubated, PETE with bicuspid aortic valve, MRI brain acute infarcts, hypoxic ischemic encephalopathy     Cardiac cath today. care discussed with patient's ex-wife ([place]). Wife states that patient has experienced hypoxic brain injury in the past and often forgets important details.        REVIEW OF SYSTEMS:  - comfortable.     Physical Exam:   GENERAL APPEARANCE:  NAD, hemodynamically stable, comfortable, in chains  T(C): 36.7 (22 @ 08:40), Max: 37.1 (22 @ 21:30)  HR: 99 (22 @ 08:40) (75 - 100)  BP: 127/89 (22 @ 08:40) (119/86 - 132/98)  RR: 17 (22 @ 08:40) (17 - 19)  SpO2: 98% (22 @ 08:40) (97% - 99%)  HEENT: no traumatic head injury  Skin:  Warm and dry without any rash   NECK:  Supple without lymphadenopathy.   HEART:  Regular rate and rhythm. normal S1 and S2, No M/R/G  LUNGS:  Good ins/exp effort, no W/R/R/C  ABDOMEN:  Soft, nontender, nondistended with good bowel sounds heard  EXTREMITIES:  Without cyanosis, clubbing or edema.   NEUROLOGICAL:  Gross nonfocal     Labs:   CBC Full  -  ( 2022 07:19 )  WBC Count : 7.62 K/uL  RBC Count : 4.99 M/uL  Hemoglobin : 16.1 g/dL  Hematocrit : 47.0 %  Platelet Count - Automated : 245 K/uL    PT/INR - ( 2022 16:16 )   PT: 12.0 sec;   INR: 1.03 ratio         PTT - ( 2022 16:16 )  PTT:25.8 sec  Urinalysis Basic - ( 2022 16:16 )    Color: Yellow / Appearance: Clear / S.030 / pH: x  Gluc: x / Ketone: Trace  / Bili: Negative / Urobili: Negative   Blood: x / Protein: 100 / Nitrite: Negative   Leuk Esterase: Trace / RBC: 0-2 /HPF / WBC 0-2   Sq Epi: x / Non Sq Epi: Few / Bacteria: Few      -    141  |  111<H>  |  18  ----------------------------<  96  3.9   |  26  |  1.11    Ca    8.9      2022 07:19    TPro  6.9  /  Alb  3.6  /  TBili  0.3  /  DBili  x   /  AST  16  /  ALT  21  /  AlkPhos  57  06-28      # Chest pain / elevated troponin this is most likely secondary to demand ischemia int he setting of cocaine use /  sinus tachycardia on admission  - tele monitoring  - EKG: no ST segment changes or T wave inversions   - Troponin troponin 97.97 -> 101.70, will trend 1 more troponins   - strongly recommended to avoid cocaine use  - Ordered ECHO, lipid panel   - ECHO (21): EF 60-65%, LVH, trace M, bicuspid aortic valve with mild to moderate aortic stenosis  - pending cardiology eval    # Hyperglycemia   - Glucose 122, monitor closely   - Pt with prior hx of Type 2 DM?   - Ordered HbA1c   - Diabetic diet   - Low dose ISS; will advance to basal bolus dosing if HbA1c is elevated     # Asymptomatic bacteruria   - UA: trace leukocyte esterase, trace blood, few bacteria  - Pt asymptomatic likely contaminant     #  Cocaine use and THC use   - Counseled on cessation     # History of Type 2 DM, HTN, hypothyroidism asthma, ADHD, with admission in  for benzo/cocaine overdose with subsequent aspiration pneumonia requiring intubation with subsequent anoxic brain injury and ECHO showing LVEF <20% and MRI with acute infarcts and hypoxic ischemic encephalopathy   - Pt not currently taking any medications -> states that his emergency contact has a list of medications he was supposed to be on -> unable to reach by phone   - f/u TFTs     Code status: Full code

## 2022-06-30 NOTE — PACU DISCHARGE NOTE - COMMENTS
Patient s/p LHC, Patient A&Ox4, VSS. Right Radial dressing in place, clean dry intact, no s/s of hematoma or bleeding noted, positive pulse present. Report given to Lio PENN on 3N. Left PIV WDL. Tele monitor in place on patient, confirmed with tele monitoring room. Patient transported back to room safely with transport staff without incident.

## 2022-07-01 ENCOUNTER — TRANSCRIPTION ENCOUNTER (OUTPATIENT)
Age: 47
End: 2022-07-01

## 2022-07-01 VITALS
DIASTOLIC BLOOD PRESSURE: 80 MMHG | OXYGEN SATURATION: 98 % | TEMPERATURE: 98 F | SYSTOLIC BLOOD PRESSURE: 126 MMHG | RESPIRATION RATE: 18 BRPM | HEART RATE: 104 BPM

## 2022-07-01 DIAGNOSIS — I51.9 HEART DISEASE, UNSPECIFIED: ICD-10-CM

## 2022-07-01 LAB
ANION GAP SERPL CALC-SCNC: 5 MMOL/L — SIGNIFICANT CHANGE UP (ref 5–17)
BASOPHILS # BLD AUTO: 0.07 K/UL — SIGNIFICANT CHANGE UP (ref 0–0.2)
BASOPHILS NFR BLD AUTO: 1 % — SIGNIFICANT CHANGE UP (ref 0–2)
BUN SERPL-MCNC: 22 MG/DL — SIGNIFICANT CHANGE UP (ref 7–23)
CALCIUM SERPL-MCNC: 8.9 MG/DL — SIGNIFICANT CHANGE UP (ref 8.5–10.1)
CHLORIDE SERPL-SCNC: 109 MMOL/L — HIGH (ref 96–108)
CO2 SERPL-SCNC: 25 MMOL/L — SIGNIFICANT CHANGE UP (ref 22–31)
CREAT SERPL-MCNC: 1 MG/DL — SIGNIFICANT CHANGE UP (ref 0.5–1.3)
EGFR: 95 ML/MIN/1.73M2 — SIGNIFICANT CHANGE UP
EOSINOPHIL # BLD AUTO: 0.62 K/UL — HIGH (ref 0–0.5)
EOSINOPHIL NFR BLD AUTO: 9.2 % — HIGH (ref 0–6)
GLUCOSE SERPL-MCNC: 98 MG/DL — SIGNIFICANT CHANGE UP (ref 70–99)
HCT VFR BLD CALC: 49.6 % — SIGNIFICANT CHANGE UP (ref 39–50)
HGB BLD-MCNC: 17.1 G/DL — HIGH (ref 13–17)
IMM GRANULOCYTES NFR BLD AUTO: 0.3 % — SIGNIFICANT CHANGE UP (ref 0–1.5)
LYMPHOCYTES # BLD AUTO: 1.89 K/UL — SIGNIFICANT CHANGE UP (ref 1–3.3)
LYMPHOCYTES # BLD AUTO: 28 % — SIGNIFICANT CHANGE UP (ref 13–44)
MCHC RBC-ENTMCNC: 32.2 PG — SIGNIFICANT CHANGE UP (ref 27–34)
MCHC RBC-ENTMCNC: 34.5 GM/DL — SIGNIFICANT CHANGE UP (ref 32–36)
MCV RBC AUTO: 93.4 FL — SIGNIFICANT CHANGE UP (ref 80–100)
MONOCYTES # BLD AUTO: 0.76 K/UL — SIGNIFICANT CHANGE UP (ref 0–0.9)
MONOCYTES NFR BLD AUTO: 11.3 % — SIGNIFICANT CHANGE UP (ref 2–14)
NEUTROPHILS # BLD AUTO: 3.38 K/UL — SIGNIFICANT CHANGE UP (ref 1.8–7.4)
NEUTROPHILS NFR BLD AUTO: 50.2 % — SIGNIFICANT CHANGE UP (ref 43–77)
PLATELET # BLD AUTO: 257 K/UL — SIGNIFICANT CHANGE UP (ref 150–400)
POTASSIUM SERPL-MCNC: 4.2 MMOL/L — SIGNIFICANT CHANGE UP (ref 3.5–5.3)
POTASSIUM SERPL-SCNC: 4.2 MMOL/L — SIGNIFICANT CHANGE UP (ref 3.5–5.3)
RBC # BLD: 5.31 M/UL — SIGNIFICANT CHANGE UP (ref 4.2–5.8)
RBC # FLD: 13 % — SIGNIFICANT CHANGE UP (ref 10.3–14.5)
SODIUM SERPL-SCNC: 139 MMOL/L — SIGNIFICANT CHANGE UP (ref 135–145)
WBC # BLD: 6.74 K/UL — SIGNIFICANT CHANGE UP (ref 3.8–10.5)
WBC # FLD AUTO: 6.74 K/UL — SIGNIFICANT CHANGE UP (ref 3.8–10.5)

## 2022-07-01 PROCEDURE — 99233 SBSQ HOSP IP/OBS HIGH 50: CPT

## 2022-07-01 PROCEDURE — 99239 HOSP IP/OBS DSCHRG MGMT >30: CPT

## 2022-07-01 RX ORDER — ATORVASTATIN CALCIUM 80 MG/1
1 TABLET, FILM COATED ORAL
Qty: 30 | Refills: 0
Start: 2022-07-01 | End: 2022-07-30

## 2022-07-01 RX ORDER — ASPIRIN/CALCIUM CARB/MAGNESIUM 324 MG
1 TABLET ORAL
Qty: 30 | Refills: 0
Start: 2022-07-01 | End: 2022-07-30

## 2022-07-01 RX ADMIN — Medication 81 MILLIGRAM(S): at 10:31

## 2022-07-01 NOTE — DISCHARGE NOTE PROVIDER - CARE PROVIDER_API CALL
William Noe)  Cardiology  [address] [place] [address]  Phone: [phone]  Fax: [phone]  Follow Up Time:

## 2022-07-01 NOTE — DISCHARGE NOTE NURSING/CASE MANAGEMENT/SOCIAL WORK - PATIENT PORTAL LINK FT
You can access the FollowMyHealth Patient Portal offered by [facility] by registering at the following website: [email or web address]. By joining [facility]’s FollowMyHealth portal, you will also be able to view your health information using other applications (apps) compatible with our system.

## 2022-07-01 NOTE — PROGRESS NOTE ADULT - PROBLEM SELECTOR PLAN 1
Cath w/ no obstructive disease.  Cont. ASA, statin for now. Await final report  re: presence of luminal disease.

## 2022-07-01 NOTE — PROGRESS NOTE ADULT - SUBJECTIVE AND OBJECTIVE BOX
46 y/o M with PMH Type 2 DM, HTN, hypothyroidism, asthma, ADHD, bicuspid aortic valve, with admission in 2021 for benzo/cocaine overdose with subsequent aspiration pneumonia requiring intubation with subsequent anoxic brain injury and ECHO showing LVEF <20% and MRI with acute infarcts and hypoxic ischemic encephalopathy presents with chest pain. The pt violated a restraining order today and had an altercation with the police. He then started having chest pain in the center of his chest with radiation down the left arm. Pain 7/10 in intensity, described as sharp, tingling sensation in the arm, pain is steady/constant, worse with breathing. Reports SOB, sweats, blurry vision. Denies fevers, chills, abdominal pain, N/V, diarrhea/constipation. He was previously on medications but stopped taking all medications. Has not taken anything since 8/2021. Pt reports last cocaine use 3 days ago. Last THC use 3 days ago. Used flipClassets today in front of the police.     Prior admissions:   - 9/23/2021: unresponsive secondary to benzo/cocaine overdose -> aspiration PNA -> intubated and on pressors -> anoxic brain injury, ECHO LVEF < 20% -> pt extubated, PETE with bicuspid aortic valve, MRI brain acute infarcts, hypoxic ischemic encephalopathy     ER course: HR . Labs: troponin 97.97 -> 101.70, glucose 122. UA: trace ketones, trace leukocyte esterase, trace blood, few bacteria. UTox: positive for THC, positive for cocaine. EKG: Sinus tachycardia with  bpm, QTC prolonged 487 ms, LVH, no ST segment changes, no T wave inversions (personally reviewed). CXR: no consolidation, no effusion, no pneumothorax (personally reviewed).     Cardiology called to evaluate symptoms. Pt reports that he underwent cardiac cath in the past but he was in a "coma" and didn't remember results or follow up. Pt reports that his symptoms occur with exertion and at rest. Pt has not followed up with a cardiologist in the past.     7/11/'22: reviewed cath results - no obstructive disease  Reviewed symptoms: atypical not consistently related to exertion.      MEDICATIONS:  OUTPATIENT  Home Medications:      INPATIENT  MEDICATIONS  (STANDING):  aspirin  chewable 81 milliGRAM(s) Oral daily  atorvastatin 40 milliGRAM(s) Oral at bedtime  glucagon  Injectable 1 milliGRAM(s) IntraMuscular once  influenza   Vaccine 0.5 milliLiter(s) IntraMuscular once  sodium chloride 0.9%. 1000 milliLiter(s) (200 mL/Hr) IV Continuous <Continuous>    MEDICATIONS  (PRN):  acetaminophen     Tablet .. 650 milliGRAM(s) Oral every 6 hours PRN Temp greater or equal to 38C (100.4F), Mild Pain (1 - 3)  aluminum hydroxide/magnesium hydroxide/simethicone Suspension 30 milliLiter(s) Oral every 4 hours PRN Dyspepsia  melatonin 3 milliGRAM(s) Oral at bedtime PRN Insomnia  nitroglycerin     SubLingual 0.4 milliGRAM(s) SubLingual once PRN Chest Pain  ondansetron Injectable 4 milliGRAM(s) IV Push every 8 hours PRN Nausea and/or Vomiting          Vital Signs Last 24 Hrs  T(C): 36.8 (01 Jul 2022 09:03), Max: 36.8 (30 Jun 2022 23:24)  T(F): 98.3 (01 Jul 2022 09:03), Max: 98.3 (01 Jul 2022 09:03)  HR: 104 (01 Jul 2022 09:03) (104 - 108)  BP: 126/80 (01 Jul 2022 09:03) (121/85 - 146/90)  BP(mean): --  RR: 18 (01 Jul 2022 09:03) (18 - 18)  SpO2: 98% (01 Jul 2022 09:03) (97% - 100%)Daily     Daily I&O's Summary    30 Jun 2022 07:01  -  01 Jul 2022 07:00  --------------------------------------------------------  IN: 650 mL / OUT: 0 mL / NET: 650 mL        I&O's Detail    30 Jun 2022 07:01  -  01 Jul 2022 07:00  --------------------------------------------------------  IN:    sodium chloride 0.9%: 400 mL    Sodium Chloride 0.9% Bolus: 250 mL  Total IN: 650 mL    OUT:  Total OUT: 0 mL    Total NET: 650 mL          I&O's Summary    30 Jun 2022 07:01  -  01 Jul 2022 07:00  --------------------------------------------------------  IN: 650 mL / OUT: 0 mL / NET: 650 mL        PHYSICAL EXAM:    Constitutional: NAD, awake and alert, well-developed  HEENT: PERR, EOMI,  No oral cyananosis.  Neck:  supple,  No JVD  Respiratory: Breath sounds are clear bilaterally, No wheezing, rales or rhonchi  Cardiovascular: S1 and S2, regular rate and rhythm,1/6 CHELSEY  Gastrointestinal: Bowel Sounds present, soft, nontender.   Extremities: No peripheral edema. No clubbing or cyanosis.  Vascular: 2+ peripheral pulses  Neurological: A/O x 3, no focal deficits  Musculoskeletal: no calf tenderness.  Skin: No rashes.      ===============================  ===============================  LABS:                         17.1   6.74  )-----------( 257      ( 01 Jul 2022 07:24 )             49.6                         16.4   8.51  )-----------( 246      ( 30 Jun 2022 08:34 )             48.1                         16.1   7.85  )-----------( 226      ( 30 Jun 2022 01:07 )             47.1     01 Jul 2022 07:24    139    |  109    |  22     ----------------------------<  98     4.2     |  25     |  1.00   29 Jun 2022 07:19    141    |  111    |  18     ----------------------------<  96     3.9     |  26     |  1.11   28 Jun 2022 16:16    144    |  114    |  16     ----------------------------<  122    3.9     |  26     |  1.19     Ca    8.9        01 Jul 2022 07:24  Ca    8.9        29 Jun 2022 07:19  Ca    9.1        28 Jun 2022 16:16    TPro  6.9    /  Alb  3.6    /  TBili  0.3    /  DBili  x      /  AST  16     /  ALT  21     /  AlkPhos  57     28 Jun 2022 16:16    PT/INR - ( 29 Jun 2022 17:38 )   PT: 11.2 sec;   INR: 0.97 ratio         PTT - ( 30 Jun 2022 08:34 )  PTT:27.8 sec  ===============================  ===============================  CARDIAC BIOMARKERS:  BNP  Serum Pro-Brain Natriuretic Peptide: 5142 pg/mL *H* [0 - 300] (08-22-21 @ 12:22)  Serum Pro-Brain Natriuretic Peptide: 1967 pg/mL *H* [0 - 300] (08-21-21 @ 20:02)      TROPONIN  Troponin I, High Sensitivity Result: 87.21 ng/L *H* (06-29-22 @ 00:56)  Troponin I, High Sensitivity Result: 101.70 ng/L *H* (06-28-22 @ 19:01)  Troponin I, High Sensitivity Result: 97.97 ng/L *H* (06-28-22 @ 16:16)    Troponin T, Serum: 1.04 ng/mL *H* [0.00 - 0.06] (08-22-21 @ 12:22)  Troponin T, Serum: 1.25 ng/mL *H* [0.00 - 0.06] (08-22-21 @ 04:55)  Troponin T, Serum: 0.72 ng/mL *H* [0.00 - 0.06] (08-21-21 @ 20:02)    ===============================  ===============================  BLOOD CULTURES:    Blood Culture:     06-29 @ 07:19  TSH: 1.33    ===============================  ===============================  RADIOLOGY/EKG: NSR non sp st t changes     Findings     Mitral Valve   The mitral valve leaflets appear thickened.     Aortic Valve   History of an aortic bicuspid valve.   Significant fibrocalcific changes noted to the aortic valve leaflets with   restriction in leaflet excursion.   Peak and mean transaortic gradients are 39 and 26 mmHg respectively; this   finding is consistent with moderate aortic stenosis.   Trace aortic regurgitation is present.     Tricuspid Valve   Normal appearing tricuspid valve structure.   Trace tricuspid valve regurgitation is present.     Pulmonic Valve   Normal appearing pulmonic valve structure.   Trace pulmonic valvular regurgitation is present.     Left Atrium   Normal appearing left atrium.     Left Ventricle   Moderate asymmetrical septal left ventricular hypertrophy is present.   Overall left ventricular systolic function appears mildly decreased     Right Atrium   Normal appearing right atrium.     Right Ventricle   Normal appearing right ventricle structure and function.     Pericardial Effusion   No evidence of pericardial effusion.     Pleural Effusion   Pleural effusion cannot be ruled out.     Miscellaneous   The IVC appears normal.     Impression     Summary     The mitral valve leaflets appear thickened.   History of an aortic bicuspid valve.   Significant fibrocalcific changes noted to the aortic valve leaflets with   restriction in leaflet excursion.   Peak and mean transaortic gradients are 39 and 26 mmHg respectively; this   finding is consistent with moderate aortic stenosis.   Trace aortic regurgitation is present.   Normal appearingtricuspid valve structure.   Trace tricuspid valve regurgitation is present.   Normal appearing pulmonic valve structure.   Trace pulmonic valvular regurgitation is present.   Normal appearing left atrium.   Moderate asymmetrical septal left ventricular hypertrophy is present.   Overall left ventricular systolic function appears mildly decreased   Pleural effusion cannot be ruled out.  (REVIEWED ECHO - GLOBAL HYPOKINESIS, EF =40-45%)   Signature     ----------------------------------------------------------------   Electronically signed by Tuyet Scott MD(Interpreting   physician) on 06/30/2022 07:28 AM   ----------------------------------------------------------------    --------------------------  Reviewed prior echos in [facility] system:    Echo Aug '21: EF <20%  then TTE & PETE Sep '21 w/ normal EF     stress Sep '21 w/ no ischemia normal EF.      ===============================    William Noe M.D.  Cardiology, [facility]  Cell: [phone]  Offices:    ([facility] Office)  [phone] ([facility] Office)

## 2022-07-01 NOTE — PROGRESS NOTE ADULT - PROBLEM SELECTOR PLAN 3
Moderate systolic dysfunction on Echo this admit, EF=40-45%.  h/o reduced EF in 30s w/ subsequent improvement (see above)  Likely related to drug use.  Pt yelling to be d/c'd.  h/o poor medical compliance.    FU w/ cardio in 1 week, if compliant w/ followup  will consider ACEI/entresto & BB.      followup w/ cardiology in 1 week.

## 2022-07-01 NOTE — DISCHARGE NOTE PROVIDER - HOSPITAL COURSE
Patient is a 46 y/o/m with past medical history of type 2 DM, HTN, hypothyroidism, asthma, ADHD, bicuspid aortic valve, with admission in 2021 for benzo/cocaine overdose with subsequent aspiration pneumonia requiring intubation with subsequent anoxic brain injury and ECHO showing LVEF <20% and MRI with acute infarcts and hypoxic ischemic encephalopathy presents with chest pain. The pt violated a restraining order today and had an altercation with the police. He then started having chest pain in the center of his chest with radiation down the left arm. Pain 7/10 in intensity, described as sharp, tingling sensation in the arm, pain is steady/constant, worse with breathing. Reports SOB, sweats, blurry vision. Denies fevers, chills, abdominal pain, N/V, diarrhea/constipation. He was previously on medications but stopped taking all medications. Has not taken anything since 8/2021. Pt reports last cocaine use 3 days ago. Last THC use 3 days ago. Used WhAcceleforceets today in front of the police.      Chest pain / elevated troponin this is most likely secondary to demand ischemia int he setting of cocaine use /  sinus tachycardia on admission  - tele monitoring  - EKG: no ST segment changes or T wave inversions   - strongly recommended to avoid cocaine use  - Ordered ECHO, lipid panel   - ECHO (9/13/21): EF 60-65%, LVH, trace M, bicuspid aortic valve with mild to moderate aortic stenosis  - s/p LHC on 06/30 - showing non obstructive CAD, medicalmanagemetn   - cont asa and statin   - follow up with cardiology - Dr. Noe - outpatient     # Hyperglycemia   - HbA1c is 5.6  - Diabetic diet   - repeeat HgA1C in 3 months     # Asymptomatic bacteruria   - UA: trace leukocyte esterase, trace blood, few bacteria  - Pt asymptomatic likely contaminant     #  Cocaine use and THC use   - Counseled on cessation     # History of Type 2 DM, HTN, hypothyroidism asthma, ADHD, with admission in 2021 for benzo/cocaine overdose with subsequent aspiration pneumonia requiring intubation with subsequent anoxic brain injury and ECHO showing LVEF <20% and MRI with acute infarcts and hypoxic ischemic encephalopathy   - Pt not currently taking any medications     Pt; is stable for discharge, will follow up with cardiology next week.       Physical Exam:   Vital Signs Last 24 Hrs  T(C): 36.8 (01 Jul 2022 09:03), Max: 36.8 (30 Jun 2022 23:24)  T(F): 98.3 (01 Jul 2022 09:03), Max: 98.3 (01 Jul 2022 09:03)  HR: 104 (01 Jul 2022 09:03) (103 - 108)  BP: 126/80 (01 Jul 2022 09:03) (121/85 - 146/90)  BP(mean): --  RR: 18 (01 Jul 2022 09:03) (18 - 18)  SpO2: 98% (01 Jul 2022 09:03) (97% - 100%)  GENERAL APPEARANCE:  NAD, hemodynamically stable, comfortable, in chains  HEENT: no traumatic head injury  Skin:  Warm and dry without any rash   NECK:  Supple without lymphadenopathy.   HEART:  Regular rate and rhythm. normal S1 and S2, No M/R/G  LUNGS:  Good ins/exp effort, no W/R/R/C  ABDOMEN:  Soft, nontender, nondistended with good bowel sounds heard  EXTREMITIES:  Without cyanosis, clubbing or edema.   NEUROLOGICAL:  Gross nonfocal

## 2022-07-01 NOTE — DISCHARGE NOTE PROVIDER - NSDCCPCAREPLAN_GEN_ALL_CORE_FT
PRINCIPAL DISCHARGE DIAGNOSIS  Diagnosis: CAD (coronary artery disease)  Assessment and Plan of Treatment: you had chest pain because of your drug abuse - cocaine use   you had a procedure - cardiac angiogram that shows you have heart disease bu no blockages in your arteries of yopur heart that need stents   you need to stop using drugs and you need to take medications as prescribed   follow up with cardiology next week
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
## 2022-07-08 DIAGNOSIS — R07.9 CHEST PAIN, UNSPECIFIED: ICD-10-CM
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
## 2022-07-08 DIAGNOSIS — Z86.73 PERSONAL HISTORY OF TRANSIENT ISCHEMIC ATTACK (TIA), AND CEREBRAL INFARCTION WITHOUT RESIDUAL DEFICITS: ICD-10-CM

## 2022-07-08 DIAGNOSIS — R00.0 TACHYCARDIA, UNSPECIFIED: ICD-10-CM

## 2022-07-08 DIAGNOSIS — Q23.1 CONGENITAL INSUFFICIENCY OF AORTIC VALVE: ICD-10-CM

## 2022-07-08 DIAGNOSIS — T40.5X1A POISONING BY COCAINE, ACCIDENTAL (UNINTENTIONAL), INITIAL ENCOUNTER: ICD-10-CM

## 2022-07-08 DIAGNOSIS — I25.10 ATHEROSCLEROTIC HEART DISEASE OF NATIVE CORONARY ARTERY WITHOUT ANGINA PECTORIS: ICD-10-CM

## 2022-07-08 DIAGNOSIS — F12.90 CANNABIS USE, UNSPECIFIED, UNCOMPLICATED: ICD-10-CM

## 2022-07-08 DIAGNOSIS — F90.9 ATTENTION-DEFICIT HYPERACTIVITY DISORDER, UNSPECIFIED TYPE: ICD-10-CM

## 2022-07-08 DIAGNOSIS — R82.71 BACTERIURIA: ICD-10-CM

## 2022-07-08 DIAGNOSIS — I10 ESSENTIAL (PRIMARY) HYPERTENSION: ICD-10-CM

## 2022-07-08 DIAGNOSIS — J45.909 UNSPECIFIED ASTHMA, UNCOMPLICATED: ICD-10-CM

## 2022-07-08 DIAGNOSIS — Z96.621 PRESENCE OF RIGHT ARTIFICIAL ELBOW JOINT: ICD-10-CM

## 2022-07-08 DIAGNOSIS — E11.9 TYPE 2 DIABETES MELLITUS WITHOUT COMPLICATIONS: ICD-10-CM

## 2022-07-08 DIAGNOSIS — Z91.14 PATIENT'S OTHER NONCOMPLIANCE WITH MEDICATION REGIMEN: ICD-10-CM
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
## 2022-07-08 DIAGNOSIS — I24.8 OTHER FORMS OF ACUTE ISCHEMIC HEART DISEASE: ICD-10-CM

## 2022-07-08 DIAGNOSIS — E03.9 HYPOTHYROIDISM, UNSPECIFIED: ICD-10-CM

## 2023-05-24 NOTE — PROGRESS NOTE ADULT - ASSESSMENT
It can. Does he want to go? Does he have a preference to where?      Spoke with Faby he is agreeable ,no preference.   43y old  Male with PMH of HTN, DM, Asthma who presents with a chief complaint of Acute hypoxemic respiratory failure, AMS  from suspected overdose of benzo and cocaine.  Patient found  by sister unresponsive.  Arrived in the ER with agonal breath breathing and was intubated. Cxr c/w extensive right infiltrate likely aspiration pneumonia.  CT head reveals symmetric low attenuation lesions in the bilateral globus pallidus regions of the basal ganglia, which can be seen with hypoxic ischemic encephalopathy or related to carbon monoxide positioning, as well as indeterminate areas of low attenuation in the bilateral cerebellum may reflect infarcts.  Patient developed shock symptoms of hypotension requiring pressors to maintain b/p. Resuscitated with fluids and receiving maintenance fluids. We were asked to see patient for Elevated trops from 0.71 on admission to 1.25.  Ekg on admit without ischemic changes and repeated now and sinus tach with no ischemic changes.    ELEVATED TROP/CPK   CK/CKMB trending down  EKG without acute changes  Recommend ASA 81mg via NGT if ok with neuro    Systolic heart failure (LVEF <20%)  TTE: LVEF <20%, grade I DD, moderately reduced RV systolic function, mild-moderate MVR, mild AI, LF/LG severe AS  Once BP can tolerate would recommend introducing hydralazine for afterload reduction and low dose BB  Avoid ACE/ARB/ARNi until renal function normalizes  If pt should have meaningful neurologic recovery he will eventually need ischemic eval ([facility])    LIKELY SEPTIC SHOCK FROM ASPIRATION PNEUMONIA  On Zosyn  BP improving, no longer requiring vasopressors  On Midodrine     Anoxic Encephalopathy/Shock Liver  Avoid statin in view of transaminitis/rhabdo?  EEG pending

## 2023-11-14 NOTE — H&P ADULT - HISTORY OF PRESENT ILLNESS
Pt is inquiring if Dr Astorga can make a statement, pt is dyslexic and is trying to get into a program to be a  but they need an statement stating that pt is ok to be in the program. Please advice.        Thank you   42 y/o M with a h/o HTN, DM, asthma, presents to the ED after being found unresponsive in bed by his sister this evening. Noted to have agonal respirations and emesis coming from mouth. He was found to be hypoxemic in the ED prompting emergent endotracheal intubation and appears to have an extensive right sided pneumonia on CXR. The patient's sister, Adela, reports that he had recently broken up with his girlfriend and has been having a hard time coping with it and has been noticeably depressed. Within the past 5 days he was taken to the ED at [facility] for some unclear intoxication. Adela also reports recently noticing a plethora of newly prescribed medications at his house and was able to identify clonazepam and alprazolam. Urine toxicology (+) for benzos and cocaine. CT head   44 y/o M with a h/o HTN, DM, asthma, presents to the ED after being found unresponsive in bed by his sister this evening. Noted to have agonal respirations and emesis coming from mouth. He was found to be hypoxemic in the ED prompting emergent endotracheal intubation and appears to have an extensive right sided pneumonia on CXR. The patient's sister, Adela, reports that he had recently broken up with his girlfriend and has been having a hard time coping with it and has been noticeably depressed. Within the past 5 days he was taken to the ED at [facility] for some unclear intoxication. Adela also reports recently noticing a plethora of newly prescribed medications at his house and was able to identify clonazepam and alprazolam. Urine toxicology (+) for benzos and cocaine. CT head reveals symmetric low attenuation lesions in the bilateral globus pallidus regions of the basal ganglia, which can be seen with hypoxic ischemic encephalopathy or related to carbon monoxide positioning, as well as indeterminate areas of low attenuation in the bilateral cerebellum may reflect infarcts. He is sedated and now with progressive hypotension despite 3L IVF bolus. Started on IV vasopressor therapy. 44 y/o M with a h/o HTN, DM, asthma, presents to the ED after being found unresponsive in bed by his sister this evening. Noted to have agonal respirations and emesis coming from mouth. He was found to be hypoxemic in the ED prompting emergent endotracheal intubation and appears to have an extensive right sided pneumonia on CXR. The patient's sister, Adela, reports that he had recently broken up with his girlfriend and has been having a hard time coping with it and has been noticeably depressed. Within the past 5 days he was taken to the ED at [facility] for some unclear intoxication. Adela also reports recently noticing a plethora of newly prescribed medications at his house and was able to identify clonazepam and alprazolam. Tonight he had told her that he was going out with friends and asked if she would come over and watch his dog, which she thought seemed unusual. Urine toxicology (+) for benzos and cocaine. CT head reveals symmetric low attenuation lesions in the bilateral globus pallidus regions of the basal ganglia, which can be seen with hypoxic ischemic encephalopathy or related to carbon monoxide positioning, as well as indeterminate areas of low attenuation in the bilateral cerebellum may reflect infarcts. He is sedated and now with progressive hypotension despite 3L IVF bolus. Started on IV vasopressor therapy. 45 y/o M (real name: Ildefonso Pierre, : [birth year]) with a h/o HTN, DM, asthma, presents to the ED after being found unresponsive in bed by his sister this evening. Noted to have agonal respirations and emesis coming from mouth. He was found to be hypoxemic in the ED prompting emergent endotracheal intubation and appears to have an extensive right sided pneumonia on CXR. The patient's sister, Adela, reports that he had recently broken up with his girlfriend and has been having a hard time coping with it and has been noticeably depressed. Within the past 5 days he was taken to the ED at [facility] for some unclear intoxication. Adela also reports recently noticing a plethora of newly prescribed medications at his house and was able to identify clonazepam and alprazolam. Tonight he had told her that he was going out with friends and asked if she would come over and watch his dog, which she thought seemed unusual. Urine toxicology (+) for benzos and cocaine. CT head reveals symmetric low attenuation lesions in the bilateral globus pallidus regions of the basal ganglia, which can be seen with hypoxic ischemic encephalopathy or related to carbon monoxide positioning, as well as indeterminate areas of low attenuation in the bilateral cerebellum may reflect infarcts. He is sedated and now with progressive hypotension despite 3L IVF bolus. Started on IV vasopressor therapy.

## 2024-03-27 NOTE — PATIENT PROFILE ADULT - DO YOU FEEL LIKE HURTING YOURSELF OR OTHERS?
----- Message from Lizette Irwin DO sent at 3/27/2024  6:58 AM CDT -----  Please call patient and let him know that he has normal electrolytes, kidney function, and no anemia. His blood sugar is elevated and he should begin taking the metformin. Please also make sure that he's taking crestor. We will clear him for surgery. Thanks   no

## 2025-06-25 NOTE — ED ADULT NURSE NOTE - NSSEPSISSUSPECTED_ED_A_ED
MEDICAL ONCOLOGY/HEMATOLOGY PROGRESS NOTE    Patient Active Problem List    Diagnosis Date Noted    Atrial fibrillation  (CMD) 02/13/2024     Priority: High    Pacemaker lead malfunction 04/29/2021     Priority: High    S/P robotic right lower lobectomy of lung 09/16/2019     Priority: Medium    Ascending aorta dilation (CMD) 06/18/2025     Priority: Low     Overview Note:     6/16/2025 - ECHO - Dilated sinus of valsalva measuring  4.0 cm.; Dilated ascending aorta measuring 3.70 cm.      NSVT (nonsustained ventricular tachycardia)  (CMD)      Priority: Low     Overview Note:     16  NSVT episodes and 1 monitored VT episode noted on device check in OV 5/8/2025      S/P left atrial appendage ligation 08/09/2024     Priority: Low    Nonrheumatic aortic valve stenosis 06/17/2024     Priority: Low     Overview Note:     6/14/24 - ECHO - Mild aortic valve stenosis; mean gradient 13 mmHg, TIMOTHY 2.1 cm2  6/16/2025 - ECHO - Mild aortic valve stenosis; mean gradient 13 mmHg, TIMOTHY 1.9 cm2      Chronic anticoagulation 01/15/2024     Priority: Low    Thyroid nodule 01/15/2023     Priority: Low    Complete heart block s/p AV Node Ablation 04/13/2021     Priority: Low    [employer] MRI Bi-V Pacemaker CRT-P, 04/13/2021 04/13/2021     Priority: Low     Overview Note:     04/13/2021: Initial implant Bi-V CRT-P and AV Node Ablation  Pre-op Diagnosis: Cardiomyopathy, AF with RVR  Remote Monitor: [employer] Relay [id number]  Device: Wireless: Yes, Bluetooth: Yes  Anticoagulation: Eliquis  5/27/2021 s/p LV lead revision for high threshold  6/7/2021 OV Due to patient reports of PNS, changed LV Capture Management from Adaptive to Monitor. ADL rate changed today from 95 bpm to 110 bpm to support patient during activity. Turned  AT/AF Daily [place] Alert and set it to 24hr detection time.  8/30/2021 OV LR changed from 80 to 60 bpm, A amplitude from 1.75V to 2.00 V, LV amplitude from 4.00V to 3.00V  8/22/2022 OV. LV threshold rise. LV output increased to 3.5V  @1ms for 1V safety margin   8/10/2023 OV Programming changes: decreased VTM to 140bpm, LV to autocapture.  4/15/2024 OV Changes: Alert for VT turned on            Atrial flutter  (CMD)      Priority: Low     Overview Note:     1/29/2021 new onset per EKG  4/13/2021/ Dr. Sheffield/ AV node ablation, BiV implant  5/27/2021/ Dr. Sheffield, lead revision  2/13/24 - Watchman device implant with Dr. Gonzalez - Successful LAAO with 22mm Amulet       Cardiomyopathy (CMD)      Priority: Low     Overview Note:     1/29/2021 per Echo EF 35%  4/13/2021/ Dr. Sheffield/  BiV implant  10/3/2022 per ECHO 35%  8/30/2022 per ECHO EF 30%  6/14/24 - ECHO - EF 35%  6/16/2025 - ECHO - EF 35%        Adenocarcinoma of right lung  (CMD) 08/08/2019     Priority: Low    Pneumothorax after biopsy 08/08/2019     Priority: Low    Benign prostatic hyperplasia without lower urinary tract symptoms 05/30/2018     Priority: Low    Chronic midline low back pain without sciatica 06/05/2016     Priority: Low    Carotid artery stenosis 07/17/2015     Priority: Low     Overview Note:     7/2015  Bilateral per US and CT  1/2017 per US carotid R ICA 50-69%  1/2018 per US carotid R ICA 50-69%  1/2019 per US of carotid R ICA 50-69%, L ICA near 50%  12/2019 per US of carotid moderate JANNETH , LICA mild  12/2020 per US of carotid moderate JANNETH LICA mild  2/2/2022 per CTA JANNETH 60%  1/2022 per US mild      Environmental allergies 12/23/2014     Priority: Low    Erectile dysfunction 04/15/2013     Priority: Low    CAD (coronary artery disease)      Priority: Low     Overview Note:     10/9/2012/ Dr. Coyne/  [facility]/  3.0x18 Xience stent in distal circ. Normal LVSF, EF 60%.  2/13/18/ [facility]/ [facility]/ Dr. Caballero/  CAD mild/ prox OM 40%/  EF 65%.      Paroxysmal SVT (supraventricular tachycardia) (CMS/HCC) 10/07/2012     Priority: Low    Contracture of palmar fascia 09/11/2012     Priority: Low    Anxiety 07/03/2012     Priority: Low    Gastroesophageal reflux disease without esophagitis  07/03/2012     Priority: Low    COPD without exacerbation  (CMD) 11/14/2011     Priority: Low    Essential hypertension      Priority: Low    Seborrheic dermatitis, unspecified 01/19/2007     Priority: Low    Contact dermatitis and other eczema, due to unspecified cause 07/11/2006     Priority: Low       HISTORY OF PRESENT ILLNESS:  MR. Khai Levin is a pleasant 82 year old male known to Dr. Zaragoza' service for metastatic lung cancer. He is currently on palliative pembrolizumab q 3 weeks and is seen today in consideration of his next cycle.     Khai is feeling largely well. Of note, he recently saw cardiology noting chest pain and SOB, which he attributed to mexlitine. He stopped drug and symptoms resolved, today he denies any chest pain, SOB, palpitations. His cardiologist did update echo and stress test given his extensive heart history. Echo was largely stable, but stress test demonstrated a large area of reversible ischemia proximal inferior wall to inferior apical wall. He has an appointment to see cardiology on 7/2 to discuss further.     Otherwise, he continues to have tolerable diarrhea, maybe 1-2x daily, that responds to imodium when taken. His breathing is at baseline. He has mild itching to the skin that responds to vaseline intensive strength, he is not on any topical steroids and does not feel any additional intervention is necessary. He denies any worsening arthralgias.     PAST MEDICAL HISTORY AND PAST SURGICAL HISTORY:  Reviewed and interval changes as noted above.    CURRENT MEDICATIONS:  Reviewed in electronic medical records.    FAMILY HISTORY AND SOCIAL HISTORY:  Reviewed and interval changes as noted above.    REVIEW OF SYSTEMS:  Per electronic medical record, reviewed and agreed upon.    PHYSICAL EXAMINATION:  Vital Signs: Blood pressure (!) 141/62, pulse 75, temperature 97.6 °F (36.4 °C), temperature source Oral, resp. rate 16, weight 93.4 kg (205 lb 14.4 oz), SpO2 96%.   ECOG Performance  Status   ECOG [06/25/25 1355]   ECOG Performance Status 1      General Appearance: 82 year old male in no acute distress.  Psychiatric:  Mood and affect are normal, if not slightly flat Judgment and insight are appropriate.  Head:  Normocephalic, atraumatic.  Ears, Nose, Throat/Eyes: no mucositis/thrush  Cardiovascular: systolic murmur II/VI at RUSB>LUSB. Regular rhythm  Respiratory: Normal respiratory effort, bilateral and symmetric expansion. Lungs are clear to auscultation bilaterally.   Abdomen: no pain to palpation  Musculoskeletal: there is pitting edema only at bilateral ankles, 1+, nothing above that.  Skin: No rashes, petechiae or ecchymosis. No icterus, no ulcerations.   Neurologic: No focal deficits, normal gait and station. Alert and oriented.     LABORATORY DATA:  Lab Services on 06/25/2025   Component Date Value    Sodium 06/25/2025 133 (L)     Potassium 06/25/2025 5.0     Chloride 06/25/2025 100     Carbon Dioxide 06/25/2025 27     Anion Gap 06/25/2025 11     Glucose 06/25/2025 114 (H)     BUN 06/25/2025 8     Creatinine 06/25/2025 0.65 (L)     Glomerular Filtration Ra* 06/25/2025 >90     BUN/Cr 06/25/2025 12     Calcium 06/25/2025 8.7     Bilirubin, Total 06/25/2025 0.6     GOT/AST 06/25/2025 14     GPT/ALT 06/25/2025 25     Alkaline Phosphatase 06/25/2025 79     Albumin 06/25/2025 3.4     Protein, Total 06/25/2025 6.4     Globulin 06/25/2025 3.0     A/G Ratio 06/25/2025 1.1     WBC 06/25/2025 6.2     RBC 06/25/2025 3.80 (L)     HGB 06/25/2025 12.2 (L)     HCT 06/25/2025 36.1 (L)     MCV 06/25/2025 95.0     MCH 06/25/2025 32.1     MCHC 06/25/2025 33.8     RDW-CV 06/25/2025 12.8     RDW-SD 06/25/2025 44.7     PLT 06/25/2025 151     NRBC 06/25/2025 0     Neutrophil, Percent 06/25/2025 84     Lymphocytes, Percent 06/25/2025 6     Mono, Percent 06/25/2025 6     Eosinophils, Percent 06/25/2025 2     Basophils, Percent 06/25/2025 1     Immature Granulocytes 06/25/2025 1     Absolute Neutrophils 06/25/2025  5.3     Absolute Lymphocytes 06/25/2025 0.4 (L)     Absolute Monocytes 06/25/2025 0.4     Absolute Eosinophils  06/25/2025 0.1     Absolute Basophils 06/25/2025 0.0     Absolute Immature Granul* 06/25/2025 0.0    Hospital Outpatient Visit on 06/20/2025   Component Date Value    Resting HR Achieved 06/20/2025 86     Baseline BP 06/20/2025 129/66     Stress peak HR 06/20/2025 96     Post peak BP 06/20/2025 129/66     Predicted HR Max 06/20/2025 140     Percent HR 06/20/2025 69     Exercise duration (min) 06/20/2025 6     Exercise duration (sec) 06/20/2025 21     Estimated workload 06/20/2025 1.0     HEART RATE RESERVE PREDI* 06/20/2025 31.43    Imaging Services on 06/16/2025   Component Date Value    MV Peak A Velocity 06/16/2025 0.7     MV E Tissue Manuel Lat 06/16/2025 0.0     E Wave Decelaration Time 06/16/2025 [id number]     LV outflow tract 06/16/2025 2.326006     LVOT VTI 06/16/2025 17.828805     PV Peak Velocity 06/16/2025 0.6     TV Estimated Right Arter* 06/16/2025 8     DOP Calc LVOT Peak Manuel 06/16/2025 0.7     AV Peak Velocity 06/16/2025 2.4     Tricuspid Valve Peak Reg* 06/16/2025 2.7     Left Internal Dimenson i* 06/16/2025 4.092776     Sinuses of Valsalva 06/16/2025 4.607912     Ascending Aorta 06/16/2025 3.498091     LV End Systolic Longitud* 06/16/2025 -16     Tricuspid Annular Plane * 06/16/2025 1.9     AV Mean Gradient 06/16/2025 14.574059     AV Peak Gradient 06/16/2025 24.965761     ARNOL LVOT Peak Gradient 06/16/2025 1.062609     Tricuspid valve annular * 06/16/2025 0.1     MV E Wave Manuel/E Tissue V* 06/16/2025 14.5762783184933     Right Ventricular Area C* 06/16/2025 44.5309880069448     Aortic Valve Area 06/16/2025 1.22263889743804     Est Right Vent Systolic * 06/16/2025 39.6395903617827     Ejection Fraction 06/16/2025 35     Interventricular Septum * 06/16/2025 1.595843     Left Ventricular Interna* 06/16/2025 6.30116     LV end diastolic posteri* 06/16/2025 1.140509     MV Peak E Velocity  06/16/2025 0.5     MV E Tissue Manuel Med 06/16/2025 0.0    Lab Services on 06/03/2025   Component Date Value    Magnesium 06/03/2025 1.7     Sodium 06/03/2025 136     Potassium 06/03/2025 3.8     Chloride 06/03/2025 101     Carbon Dioxide 06/03/2025 26     Anion Gap 06/03/2025 13     Glucose 06/03/2025 115 (H)     BUN 06/03/2025 7     Creatinine 06/03/2025 0.63 (L)     Glomerular Filtration Ra* 06/03/2025 >90     BUN/Cr 06/03/2025 11     Calcium 06/03/2025 8.7     Bilirubin, Total 06/03/2025 0.7     GOT/AST 06/03/2025 18     GPT/ALT 06/03/2025 25     Alkaline Phosphatase 06/03/2025 76     Albumin 06/03/2025 3.5     Protein, Total 06/03/2025 6.4     Globulin 06/03/2025 2.9     A/G Ratio 06/03/2025 1.2     TSH 06/03/2025 1.320     WBC 06/03/2025 5.6     RBC 06/03/2025 3.69 (L)     HGB 06/03/2025 11.9 (L)     HCT 06/03/2025 35.2 (L)     MCV 06/03/2025 95.4     MCH 06/03/2025 32.2     MCHC 06/03/2025 33.8     RDW-CV 06/03/2025 12.8     RDW-SD 06/03/2025 45.1     PLT 06/03/2025 168     NRBC 06/03/2025 0     Neutrophil, Percent 06/03/2025 81     Lymphocytes, Percent 06/03/2025 7     Mono, Percent 06/03/2025 7     Eosinophils, Percent 06/03/2025 3     Basophils, Percent 06/03/2025 1     Immature Granulocytes 06/03/2025 1     Absolute Neutrophils 06/03/2025 4.7     Absolute Lymphocytes 06/03/2025 0.4 (L)     Absolute Monocytes 06/03/2025 0.4     Absolute Eosinophils  06/03/2025 0.1     Absolute Basophils 06/03/2025 0.0     Absolute Immature Granul* 06/03/2025 0.0        CLINICAL IMPRESSION AND PLAN:  #Metastatic NSCLC   Patient is tolerating therapy with acceptable toxicity. I reviewed interval cardiology notes and do not suspect his abnormal stress test to be due to immunotherapy. As such, we will proceed to next cycle pembrolizumab. Patient is counseled with any chest pain or SOB he is to seek care in ED, otherwise he is to follow-up as scheduled with his cardiologist.     He will return in 3 weeks to see Dr. Zaragoza in  consideration of his next cycle, though he is encouraged to call sooner with any interval questions/concerns/issues.     Lilo Matos PA-C     No